# Patient Record
Sex: FEMALE | Race: WHITE | NOT HISPANIC OR LATINO | Employment: OTHER | URBAN - NONMETROPOLITAN AREA
[De-identification: names, ages, dates, MRNs, and addresses within clinical notes are randomized per-mention and may not be internally consistent; named-entity substitution may affect disease eponyms.]

---

## 2017-07-05 ENCOUNTER — TRANSCRIBE ORDERS (OUTPATIENT)
Dept: PHYSICAL THERAPY | Facility: HOSPITAL | Age: 57
End: 2017-07-05

## 2017-07-05 DIAGNOSIS — I89.0 LYMPHEDEMA: Primary | ICD-10-CM

## 2017-07-12 ENCOUNTER — APPOINTMENT (OUTPATIENT)
Dept: PHYSICAL THERAPY | Facility: HOSPITAL | Age: 57
End: 2017-07-12

## 2017-07-21 ENCOUNTER — APPOINTMENT (OUTPATIENT)
Dept: PHYSICAL THERAPY | Facility: HOSPITAL | Age: 57
End: 2017-07-21

## 2018-01-25 ENCOUNTER — OFFICE VISIT (OUTPATIENT)
Dept: ENDOCRINOLOGY | Facility: CLINIC | Age: 58
End: 2018-01-25

## 2018-01-25 VITALS
HEIGHT: 66 IN | BODY MASS INDEX: 33.38 KG/M2 | DIASTOLIC BLOOD PRESSURE: 70 MMHG | SYSTOLIC BLOOD PRESSURE: 128 MMHG | WEIGHT: 207.7 LBS | HEART RATE: 73 BPM

## 2018-01-25 DIAGNOSIS — E11.69 MIXED DIABETIC HYPERLIPIDEMIA ASSOCIATED WITH TYPE 2 DIABETES MELLITUS (HCC): ICD-10-CM

## 2018-01-25 DIAGNOSIS — E04.0 SIMPLE GOITER: ICD-10-CM

## 2018-01-25 DIAGNOSIS — E11.59 HYPERTENSION ASSOCIATED WITH DIABETES (HCC): ICD-10-CM

## 2018-01-25 DIAGNOSIS — Z79.4 TYPE 2 DIABETES MELLITUS WITH HYPERGLYCEMIA, WITH LONG-TERM CURRENT USE OF INSULIN (HCC): Primary | ICD-10-CM

## 2018-01-25 DIAGNOSIS — I15.2 HYPERTENSION ASSOCIATED WITH DIABETES (HCC): ICD-10-CM

## 2018-01-25 DIAGNOSIS — E78.2 MIXED DIABETIC HYPERLIPIDEMIA ASSOCIATED WITH TYPE 2 DIABETES MELLITUS (HCC): ICD-10-CM

## 2018-01-25 DIAGNOSIS — E11.65 TYPE 2 DIABETES MELLITUS WITH HYPERGLYCEMIA, WITH LONG-TERM CURRENT USE OF INSULIN (HCC): Primary | ICD-10-CM

## 2018-01-25 DIAGNOSIS — Z72.0 TOBACCO ABUSE DISORDER: ICD-10-CM

## 2018-01-25 PROBLEM — N18.2 CKD (CHRONIC KIDNEY DISEASE), STAGE II: Status: ACTIVE | Noted: 2018-01-25

## 2018-01-25 PROBLEM — C43.71 MELANOMA OF LOWER LEG, RIGHT: Status: ACTIVE | Noted: 2018-01-25

## 2018-01-25 PROBLEM — E11.42 DIABETIC POLYNEUROPATHY ASSOCIATED WITH TYPE 2 DIABETES MELLITUS: Status: ACTIVE | Noted: 2018-01-25

## 2018-01-25 PROBLEM — C43.71 MELANOMA OF LOWER LEG, RIGHT (HCC): Status: RESOLVED | Noted: 2018-01-25 | Resolved: 2018-01-25

## 2018-01-25 PROBLEM — E11.3593 PROLIFERATIVE DIABETIC RETINOPATHY OF BOTH EYES ASSOCIATED WITH TYPE 2 DIABETES MELLITUS (HCC): Status: ACTIVE | Noted: 2018-01-25

## 2018-01-25 LAB — GLUCOSE BLDC GLUCOMTR-MCNC: 199 MG/DL (ref 70–130)

## 2018-01-25 PROCEDURE — 82962 GLUCOSE BLOOD TEST: CPT | Performed by: INTERNAL MEDICINE

## 2018-01-25 PROCEDURE — 99205 OFFICE O/P NEW HI 60 MIN: CPT | Performed by: INTERNAL MEDICINE

## 2018-01-25 PROCEDURE — 99406 BEHAV CHNG SMOKING 3-10 MIN: CPT | Performed by: INTERNAL MEDICINE

## 2018-01-25 RX ORDER — PIOGLITAZONEHYDROCHLORIDE 15 MG/1
15 TABLET ORAL DAILY
Qty: 30 TABLET | Refills: 11 | Status: SHIPPED | OUTPATIENT
Start: 2018-01-25 | End: 2019-01-25

## 2018-01-25 NOTE — PATIENT INSTRUCTIONS
Basaglar 40 units - back off to 35   If your sugar drops more than 30 points when you go without eating, back off 5 units    ==========      On lasix 20 mg daily - hold and replace with jardiance but you may use lasix if you swell    ============    Start actos 15 mg daily     Prevents strokes but can cause swelling     ============    Start victoza 0.6 mg daily for 1 week then 1.2 mg daily   Control appetite   Releases your own insulin     -----------------      Humalog 12 units at 150  And 3 per 50 above 150     Start with 2 units per 15 grams    + sliding scale as you are doing 3 units per 50 above 150

## 2018-01-25 NOTE — PROGRESS NOTES
Milagros Parson is a 57 y.o. female who presents for  evaluation of   Chief Complaint   Patient presents with   • Diabetes       Referring provider    Abigail Nicholas, APRN  [address] [place], [place] [address]    Primary Care Provider    Krish Brown MD    Duration 15 years    Timing - Diabetes is Constant    Quality -  needs improvement    Severity -  severe    Complications -    CVA in 2016    + neuropathy  + retinopathy  + nephropathy       Current symptoms/problems  increase appetite, paresthesia of the feet, polydipsia and polyuria     Alleviating Factors: Compliance      Side Effects  none    Current diet  diabetic    Current exercise none    Current monitoring regimen: home blood tests - 4 times daily  Home blood sugar records:     Hypoglycemia nocturnal    Past Medical History:   Diagnosis Date   • Atrial septal defect, secundum    • Chest pain 11/17/2016   • CKD (chronic kidney disease), stage II 1/25/2018   • Diabetes mellitus     DIABETES , UNCOMPLICATED, TYPE II    • Diabetic polyneuropathy associated with type 2 diabetes mellitus 1/25/2018   • HLD (hyperlipidemia) 11/17/2016   • HTN (hypertension) 11/17/2016   • Hyperlipidemia, mixed    • Hypertension associated with diabetes 1/25/2018   • Hypertension, benign    • Melanoma of lower leg, right 1/25/2018   • Mixed diabetic hyperlipidemia associated with type 2 diabetes mellitus 1/25/2018   • Nonspecific chest pain    • Proliferative diabetic retinopathy of both eyes associated with type 2 diabetes mellitus 1/25/2018   • Type 2 diabetes mellitus with hyperglycemia, with long-term current use of insulin 1/25/2018     Family History   Problem Relation Age of Onset   • Heart disease Father      Social History   Substance Use Topics   • Smoking status: Current Every Day Smoker     Packs/day: 1.00   • Smokeless tobacco: Never Used   • Alcohol use No         Current Outpatient Prescriptions:   •  aspirin 81 MG EC tablet, Take 81 mg by mouth  Daily., Disp: , Rfl:   •  choline fenofibrate (TRILIPIX) 45 MG capsule, Take 1 capsule by mouth Daily., Disp: 30 capsule, Rfl: 11  •  dexlansoprazole (DEXILANT) 60 MG capsule, Take 60 mg by mouth Daily., Disp: , Rfl:   •  estrogens, conjugated,-methyltestosterone (ESTRATEST HS) 0.625-1.25 MG per tablet, Take 1 tablet by mouth Daily., Disp: , Rfl:   •  furosemide (LASIX) 20 MG tablet, Take 20 mg by mouth 2 (Two) Times a Day., Disp: , Rfl:   •  gabapentin (NEURONTIN) 300 MG capsule, Take 300 mg by mouth 3 (Three) Times a Day., Disp: , Rfl:   •  HYDROcodone-acetaminophen (VICODIN) 7.5-500 MG per tablet, Take 1 tablet by mouth Every 6 (Six) Hours As Needed for moderate pain (4-6)., Disp: , Rfl:   •  lisinopril (PRINIVIL,ZESTRIL) 10 MG tablet, Take 10 mg by mouth Daily., Disp: , Rfl:   •  ondansetron ODT (ZOFRAN-ODT) 4 MG disintegrating tablet, Take 1 tablet by mouth 4 (Four) Times a Day As Needed for nausea or vomiting., Disp: 20 tablet, Rfl: 0  •  promethazine (PHENERGAN) 25 MG tablet, Take 25 mg by mouth Every 6 (Six) Hours As Needed for nausea or vomiting., Disp: , Rfl:   •  azithromycin (ZITHROMAX) 250 MG tablet, Take 1 tablet by mouth Daily., Disp: 6 tablet, Rfl: 0  •  Empagliflozin (JARDIANCE) 10 MG tablet, Take 1 tablet by mouth Daily. Before bkfast, Disp: 30 tablet, Rfl: 11  •  insulin aspart (NOVOLOG FLEXPEN) 100 UNIT/ML solution pen-injector sc pen, Up to 20 units with meals, Disp: 6 pen, Rfl: 11  •  Insulin Glargine (BASAGLAR KWIKPEN) 100 UNIT/ML injection pen, 35 units qhs, Disp: 4 pen, Rfl: 11  •  Insulin Pen Needle (B-D UF III MINI PEN NEEDLES) 31G X 5 MM misc, Use 5 times daily, Disp: 150 each, Rfl: 11  •  Liraglutide (VICTOZA) 18 MG/3ML solution pen-injector injection, 0.6 mg daily for 1 week then 1.2 mg subcutaneously daily, Disp: 2 pen, Rfl: 11  •  pioglitazone (ACTOS) 15 MG tablet, Take 1 tablet by mouth Daily., Disp: 30 tablet, Rfl: 11    Review of Systems    Review of Systems   Constitutional:  "Positive for fatigue. Negative for activity change, appetite change, chills, diaphoresis, fever and unexpected weight change.   HENT: Negative for congestion, dental problem, drooling, ear discharge, ear pain, facial swelling, mouth sores, postnasal drip, rhinorrhea, sinus pressure, sore throat, tinnitus, trouble swallowing and voice change.    Eyes: Negative for photophobia, pain, discharge, redness, itching and visual disturbance.   Respiratory: Negative for apnea, cough, choking, chest tightness, shortness of breath, wheezing and stridor.    Cardiovascular: Negative for chest pain, palpitations and leg swelling.   Gastrointestinal: Negative for abdominal distention, abdominal pain, constipation, diarrhea, nausea and vomiting.   Endocrine: Positive for polydipsia, polyphagia and polyuria. Negative for cold intolerance and heat intolerance.   Genitourinary: Negative for decreased urine volume, difficulty urinating, dysuria, flank pain, frequency, hematuria and urgency.   Musculoskeletal: Negative for arthralgias, back pain, gait problem, joint swelling, myalgias, neck pain and neck stiffness.   Skin: Negative for color change, pallor, rash and wound.   Allergic/Immunologic: Negative for immunocompromised state.   Neurological: Positive for weakness. Negative for dizziness, tremors, seizures, syncope, facial asymmetry, speech difficulty, light-headedness, numbness and headaches.   Hematological: Negative for adenopathy.   Psychiatric/Behavioral: Negative for agitation, behavioral problems, confusion, decreased concentration, dysphoric mood, hallucinations, self-injury, sleep disturbance and suicidal ideas. The patient is not nervous/anxious and is not hyperactive.         Objective:   /70 (BP Location: Right arm, Patient Position: Sitting, Cuff Size: Large Adult)  Pulse 73  Ht 167.6 cm (65.98\")  Wt 94.2 kg (207 lb 11.2 oz)  BMI 33.54 kg/m2    Physical Exam   Constitutional: She is oriented to person, " place, and time. She appears well-developed.   HENT:   Head: Normocephalic.   Right Ear: External ear normal.   Left Ear: External ear normal.   Nose: Nose normal.   Eyes: Conjunctivae and EOM are normal. No scleral icterus.   Neck: Normal range of motion. Neck supple. No tracheal deviation present. Thyromegaly present.   Cardiovascular: Normal rate, regular rhythm, normal heart sounds and intact distal pulses.  Exam reveals no gallop and no friction rub.    No murmur heard.  Pulmonary/Chest: Effort normal and breath sounds normal. No stridor. No respiratory distress. She has no wheezes. She has no rales. She exhibits no tenderness.   Abdominal: Soft. Bowel sounds are normal. She exhibits no distension and no mass. There is no tenderness. There is no rebound and no guarding.   Musculoskeletal: Normal range of motion. She exhibits no tenderness or deformity.   Lymphadenopathy:     She has no cervical adenopathy.   Neurological: She is alert and oriented to person, place, and time. She displays normal reflexes. She exhibits normal muscle tone. Coordination normal.   Skin: No rash noted. No erythema. No pallor.   Psychiatric: She has a normal mood and affect. Her behavior is normal. Judgment and thought content normal.       Lab Review    Results for orders placed or performed in visit on 01/25/18   POC Glucose Fingerstick   Result Value Ref Range    Glucose 199 (A) 70 - 130 mg/dL           Assessment/Plan       ICD-10-CM ICD-9-CM   1. Type 2 diabetes mellitus with hyperglycemia, with long-term current use of insulin E11.65 250.00    Z79.4 790.29     V58.67   2. Hypertension associated with diabetes E11.59 250.80    I10 401.9   3. Mixed diabetic hyperlipidemia associated with type 2 diabetes mellitus E11.69 250.80    E78.2 272.2   4. Simple goiter E04.0 240.0   5. Tobacco abuse disorder Z72.0 305.1       Glycemic Management:   No results found for: HGBA1C  Lab Results   Component Value Date    GLUCOSE 253 (H)  11/29/2016    BUN 20 11/29/2016    CREATININE 1.42 (H) 11/29/2016    EGFRIFNONA 38 (L) 11/29/2016    BCR 14.1 11/29/2016    K 4.0 11/29/2016    CO2 31.0 11/29/2016    CALCIUM 9.5 11/29/2016    ALBUMIN 4.00 11/29/2016    LABIL2 1.1 11/29/2016    AST 31 11/29/2016    ALT 33 11/29/2016    ANIONGAP 12.0 11/29/2016     Lab Results   Component Value Date    WBC 9.28 11/29/2016    HGB 12.3 11/29/2016    HCT 35.9 (L) 11/29/2016    MCV 90.2 11/29/2016     11/29/2016       Basaglar 40 units - decrease to 35  If your sugar drops more than 30 points when you go without eating, back off 5 units    ==========      On lasix 20 mg daily - hold and replace with jardiance     ============    Start actos 15 mg daily     ============    Humalog 12 units at 150  And 3 per 50 above 150     Change to 2 per carb and 2 per 50    ============    Add victoza      Lipid Management  No results found for: CHOL  No results found for: TRIG  No results found for: HDL  No results found for: LDLCALC  No results found for: LDL  No results found for: LDLDIRECT    Statin?    Blood Pressure Management:    Vitals:    01/25/18 1050   BP: 128/70   Pulse: 73     Lab Results   Component Value Date    GLUCOSE 253 (H) 11/29/2016    CALCIUM 9.5 11/29/2016     11/29/2016    K 4.0 11/29/2016    CO2 31.0 11/29/2016    CL 94 (L) 11/29/2016    BUN 20 11/29/2016    CREATININE 1.42 (H) 11/29/2016    EGFRIFNONA 38 (L) 11/29/2016    BCR 14.1 11/29/2016    ANIONGAP 12.0 11/29/2016       Controlled on lisinopril mediated regimen         Microvascular Complication Monitoring:    -----------    Last Microalbumin-Proteinuria Assessment    No results found for: MALBCRERATIO    No results found for: UTPCR    -----------      Neuropathy, none     Tobacco Use     I advised the patient of the risks in continuing to use tobacco, and I provided this patient with smoking cessation educational materials.  I also discussed how to quit smoking and the patient has expressed  the willingness to quit.      During this visit, I spent 3-10 mintues counseling the patient regarding smoking cessation.           Weight Related:   Wt Readings from Last 3 Encounters:   01/25/18 94.2 kg (207 lb 11.2 oz)   11/29/16 97.5 kg (215 lb)     Body mass index is 33.54 kg/(m^2).        Diet interventions: moderate (500 kCal/d) deficit diet.      Bone Health    Lab Results   Component Value Date    CALCIUM 9.5 11/29/2016       Thyroid Health    No results found for: TSH      Perceived goiter, obtain thyroid US       Other Diabetes Related Aspects       No results found for: [id number]      I reviewed and summarized records from Krish Brown MD from 2017 and I reviewed / ordered labs.     Orders Placed This Encounter   Procedures   • US Thyroid     Scheduling Instructions:      Do at [facility] in [place], [place]     Order Specific Question:   Reason for Exam:     Answer:   goiter   • POC Glucose Fingerstick         A copy of my note was sent to Krish Brown MD    Please see my above opinion and suggestions.       MDM  Number of Diagnoses or Management Options  Hypertension associated with diabetes: new, needed workup  Mixed diabetic hyperlipidemia associated with type 2 diabetes mellitus: new, needed workup  Simple goiter: new, needed workup  Tobacco abuse disorder: new, needed workup  Type 2 diabetes mellitus with hyperglycemia, with long-term current use of insulin: new, needed workup     Amount and/or Complexity of Data Reviewed  Clinical lab tests: ordered and reviewed  Tests in the radiology section of CPT®: ordered  Review and summarize past medical records: yes    Risk of Complications, Morbidity, and/or Mortality  Presenting problems: moderate  Diagnostic procedures: minimal  Management options: moderate

## 2018-01-26 ENCOUNTER — TELEPHONE (OUTPATIENT)
Dept: ENDOCRINOLOGY | Facility: CLINIC | Age: 58
End: 2018-01-26

## 2018-01-26 PROBLEM — Z72.0 TOBACCO ABUSE DISORDER: Status: ACTIVE | Noted: 2018-01-26

## 2018-01-26 PROBLEM — E04.0 SIMPLE GOITER: Status: ACTIVE | Noted: 2018-01-26

## 2018-01-26 RX ORDER — INSULIN GLARGINE 100 [IU]/ML
INJECTION, SOLUTION SUBCUTANEOUS
Qty: 4 PEN | Refills: 11 | Status: SHIPPED | OUTPATIENT
Start: 2018-01-26 | End: 2020-01-28 | Stop reason: SDUPTHER

## 2018-01-29 ENCOUNTER — TELEPHONE (OUTPATIENT)
Dept: ENDOCRINOLOGY | Facility: CLINIC | Age: 58
End: 2018-01-29

## 2018-02-07 ENCOUNTER — TELEPHONE (OUTPATIENT)
Dept: ENDOCRINOLOGY | Facility: CLINIC | Age: 58
End: 2018-02-07

## 2018-02-08 ENCOUNTER — TELEPHONE (OUTPATIENT)
Dept: ENDOCRINOLOGY | Facility: CLINIC | Age: 58
End: 2018-02-08

## 2018-02-08 NOTE — TELEPHONE ENCOUNTER
jardiance is approved until further notice    actos requires step therapy of metformin which pt can not tolerate.

## 2018-02-15 ENCOUNTER — TELEPHONE (OUTPATIENT)
Dept: ENDOCRINOLOGY | Facility: CLINIC | Age: 58
End: 2018-02-15

## 2018-03-08 ENCOUNTER — TELEPHONE (OUTPATIENT)
Dept: ENDOCRINOLOGY | Facility: CLINIC | Age: 58
End: 2018-03-08

## 2018-04-13 ENCOUNTER — TELEPHONE (OUTPATIENT)
Dept: ENDOCRINOLOGY | Facility: CLINIC | Age: 58
End: 2018-04-13

## 2018-04-13 ENCOUNTER — TELEPHONE (OUTPATIENT)
Dept: FAMILY MEDICINE CLINIC | Facility: CLINIC | Age: 58
End: 2018-04-13

## 2018-04-13 ENCOUNTER — OFFICE VISIT (OUTPATIENT)
Dept: ENDOCRINOLOGY | Facility: CLINIC | Age: 58
End: 2018-04-13

## 2018-04-13 VITALS
HEART RATE: 66 BPM | BODY MASS INDEX: 33.91 KG/M2 | WEIGHT: 211 LBS | DIASTOLIC BLOOD PRESSURE: 70 MMHG | HEIGHT: 66 IN | SYSTOLIC BLOOD PRESSURE: 142 MMHG

## 2018-04-13 DIAGNOSIS — I15.2 HYPERTENSION ASSOCIATED WITH DIABETES (HCC): ICD-10-CM

## 2018-04-13 DIAGNOSIS — E11.69 MIXED DIABETIC HYPERLIPIDEMIA ASSOCIATED WITH TYPE 2 DIABETES MELLITUS (HCC): ICD-10-CM

## 2018-04-13 DIAGNOSIS — Z79.4 TYPE 2 DIABETES MELLITUS WITH HYPERGLYCEMIA, WITH LONG-TERM CURRENT USE OF INSULIN (HCC): Primary | ICD-10-CM

## 2018-04-13 DIAGNOSIS — E11.65 TYPE 2 DIABETES MELLITUS WITH HYPERGLYCEMIA, WITH LONG-TERM CURRENT USE OF INSULIN (HCC): Primary | ICD-10-CM

## 2018-04-13 DIAGNOSIS — I87.2 VENOUS INSUFFICIENCY OF BOTH LOWER EXTREMITIES: ICD-10-CM

## 2018-04-13 DIAGNOSIS — E78.2 MIXED DIABETIC HYPERLIPIDEMIA ASSOCIATED WITH TYPE 2 DIABETES MELLITUS (HCC): ICD-10-CM

## 2018-04-13 DIAGNOSIS — E11.59 HYPERTENSION ASSOCIATED WITH DIABETES (HCC): ICD-10-CM

## 2018-04-13 DIAGNOSIS — R09.89 WEAK PULSE: ICD-10-CM

## 2018-04-13 DIAGNOSIS — R60.9 SWELLING: Primary | ICD-10-CM

## 2018-04-13 PROCEDURE — 99214 OFFICE O/P EST MOD 30 MIN: CPT | Performed by: NURSE PRACTITIONER

## 2018-04-13 NOTE — TELEPHONE ENCOUNTER
She needs to get from primary doctor     Documentation       You   OWEN Humphrey 34 minutes ago (3:06 PM)      Please advise     Documentation       Deepthi Gunn routed conversation to You 37 minutes ago (3:02 PM)      Milagros Parson [phone]  Deepthi Gunn 39 minutes ago (3:01 PM)      Deepthi Gunn 39 minutes ago (3:01 PM)      MILAGROS PARSON WAS JUST SEEN TODAY ..SHE FORGOT TO ASK IF [facility] WILL GIVE HER A PRESP ANTIBIOTIC FOR HER INF.IN TOOTH..SHE HAS TO GET APPT WITH [facility]...PLEASE SEND PRESP TO [facility] IN [place]. HER# [phone]

## 2018-04-13 NOTE — PROGRESS NOTES
Subjective    Milagros Parson is a 58 y.o. female. she is here today for follow-up.    History of Present Illness       Primary Care Provider     Krish Brown MD     Duration 15 years     Timing - Diabetes is Constant     Quality -  needs improvement     Severity -  severe     Complications -     CVA in 2016     + neuropathy  + retinopathy  + nephropathy         Current symptoms/problems  increase appetite, paresthesia of the feet, polydipsia and polyuria      Alleviating Factors: Compliance       Side Effects  none     Current diet  diabetic     Current exercise none     Current monitoring regimen: home blood tests - 6 times daily  Home blood sugar records:       Am 140 up to 160       Under 200     Having some low sugars     Needs to test more frequently      Hypoglycemia nocturnal      The following portions of the patient's history were reviewed and updated as appropriate:   Past Medical History:   Diagnosis Date   • Atrial septal defect, secundum    • Chest pain 11/17/2016   • CKD (chronic kidney disease), stage II 1/25/2018   • Diabetes mellitus     DIABETES , UNCOMPLICATED, TYPE II    • Diabetic polyneuropathy associated with type 2 diabetes mellitus 1/25/2018   • HLD (hyperlipidemia) 11/17/2016   • HTN (hypertension) 11/17/2016   • Hyperlipidemia, mixed    • Hypertension associated with diabetes 1/25/2018   • Hypertension, benign    • Melanoma of lower leg, right 1/25/2018   • Mixed diabetic hyperlipidemia associated with type 2 diabetes mellitus 1/25/2018   • Nonspecific chest pain    • Proliferative diabetic retinopathy of both eyes associated with type 2 diabetes mellitus 1/25/2018   • Type 2 diabetes mellitus with hyperglycemia, with long-term current use of insulin 1/25/2018     No past surgical history on file.  Family History   Problem Relation Age of Onset   • Heart disease Father      OB History     No data available        Current Outpatient Prescriptions   Medication Sig Dispense Refill    • aspirin 81 MG EC tablet Take 81 mg by mouth Daily.     • choline fenofibrate (TRILIPIX) 45 MG capsule Take 1 capsule by mouth Daily. 30 capsule 11   • dexlansoprazole (DEXILANT) 60 MG capsule Take 60 mg by mouth Daily.     • Empagliflozin (JARDIANCE) 10 MG tablet Take 1 tablet by mouth Daily. Before bkfast 30 tablet 11   • estrogens, conjugated,-methyltestosterone (ESTRATEST HS) 0.625-1.25 MG per tablet Take 1 tablet by mouth Daily.     • furosemide (LASIX) 20 MG tablet Take 20 mg by mouth 2 (Two) Times a Day.     • gabapentin (NEURONTIN) 300 MG capsule Take 300 mg by mouth 3 (Three) Times a Day.     • HYDROcodone-acetaminophen (VICODIN) 7.5-500 MG per tablet Take 1 tablet by mouth Every 6 (Six) Hours As Needed for moderate pain (4-6).     • insulin aspart (NOVOLOG FLEXPEN) 100 UNIT/ML solution pen-injector sc pen Up to 20 units with meals 6 pen 11   • Insulin Glargine (BASAGLAR KWIKPEN) 100 UNIT/ML injection pen 35 units qhs 4 pen 11   • Insulin Pen Needle (B-D UF III MINI PEN NEEDLES) 31G X 5 MM misc Use 5 times daily 150 each 11   • Liraglutide (VICTOZA) 18 MG/3ML solution pen-injector injection 0.6 mg daily for 1 week then 1.2 mg subcutaneously daily 2 pen 11   • lisinopril (PRINIVIL,ZESTRIL) 10 MG tablet Take 10 mg by mouth Daily.     • ondansetron ODT (ZOFRAN-ODT) 4 MG disintegrating tablet Take 1 tablet by mouth 4 (Four) Times a Day As Needed for nausea or vomiting. 20 tablet 0   • pioglitazone (ACTOS) 15 MG tablet Take 1 tablet by mouth Daily. 30 tablet 11   • promethazine (PHENERGAN) 25 MG tablet Take 25 mg by mouth Every 6 (Six) Hours As Needed for nausea or vomiting.       No current facility-administered medications for this visit.      Allergies   Allergen Reactions   • Penicillins      Social History     Social History   • Marital status:      Social History Main Topics   • Smoking status: Current Every Day Smoker     Packs/day: 1.00   • Smokeless tobacco: Never Used   • Alcohol use No   •  "Drug use: No     Other Topics Concern   • Not on file       Review of Systems  Review of Systems   Constitutional: Negative for activity change, appetite change, diaphoresis and fatigue.   HENT: Negative for facial swelling, sneezing, sore throat, tinnitus, trouble swallowing and voice change.    Eyes: Negative for photophobia, pain, discharge, redness, itching and visual disturbance.   Respiratory: Negative for apnea, cough, choking, chest tightness and shortness of breath.    Cardiovascular: Negative for chest pain, palpitations and leg swelling.   Gastrointestinal: Negative for abdominal distention, abdominal pain, constipation, diarrhea, nausea and vomiting.   Endocrine: Negative for cold intolerance, heat intolerance, polydipsia, polyphagia and polyuria.   Genitourinary: Negative for difficulty urinating, dysuria, frequency, hematuria and urgency.   Musculoskeletal: Negative for arthralgias, back pain, gait problem, joint swelling, myalgias, neck pain and neck stiffness.   Skin: Negative for color change, pallor, rash and wound.   Neurological: Negative for dizziness, tremors, weakness, light-headedness, numbness and headaches.   Hematological: Negative for adenopathy. Does not bruise/bleed easily.   Psychiatric/Behavioral: Negative for behavioral problems, confusion and sleep disturbance.        Objective    /70 (BP Location: Left arm, Patient Position: Sitting, Cuff Size: Adult)   Pulse 66   Ht 167.6 cm (66\")   Wt 95.7 kg (211 lb)   BMI 34.06 kg/m²   Physical Exam   Constitutional: She is oriented to person, place, and time. She appears well-developed and well-nourished. No distress.   HENT:   Head: Normocephalic and atraumatic.   Right Ear: External ear normal.   Left Ear: External ear normal.   Nose: Nose normal.   Eyes: Conjunctivae and EOM are normal. Pupils are equal, round, and reactive to light.   Neck: Normal range of motion. Neck supple. No tracheal deviation present. No thyromegaly " present.   Cardiovascular: Normal rate, regular rhythm and normal heart sounds.    No murmur heard.  Pulses palpable but weak   Pulmonary/Chest: Effort normal and breath sounds normal. No respiratory distress. She has no wheezes.   Abdominal: Soft. Bowel sounds are normal. There is no tenderness. There is no rebound and no guarding.   Musculoskeletal: Normal range of motion. She exhibits edema. She exhibits no tenderness or deformity.   Trace edema bilateral   Neurological: She is alert and oriented to person, place, and time. No cranial nerve deficit.   Skin: Skin is warm and dry. No rash noted.   Psychiatric: She has a normal mood and affect. Her behavior is normal. Judgment and thought content normal.       Lab Review  Glucose (mg/dL)   Date Value   11/29/2016 253 (H)   08/21/2016 438 (H)     Sodium (mmol/L)   Date Value   11/29/2016 137   08/21/2016 135   08/21/2016 135     Potassium (mmol/L)   Date Value   11/29/2016 4.0   08/21/2016 4.3     Chloride (mmol/L)   Date Value   11/29/2016 94 (L)   08/21/2016 93 (L)     CO2 (mmol/L)   Date Value   11/29/2016 31.0   08/21/2016 28     BUN (mg/dL)   Date Value   11/29/2016 20   08/21/2016 28 (H)     Creatinine (mg/dL)   Date Value   11/29/2016 1.42 (H)   08/21/2016 1.62 (H)   08/20/2015 1.39       Assessment/Plan      1. Type 2 diabetes mellitus with hyperglycemia, with long-term current use of insulin    2. Hypertension associated with diabetes    3. Mixed diabetic hyperlipidemia associated with type 2 diabetes mellitus    4. Venous insufficiency of both lower extremities    .    Medications prescribed:  Outpatient Encounter Prescriptions as of 4/13/2018   Medication Sig Dispense Refill   • aspirin 81 MG EC tablet Take 81 mg by mouth Daily.     • choline fenofibrate (TRILIPIX) 45 MG capsule Take 1 capsule by mouth Daily. 30 capsule 11   • dexlansoprazole (DEXILANT) 60 MG capsule Take 60 mg by mouth Daily.     • Empagliflozin (JARDIANCE) 10 MG tablet Take 1 tablet by  mouth Daily. Before bkfast 30 tablet 11   • estrogens, conjugated,-methyltestosterone (ESTRATEST HS) 0.625-1.25 MG per tablet Take 1 tablet by mouth Daily.     • furosemide (LASIX) 20 MG tablet Take 20 mg by mouth 2 (Two) Times a Day.     • gabapentin (NEURONTIN) 300 MG capsule Take 300 mg by mouth 3 (Three) Times a Day.     • HYDROcodone-acetaminophen (VICODIN) 7.5-500 MG per tablet Take 1 tablet by mouth Every 6 (Six) Hours As Needed for moderate pain (4-6).     • insulin aspart (NOVOLOG FLEXPEN) 100 UNIT/ML solution pen-injector sc pen Up to 20 units with meals 6 pen 11   • Insulin Glargine (BASAGLAR KWIKPEN) 100 UNIT/ML injection pen 35 units qhs 4 pen 11   • Insulin Pen Needle (B-D UF III MINI PEN NEEDLES) 31G X 5 MM misc Use 5 times daily 150 each 11   • Liraglutide (VICTOZA) 18 MG/3ML solution pen-injector injection 0.6 mg daily for 1 week then 1.2 mg subcutaneously daily 2 pen 11   • lisinopril (PRINIVIL,ZESTRIL) 10 MG tablet Take 10 mg by mouth Daily.     • ondansetron ODT (ZOFRAN-ODT) 4 MG disintegrating tablet Take 1 tablet by mouth 4 (Four) Times a Day As Needed for nausea or vomiting. 20 tablet 0   • pioglitazone (ACTOS) 15 MG tablet Take 1 tablet by mouth Daily. 30 tablet 11   • promethazine (PHENERGAN) 25 MG tablet Take 25 mg by mouth Every 6 (Six) Hours As Needed for nausea or vomiting.     • [DISCONTINUED] azithromycin (ZITHROMAX) 250 MG tablet Take 1 tablet by mouth Daily. 6 tablet 0     No facility-administered encounter medications on file as of 4/13/2018.        Orders placed during this encounter include:  No orders of the defined types were placed in this encounter.    Glycemic Management:   No results found for: HGBA1C        Lab Results   Component Value Date     GLUCOSE 253 (H) 11/29/2016     BUN 20 11/29/2016     CREATININE 1.42 (H) 11/29/2016     EGFRIFNONA 38 (L) 11/29/2016     BCR 14.1 11/29/2016     K 4.0 11/29/2016     CO2 31.0 11/29/2016     CALCIUM 9.5 11/29/2016     ALBUMIN 4.00  11/29/2016     LABIL2 1.1 11/29/2016     AST 31 11/29/2016     ALT 33 11/29/2016     ANIONGAP 12.0 11/29/2016           Lab Results   Component Value Date     WBC 9.28 11/29/2016     HGB 12.3 11/29/2016     HCT 35.9 (L) 11/29/2016     MCV 90.2 11/29/2016      11/29/2016           Needs to check more frequently due to variable blood sugars from 30s up to 400        Basaglar taking 20 up to 25 at night   If your sugar drops more than 30 points when you go without eating, back off 5 units     ==========         jardiance      ============     Start actos 15 mg daily      ============     Novolog  12 units at 150  And 3 per 50 above 150      Change to 2 per carb and 2 per 50     ============      victoza taking         Lipid Management       Statin?     Blood Pressure Management:          Controlled on lisinopril mediated regimen     Lasix was stopped            Microvascular Complication Monitoring:    -----------     Last Microalbumin-Proteinuria Assessment     No results found for: MALBCRERATIO     No results found for: UTPCR     -----------        Neuropathy, none      Tobacco Use      I advised the patient of the risks in continuing to use tobacco, and I provided this patient with smoking cessation educational materials.  I also discussed how to quit smoking and the patient has expressed the willingness to quit.       During this visit, I spent 3-10 mintues counseling the patient regarding smoking cessation.               Weight Related:       Wt Readings from Last 3 Encounters:   01/25/18 94.2 kg (207 lb 11.2 oz)   11/29/16 97.5 kg (215 lb)      Body mass index is 33.54 kg/(m^2).           Diet interventions: moderate (500 kCal/d) deficit diet.        Bone Health           Lab Results   Component Value Date     CALCIUM 9.5 11/29/2016         Thyroid Health     No results found for: TSH        Perceived goiter, obtain thyroid US --- had done at [facility] -- will obtain report        Other Diabetes  Related Aspects         No results found for: [id number]    Refer to vascular at [facility]    Calf pain, swelling legs, weak pulses has been going on for months    Obtain vascular studies          4. Follow-up: Return in about 3 months (around 7/13/2018) for Recheck.

## 2018-04-19 ENCOUNTER — HOSPITAL ENCOUNTER (OUTPATIENT)
Dept: VASCULAR LAB | Age: 58
Discharge: HOME | End: 2018-04-19
Payer: MEDICAID

## 2018-04-19 DIAGNOSIS — M79.89 LEG SWELLING: Primary | ICD-10-CM

## 2018-04-19 PROCEDURE — 93970 EXTREMITY STUDY: CPT

## 2018-05-08 ENCOUNTER — TELEPHONE (OUTPATIENT)
Dept: ENDOCRINOLOGY | Facility: CLINIC | Age: 58
End: 2018-05-08

## 2018-05-08 NOTE — TELEPHONE ENCOUNTER
----- Message from OWEN Humphrey sent at 5/8/2018 10:54 AM CDT -----  She needs to discuss with primary I did not prescribe the neurotin; no evidence of blood clot in legs   ----- Message -----  From: Elina Whittaker MA  Sent: 5/8/2018   9:51 AM  To: OWEN Humphrey    Pt is asking that her neurontin be changed to something stronger or increased.  She takes 300 mg po tid and I have also called for labs from [facility] and vascular test from [facility].

## 2018-08-30 ENCOUNTER — TELEPHONE (OUTPATIENT)
Dept: ENDOCRINOLOGY | Facility: CLINIC | Age: 58
End: 2018-08-30

## 2018-09-20 ENCOUNTER — OFFICE VISIT (OUTPATIENT)
Dept: ENDOCRINOLOGY | Facility: CLINIC | Age: 58
End: 2018-09-20

## 2018-09-20 VITALS
HEIGHT: 66 IN | WEIGHT: 198 LBS | SYSTOLIC BLOOD PRESSURE: 124 MMHG | DIASTOLIC BLOOD PRESSURE: 80 MMHG | HEART RATE: 70 BPM | BODY MASS INDEX: 31.82 KG/M2

## 2018-09-20 DIAGNOSIS — E78.2 MIXED DIABETIC HYPERLIPIDEMIA ASSOCIATED WITH TYPE 2 DIABETES MELLITUS (HCC): ICD-10-CM

## 2018-09-20 DIAGNOSIS — E11.69 MIXED DIABETIC HYPERLIPIDEMIA ASSOCIATED WITH TYPE 2 DIABETES MELLITUS (HCC): ICD-10-CM

## 2018-09-20 DIAGNOSIS — E11.65 TYPE 2 DIABETES MELLITUS WITH HYPERGLYCEMIA, WITH LONG-TERM CURRENT USE OF INSULIN (HCC): Primary | ICD-10-CM

## 2018-09-20 DIAGNOSIS — Z79.4 TYPE 2 DIABETES MELLITUS WITH HYPERGLYCEMIA, WITH LONG-TERM CURRENT USE OF INSULIN (HCC): Primary | ICD-10-CM

## 2018-09-20 DIAGNOSIS — I15.2 HYPERTENSION ASSOCIATED WITH DIABETES (HCC): ICD-10-CM

## 2018-09-20 DIAGNOSIS — E11.59 HYPERTENSION ASSOCIATED WITH DIABETES (HCC): ICD-10-CM

## 2018-09-20 PROCEDURE — 99214 OFFICE O/P EST MOD 30 MIN: CPT | Performed by: NURSE PRACTITIONER

## 2018-09-20 NOTE — PROGRESS NOTES
Subjective    Milagros Parson is a 58 y.o. female. she is here today for follow-up.    History of Present Illness     Primary Care Provider     Krish Brown MD     Duration 15 years     Timing - Diabetes is Constant     Quality -  needs improvement     Severity -  severe     Complications -     CVA in 2016     + neuropathy  + retinopathy  + nephropathy         Current symptoms/problems  increase appetite, paresthesia of the feet, polydipsia and polyuria      Alleviating Factors: Compliance       Side Effects  none     Current diet  diabetic     Current exercise none     Current monitoring regimen: home blood tests - 6 times daily    Home blood sugar records:         Am fasting 130          Hypoglycemia nocturnal      The following portions of the patient's history were reviewed and updated as appropriate:   Past Medical History:   Diagnosis Date   • Atrial septal defect, secundum    • Chest pain 11/17/2016   • CKD (chronic kidney disease), stage II 1/25/2018   • Diabetes mellitus (CMS/[facility])     DIABETES , UNCOMPLICATED, TYPE II    • Diabetic polyneuropathy associated with type 2 diabetes mellitus (CMS/[facility]) 1/25/2018   • HLD (hyperlipidemia) 11/17/2016   • HTN (hypertension) 11/17/2016   • Hyperlipidemia, mixed    • Hypertension associated with diabetes (CMS/[facility]) 1/25/2018   • Hypertension, benign    • Melanoma of lower leg, right (CMS/[facility]) 1/25/2018   • Mixed diabetic hyperlipidemia associated with type 2 diabetes mellitus (CMS/[facility]) 1/25/2018   • Nonspecific chest pain    • Proliferative diabetic retinopathy of both eyes associated with type 2 diabetes mellitus (CMS/[facility]) 1/25/2018   • Type 2 diabetes mellitus with hyperglycemia, with long-term current use of insulin (CMS/[facility]) 1/25/2018     History reviewed. No pertinent surgical history.  Family History   Problem Relation Age of Onset   • Heart disease Father      OB History     No data available        Current Outpatient Prescriptions   Medication Sig  Dispense Refill   • aspirin 81 MG EC tablet Take 81 mg by mouth Daily.     • choline fenofibrate (TRILIPIX) 45 MG capsule Take 1 capsule by mouth Daily. 30 capsule 11   • dexlansoprazole (DEXILANT) 60 MG capsule Take 60 mg by mouth Daily.     • Empagliflozin (JARDIANCE) 10 MG tablet Take 1 tablet by mouth Daily. Before bkfast 30 tablet 11   • estrogens, conjugated,-methyltestosterone (ESTRATEST HS) 0.625-1.25 MG per tablet Take 1 tablet by mouth Daily.     • furosemide (LASIX) 20 MG tablet Take 20 mg by mouth 2 (Two) Times a Day.     • gabapentin (NEURONTIN) 300 MG capsule Take 300 mg by mouth 3 (Three) Times a Day.     • HYDROcodone-acetaminophen (VICODIN) 7.5-500 MG per tablet Take 1 tablet by mouth Every 6 (Six) Hours As Needed for moderate pain (4-6).     • insulin aspart (NOVOLOG FLEXPEN) 100 UNIT/ML solution pen-injector sc pen Up to 20 units with meals 6 pen 11   • Insulin Glargine (BASAGLAR KWIKPEN) 100 UNIT/ML injection pen 35 units qhs 4 pen 11   • Insulin Lispro (ADMELOG SOLOSTAR) 100 UNIT/ML solution pen-injector Inject up to 20 units with each meal 5 pen 5   • Insulin Pen Needle (B-D UF III MINI PEN NEEDLES) 31G X 5 MM misc Use 5 times daily 150 each 11   • Liraglutide (VICTOZA) 18 MG/3ML solution pen-injector injection 0.6 mg daily for 1 week then 1.2 mg subcutaneously daily 2 pen 11   • lisinopril (PRINIVIL,ZESTRIL) 10 MG tablet Take 10 mg by mouth Daily.     • ondansetron ODT (ZOFRAN-ODT) 4 MG disintegrating tablet Take 1 tablet by mouth 4 (Four) Times a Day As Needed for nausea or vomiting. 20 tablet 0   • pioglitazone (ACTOS) 15 MG tablet Take 1 tablet by mouth Daily. 30 tablet 11   • promethazine (PHENERGAN) 25 MG tablet Take 25 mg by mouth Every 6 (Six) Hours As Needed for nausea or vomiting.       No current facility-administered medications for this visit.      Allergies   Allergen Reactions   • Penicillins      Social History     Social History   • Marital status:      Social History  "Main Topics   • Smoking status: Current Every Day Smoker     Packs/day: 1.00   • Smokeless tobacco: Never Used   • Alcohol use No   • Drug use: No     Other Topics Concern   • Not on file       Review of Systems  Review of Systems   Constitutional: Negative for activity change, appetite change, diaphoresis and fatigue.   HENT: Negative for facial swelling, sneezing, sore throat, tinnitus, trouble swallowing and voice change.    Eyes: Negative for photophobia, pain, discharge, redness, itching and visual disturbance.   Respiratory: Negative for apnea, cough, choking, chest tightness and shortness of breath.    Cardiovascular: Negative for chest pain, palpitations and leg swelling.   Gastrointestinal: Negative for abdominal distention, abdominal pain, constipation, diarrhea, nausea and vomiting.   Endocrine: Negative for cold intolerance, heat intolerance, polydipsia, polyphagia and polyuria.   Genitourinary: Negative for difficulty urinating, dysuria, frequency, hematuria and urgency.   Musculoskeletal: Negative for arthralgias, back pain, gait problem, joint swelling, myalgias, neck pain and neck stiffness.   Skin: Negative for color change, pallor, rash and wound.   Neurological: Negative for dizziness, tremors, weakness, light-headedness, numbness and headaches.   Hematological: Negative for adenopathy. Does not bruise/bleed easily.   Psychiatric/Behavioral: Negative for behavioral problems, confusion and sleep disturbance.        Objective    /80 (BP Location: Left arm, Patient Position: Sitting, Cuff Size: Adult)   Pulse 70   Ht 167.6 cm (66\")   Wt 89.8 kg (198 lb)   BMI 31.96 kg/m²   Physical Exam   Constitutional: She is oriented to person, place, and time. She appears well-developed and well-nourished. No distress.   HENT:   Head: Normocephalic and atraumatic.   Right Ear: External ear normal.   Left Ear: External ear normal.   Nose: Nose normal.   Eyes: Pupils are equal, round, and reactive to " light. Conjunctivae and EOM are normal.   Neck: Normal range of motion. Neck supple. No tracheal deviation present. No thyromegaly present.   Cardiovascular: Normal rate, regular rhythm and normal heart sounds.    No murmur heard.  Pulmonary/Chest: Effort normal and breath sounds normal. No respiratory distress. She has no wheezes.   Abdominal: Soft. Bowel sounds are normal. There is no tenderness. There is no rebound and no guarding.   Musculoskeletal: Normal range of motion. She exhibits no edema, tenderness or deformity.    Milagros had a diabetic foot exam performed today.   During the foot exam she had a monofilament test performed.    Neurological Sensory Findings -  Right ankle/foot altered proprioception and left ankle/foot altered proprioception.  Vascular Status -  Her right foot exhibits no edema. Her left foot exhibits no edema.  Skin Integrity  -  She has right heel is dry and cracked.She has left heel dry and cracked..  Neurological: She is alert and oriented to person, place, and time. No cranial nerve deficit.   Skin: Skin is warm and dry. No rash noted.   Psychiatric: She has a normal mood and affect. Her behavior is normal. Judgment and thought content normal.       Lab Review  Glucose (mg/dL)   Date Value   11/29/2016 253 (H)   08/21/2016 438 (H)     Sodium (mmol/L)   Date Value   11/29/2016 137   08/21/2016 135   08/21/2016 135     Potassium (mmol/L)   Date Value   11/29/2016 4.0   08/21/2016 4.3     Chloride (mmol/L)   Date Value   11/29/2016 94 (L)   08/21/2016 93 (L)     CO2 (mmol/L)   Date Value   11/29/2016 31.0   08/21/2016 28     BUN (mg/dL)   Date Value   11/29/2016 20   08/21/2016 28 (H)     Creatinine (mg/dL)   Date Value   11/29/2016 1.42 (H)   08/21/2016 1.62 (H)   08/20/2015 1.39       Assessment/Plan      1. Type 2 diabetes mellitus with hyperglycemia, with long-term current use of insulin (CMS/[facility])    2. Hypertension associated with diabetes (CMS/[facility])    3. Mixed diabetic  hyperlipidemia associated with type 2 diabetes mellitus (CMS/[facility])    .    Medications prescribed:  Outpatient Encounter Prescriptions as of 9/20/2018   Medication Sig Dispense Refill   • aspirin 81 MG EC tablet Take 81 mg by mouth Daily.     • choline fenofibrate (TRILIPIX) 45 MG capsule Take 1 capsule by mouth Daily. 30 capsule 11   • dexlansoprazole (DEXILANT) 60 MG capsule Take 60 mg by mouth Daily.     • Empagliflozin (JARDIANCE) 10 MG tablet Take 1 tablet by mouth Daily. Before bkfast 30 tablet 11   • estrogens, conjugated,-methyltestosterone (ESTRATEST HS) 0.625-1.25 MG per tablet Take 1 tablet by mouth Daily.     • furosemide (LASIX) 20 MG tablet Take 20 mg by mouth 2 (Two) Times a Day.     • gabapentin (NEURONTIN) 300 MG capsule Take 300 mg by mouth 3 (Three) Times a Day.     • HYDROcodone-acetaminophen (VICODIN) 7.5-500 MG per tablet Take 1 tablet by mouth Every 6 (Six) Hours As Needed for moderate pain (4-6).     • insulin aspart (NOVOLOG FLEXPEN) 100 UNIT/ML solution pen-injector sc pen Up to 20 units with meals 6 pen 11   • Insulin Glargine (BASAGLAR KWIKPEN) 100 UNIT/ML injection pen 35 units qhs 4 pen 11   • Insulin Lispro (ADMELOG SOLOSTAR) 100 UNIT/ML solution pen-injector Inject up to 20 units with each meal 5 pen 5   • Insulin Pen Needle (B-D UF III MINI PEN NEEDLES) 31G X 5 MM misc Use 5 times daily 150 each 11   • Liraglutide (VICTOZA) 18 MG/3ML solution pen-injector injection 0.6 mg daily for 1 week then 1.2 mg subcutaneously daily 2 pen 11   • lisinopril (PRINIVIL,ZESTRIL) 10 MG tablet Take 10 mg by mouth Daily.     • ondansetron ODT (ZOFRAN-ODT) 4 MG disintegrating tablet Take 1 tablet by mouth 4 (Four) Times a Day As Needed for nausea or vomiting. 20 tablet 0   • pioglitazone (ACTOS) 15 MG tablet Take 1 tablet by mouth Daily. 30 tablet 11   • promethazine (PHENERGAN) 25 MG tablet Take 25 mg by mouth Every 6 (Six) Hours As Needed for nausea or vomiting.       No facility-administered encounter  medications on file as of 9/20/2018.        Orders placed during this encounter include:  No orders of the defined types were placed in this encounter.    Glycemic Management:              Needs to check more frequently due to variable blood sugars from 30s up to 400           Basaglar has not been taking      ==========         jardiance 10 mg daily -- taking      ============      actos 15 mg daily -- taking   ============     Admelog          12 units at 150  And 3 per 50 above 150       per carb and 2 per 50     ============     victoza 1.2 mg  taking         Lipid Management        Statin?     Blood Pressure Management:          Controlled on lisinopril mediated regimen      Lasix was stopped            Microvascular Complication Monitoring:    -----------     Last Microalbumin-Proteinuria Assessment     No results found for: MALBCRERATIO     No results found for: UTPCR     -----------        Neuropathy, none     RX for diabetic shoes     Follows with      Preventive Care:          Tobacco Use      I advised Milagros of the risks of continuing to use tobacco, and I provided her with tobacco cessation educational materials in the After Visit Summary.     During this visit, I spent less than 3  minutes counseling the patient regarding tobacco cessation.                  Weight Related:       Patient's Body mass index is 31.96 kg/m². BMI is above normal parameters. Recommendations include: educational material.             Diet interventions: moderate (500 kCal/d) deficit diet.        Bone Health               Lab Results   Component Value Date     CALCIUM 9.5 11/29/2016         Thyroid Health      No results found for: TSH          Perceived goiter, obtain thyroid US --- had done at [facility] -- will obtain report        Other Diabetes Related Aspects            4. Follow-up: Return in about 4 months (around 1/20/2019) for Recheck.

## 2018-11-24 ENCOUNTER — APPOINTMENT (OUTPATIENT)
Dept: GENERAL RADIOLOGY | Facility: HOSPITAL | Age: 58
End: 2018-11-24

## 2018-11-24 ENCOUNTER — HOSPITAL ENCOUNTER (EMERGENCY)
Facility: HOSPITAL | Age: 58
Discharge: HOME | End: 2018-11-24
Attending: EMERGENCY MEDICINE | Admitting: EMERGENCY MEDICINE

## 2018-11-24 ENCOUNTER — APPOINTMENT (OUTPATIENT)
Dept: CT IMAGING | Facility: HOSPITAL | Age: 58
End: 2018-11-24

## 2018-11-24 VITALS
WEIGHT: 203.2 LBS | SYSTOLIC BLOOD PRESSURE: 130 MMHG | OXYGEN SATURATION: 97 % | RESPIRATION RATE: 18 BRPM | HEIGHT: 66 IN | TEMPERATURE: 97.5 F | DIASTOLIC BLOOD PRESSURE: 58 MMHG | HEART RATE: 76 BPM | BODY MASS INDEX: 32.66 KG/M2

## 2018-11-24 DIAGNOSIS — R07.89 CHEST WALL PAIN: Primary | ICD-10-CM

## 2018-11-24 LAB
ALBUMIN SERPL-MCNC: 4 G/DL (ref 3.5–5)
ALBUMIN/GLOB SERPL: 1.3 G/DL (ref 1.1–2.5)
ALP SERPL-CCNC: 58 U/L (ref 24–120)
ALT SERPL W P-5'-P-CCNC: 16 U/L (ref 0–54)
ANION GAP SERPL CALCULATED.3IONS-SCNC: 9 MMOL/L (ref 4–13)
AST SERPL-CCNC: 24 U/L (ref 7–45)
BASOPHILS # BLD AUTO: 0.04 10*3/MM3 (ref 0–0.2)
BASOPHILS NFR BLD AUTO: 0.7 % (ref 0–2)
BILIRUB SERPL-MCNC: 0.4 MG/DL (ref 0.1–1)
BUN BLD-MCNC: 19 MG/DL (ref 5–21)
BUN/CREAT SERPL: 12.3 (ref 7–25)
CALCIUM SPEC-SCNC: 9.3 MG/DL (ref 8.4–10.4)
CHLORIDE SERPL-SCNC: 103 MMOL/L (ref 98–110)
CO2 SERPL-SCNC: 30 MMOL/L (ref 24–31)
CREAT BLD-MCNC: 1.54 MG/DL (ref 0.5–1.4)
DEPRECATED RDW RBC AUTO: 43.6 FL (ref 40–54)
EOSINOPHIL # BLD AUTO: 0.2 10*3/MM3 (ref 0–0.7)
EOSINOPHIL NFR BLD AUTO: 3.4 % (ref 0–4)
ERYTHROCYTE [DISTWIDTH] IN BLOOD BY AUTOMATED COUNT: 12.8 % (ref 12–15)
GFR SERPL CREATININE-BSD FRML MDRD: 34 ML/MIN/1.73
GLOBULIN UR ELPH-MCNC: 3.2 GM/DL
GLUCOSE BLD-MCNC: 145 MG/DL (ref 70–100)
HCT VFR BLD AUTO: 39.2 % (ref 37–47)
HGB BLD-MCNC: 12.8 G/DL (ref 12–16)
HOLD SPECIMEN: NORMAL
HOLD SPECIMEN: NORMAL
IMM GRANULOCYTES # BLD: 0.01 10*3/MM3 (ref 0–0.03)
IMM GRANULOCYTES NFR BLD: 0.2 % (ref 0–5)
LYMPHOCYTES # BLD AUTO: 2.25 10*3/MM3 (ref 0.72–4.86)
LYMPHOCYTES NFR BLD AUTO: 38.6 % (ref 15–45)
MCH RBC QN AUTO: 30.3 PG (ref 28–32)
MCHC RBC AUTO-ENTMCNC: 32.7 G/DL (ref 33–36)
MCV RBC AUTO: 92.9 FL (ref 82–98)
MONOCYTES # BLD AUTO: 0.52 10*3/MM3 (ref 0.19–1.3)
MONOCYTES NFR BLD AUTO: 8.9 % (ref 4–12)
NEUTROPHILS # BLD AUTO: 2.81 10*3/MM3 (ref 1.87–8.4)
NEUTROPHILS NFR BLD AUTO: 48.2 % (ref 39–78)
NRBC BLD MANUAL-RTO: 0 /100 WBC (ref 0–0)
PLATELET # BLD AUTO: 192 10*3/MM3 (ref 130–400)
PMV BLD AUTO: 11.5 FL (ref 6–12)
POTASSIUM BLD-SCNC: 4.6 MMOL/L (ref 3.5–5.3)
PROT SERPL-MCNC: 7.2 G/DL (ref 6.3–8.7)
RBC # BLD AUTO: 4.22 10*6/MM3 (ref 4.2–5.4)
SODIUM BLD-SCNC: 142 MMOL/L (ref 135–145)
TROPONIN I SERPL-MCNC: <0.012 NG/ML (ref 0–0.03)
WBC NRBC COR # BLD: 5.83 10*3/MM3 (ref 4.8–10.8)
WHOLE BLOOD HOLD SPECIMEN: NORMAL
WHOLE BLOOD HOLD SPECIMEN: NORMAL

## 2018-11-24 PROCEDURE — 93010 ELECTROCARDIOGRAM REPORT: CPT | Performed by: INTERNAL MEDICINE

## 2018-11-24 PROCEDURE — 71045 X-RAY EXAM CHEST 1 VIEW: CPT

## 2018-11-24 PROCEDURE — 93005 ELECTROCARDIOGRAM TRACING: CPT

## 2018-11-24 PROCEDURE — 71250 CT THORAX DX C-: CPT

## 2018-11-24 PROCEDURE — 99284 EMERGENCY DEPT VISIT MOD MDM: CPT

## 2018-11-24 PROCEDURE — 93005 ELECTROCARDIOGRAM TRACING: CPT | Performed by: EMERGENCY MEDICINE

## 2018-11-24 PROCEDURE — 80053 COMPREHEN METABOLIC PANEL: CPT | Performed by: EMERGENCY MEDICINE

## 2018-11-24 PROCEDURE — 84484 ASSAY OF TROPONIN QUANT: CPT | Performed by: EMERGENCY MEDICINE

## 2018-11-24 PROCEDURE — 85025 COMPLETE CBC W/AUTO DIFF WBC: CPT | Performed by: EMERGENCY MEDICINE

## 2018-11-24 RX ORDER — ASPIRIN 81 MG/1
324 TABLET, CHEWABLE ORAL ONCE
Status: COMPLETED | OUTPATIENT
Start: 2018-11-24 | End: 2018-11-24

## 2018-11-24 RX ORDER — SODIUM CHLORIDE 0.9 % (FLUSH) 0.9 %
10 SYRINGE (ML) INJECTION AS NEEDED
Status: DISCONTINUED | OUTPATIENT
Start: 2018-11-24 | End: 2018-11-24 | Stop reason: HOSPADM

## 2018-11-24 RX ADMIN — ASPIRIN 81 MG 324 MG: 81 TABLET ORAL at 14:44

## 2018-11-24 NOTE — ED NOTES
Pt EKG and Troponin repeats due at 1600. Dr Butterfield stated they did not need repeat these tests and that the patient was ready to be discharged.      Kitty Reynoso, RN  11/24/18 1284

## 2018-11-24 NOTE — ED PROVIDER NOTES
"Subjective   Patient c/o right chest pain for 2 months and getting worse.  Cannot think of anything that makes it worse or better.  Has seen PCP and had CXR that showed nodule and they are trying to get her into pulmonary.  Pain is usually in right upper chest just but also had pain right lower chest under breast this AM so came to ED.  Feels \"pop\" in chest at times and takes while to get things back into position and pain is worse then.        History provided by:  Patient   used: No    Chest Pain   Pain location:  R chest  Pain quality: aching    Pain radiates to:  Does not radiate  Pain severity:  Severe  Onset quality:  Gradual  Duration:  2 months  Timing:  Constant  Progression:  Worsening  Chronicity:  New  Context: not breathing, not drug use, not eating, not intercourse, not lifting, not movement, not raising an arm, not at rest, not stress and not trauma    Relieved by:  Nothing  Worsened by:  Nothing  Ineffective treatments:  None tried  Associated symptoms: no abdominal pain, no AICD problem, no altered mental status, no anorexia, no anxiety, no back pain, no claudication, no cough, no diaphoresis, no dizziness, no dysphagia, no fever, no headache, no heartburn, no lower extremity edema, no nausea, no orthopnea, no palpitations, no PND, no shortness of breath and no vomiting    Risk factors: smoking    Risk factors: no aortic disease, no birth control, no diabetes mellitus, no Eliza-Danlos syndrome, no high cholesterol, no hypertension, no immobilization, not male, not obese and not pregnant        Review of Systems   Constitutional: Negative.  Negative for diaphoresis and fever.   HENT: Negative.  Negative for trouble swallowing.    Respiratory: Negative.  Negative for cough and shortness of breath.    Cardiovascular: Positive for chest pain. Negative for palpitations, orthopnea, claudication and PND.   Gastrointestinal: Negative.  Negative for abdominal pain, anorexia, heartburn, " nausea and vomiting.   Genitourinary: Negative.    Musculoskeletal: Negative.  Negative for back pain.   Neurological: Negative.  Negative for dizziness and headaches.   Hematological: Negative.    Psychiatric/Behavioral: Negative.    All other systems reviewed and are negative.      Past Medical History:   Diagnosis Date   • Atrial septal defect, secundum    • Chest pain 11/17/2016   • CKD (chronic kidney disease), stage II 1/25/2018   • Diabetes mellitus (CMS/HCC)     DIABETES , UNCOMPLICATED, TYPE II    • Diabetic polyneuropathy associated with type 2 diabetes mellitus (CMS/HCC) 1/25/2018   • HLD (hyperlipidemia) 11/17/2016   • HTN (hypertension) 11/17/2016   • Hyperlipidemia, mixed    • Hypertension associated with diabetes (CMS/HCC) 1/25/2018   • Hypertension, benign    • Melanoma of lower leg, right (CMS/HCC) 1/25/2018   • Mixed diabetic hyperlipidemia associated with type 2 diabetes mellitus (CMS/HCC) 1/25/2018   • Nonspecific chest pain    • Proliferative diabetic retinopathy of both eyes associated with type 2 diabetes mellitus (CMS/HCC) 1/25/2018   • Type 2 diabetes mellitus with hyperglycemia, with long-term current use of insulin (CMS/HCC) 1/25/2018       Allergies   Allergen Reactions   • Penicillins Anaphylaxis       No past surgical history on file.    Family History   Problem Relation Age of Onset   • Heart disease Father        Social History     Socioeconomic History   • Marital status:      Spouse name: Not on file   • Number of children: Not on file   • Years of education: Not on file   • Highest education level: Not on file   Tobacco Use   • Smoking status: Current Every Day Smoker     Packs/day: 1.00   • Smokeless tobacco: Never Used   Substance and Sexual Activity   • Alcohol use: No   • Drug use: No       Prior to Admission medications    Medication Sig Start Date End Date Taking? Authorizing Provider   aspirin 81 MG EC tablet Take 81 mg by mouth Daily.    Provider, MD Grace    choline fenofibrate (TRILIPIX) 45 MG capsule Take 1 capsule by mouth Daily. 12/21/16   Israel Tobin MD   dexlansoprazole (DEXILANT) 60 MG capsule Take 60 mg by mouth Daily.    Grace Clancy MD   Empagliflozin (JARDIANCE) 10 MG tablet Take 1 tablet by mouth Daily. Before bkfast 1/25/18   Aleksander Fish MD   estrogens, conjugated,-methyltestosterone (ESTRATEST HS) 0.625-1.25 MG per tablet Take 1 tablet by mouth Daily.    Grace Clancy MD   furosemide (LASIX) 20 MG tablet Take 20 mg by mouth 2 (Two) Times a Day.    Grace Clancy MD   gabapentin (NEURONTIN) 300 MG capsule Take 300 mg by mouth 3 (Three) Times a Day.    Grace Clancy MD   HYDROcodone-acetaminophen (VICODIN) 7.5-500 MG per tablet Take 1 tablet by mouth Every 6 (Six) Hours As Needed for moderate pain (4-6).    Grace Clancy MD   insulin aspart (NOVOLOG FLEXPEN) 100 UNIT/ML solution pen-injector sc pen Up to 20 units with meals 1/26/18   Aleksander Fish MD   Insulin Glargine (BASAGLAR KWIKPEN) 100 UNIT/ML injection pen 35 units qhs 1/26/18   Aleksander Fish MD   Insulin Lispro (ADMELOG SOLOSTAR) 100 UNIT/ML solution pen-injector Inject up to 20 units with each meal 8/30/18   Smooth Plasencia APRN   Insulin Pen Needle (B-D UF III MINI PEN NEEDLES) 31G X 5 MM misc Use 5 times daily 1/25/18   Aleksander Fish MD   Liraglutide (VICTOZA) 18 MG/3ML solution pen-injector injection 0.6 mg daily for 1 week then 1.2 mg subcutaneously daily 1/25/18   Aleksander Fish MD   lisinopril (PRINIVIL,ZESTRIL) 10 MG tablet Take 10 mg by mouth Daily.    Grace Clancy MD   ondansetron ODT (ZOFRAN-ODT) 4 MG disintegrating tablet Take 1 tablet by mouth 4 (Four) Times a Day As Needed for nausea or vomiting. 11/30/16   Farhan Thompson MD   pioglitazone (ACTOS) 15 MG tablet Take 1 tablet by mouth Daily. 1/25/18 1/25/19  Aleksander Fish MD   promethazine (PHENERGAN)  25 MG tablet Take 25 mg by mouth Every 6 (Six) Hours As Needed for nausea or vomiting.    Provider, MD Grace       Medications   sodium chloride 0.9 % flush 10 mL (not administered)   aspirin chewable tablet 324 mg (324 mg Oral Given 11/24/18 1444)       Vitals:    11/24/18 1307   BP: 131/77   Pulse: 66   Resp: 17   Temp: 97.6 °F (36.4 °C)   SpO2: 99%         Objective   Physical Exam   Constitutional: She is oriented to person, place, and time. She appears well-developed and well-nourished.   HENT:   Head: Normocephalic and atraumatic.   Neck: Normal range of motion. Neck supple.   Cardiovascular: Normal rate and regular rhythm.   Pulmonary/Chest: Effort normal and breath sounds normal. She exhibits tenderness.   Tender to palpation in chest wall in midline just below clavicle.   Abdominal: Soft. Bowel sounds are normal.   Musculoskeletal: Normal range of motion.   Neurological: She is alert and oriented to person, place, and time.   Skin: Skin is warm and dry.   Psychiatric: She has a normal mood and affect. Her behavior is normal.   Nursing note and vitals reviewed.      Procedures         Lab Results (last 24 hours)     Procedure Component Value Units Date/Time    CBC & Differential [[id number]] Collected:  11/24/18 1332    Specimen:  Blood Updated:  11/24/18 1340    Narrative:       The following orders were created for panel order CBC & Differential.  Procedure                               Abnormality         Status                     ---------                               -----------         ------                     CBC Auto Differential[[id number]]        Abnormal            Final result                 Please view results for these tests on the individual orders.    Comprehensive Metabolic Panel [[id number]]  (Abnormal) Collected:  11/24/18 1332    Specimen:  Blood from Arm, Left Updated:  11/24/18 1351     Glucose 145 mg/dL      BUN 19 mg/dL      Creatinine 1.54 mg/dL      Sodium 142 mmol/L       Potassium 4.6 mmol/L      Chloride 103 mmol/L      CO2 30.0 mmol/L      Calcium 9.3 mg/dL      Total Protein 7.2 g/dL      Albumin 4.00 g/dL      ALT (SGPT) 16 U/L      AST (SGOT) 24 U/L      Alkaline Phosphatase 58 U/L      Total Bilirubin 0.4 mg/dL      eGFR Non African Amer 34 mL/min/1.73      Globulin 3.2 gm/dL      A/G Ratio 1.3 g/dL      BUN/Creatinine Ratio 12.3     Anion Gap 9.0 mmol/L     Troponin [[id number]]  (Normal) Collected:  11/24/18 1332    Specimen:  Blood from Arm, Left Updated:  11/24/18 1402     Troponin I <0.012 ng/mL     CBC Auto Differential [[id number]]  (Abnormal) Collected:  11/24/18 1332    Specimen:  Blood from Arm, Left Updated:  11/24/18 1340     WBC 5.83 10*3/mm3      RBC 4.22 10*6/mm3      Hemoglobin 12.8 g/dL      Hematocrit 39.2 %      MCV 92.9 fL      MCH 30.3 pg      MCHC 32.7 g/dL      RDW 12.8 %      RDW-SD 43.6 fl      MPV 11.5 fL      Platelets 192 10*3/mm3      Neutrophil % 48.2 %      Lymphocyte % 38.6 %      Monocyte % 8.9 %      Eosinophil % 3.4 %      Basophil % 0.7 %      Immature Grans % 0.2 %      Neutrophils, Absolute 2.81 10*3/mm3      Lymphocytes, Absolute 2.25 10*3/mm3      Monocytes, Absolute 0.52 10*3/mm3      Eosinophils, Absolute 0.20 10*3/mm3      Basophils, Absolute 0.04 10*3/mm3      Immature Grans, Absolute 0.01 10*3/mm3      nRBC 0.0 /100 WBC           CT Chest Without Contrast   Final Result   1. No acute cardiopulmonary finding on unenhanced CT.    2. ASD closure device present. Scattered coronary artery calcifications.   This report was finalized on 11/24/2018 14:45 by Dr Adina Ballesteros MD.      XR Chest 1 View   Final Result   1. No acute cardiopulmonary findings.   This report was finalized on 11/24/2018 13:44 by Dr Adina Ballesteros MD.          ED Course  ED Course as of Nov 24 1534   Sat Nov 24, 2018   1532 Told patient-that her testing was negative and this seemed to be chest wall pain but nothing life-threatening.  [TR]      ED Course User  Index  [TR] Guanako Butterfield Jr., MD          MDM  Number of Diagnoses or Management Options  Chest wall pain: new and requires workup     Amount and/or Complexity of Data Reviewed  Clinical lab tests: ordered and reviewed  Tests in the radiology section of CPT®: ordered and reviewed  Tests in the medicine section of CPT®: ordered and reviewed    Risk of Complications, Morbidity, and/or Mortality  Presenting problems: moderate  Diagnostic procedures: moderate  Management options: moderate    Patient Progress  Patient progress: stable      Final diagnoses:   Chest wall pain          Guanako Butterfield Jr., MD  11/24/18 1533

## 2019-02-06 ENCOUNTER — TELEPHONE (OUTPATIENT)
Dept: FAMILY MEDICINE CLINIC | Facility: CLINIC | Age: 59
End: 2019-02-06

## 2019-02-06 NOTE — TELEPHONE ENCOUNTER
She has an appt Feb 22 for 4  mo mary carmen-wants to know if she needs to get any lab work done before appt. She can be reached at [phone]- if get voice mail-said to leave message.

## 2019-02-07 DIAGNOSIS — E04.0 SIMPLE GOITER: ICD-10-CM

## 2019-02-07 DIAGNOSIS — E11.59 HYPERTENSION ASSOCIATED WITH DIABETES (HCC): Primary | ICD-10-CM

## 2019-02-07 DIAGNOSIS — E78.2 MIXED DIABETIC HYPERLIPIDEMIA ASSOCIATED WITH TYPE 2 DIABETES MELLITUS (HCC): ICD-10-CM

## 2019-02-07 DIAGNOSIS — I15.2 HYPERTENSION ASSOCIATED WITH DIABETES (HCC): Primary | ICD-10-CM

## 2019-02-07 DIAGNOSIS — Z79.4 TYPE 2 DIABETES MELLITUS WITH HYPERGLYCEMIA, WITH LONG-TERM CURRENT USE OF INSULIN (HCC): ICD-10-CM

## 2019-02-07 DIAGNOSIS — E11.69 MIXED DIABETIC HYPERLIPIDEMIA ASSOCIATED WITH TYPE 2 DIABETES MELLITUS (HCC): ICD-10-CM

## 2019-02-07 DIAGNOSIS — E55.9 VITAMIN D DEFICIENCY: ICD-10-CM

## 2019-02-07 DIAGNOSIS — E11.65 TYPE 2 DIABETES MELLITUS WITH HYPERGLYCEMIA, WITH LONG-TERM CURRENT USE OF INSULIN (HCC): ICD-10-CM

## 2019-02-18 ENCOUNTER — TELEPHONE (OUTPATIENT)
Dept: FAMILY MEDICINE CLINIC | Facility: CLINIC | Age: 59
End: 2019-02-18

## 2019-03-05 ENCOUNTER — TELEPHONE (OUTPATIENT)
Dept: NEUROSURGERY | Age: 59
End: 2019-03-05

## 2019-03-12 ENCOUNTER — TELEPHONE (OUTPATIENT)
Dept: ENDOCRINOLOGY | Facility: CLINIC | Age: 59
End: 2019-03-12

## 2019-03-12 RX ORDER — PIOGLITAZONEHYDROCHLORIDE 15 MG/1
15 TABLET ORAL DAILY
Qty: 30 TABLET | Refills: 5 | Status: SHIPPED | OUTPATIENT
Start: 2019-03-12 | End: 2019-11-26 | Stop reason: SDUPTHER

## 2019-03-12 NOTE — TELEPHONE ENCOUNTER
Pt needs a refill of Actose generic called in, says she has an appt in 2 weeks. She is completely out, she uses [facility] on [address] [phone] in [place]. Thanks!

## 2019-03-20 ENCOUNTER — TELEPHONE (OUTPATIENT)
Dept: NEUROSURGERY | Age: 59
End: 2019-03-20

## 2019-04-10 ENCOUNTER — TELEPHONE (OUTPATIENT)
Dept: NEUROSURGERY | Age: 59
End: 2019-04-10

## 2019-05-01 ENCOUNTER — OFFICE VISIT (OUTPATIENT)
Dept: NEUROSURGERY | Age: 59
End: 2019-05-01
Payer: MEDICAID

## 2019-05-01 ENCOUNTER — TELEPHONE (OUTPATIENT)
Dept: NEUROSURGERY | Age: 59
End: 2019-05-01

## 2019-05-01 VITALS
HEART RATE: 48 BPM | BODY MASS INDEX: 33.27 KG/M2 | HEIGHT: 66 IN | DIASTOLIC BLOOD PRESSURE: 77 MMHG | SYSTOLIC BLOOD PRESSURE: 139 MMHG | WEIGHT: 207 LBS

## 2019-05-01 DIAGNOSIS — M54.50 CHRONIC MIDLINE LOW BACK PAIN WITHOUT SCIATICA: Primary | ICD-10-CM

## 2019-05-01 DIAGNOSIS — M79.89 RIGHT LEG SWELLING: ICD-10-CM

## 2019-05-01 DIAGNOSIS — R26.89 ANTALGIC GAIT: ICD-10-CM

## 2019-05-01 DIAGNOSIS — Z85.820 H/O MALIGNANT MELANOMA: ICD-10-CM

## 2019-05-01 DIAGNOSIS — M25.551 RIGHT HIP PAIN: ICD-10-CM

## 2019-05-01 DIAGNOSIS — G89.29 CHRONIC MIDLINE LOW BACK PAIN WITHOUT SCIATICA: Primary | ICD-10-CM

## 2019-05-01 PROCEDURE — 99204 OFFICE O/P NEW MOD 45 MIN: CPT | Performed by: NURSE PRACTITIONER

## 2019-05-01 PROCEDURE — G8428 CUR MEDS NOT DOCUMENT: HCPCS | Performed by: NURSE PRACTITIONER

## 2019-05-01 PROCEDURE — G8417 CALC BMI ABV UP PARAM F/U: HCPCS | Performed by: NURSE PRACTITIONER

## 2019-05-01 PROCEDURE — 4004F PT TOBACCO SCREEN RCVD TLK: CPT | Performed by: NURSE PRACTITIONER

## 2019-05-01 PROCEDURE — 3017F COLORECTAL CA SCREEN DOC REV: CPT | Performed by: NURSE PRACTITIONER

## 2019-05-01 RX ORDER — ALPRAZOLAM 1 MG/1
1 TABLET ORAL 4 TIMES DAILY PRN
COMMUNITY
Start: 2015-09-10

## 2019-05-01 RX ORDER — PANTOPRAZOLE SODIUM 40 MG/1
40 TABLET, DELAYED RELEASE ORAL DAILY
COMMUNITY
Start: 2015-09-10

## 2019-05-01 RX ORDER — INSULIN LISPRO 100 U/ML
20 INJECTION, SOLUTION SUBCUTANEOUS
COMMUNITY
Start: 2019-02-07

## 2019-05-01 RX ORDER — POTASSIUM CHLORIDE 750 MG/1
10 TABLET, FILM COATED, EXTENDED RELEASE ORAL 2 TIMES DAILY
COMMUNITY
Start: 2019-04-01

## 2019-05-01 RX ORDER — FUROSEMIDE 40 MG/1
20 TABLET ORAL DAILY PRN
COMMUNITY
Start: 2015-09-11

## 2019-05-01 RX ORDER — GABAPENTIN 300 MG/1
300 CAPSULE ORAL 3 TIMES DAILY
COMMUNITY

## 2019-05-01 RX ORDER — LIRAGLUTIDE 6 MG/ML
1.2 INJECTION SUBCUTANEOUS DAILY
COMMUNITY
Start: 2019-01-24

## 2019-05-01 RX ORDER — LISINOPRIL 10 MG/1
10 TABLET ORAL DAILY
COMMUNITY
Start: 2019-04-08

## 2019-05-01 RX ORDER — CLOPIDOGREL BISULFATE 75 MG/1
75 TABLET ORAL DAILY
COMMUNITY
Start: 2019-04-01

## 2019-05-01 RX ORDER — HYDROCODONE BITARTRATE AND ACETAMINOPHEN 10; 325 MG/1; MG/1
1 TABLET ORAL 4 TIMES DAILY PRN
COMMUNITY
Start: 2019-04-25 | End: 2019-11-01

## 2019-05-01 RX ORDER — PIOGLITAZONEHYDROCHLORIDE 15 MG/1
15 TABLET ORAL DAILY
COMMUNITY
Start: 2019-04-05

## 2019-05-01 ASSESSMENT — ENCOUNTER SYMPTOMS
BACK PAIN: 1
PHOTOPHOBIA: 1
SHORTNESS OF BREATH: 1
NAUSEA: 1
SORE THROAT: 1
BLURRED VISION: 1

## 2019-05-01 NOTE — PROGRESS NOTES
[facility]  Office Visit      Chief Complaint   Patient presents with    New Patient    Back Pain     Rt sided LBP    Hip Pain     Rt hip       HISTORY OF PRESENT ILLNESS:    Kristi Gutierrez is a 62 y.o. female with a history of melanoma disabled who presents with low back pain and right hip pain that has been ongoing for about 5 weeks. History of chronic back pain prior to this injury. She reports lifting a filing cabinet the day before the pain started. The pain does not radiate. Her pain is mostly located in the right low back. The patient complains of mild tingling on right toes which could be associated with neuropathy. She states that her pain in the last few weeks has significantly improved. Her pain is not changed when going from a seated to standing position. Her pain is significantly worsened with walking. Her pain is not changed when lying flat. Overall, indicative that the patient does not have a mechanical nature to their pain. She also states that about 2 months ago she started developing right arm/shoulder pain. She reports that she does have some neck pain. She reports that it is difficult for her to use the right arm due to pain. She does have thumb pain in which she associates with trigger finger/previous surgery. The patient has underwent a non-operative treatment course that has included:  NSAIDs (cannot take due to kidneys)  Gabapentin  Opiates (Norco, Percocet)  Oral Steroids (diabetic)  IM steroid       Of note she does use tobacco and does take blood thinning medications (Plavix). She does have a history of a basal ganglia stroke.                 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 Endo/Heme/Allergies: Bruises/bleeds easily. Psychiatric/Behavioral: Positive for depression and memory loss. The patient is nervous/anxious. PHYSICAL EXAM:  Vitals:    05/01/19 1316   BP: 139/77   Pulse: (!) 48     Constitutional: appears well-developed and well-nourished. Eyes - conjunctiva normal.  Pupils react to light  Ear, nose, throat -hearing intact to finger rub, No scars, masses, or lesions over external nose or ears, no atrophy oftongue  Neck-symmetric, no masses noted, no jugular vein distension  Respiration- chest wall appears symmetric, good expansion, normal effort without use of accessory muscles  Musculoskeletal - no significantwasting of muscles noted, no bony deformities, gait no gross ataxia  Extremities- RLE edema that is chronic due to removal of lymph nodes   Skin - warm, dry, and intact. No rash, erythema, or pallor. Psychiatric - mood, affect, and behavior appear normal.     Neurologic Examination  Awake, Alert and oriented x 4  Normal speech pattern, following commands    Motor:  RIGHT:      iliopsoas 5/5    knee flexor 5/5    knee extension 5/5    EHL/dorsiflexion 5/5    plantar flexion 5/5    LEFT:      iliopsoas 5/5    knee flexor 5/5    knee extension 5/5    EHL/dorsiflexion 5/5    plantar flexion 5/5    Decrease to pinprick sensation patchy loss left hand and RLE  Reflexes are 3+ and symmetric  No clonus or Hoffmans sign  No myofacial tenderness to palpation  Antalgic Gait pattern        DATA and IMAGING:    Nursing/pcp notes, imaging, labs, and vitals reviewed. PT,OT and/or speech notes reviewed    No results found for: WBC, HGB, HCT, MCV, PLTNo results found for: NA, K, CL, CO2, BUN, CREATININE, GLUCOSE, CALCIUM, PROT, LABALBU, BILITOT, ALKPHOS, AST, ALT, LABGLOM, GFRAA, AGRATIO, GLOBNo results found for: INR, PROTIME    No imaging to view at today's appointment. ASSESSMENT:    Jorge Aquino is a 62 y.o. female with complaints of low back pain and right hip pain that has improved over the last few weeks. ICD-10-CM    1. Chronic midline low back pain without sciatica M54.5     G89.29    2. Right hip pain M25.551    3. Antalgic gait R26.89    4. Right leg swelling M79.89    5. H/O Malignant melanoma Z85.820        PLAN:  I discussed with Mrs. Doug Herman that given her pain has significantly improved over the last few weeks and that she is not profoundly weak, we can hold off on obtaining a MRI. She verbalizes understanding and agrees she would like to hold off on additional imaging. I recommend proper body mechanics, massage therapy, PT, etc.    -Follow up as needed           Note: A total of >50% (23 minutes) of 45 minutes was spent discussing the pathophysiology and treatment and/or coordination of care of the above diagnoses.       Rk Arechiga APRN

## 2019-05-01 NOTE — PROGRESS NOTES
Review of Systems   Constitutional: Positive for chills, diaphoresis and malaise/fatigue. HENT: Positive for sore throat. Eyes: Positive for blurred vision and photophobia. Respiratory: Positive for shortness of breath. Cardiovascular: Positive for claudication and leg swelling. Gastrointestinal: Positive for nausea. Genitourinary: Negative. Musculoskeletal: Positive for back pain, joint pain, myalgias and neck pain. Skin: Positive for itching. Neurological: Positive for dizziness, tingling, speech change, focal weakness and headaches. Endo/Heme/Allergies: Bruises/bleeds easily. Psychiatric/Behavioral: Positive for depression and memory loss. The patient is nervous/anxious.

## 2019-05-06 ENCOUNTER — APPOINTMENT (OUTPATIENT)
Dept: LAB | Facility: HOSPITAL | Age: 59
End: 2019-05-06

## 2019-05-06 ENCOUNTER — OFFICE VISIT (OUTPATIENT)
Dept: ENDOCRINOLOGY | Facility: CLINIC | Age: 59
End: 2019-05-06

## 2019-05-06 VITALS
DIASTOLIC BLOOD PRESSURE: 76 MMHG | HEART RATE: 63 BPM | BODY MASS INDEX: 32.14 KG/M2 | WEIGHT: 200 LBS | SYSTOLIC BLOOD PRESSURE: 148 MMHG | HEIGHT: 66 IN

## 2019-05-06 DIAGNOSIS — E04.0 SIMPLE GOITER: ICD-10-CM

## 2019-05-06 DIAGNOSIS — Z72.0 TOBACCO ABUSE DISORDER: ICD-10-CM

## 2019-05-06 DIAGNOSIS — E11.59 HYPERTENSION ASSOCIATED WITH DIABETES (HCC): ICD-10-CM

## 2019-05-06 DIAGNOSIS — Z79.4 TYPE 2 DIABETES MELLITUS WITH HYPERGLYCEMIA, WITH LONG-TERM CURRENT USE OF INSULIN (HCC): Primary | ICD-10-CM

## 2019-05-06 DIAGNOSIS — E78.2 MIXED DIABETIC HYPERLIPIDEMIA ASSOCIATED WITH TYPE 2 DIABETES MELLITUS (HCC): ICD-10-CM

## 2019-05-06 DIAGNOSIS — I15.2 HYPERTENSION ASSOCIATED WITH DIABETES (HCC): ICD-10-CM

## 2019-05-06 DIAGNOSIS — E11.69 MIXED DIABETIC HYPERLIPIDEMIA ASSOCIATED WITH TYPE 2 DIABETES MELLITUS (HCC): ICD-10-CM

## 2019-05-06 DIAGNOSIS — E11.65 TYPE 2 DIABETES MELLITUS WITH HYPERGLYCEMIA, WITH LONG-TERM CURRENT USE OF INSULIN (HCC): Primary | ICD-10-CM

## 2019-05-06 PROCEDURE — 36415 COLL VENOUS BLD VENIPUNCTURE: CPT | Performed by: NURSE PRACTITIONER

## 2019-05-06 PROCEDURE — 80050 GENERAL HEALTH PANEL: CPT | Performed by: NURSE PRACTITIONER

## 2019-05-06 PROCEDURE — 99214 OFFICE O/P EST MOD 30 MIN: CPT | Performed by: NURSE PRACTITIONER

## 2019-05-06 PROCEDURE — 83036 HEMOGLOBIN GLYCOSYLATED A1C: CPT | Performed by: NURSE PRACTITIONER

## 2019-05-06 NOTE — PROGRESS NOTES
Subjective    Milagros Parson is a 59 y.o. female. she is here today for follow-up.    History of Present Illness       Primary Care Provider     Krish Brown MD     Duration 15 years     Timing - Diabetes is Constant     Quality -  needs improvement     Severity -  severe     Complications -     CVA in 2016     + neuropathy  + retinopathy  + nephropathy         Current symptoms/problems  increase appetite, paresthesia of the feet, polydipsia and polyuria      Alleviating Factors: Compliance       Side Effects  none     Current diet  diabetic     Current exercise none     Current monitoring regimen: home blood tests - 6 times daily     Home blood sugar records:       states running good            Hypoglycemia nocturnal          The following portions of the patient's history were reviewed and updated as appropriate:   Past Medical History:   Diagnosis Date   • Atrial septal defect, secundum    • Chest pain 11/17/2016   • CKD (chronic kidney disease), stage II 1/25/2018   • Diabetes mellitus (CMS/[facility])     DIABETES , UNCOMPLICATED, TYPE II    • Diabetic polyneuropathy associated with type 2 diabetes mellitus (CMS/[facility]) 1/25/2018   • HLD (hyperlipidemia) 11/17/2016   • HTN (hypertension) 11/17/2016   • Hyperlipidemia, mixed    • Hypertension associated with diabetes (CMS/[facility]) 1/25/2018   • Hypertension, benign    • Melanoma of lower leg, right (CMS/[facility]) 1/25/2018   • Mixed diabetic hyperlipidemia associated with type 2 diabetes mellitus (CMS/[facility]) 1/25/2018   • Nonspecific chest pain    • Proliferative diabetic retinopathy of both eyes associated with type 2 diabetes mellitus (CMS/[facility]) 1/25/2018   • Type 2 diabetes mellitus with hyperglycemia, with long-term current use of insulin (CMS/[facility]) 1/25/2018     History reviewed. No pertinent surgical history.  Family History   Problem Relation Age of Onset   • Heart disease Father      OB History     No data available        Current Outpatient Medications    Medication Sig Dispense Refill   • aspirin 81 MG EC tablet Take 81 mg by mouth Daily.     • choline fenofibrate (TRILIPIX) 45 MG capsule Take 1 capsule by mouth Daily. 30 capsule 11   • dexlansoprazole (DEXILANT) 60 MG capsule Take 60 mg by mouth Daily.     • Empagliflozin (JARDIANCE) 10 MG tablet Take 1 tablet by mouth Daily. Before bkfast 30 tablet 11   • estrogens, conjugated,-methyltestosterone (ESTRATEST HS) 0.625-1.25 MG per tablet Take 1 tablet by mouth Daily.     • furosemide (LASIX) 20 MG tablet Take 20 mg by mouth 2 (Two) Times a Day.     • gabapentin (NEURONTIN) 300 MG capsule Take 300 mg by mouth 3 (Three) Times a Day.     • HYDROcodone-acetaminophen (VICODIN) 7.5-500 MG per tablet Take 1 tablet by mouth Every 6 (Six) Hours As Needed for moderate pain (4-6).     • insulin aspart (NOVOLOG FLEXPEN) 100 UNIT/ML solution pen-injector sc pen Up to 20 units with meals 6 pen 11   • Insulin Glargine (BASAGLAR KWIKPEN) 100 UNIT/ML injection pen 35 units qhs 4 pen 11   • Insulin Lispro (ADMELOG SOLOSTAR) 100 UNIT/ML solution pen-injector Inject up to 20 units with each meal 5 pen 5   • Insulin Pen Needle (B-D UF III MINI PEN NEEDLES) 31G X 5 MM misc Use 5 times daily 150 each 11   • Liraglutide (VICTOZA) 18 MG/3ML solution pen-injector injection Inject 1.2 mg under the skin into the appropriate area as directed Daily. 0.6 mg daily for 1 week then 1.2 mg subcutaneously daily 4 pen 11   • lisinopril (PRINIVIL,ZESTRIL) 10 MG tablet Take 10 mg by mouth Daily.     • ondansetron ODT (ZOFRAN-ODT) 4 MG disintegrating tablet Take 1 tablet by mouth 4 (Four) Times a Day As Needed for nausea or vomiting. 20 tablet 0   • pioglitazone (ACTOS) 15 MG tablet Take 1 tablet by mouth Daily. 30 tablet 5   • promethazine (PHENERGAN) 25 MG tablet Take 25 mg by mouth Every 6 (Six) Hours As Needed for nausea or vomiting.       No current facility-administered medications for this visit.      Allergies   Allergen Reactions   •  "Penicillins Anaphylaxis     Social History     Socioeconomic History   • Marital status:      Spouse name: Not on file   • Number of children: Not on file   • Years of education: Not on file   • Highest education level: Not on file   Tobacco Use   • Smoking status: Current Every Day Smoker     Packs/day: 1.00   • Smokeless tobacco: Never Used   Substance and Sexual Activity   • Alcohol use: No   • Drug use: No       Review of Systems  Review of Systems   Constitutional: Negative for activity change, appetite change, diaphoresis and fatigue.   HENT: Negative for facial swelling, sneezing, sore throat, tinnitus, trouble swallowing and voice change.    Eyes: Negative for photophobia, pain, discharge, redness, itching and visual disturbance.   Respiratory: Negative for apnea, cough, choking, chest tightness and shortness of breath.    Cardiovascular: Negative for chest pain, palpitations and leg swelling.   Gastrointestinal: Negative for abdominal distention, abdominal pain, constipation, diarrhea, nausea and vomiting.   Endocrine: Negative for cold intolerance, heat intolerance, polydipsia, polyphagia and polyuria.   Genitourinary: Negative for difficulty urinating, dysuria, frequency, hematuria and urgency.   Musculoskeletal: Negative for arthralgias, back pain, gait problem, joint swelling, myalgias, neck pain and neck stiffness.   Skin: Negative for color change, pallor, rash and wound.   Neurological: Negative for dizziness, tremors, weakness, light-headedness, numbness and headaches.   Hematological: Negative for adenopathy. Does not bruise/bleed easily.   Psychiatric/Behavioral: Negative for behavioral problems, confusion and sleep disturbance.        Objective    /76 (BP Location: Left arm, Patient Position: Sitting, Cuff Size: Adult)   Pulse 63   Ht 167.6 cm (66\")   Wt 90.7 kg (200 lb)   BMI 32.28 kg/m²   Physical Exam   Constitutional: She is oriented to person, place, and time. She appears " well-developed and well-nourished. No distress.   HENT:   Head: Normocephalic and atraumatic.   Right Ear: External ear normal.   Left Ear: External ear normal.   Nose: Nose normal.   Eyes: Conjunctivae and EOM are normal. Pupils are equal, round, and reactive to light.   Neck: Normal range of motion. Neck supple. No tracheal deviation present. No thyromegaly present.   Cardiovascular: Normal rate, regular rhythm and normal heart sounds.   No murmur heard.  Pulmonary/Chest: Effort normal and breath sounds normal. No respiratory distress. She has no wheezes.   Abdominal: Soft. Bowel sounds are normal. There is no tenderness. There is no rebound and no guarding.   Musculoskeletal: Normal range of motion. She exhibits no edema, tenderness or deformity.   Left foot bruised, swollen    Neurological: She is alert and oriented to person, place, and time. No cranial nerve deficit.   Skin: Skin is warm and dry. No rash noted.   Psychiatric: She has a normal mood and affect. Her behavior is normal. Judgment and thought content normal.       Lab Review  Glucose (mg/dL)   Date Value   11/24/2018 145 (H)   11/29/2016 253 (H)   08/21/2016 438 (H)     Sodium (mmol/L)   Date Value   11/24/2018 142   11/29/2016 137   08/21/2016 135   08/21/2016 135     Potassium (mmol/L)   Date Value   11/24/2018 4.6   11/29/2016 4.0   08/21/2016 4.3     Chloride (mmol/L)   Date Value   11/24/2018 103   11/29/2016 94 (L)   08/21/2016 93 (L)     CO2 (mmol/L)   Date Value   11/24/2018 30.0   11/29/2016 31.0   08/21/2016 28     BUN (mg/dL)   Date Value   11/24/2018 19   11/29/2016 20   08/21/2016 28 (H)     Creatinine (mg/dL)   Date Value   11/24/2018 1.54 (H)   11/29/2016 1.42 (H)   08/21/2016 1.62 (H)   08/20/2015 1.39       Assessment/Plan      1. Type 2 diabetes mellitus with hyperglycemia, with long-term current use of insulin (CMS/[facility])    2. Hypertension associated with diabetes (CMS/[facility])    3. Mixed diabetic hyperlipidemia associated with type 2  diabetes mellitus (CMS/[facility])    4. Tobacco abuse disorder    5. Simple goiter    .    Medications prescribed:  Outpatient Encounter Medications as of 5/6/2019   Medication Sig Dispense Refill   • aspirin 81 MG EC tablet Take 81 mg by mouth Daily.     • choline fenofibrate (TRILIPIX) 45 MG capsule Take 1 capsule by mouth Daily. 30 capsule 11   • dexlansoprazole (DEXILANT) 60 MG capsule Take 60 mg by mouth Daily.     • Empagliflozin (JARDIANCE) 10 MG tablet Take 1 tablet by mouth Daily. Before bkfast 30 tablet 11   • estrogens, conjugated,-methyltestosterone (ESTRATEST HS) 0.625-1.25 MG per tablet Take 1 tablet by mouth Daily.     • furosemide (LASIX) 20 MG tablet Take 20 mg by mouth 2 (Two) Times a Day.     • gabapentin (NEURONTIN) 300 MG capsule Take 300 mg by mouth 3 (Three) Times a Day.     • HYDROcodone-acetaminophen (VICODIN) 7.5-500 MG per tablet Take 1 tablet by mouth Every 6 (Six) Hours As Needed for moderate pain (4-6).     • insulin aspart (NOVOLOG FLEXPEN) 100 UNIT/ML solution pen-injector sc pen Up to 20 units with meals 6 pen 11   • Insulin Glargine (BASAGLAR KWIKPEN) 100 UNIT/ML injection pen 35 units qhs 4 pen 11   • Insulin Lispro (ADMELOG SOLOSTAR) 100 UNIT/ML solution pen-injector Inject up to 20 units with each meal 5 pen 5   • Insulin Pen Needle (B-D UF III MINI PEN NEEDLES) 31G X 5 MM misc Use 5 times daily 150 each 11   • Liraglutide (VICTOZA) 18 MG/3ML solution pen-injector injection Inject 1.2 mg under the skin into the appropriate area as directed Daily. 0.6 mg daily for 1 week then 1.2 mg subcutaneously daily 4 pen 11   • lisinopril (PRINIVIL,ZESTRIL) 10 MG tablet Take 10 mg by mouth Daily.     • ondansetron ODT (ZOFRAN-ODT) 4 MG disintegrating tablet Take 1 tablet by mouth 4 (Four) Times a Day As Needed for nausea or vomiting. 20 tablet 0   • pioglitazone (ACTOS) 15 MG tablet Take 1 tablet by mouth Daily. 30 tablet 5   • promethazine (PHENERGAN) 25 MG tablet Take 25 mg by mouth Every 6 (Six)  Hours As Needed for nausea or vomiting.     • [DISCONTINUED] Liraglutide (VICTOZA) 18 MG/3ML solution pen-injector injection 0.6 mg daily for 1 week then 1.2 mg subcutaneously daily 2 pen 11     No facility-administered encounter medications on file as of 5/6/2019.        Orders placed during this encounter include:  Orders Placed This Encounter   Procedures   • US Thyroid     Schedule at [facility]     Standing Status:   Future     Standing Expiration Date:   5/6/2020     Order Specific Question:   Reason for Exam:     Answer:   thyroid nodule   • Comprehensive Metabolic Panel   • Hemoglobin A1c   • TSH   • CBC & Differential     Order Specific Question:   Manual Differential     Answer:   No     Glycemic Management:            States at goal            Basaglar has not been taking      ==========         jardiance 10 mg daily -- taking --stopped due to fear of side effects      ============      actos 15 mg daily -- taking   ============     Admelog             12 units at 150  And 3 per 50 above 150       per carb and 2 per 50     ============     victoza 1.2 mg  taking         Lipid Management        Statin?     Blood Pressure Management:          Controlled on lisinopril mediated regimen      Lasix was stopped            Microvascular Complication Monitoring:    -----------     Last Microalbumin-Proteinuria Assessment     No results found for: MALBCRERACHRISTINEO     No results found for: UTPCR     -----------        Neuropathy, none      RX for diabetic shoes      Follows with       Preventive Care:            Tobacco Use      I advised Milagros of the risks of continuing to use tobacco, and I provided her with tobacco cessation educational materials in the After Visit Summary.     During this visit, I spent less than 3  minutes counseling the patient regarding tobacco cessation.                      Weight Related:         Patient's Body mass index is 32.28 kg/m². BMI is above normal parameters.  Recommendations include: educational material.                Diet interventions: moderate (500 kCal/d) deficit diet.        Bone Health               Lab Results   Component Value Date     CALCIUM 9.5 11/29/2016         Thyroid Health                   Perceived goiter, obtain thyroid US --- had done at [facility] -- will obtain report    Thyroid ultrasound ordered today         Other Diabetes Related Aspects             4. Follow-up: Return in about 4 months (around 9/6/2019) for Recheck.

## 2019-05-07 ENCOUNTER — TELEPHONE (OUTPATIENT)
Dept: ENDOCRINOLOGY | Facility: CLINIC | Age: 59
End: 2019-05-07

## 2019-05-07 DIAGNOSIS — R94.6 ABNORMAL THYROID FUNCTION TEST: Primary | ICD-10-CM

## 2019-05-07 LAB
ALBUMIN SERPL-MCNC: 4.4 G/DL (ref 3.5–5.2)
ALBUMIN/GLOB SERPL: 1.5 G/DL
ALP SERPL-CCNC: 70 U/L (ref 39–117)
ALT SERPL W P-5'-P-CCNC: 12 U/L (ref 1–33)
ANION GAP SERPL CALCULATED.3IONS-SCNC: 8.7 MMOL/L
AST SERPL-CCNC: 14 U/L (ref 1–32)
BASOPHILS # BLD AUTO: 0.08 10*3/MM3 (ref 0–0.2)
BASOPHILS NFR BLD AUTO: 1 % (ref 0–1.5)
BILIRUB SERPL-MCNC: 0.3 MG/DL (ref 0.2–1.2)
BUN BLD-MCNC: 22 MG/DL (ref 8–23)
BUN/CREAT SERPL: 17.1 (ref 7–25)
CALCIUM SPEC-SCNC: 9.8 MG/DL (ref 8.6–10.5)
CHLORIDE SERPL-SCNC: 102 MMOL/L (ref 98–107)
CO2 SERPL-SCNC: 28.3 MMOL/L (ref 22–29)
CREAT BLD-MCNC: 1.29 MG/DL (ref 0.57–1)
DEPRECATED RDW RBC AUTO: 43.3 FL (ref 37–54)
EOSINOPHIL # BLD AUTO: 0.17 10*3/MM3 (ref 0–0.4)
EOSINOPHIL NFR BLD AUTO: 2.2 % (ref 0.3–6.2)
ERYTHROCYTE [DISTWIDTH] IN BLOOD BY AUTOMATED COUNT: 12.7 % (ref 12.3–15.4)
GFR SERPL CREATININE-BSD FRML MDRD: 42 ML/MIN/1.73
GLOBULIN UR ELPH-MCNC: 2.9 GM/DL
GLUCOSE BLD-MCNC: 117 MG/DL (ref 65–99)
HBA1C MFR BLD: 6.4 % (ref 4.8–5.6)
HCT VFR BLD AUTO: 40.3 % (ref 34–46.6)
HGB BLD-MCNC: 13.3 G/DL (ref 12–15.9)
IMM GRANULOCYTES # BLD AUTO: 0.02 10*3/MM3 (ref 0–0.05)
IMM GRANULOCYTES NFR BLD AUTO: 0.3 % (ref 0–0.5)
LYMPHOCYTES # BLD AUTO: 2.7 10*3/MM3 (ref 0.7–3.1)
LYMPHOCYTES NFR BLD AUTO: 34.7 % (ref 19.6–45.3)
MCH RBC QN AUTO: 30.6 PG (ref 26.6–33)
MCHC RBC AUTO-ENTMCNC: 33 G/DL (ref 31.5–35.7)
MCV RBC AUTO: 92.6 FL (ref 79–97)
MONOCYTES # BLD AUTO: 0.61 10*3/MM3 (ref 0.1–0.9)
MONOCYTES NFR BLD AUTO: 7.9 % (ref 5–12)
NEUTROPHILS # BLD AUTO: 4.19 10*3/MM3 (ref 1.7–7)
NEUTROPHILS NFR BLD AUTO: 53.9 % (ref 42.7–76)
NRBC BLD AUTO-RTO: 0 /100 WBC (ref 0–0.2)
PLATELET # BLD AUTO: 194 10*3/MM3 (ref 140–450)
PMV BLD AUTO: 12.8 FL (ref 6–12)
POTASSIUM BLD-SCNC: 4.5 MMOL/L (ref 3.5–5.2)
PROT SERPL-MCNC: 7.3 G/DL (ref 6–8.5)
RBC # BLD AUTO: 4.35 10*6/MM3 (ref 3.77–5.28)
SODIUM BLD-SCNC: 139 MMOL/L (ref 136–145)
TSH SERPL DL<=0.05 MIU/L-ACNC: 4.92 MIU/ML (ref 0.27–4.2)
WBC NRBC COR # BLD: 7.77 10*3/MM3 (ref 3.4–10.8)

## 2019-05-07 NOTE — TELEPHONE ENCOUNTER
----- Message from OWEN Humphrey sent at 5/7/2019  7:58 AM CDT -----  thryoid mildly low at 4.9 we don't treat with one abnormal and until 7; she needs to repeat labs in one month we can fax an order to her lab ; also her kidney function is better but she still should not take the jardiance her GFR is 42

## 2019-06-12 ENCOUNTER — TELEPHONE (OUTPATIENT)
Dept: ENDOCRINOLOGY | Facility: CLINIC | Age: 59
End: 2019-06-12

## 2019-06-12 NOTE — TELEPHONE ENCOUNTER
Pt call for results on Thyroid test done by [facility] ordered by [facility]    Please call at [phone]

## 2019-06-13 NOTE — TELEPHONE ENCOUNTER
CLIF STELLA LEFT MESSAGE YESTERDAY FOR RESULTS ..SHE IS AT THE HOSPITAL NOW AND HER PHONE WILL BE ON SILENT..SO PLEASE LEAVE MESSAGE ON THAT NUMBER

## 2019-06-14 ENCOUNTER — TELEPHONE (OUTPATIENT)
Dept: ENDOCRINOLOGY | Facility: CLINIC | Age: 59
End: 2019-06-14

## 2019-06-27 ENCOUNTER — TELEPHONE (OUTPATIENT)
Dept: FAMILY MEDICINE CLINIC | Facility: CLINIC | Age: 59
End: 2019-06-27

## 2019-08-26 ENCOUNTER — APPOINTMENT (OUTPATIENT)
Dept: ULTRASOUND IMAGING | Facility: HOSPITAL | Age: 59
End: 2019-08-26

## 2019-08-26 ENCOUNTER — APPOINTMENT (OUTPATIENT)
Dept: GENERAL RADIOLOGY | Facility: HOSPITAL | Age: 59
End: 2019-08-26

## 2019-08-26 ENCOUNTER — HOSPITAL ENCOUNTER (EMERGENCY)
Facility: HOSPITAL | Age: 59
Discharge: HOME | End: 2019-08-26
Admitting: EMERGENCY MEDICINE

## 2019-08-26 VITALS
BODY MASS INDEX: 32.78 KG/M2 | TEMPERATURE: 97.7 F | HEIGHT: 66 IN | OXYGEN SATURATION: 97 % | SYSTOLIC BLOOD PRESSURE: 109 MMHG | WEIGHT: 204 LBS | HEART RATE: 58 BPM | RESPIRATION RATE: 16 BRPM | DIASTOLIC BLOOD PRESSURE: 61 MMHG

## 2019-08-26 DIAGNOSIS — G89.29 CHRONIC PAIN OF RIGHT UPPER EXTREMITY: Primary | ICD-10-CM

## 2019-08-26 DIAGNOSIS — M79.601 CHRONIC PAIN OF RIGHT UPPER EXTREMITY: Primary | ICD-10-CM

## 2019-08-26 DIAGNOSIS — G89.29 CHRONIC BILATERAL LOW BACK PAIN WITH LEFT-SIDED SCIATICA: ICD-10-CM

## 2019-08-26 DIAGNOSIS — M54.42 CHRONIC BILATERAL LOW BACK PAIN WITH LEFT-SIDED SCIATICA: ICD-10-CM

## 2019-08-26 PROCEDURE — 96372 THER/PROPH/DIAG INJ SC/IM: CPT

## 2019-08-26 PROCEDURE — 99283 EMERGENCY DEPT VISIT LOW MDM: CPT

## 2019-08-26 PROCEDURE — 25010000002 DEXAMETHASONE PER 1 MG: Performed by: EMERGENCY MEDICINE

## 2019-08-26 PROCEDURE — 72110 X-RAY EXAM L-2 SPINE 4/>VWS: CPT

## 2019-08-26 PROCEDURE — 93971 EXTREMITY STUDY: CPT

## 2019-08-26 PROCEDURE — 93970 EXTREMITY STUDY: CPT | Performed by: SURGERY

## 2019-08-26 RX ORDER — ONDANSETRON 4 MG/1
4 TABLET, ORALLY DISINTEGRATING ORAL ONCE
Status: COMPLETED | OUTPATIENT
Start: 2019-08-26 | End: 2019-08-26

## 2019-08-26 RX ORDER — PANTOPRAZOLE SODIUM 40 MG/1
40 TABLET, DELAYED RELEASE ORAL DAILY
COMMUNITY

## 2019-08-26 RX ORDER — HYDROCODONE BITARTRATE AND ACETAMINOPHEN 10; 325 MG/1; MG/1
1 TABLET ORAL EVERY 6 HOURS PRN
COMMUNITY
End: 2022-03-22 | Stop reason: ALTCHOICE

## 2019-08-26 RX ORDER — ALPRAZOLAM 1 MG/1
1 TABLET ORAL 4 TIMES DAILY PRN
COMMUNITY

## 2019-08-26 RX ORDER — METAXALONE 800 MG/1
800 TABLET ORAL 3 TIMES DAILY PRN
Qty: 21 TABLET | Refills: 0 | Status: SHIPPED | OUTPATIENT
Start: 2019-08-26 | End: 2022-06-08

## 2019-08-26 RX ORDER — DEXAMETHASONE SODIUM PHOSPHATE 10 MG/ML
10 INJECTION INTRAMUSCULAR; INTRAVENOUS ONCE
Status: COMPLETED | OUTPATIENT
Start: 2019-08-26 | End: 2019-08-26

## 2019-08-26 RX ORDER — METHYLPREDNISOLONE 4 MG/1
TABLET ORAL
Qty: 21 EACH | Refills: 0 | Status: SHIPPED | OUTPATIENT
Start: 2019-08-26 | End: 2022-06-08

## 2019-08-26 RX ORDER — CLOPIDOGREL BISULFATE 75 MG/1
75 TABLET ORAL DAILY
COMMUNITY

## 2019-08-26 RX ADMIN — ONDANSETRON 4 MG: 4 TABLET, ORALLY DISINTEGRATING ORAL at 14:33

## 2019-08-26 RX ADMIN — HYDROMORPHONE HYDROCHLORIDE 1 MG: 1 INJECTION, SOLUTION INTRAMUSCULAR; INTRAVENOUS; SUBCUTANEOUS at 14:32

## 2019-08-26 RX ADMIN — DEXAMETHASONE SODIUM PHOSPHATE 10 MG: 10 INJECTION INTRAMUSCULAR; INTRAVENOUS at 14:31

## 2019-08-29 ENCOUNTER — TELEPHONE (OUTPATIENT)
Dept: NEUROSURGERY | Age: 59
End: 2019-08-29

## 2019-08-29 NOTE — TELEPHONE ENCOUNTER
Patient left voicemail requesting an appointment after being seen by [facility]. Please call the patient to schedule an appointment with Vikas Perez on a Monday, Wednesday, or Friday. Please inform the patient that she must bring a disk with her XR images.

## 2019-10-03 ENCOUNTER — HOSPITAL ENCOUNTER (EMERGENCY)
Facility: HOSPITAL | Age: 59
Discharge: LEFT BEFORE DISCHARGE | End: 2019-10-03
Admitting: INTERNAL MEDICINE

## 2019-10-03 VITALS
WEIGHT: 204 LBS | OXYGEN SATURATION: 100 % | HEART RATE: 73 BPM | TEMPERATURE: 97.7 F | RESPIRATION RATE: 15 BRPM | SYSTOLIC BLOOD PRESSURE: 163 MMHG | BODY MASS INDEX: 32.78 KG/M2 | DIASTOLIC BLOOD PRESSURE: 77 MMHG | HEIGHT: 66 IN

## 2019-10-03 DIAGNOSIS — Z53.21 ELOPED FROM EMERGENCY DEPARTMENT: Primary | ICD-10-CM

## 2019-10-03 PROCEDURE — 99283 EMERGENCY DEPT VISIT LOW MDM: CPT

## 2019-10-03 PROCEDURE — 96372 THER/PROPH/DIAG INJ SC/IM: CPT

## 2019-10-03 RX ORDER — IBUPROFEN 800 MG/1
800 TABLET ORAL ONCE
Status: DISCONTINUED | OUTPATIENT
Start: 2019-10-03 | End: 2019-10-03

## 2019-10-03 RX ORDER — ONDANSETRON 4 MG/1
4 TABLET, ORALLY DISINTEGRATING ORAL ONCE
Status: COMPLETED | OUTPATIENT
Start: 2019-10-03 | End: 2019-10-03

## 2019-10-03 RX ORDER — KETOROLAC TROMETHAMINE 30 MG/ML
30 INJECTION, SOLUTION INTRAMUSCULAR; INTRAVENOUS ONCE
Status: DISCONTINUED | OUTPATIENT
Start: 2019-10-03 | End: 2019-10-03

## 2019-10-03 RX ORDER — ORPHENADRINE CITRATE 30 MG/ML
60 INJECTION INTRAMUSCULAR; INTRAVENOUS ONCE
Status: DISCONTINUED | OUTPATIENT
Start: 2019-10-03 | End: 2019-10-03

## 2019-10-03 RX ORDER — MORPHINE SULFATE 2 MG/ML
6 INJECTION, SOLUTION INTRAMUSCULAR; INTRAVENOUS ONCE
Status: DISCONTINUED | OUTPATIENT
Start: 2019-10-03 | End: 2019-10-03

## 2019-10-03 RX ADMIN — HYDROMORPHONE HYDROCHLORIDE 1 MG: 1 INJECTION, SOLUTION INTRAMUSCULAR; INTRAVENOUS; SUBCUTANEOUS at 17:53

## 2019-10-03 RX ADMIN — ONDANSETRON 4 MG: 4 TABLET, ORALLY DISINTEGRATING ORAL at 17:53

## 2019-10-04 NOTE — ED PROVIDER NOTES
Subjective   History of Present Illness  59-year-old female presents with a chief complaint of low back pain.  Patient has chronic low back pain and she follows up with neurosurgery pain is been progressively getting worse over the past 2 months.  Patient sees Dr. Petersen.  No fevers nausea vomiting diarrhea no bowel or bladder incontinence.  She has pain that shoots down her right leg.  Review of Systems   All other systems reviewed and are negative.      Past Medical History:   Diagnosis Date   • Atrial septal defect, secundum    • Chest pain 11/17/2016   • CKD (chronic kidney disease), stage II 1/25/2018   • Diabetes mellitus (CMS/[facility])     DIABETES , UNCOMPLICATED, TYPE II    • Diabetic polyneuropathy associated with type 2 diabetes mellitus (CMS/[facility]) 1/25/2018   • HLD (hyperlipidemia) 11/17/2016   • HTN (hypertension) 11/17/2016   • Hyperlipidemia, mixed    • Hypertension associated with diabetes (CMS/[facility]) 1/25/2018   • Hypertension, benign    • Melanoma of lower leg, right (CMS/[facility]) 1/25/2018   • Mixed diabetic hyperlipidemia associated with type 2 diabetes mellitus (CMS/[facility]) 1/25/2018   • Nonspecific chest pain    • Proliferative diabetic retinopathy of both eyes associated with type 2 diabetes mellitus (CMS/[facility]) 1/25/2018   • Stroke (CMS/[facility])    • Type 2 diabetes mellitus with hyperglycemia, with long-term current use of insulin (CMS/[facility]) 1/25/2018       Allergies   Allergen Reactions   • Penicillins Anaphylaxis       Past Surgical History:   Procedure Laterality Date   • ATRIAL SEPTAL DEFECT REPAIR     • CARPAL TUNNEL RELEASE     • FEMORAL LYMPH NODE BIOPSY/EXCISON     • FINGER SURGERY     • HYSTERECTOMY     • OTHER SURGICAL HISTORY      melanoma removed right buttock       Family History   Problem Relation Age of Onset   • Heart disease Father        Social History     Socioeconomic History   • Marital status:      Spouse name: Not on file   • Number of children: Not on file   • Years of education:  Not on file   • Highest education level: Not on file   Tobacco Use   • Smoking status: Current Every Day Smoker     Packs/day: 1.00   • Smokeless tobacco: Never Used   Substance and Sexual Activity   • Alcohol use: No   • Drug use: No           Objective   Physical Exam   Constitutional: She is oriented to person, place, and time. She appears well-developed and well-nourished.   HENT:   Head: Normocephalic and atraumatic.   Eyes: EOM are normal. Pupils are equal, round, and reactive to light.   Neck: Normal range of motion. Neck supple.   Cardiovascular: Normal rate and regular rhythm.   Pulmonary/Chest: Effort normal and breath sounds normal.   Abdominal: Soft. Bowel sounds are normal.   Musculoskeletal: Normal range of motion.   Right sided lumbar tenderness   Neurological: She is alert and oriented to person, place, and time. No cranial nerve deficit. Coordination normal.   Skin: Skin is warm and dry. Capillary refill takes less than 2 seconds.   Psychiatric: She has a normal mood and affect. Her behavior is normal.   Nursing note and vitals reviewed.      Procedures           ED Course                  MDM  Number of Diagnoses or Management Options  Diagnosis management comments: No loss of sensation in lower extremities, patient demonstrates full range of motion.  No loss of strength.  Positive straight leg raise right leg.  Patient had eloped.    Risk of Complications, Morbidity, and/or Mortality  Presenting problems: moderate  Diagnostic procedures: moderate  Management options: moderate    Patient Progress  Patient progress: stable      Final diagnoses:   Eloped from emergency department              Tee Juarez PA-C  10/03/19 1927

## 2019-10-07 ENCOUNTER — OFFICE VISIT (OUTPATIENT)
Dept: NEUROSURGERY | Age: 59
End: 2019-10-07
Payer: MEDICAID

## 2019-10-07 VITALS
HEART RATE: 66 BPM | BODY MASS INDEX: 33.41 KG/M2 | OXYGEN SATURATION: 98 % | SYSTOLIC BLOOD PRESSURE: 132 MMHG | HEIGHT: 66 IN | DIASTOLIC BLOOD PRESSURE: 89 MMHG

## 2019-10-07 DIAGNOSIS — M43.17 SPONDYLOLISTHESIS AT L5-S1 LEVEL: Primary | ICD-10-CM

## 2019-10-07 DIAGNOSIS — M51.36 DDD (DEGENERATIVE DISC DISEASE), LUMBAR: ICD-10-CM

## 2019-10-07 DIAGNOSIS — M43.06 PARS DEFECT OF LUMBAR SPINE: ICD-10-CM

## 2019-10-07 DIAGNOSIS — M51.37 DDD (DEGENERATIVE DISC DISEASE), LUMBOSACRAL: ICD-10-CM

## 2019-10-07 DIAGNOSIS — M79.604 RIGHT LEG PAIN: ICD-10-CM

## 2019-10-07 DIAGNOSIS — G89.29 CHRONIC MIDLINE LOW BACK PAIN WITH RIGHT-SIDED SCIATICA: ICD-10-CM

## 2019-10-07 DIAGNOSIS — M54.41 CHRONIC MIDLINE LOW BACK PAIN WITH RIGHT-SIDED SCIATICA: ICD-10-CM

## 2019-10-07 PROCEDURE — G8427 DOCREV CUR MEDS BY ELIG CLIN: HCPCS | Performed by: NURSE PRACTITIONER

## 2019-10-07 PROCEDURE — G8484 FLU IMMUNIZE NO ADMIN: HCPCS | Performed by: NURSE PRACTITIONER

## 2019-10-07 PROCEDURE — 3017F COLORECTAL CA SCREEN DOC REV: CPT | Performed by: NURSE PRACTITIONER

## 2019-10-07 PROCEDURE — 4004F PT TOBACCO SCREEN RCVD TLK: CPT | Performed by: NURSE PRACTITIONER

## 2019-10-07 PROCEDURE — 99214 OFFICE O/P EST MOD 30 MIN: CPT | Performed by: NURSE PRACTITIONER

## 2019-10-07 PROCEDURE — G8417 CALC BMI ABV UP PARAM F/U: HCPCS | Performed by: NURSE PRACTITIONER

## 2019-10-07 RX ORDER — IBUPROFEN 800 MG/1
TABLET ORAL
COMMUNITY
Start: 2015-09-10 | End: 2019-11-01

## 2019-10-07 RX ORDER — MELOXICAM 7.5 MG/1
TABLET ORAL
COMMUNITY
End: 2019-11-01

## 2019-10-07 RX ORDER — METHYLPREDNISOLONE 4 MG/1
TABLET ORAL
COMMUNITY
Start: 2019-08-26 | End: 2019-11-01

## 2019-10-07 RX ORDER — PROMETHAZINE HYDROCHLORIDE 25 MG/1
TABLET ORAL
COMMUNITY
Start: 2015-09-11 | End: 2019-11-01

## 2019-10-07 RX ORDER — ONDANSETRON 4 MG/1
TABLET, ORALLY DISINTEGRATING ORAL
COMMUNITY
End: 2019-11-01

## 2019-10-07 RX ORDER — SUCRALFATE 1 G/1
TABLET ORAL
COMMUNITY
End: 2019-11-01

## 2019-10-07 RX ORDER — DICLOFENAC SODIUM 75 MG/1
75 TABLET, DELAYED RELEASE ORAL 2 TIMES DAILY
Status: ON HOLD | COMMUNITY
Start: 2019-10-04 | End: 2019-11-13

## 2019-10-07 RX ORDER — BACLOFEN 10 MG/1
10 TABLET ORAL 3 TIMES DAILY
COMMUNITY
Start: 2019-10-04

## 2019-10-07 ASSESSMENT — ENCOUNTER SYMPTOMS
BACK PAIN: 1
GASTROINTESTINAL NEGATIVE: 1
EYES NEGATIVE: 1

## 2019-10-09 ENCOUNTER — HOSPITAL ENCOUNTER (EMERGENCY)
Facility: HOSPITAL | Age: 59
Discharge: HOME | End: 2019-10-09
Admitting: EMERGENCY MEDICINE

## 2019-10-09 VITALS
TEMPERATURE: 98 F | HEIGHT: 66 IN | SYSTOLIC BLOOD PRESSURE: 152 MMHG | OXYGEN SATURATION: 96 % | HEART RATE: 57 BPM | BODY MASS INDEX: 32.74 KG/M2 | WEIGHT: 203.7 LBS | RESPIRATION RATE: 15 BRPM | DIASTOLIC BLOOD PRESSURE: 86 MMHG

## 2019-10-09 DIAGNOSIS — M54.41 CHRONIC RIGHT-SIDED LOW BACK PAIN WITH RIGHT-SIDED SCIATICA: Primary | ICD-10-CM

## 2019-10-09 DIAGNOSIS — R20.2 PARESTHESIAS: ICD-10-CM

## 2019-10-09 DIAGNOSIS — G89.29 CHRONIC RIGHT-SIDED LOW BACK PAIN WITH RIGHT-SIDED SCIATICA: Primary | ICD-10-CM

## 2019-10-09 PROCEDURE — 99282 EMERGENCY DEPT VISIT SF MDM: CPT

## 2019-10-10 NOTE — ED PROVIDER NOTES
"Subjective   Patient is a 59-year-old female that presents to the ER today with complaint of \"I want to be checked to see if of had a stroke \".  The patient reports that she has numbness to the dorsal aspect of her right foot.  She states that today she felt that her toes \"felt weird\".  Patient states that several years ago she had a previous stroke in the past and that this concerned her.  Patient was initially seen at this ER on October 3, 2019.  At that time she was seen for low back pain.  She had reported that she was having pain and numbness to the right leg.  Patient was given pain medication and then eloped prior to work-up being completed.  The patient was then seen at [place] [facility] on October 5, 2019 for the same complaint.  At that time she had a CT scan performed and was advised to follow-up with her neurosurgeon.  The patient was seen by her neurosurgeon at [facility] on October 7, 2019.  I did review that note.  At that time the patient had reported that she was having numbness to the dorsal aspect of the right foot as well.  The patient was advised that they were going to set her up for an MRI and she has a follow-up appoint with him on November 5, 2019.  The patient currently denies any other symptoms at this time.  She states that she normally takes Norco for chronic pain however 3 days ago they changed her Norco to Percocet.  She states that after taking the Percocet she sometimes has problems with her memory.  She is awake alert and oriented at this time.  She is able to answer all my questions appropriately.  She has no weakness to her extremities.  She is neurologically intact and has no focal neurological deficits.  The patient states that tonight after taking a Percocet she got scared and decided to come to the ER for further evaluation.  The patient states that she does have low back pain that radiates down her right leg and into her foot.  She denies any loss of bowel or bladder control, " she denies any saddle anesthesias.        History provided by:  Patient   used: No    Other   Location:  Numbness to dorsal aspect rt foot  Severity:  Mild  Onset quality:  Sudden  Duration:  3 days  Timing:  Constant  Progression:  Unchanged  Chronicity:  New  Associated symptoms: no abdominal pain, no chest pain, no congestion, no cough, no diarrhea, no ear pain, no fatigue, no fever, no headaches, no loss of consciousness, no myalgias, no nausea, no rash, no rhinorrhea, no shortness of breath, no sore throat, no vomiting and no wheezing        Review of Systems   Constitutional: Negative for fatigue and fever.   HENT: Negative for congestion, ear pain, rhinorrhea and sore throat.    Respiratory: Negative for cough, shortness of breath and wheezing.    Cardiovascular: Negative for chest pain.   Gastrointestinal: Negative for abdominal pain, diarrhea, nausea and vomiting.   Musculoskeletal: Negative for myalgias.   Skin: Negative for rash.   Neurological: Negative for loss of consciousness and headaches.   All other systems reviewed and are negative.      Past Medical History:   Diagnosis Date   • Atrial septal defect, secundum    • Chest pain 11/17/2016   • CKD (chronic kidney disease), stage II 1/25/2018   • Diabetes mellitus (CMS/[facility])     DIABETES , UNCOMPLICATED, TYPE II    • Diabetic polyneuropathy associated with type 2 diabetes mellitus (CMS/[facility]) 1/25/2018   • HLD (hyperlipidemia) 11/17/2016   • HTN (hypertension) 11/17/2016   • Hyperlipidemia, mixed    • Hypertension associated with diabetes (CMS/[facility]) 1/25/2018   • Hypertension, benign    • Melanoma of lower leg, right (CMS/[facility]) 1/25/2018   • Mixed diabetic hyperlipidemia associated with type 2 diabetes mellitus (CMS/[facility]) 1/25/2018   • Nonspecific chest pain    • Proliferative diabetic retinopathy of both eyes associated with type 2 diabetes mellitus (CMS/HCC) 1/25/2018   • Stroke (CMS/[facility])    • Type 2 diabetes mellitus with  hyperglycemia, with long-term current use of insulin (CMS/[facility]) 1/25/2018       Allergies   Allergen Reactions   • Penicillins Anaphylaxis       Past Surgical History:   Procedure Laterality Date   • ATRIAL SEPTAL DEFECT REPAIR     • CARPAL TUNNEL RELEASE     • FEMORAL LYMPH NODE BIOPSY/EXCISON     • FINGER SURGERY     • HYSTERECTOMY     • OTHER SURGICAL HISTORY      melanoma removed right buttock       Family History   Problem Relation Age of Onset   • Heart disease Father        Social History     Socioeconomic History   • Marital status:      Spouse name: Not on file   • Number of children: Not on file   • Years of education: Not on file   • Highest education level: Not on file   Tobacco Use   • Smoking status: Current Every Day Smoker     Packs/day: 1.00   • Smokeless tobacco: Never Used   Substance and Sexual Activity   • Alcohol use: No   • Drug use: No           Objective   Physical Exam   Constitutional: She is oriented to person, place, and time. She appears well-developed and well-nourished.   HENT:   Head: Normocephalic and atraumatic.   Eyes: Conjunctivae and EOM are normal. Pupils are equal, round, and reactive to light.   No nystagmus noted   Neck: Normal range of motion.   Cardiovascular: Normal rate, regular rhythm and normal heart sounds.   Pulmonary/Chest: Effort normal and breath sounds normal.   Musculoskeletal:        Arms:  Neurological: She is alert and oriented to person, place, and time.   Pt A&O x 3, speech clear and appropriate  No facial drooping noted, PERRL, EOM intact, no nystagmus noted  Tongue midline  No upper ext drift noted,  strength 5/5 samina, +CMS, neurovascularly intact  Finger to nose test normal  No lower ext drift noted, strength 5/5 samina LE, +CMS, neurovascularly intact, particularly the pt is able to move all toes in flexion and extension without difficulty, proprioception intact, pt reports decreased sensation to dorsal aspect rt foot compared to dorsal aspect lt  foot, sensation normal to lateral and medial sides of rt foot and posterior rt foot   Skin: Skin is warm and dry. Capillary refill takes less than 2 seconds.   Psychiatric: She has a normal mood and affect.   Nursing note and vitals reviewed.      Procedures           ED Course  ED Course as of Oct 10 0058   Thu Oct 10, 2019   0039 I did have a lengthy discussion with the patient regarding her symptoms.  Upon further discussion with the patient she reports that she has had some difficulty with the numbness to the dorsal aspect of her foot for some time and states that she is also had some problems moving the dorsal aspect of her foot extending upwards towards her toes for some time. I advised the pt that this could likely be related to her back problems that she has been having. She also reported that since starting Percocet she has had some troubling remembering some things.   [LF]   0051 I advised her that the percocet could be causing this problem and suggested stopping the medication and going back to the norco that she had previously been using to see if this improved. She did not want to do this as she states that the norco was not controlling her pain well. I suggested that she take 1/2 tablet of percocet to see if this would help instead.   [LF]   0052 I advised the pt that I could not say for sure that she was not having a stroke and that the symptoms that she was having was caused by her back and the medication change. She has a hx of a CVA in the past and I suggested that we do lab work and a CT head to further evaluate her symptoms.   [LF]   0053 The pt tells me that she has had problems with her rt leg and foot for some time due to her back and does not believe that a stroke is causing her symptoms. She states that she thinks the percocet is making her forgetful since this started immediately after she started taking the Percocet and that she thinks this made her scared tonight and that is why she came  to the Er.   [LF]   0055 I offered on three separate occasions to orders labs and a CT scan head to evaluate for a stroke and told the pt that I cannot be sure that she has not had a stroke without doing this. The pt declines and prefers to be DC home with a follow up with her neurosurgeon. At this time she will be DC home with family in stable cond, advised to return to the ER as needed.   [LF]      ED Course User Index  [LF] Aimee Grant, APRN                  MDM  Number of Diagnoses or Management Options  Chronic right-sided low back pain with right-sided sciatica: established and worsening  Paresthesias: new and requires workup  Patient Progress  Patient progress: stable      Final diagnoses:   Chronic right-sided low back pain with right-sided sciatica   Paresthesias              Aimee Grant, APRN  10/10/19 0058

## 2019-10-16 ENCOUNTER — TELEPHONE (OUTPATIENT)
Dept: NEUROSURGERY | Age: 59
End: 2019-10-16

## 2019-10-16 DIAGNOSIS — M54.41 CHRONIC MIDLINE LOW BACK PAIN WITH RIGHT-SIDED SCIATICA: ICD-10-CM

## 2019-10-16 DIAGNOSIS — M51.37 DDD (DEGENERATIVE DISC DISEASE), LUMBOSACRAL: ICD-10-CM

## 2019-10-16 DIAGNOSIS — R26.89 ANTALGIC GAIT: ICD-10-CM

## 2019-10-16 DIAGNOSIS — M79.604 RIGHT LEG PAIN: ICD-10-CM

## 2019-10-16 DIAGNOSIS — M51.36 DDD (DEGENERATIVE DISC DISEASE), LUMBAR: ICD-10-CM

## 2019-10-16 DIAGNOSIS — M43.06 PARS DEFECT OF LUMBAR SPINE: ICD-10-CM

## 2019-10-16 DIAGNOSIS — M43.17 SPONDYLOLISTHESIS AT L5-S1 LEVEL: Primary | ICD-10-CM

## 2019-10-16 DIAGNOSIS — G89.29 CHRONIC MIDLINE LOW BACK PAIN WITH RIGHT-SIDED SCIATICA: ICD-10-CM

## 2019-10-25 ENCOUNTER — HOSPITAL ENCOUNTER (OUTPATIENT)
Dept: MRI IMAGING | Age: 59
Discharge: HOME | End: 2019-10-25
Payer: MEDICAID

## 2019-10-25 ENCOUNTER — HOSPITAL ENCOUNTER (EMERGENCY)
Age: 59
Discharge: HOME | End: 2019-10-25
Payer: MEDICAID

## 2019-10-25 VITALS
TEMPERATURE: 97.6 F | BODY MASS INDEX: 32.95 KG/M2 | SYSTOLIC BLOOD PRESSURE: 145 MMHG | HEIGHT: 66 IN | HEART RATE: 55 BPM | WEIGHT: 205 LBS | RESPIRATION RATE: 20 BRPM | OXYGEN SATURATION: 98 % | DIASTOLIC BLOOD PRESSURE: 75 MMHG

## 2019-10-25 DIAGNOSIS — G89.29 ACUTE EXACERBATION OF CHRONIC LOW BACK PAIN: ICD-10-CM

## 2019-10-25 DIAGNOSIS — M43.17 SPONDYLOLISTHESIS AT L5-S1 LEVEL: Primary | ICD-10-CM

## 2019-10-25 DIAGNOSIS — M43.06 PARS DEFECT OF LUMBAR SPINE: ICD-10-CM

## 2019-10-25 DIAGNOSIS — M51.37 DDD (DEGENERATIVE DISC DISEASE), LUMBOSACRAL: ICD-10-CM

## 2019-10-25 DIAGNOSIS — M79.604 RIGHT LEG PAIN: ICD-10-CM

## 2019-10-25 DIAGNOSIS — M51.36 DDD (DEGENERATIVE DISC DISEASE), LUMBAR: ICD-10-CM

## 2019-10-25 DIAGNOSIS — G89.29 CHRONIC MIDLINE LOW BACK PAIN WITH RIGHT-SIDED SCIATICA: ICD-10-CM

## 2019-10-25 DIAGNOSIS — M54.41 CHRONIC MIDLINE LOW BACK PAIN WITH RIGHT-SIDED SCIATICA: ICD-10-CM

## 2019-10-25 DIAGNOSIS — R26.89 ANTALGIC GAIT: ICD-10-CM

## 2019-10-25 DIAGNOSIS — M54.50 ACUTE EXACERBATION OF CHRONIC LOW BACK PAIN: ICD-10-CM

## 2019-10-25 DIAGNOSIS — M43.17 SPONDYLOLISTHESIS AT L5-S1 LEVEL: ICD-10-CM

## 2019-10-25 PROCEDURE — 96372 THER/PROPH/DIAG INJ SC/IM: CPT

## 2019-10-25 PROCEDURE — 72148 MRI LUMBAR SPINE W/O DYE: CPT

## 2019-10-25 PROCEDURE — 6360000002 HC RX W HCPCS: Performed by: PHYSICIAN ASSISTANT

## 2019-10-25 PROCEDURE — 99283 EMERGENCY DEPT VISIT LOW MDM: CPT

## 2019-10-25 RX ORDER — MEPERIDINE HYDROCHLORIDE 50 MG/ML
25 INJECTION INTRAMUSCULAR; INTRAVENOUS; SUBCUTANEOUS EVERY 4 HOURS PRN
Status: DISCONTINUED | OUTPATIENT
Start: 2019-10-25 | End: 2019-10-25 | Stop reason: HOSPADM

## 2019-10-25 RX ORDER — OXYCODONE HYDROCHLORIDE AND ACETAMINOPHEN 5; 325 MG/1; MG/1
1 TABLET ORAL EVERY 6 HOURS PRN
Qty: 12 TABLET | Refills: 0 | Status: SHIPPED | OUTPATIENT
Start: 2019-10-25 | End: 2019-10-28

## 2019-10-25 RX ADMIN — MEPERIDINE HYDROCHLORIDE 25 MG: 50 INJECTION INTRAMUSCULAR; INTRAVENOUS; SUBCUTANEOUS at 15:32

## 2019-10-25 SDOH — HEALTH STABILITY: MENTAL HEALTH: HOW OFTEN DO YOU HAVE A DRINK CONTAINING ALCOHOL?: NEVER

## 2019-10-25 ASSESSMENT — PAIN DESCRIPTION - LOCATION: LOCATION: BACK

## 2019-10-25 ASSESSMENT — PAIN SCALES - GENERAL
PAINLEVEL_OUTOF10: 9
PAINLEVEL_OUTOF10: 8

## 2019-10-26 ASSESSMENT — ENCOUNTER SYMPTOMS
SHORTNESS OF BREATH: 0
RHINORRHEA: 0
EYE DISCHARGE: 0
ABDOMINAL PAIN: 0
BACK PAIN: 1
COUGH: 0
EYE PAIN: 0
VOMITING: 0
PHOTOPHOBIA: 0
COLOR CHANGE: 0
APNEA: 0
SORE THROAT: 0
NAUSEA: 0
ABDOMINAL DISTENTION: 0

## 2019-10-28 ENCOUNTER — TELEPHONE (OUTPATIENT)
Dept: NEUROSURGERY | Age: 59
End: 2019-10-28

## 2019-10-29 ENCOUNTER — PREP FOR PROCEDURE (OUTPATIENT)
Dept: NEUROSURGERY | Age: 59
End: 2019-10-29

## 2019-10-29 ENCOUNTER — HOSPITAL ENCOUNTER (OUTPATIENT)
Dept: GENERAL RADIOLOGY | Age: 59
Discharge: HOME | End: 2019-10-29
Payer: MEDICAID

## 2019-10-29 ENCOUNTER — OFFICE VISIT (OUTPATIENT)
Dept: NEUROSURGERY | Age: 59
End: 2019-10-29
Payer: MEDICAID

## 2019-10-29 VITALS
HEART RATE: 66 BPM | SYSTOLIC BLOOD PRESSURE: 126 MMHG | WEIGHT: 205 LBS | BODY MASS INDEX: 32.95 KG/M2 | DIASTOLIC BLOOD PRESSURE: 71 MMHG | HEIGHT: 66 IN

## 2019-10-29 DIAGNOSIS — R26.89 ANTALGIC GAIT: ICD-10-CM

## 2019-10-29 DIAGNOSIS — M51.37 DDD (DEGENERATIVE DISC DISEASE), LUMBOSACRAL: ICD-10-CM

## 2019-10-29 DIAGNOSIS — M43.17 SPONDYLOLISTHESIS AT L5-S1 LEVEL: ICD-10-CM

## 2019-10-29 DIAGNOSIS — M79.604 RIGHT LEG PAIN: ICD-10-CM

## 2019-10-29 DIAGNOSIS — M48.061 LUMBAR FORAMINAL STENOSIS: ICD-10-CM

## 2019-10-29 DIAGNOSIS — M43.17 SPONDYLOLISTHESIS AT L5-S1 LEVEL: Primary | ICD-10-CM

## 2019-10-29 DIAGNOSIS — M43.06 PARS DEFECT OF LUMBAR SPINE: ICD-10-CM

## 2019-10-29 PROCEDURE — 72120 X-RAY BEND ONLY L-S SPINE: CPT

## 2019-10-29 PROCEDURE — 4004F PT TOBACCO SCREEN RCVD TLK: CPT | Performed by: NEUROLOGICAL SURGERY

## 2019-10-29 PROCEDURE — G8417 CALC BMI ABV UP PARAM F/U: HCPCS | Performed by: NEUROLOGICAL SURGERY

## 2019-10-29 PROCEDURE — 99215 OFFICE O/P EST HI 40 MIN: CPT | Performed by: NEUROLOGICAL SURGERY

## 2019-10-29 PROCEDURE — G8428 CUR MEDS NOT DOCUMENT: HCPCS | Performed by: NEUROLOGICAL SURGERY

## 2019-10-29 PROCEDURE — G8484 FLU IMMUNIZE NO ADMIN: HCPCS | Performed by: NEUROLOGICAL SURGERY

## 2019-10-29 PROCEDURE — 71046 X-RAY EXAM CHEST 2 VIEWS: CPT

## 2019-10-29 PROCEDURE — 3017F COLORECTAL CA SCREEN DOC REV: CPT | Performed by: NEUROLOGICAL SURGERY

## 2019-10-29 RX ORDER — OXYCODONE AND ACETAMINOPHEN 10; 325 MG/1; MG/1
1 TABLET ORAL EVERY 12 HOURS PRN
Qty: 60 TABLET | Refills: 0 | Status: SHIPPED | OUTPATIENT
Start: 2019-10-29 | End: 2019-11-07 | Stop reason: SDUPTHER

## 2019-10-29 RX ORDER — SODIUM CHLORIDE 0.9 % (FLUSH) 0.9 %
10 SYRINGE (ML) INJECTION PRN
Status: CANCELLED | OUTPATIENT
Start: 2019-10-29

## 2019-10-29 RX ORDER — SODIUM CHLORIDE 0.9 % (FLUSH) 0.9 %
10 SYRINGE (ML) INJECTION EVERY 12 HOURS SCHEDULED
Status: CANCELLED | OUTPATIENT
Start: 2019-10-29

## 2019-10-29 ASSESSMENT — ENCOUNTER SYMPTOMS
NAUSEA: 1
RESPIRATORY NEGATIVE: 1
BACK PAIN: 1
BLURRED VISION: 1

## 2019-11-01 ENCOUNTER — HOSPITAL ENCOUNTER (OUTPATIENT)
Dept: PREADMISSION TESTING | Age: 59
Discharge: HOME | End: 2019-11-05
Payer: MEDICAID

## 2019-11-01 VITALS — HEIGHT: 66 IN | WEIGHT: 205 LBS | BODY MASS INDEX: 32.95 KG/M2

## 2019-11-01 DIAGNOSIS — M43.17 SPONDYLOLISTHESIS AT L5-S1 LEVEL: ICD-10-CM

## 2019-11-01 DIAGNOSIS — M48.061 LUMBAR FORAMINAL STENOSIS: ICD-10-CM

## 2019-11-01 DIAGNOSIS — M43.06 PARS DEFECT OF LUMBAR SPINE: ICD-10-CM

## 2019-11-01 LAB
ALBUMIN SERPL-MCNC: 4.3 G/DL (ref 3.5–5.2)
ALP BLD-CCNC: 72 U/L (ref 35–104)
ALT SERPL-CCNC: 14 U/L (ref 5–33)
ANION GAP SERPL CALCULATED.3IONS-SCNC: 13 MMOL/L (ref 7–19)
APTT: 25.1 SEC (ref 26–36.2)
AST SERPL-CCNC: 13 U/L (ref 5–32)
BASOPHILS ABSOLUTE: 0 K/UL (ref 0–0.2)
BASOPHILS RELATIVE PERCENT: 0.5 % (ref 0–1)
BILIRUB SERPL-MCNC: <0.2 MG/DL (ref 0.2–1.2)
BILIRUBIN URINE: NEGATIVE
BLOOD, URINE: NEGATIVE
BUN BLDV-MCNC: 21 MG/DL (ref 8–23)
CALCIUM SERPL-MCNC: 9.9 MG/DL (ref 8.8–10.2)
CHLORIDE BLD-SCNC: 99 MMOL/L (ref 98–111)
CLARITY: CLEAR
CO2: 28 MMOL/L (ref 22–29)
COLOR: YELLOW
CREAT SERPL-MCNC: 1.3 MG/DL (ref 0.5–0.9)
EOSINOPHILS ABSOLUTE: 0.1 K/UL (ref 0–0.6)
EOSINOPHILS RELATIVE PERCENT: 0.7 % (ref 0–5)
GFR NON-AFRICAN AMERICAN: 42
GLUCOSE BLD-MCNC: 178 MG/DL (ref 74–109)
GLUCOSE URINE: NEGATIVE MG/DL
HCT VFR BLD CALC: 45.2 % (ref 37–47)
HEMOGLOBIN: 14.3 G/DL (ref 12–16)
IMMATURE GRANULOCYTES #: 0.1 K/UL
INR BLD: 0.97 (ref 0.88–1.18)
KETONES, URINE: NEGATIVE MG/DL
LEUKOCYTE ESTERASE, URINE: NEGATIVE
LYMPHOCYTES ABSOLUTE: 2.3 K/UL (ref 1.1–4.5)
LYMPHOCYTES RELATIVE PERCENT: 26.9 % (ref 20–40)
MCH RBC QN AUTO: 31 PG (ref 27–31)
MCHC RBC AUTO-ENTMCNC: 31.6 G/DL (ref 33–37)
MCV RBC AUTO: 97.8 FL (ref 81–99)
MONOCYTES ABSOLUTE: 0.7 K/UL (ref 0–0.9)
MONOCYTES RELATIVE PERCENT: 7.5 % (ref 0–10)
NEUTROPHILS ABSOLUTE: 5.5 K/UL (ref 1.5–7.5)
NEUTROPHILS RELATIVE PERCENT: 63.8 % (ref 50–65)
NITRITE, URINE: NEGATIVE
PDW BLD-RTO: 13.1 % (ref 11.5–14.5)
PH UA: 5.5 (ref 5–8)
PLATELET # BLD: 221 K/UL (ref 130–400)
PMV BLD AUTO: 12.1 FL (ref 9.4–12.3)
POTASSIUM SERPL-SCNC: 4.4 MMOL/L (ref 3.5–5)
PROTEIN UA: NEGATIVE MG/DL
PROTHROMBIN TIME: 12.3 SEC (ref 12–14.6)
RBC # BLD: 4.62 M/UL (ref 4.2–5.4)
SODIUM BLD-SCNC: 140 MMOL/L (ref 136–145)
SPECIFIC GRAVITY UA: 1.02 (ref 1–1.03)
TOTAL PROTEIN: 7.7 G/DL (ref 6.6–8.7)
URINE REFLEX TO CULTURE: NORMAL
UROBILINOGEN, URINE: 0.2 E.U./DL
WBC # BLD: 8.7 K/UL (ref 4.8–10.8)

## 2019-11-01 PROCEDURE — 80053 COMPREHEN METABOLIC PANEL: CPT

## 2019-11-01 PROCEDURE — 85025 COMPLETE CBC W/AUTO DIFF WBC: CPT

## 2019-11-01 PROCEDURE — 81003 URINALYSIS AUTO W/O SCOPE: CPT

## 2019-11-01 PROCEDURE — 85610 PROTHROMBIN TIME: CPT

## 2019-11-01 PROCEDURE — 93005 ELECTROCARDIOGRAM TRACING: CPT | Performed by: NEUROLOGICAL SURGERY

## 2019-11-01 PROCEDURE — 85730 THROMBOPLASTIN TIME PARTIAL: CPT

## 2019-11-01 RX ORDER — DEXAMETHASONE 1 MG
1.5 TABLET ORAL 2 TIMES DAILY WITH MEALS
Status: ON HOLD | COMMUNITY
End: 2019-11-13

## 2019-11-01 RX ORDER — ONDANSETRON 4 MG/1
4 TABLET, FILM COATED ORAL EVERY 8 HOURS PRN
COMMUNITY

## 2019-11-04 ENCOUNTER — TELEPHONE (OUTPATIENT)
Dept: NEUROSURGERY | Age: 59
End: 2019-11-04

## 2019-11-04 LAB
EKG P AXIS: 59 DEGREES
EKG P-R INTERVAL: 154 MS
EKG Q-T INTERVAL: 386 MS
EKG QRS DURATION: 108 MS
EKG QTC CALCULATION (BAZETT): 387 MS
EKG T AXIS: 39 DEGREES

## 2019-11-04 PROCEDURE — 93010 ELECTROCARDIOGRAM REPORT: CPT | Performed by: INTERNAL MEDICINE

## 2019-11-07 DIAGNOSIS — M43.17 SPONDYLOLISTHESIS AT L5-S1 LEVEL: ICD-10-CM

## 2019-11-07 DIAGNOSIS — M48.061 LUMBAR FORAMINAL STENOSIS: ICD-10-CM

## 2019-11-07 RX ORDER — OXYCODONE AND ACETAMINOPHEN 10; 325 MG/1; MG/1
1 TABLET ORAL EVERY 12 HOURS PRN
Qty: 60 TABLET | Refills: 0 | Status: ON HOLD | OUTPATIENT
Start: 2019-11-07 | End: 2019-11-13

## 2019-11-11 ENCOUNTER — TELEPHONE (OUTPATIENT)
Dept: NEUROSURGERY | Age: 59
End: 2019-11-11

## 2019-11-11 DIAGNOSIS — M79.604 RIGHT LEG PAIN: Primary | ICD-10-CM

## 2019-11-11 RX ORDER — OXYCODONE AND ACETAMINOPHEN 10; 325 MG/1; MG/1
1 TABLET ORAL EVERY 4 HOURS PRN
Qty: 120 TABLET | Refills: 0 | Status: SHIPPED | OUTPATIENT
Start: 2019-11-11 | End: 2019-12-11

## 2019-11-12 ENCOUNTER — TELEPHONE (OUTPATIENT)
Dept: NEUROSURGERY | Age: 59
End: 2019-11-12

## 2019-11-12 ENCOUNTER — ANESTHESIA EVENT (OUTPATIENT)
Dept: OPERATING ROOM | Age: 59
End: 2019-11-12
Payer: MEDICAID

## 2019-11-13 ENCOUNTER — HOSPITAL ENCOUNTER (OUTPATIENT)
Age: 59
Setting detail: SURGERY ADMIT
Discharge: HOME | End: 2019-11-13
Attending: NEUROLOGICAL SURGERY | Admitting: NEUROLOGICAL SURGERY
Payer: MEDICAID

## 2019-11-13 ENCOUNTER — ANESTHESIA (OUTPATIENT)
Dept: OPERATING ROOM | Age: 59
End: 2019-11-13
Payer: MEDICAID

## 2019-11-13 ENCOUNTER — APPOINTMENT (OUTPATIENT)
Dept: GENERAL RADIOLOGY | Age: 59
End: 2019-11-13
Attending: NEUROLOGICAL SURGERY
Payer: MEDICAID

## 2019-11-13 VITALS
TEMPERATURE: 98 F | SYSTOLIC BLOOD PRESSURE: 146 MMHG | WEIGHT: 205 LBS | HEART RATE: 78 BPM | OXYGEN SATURATION: 97 % | HEIGHT: 66 IN | RESPIRATION RATE: 18 BRPM | DIASTOLIC BLOOD PRESSURE: 72 MMHG | BODY MASS INDEX: 32.95 KG/M2

## 2019-11-13 LAB
ABO/RH: NORMAL
ANTIBODY SCREEN: NORMAL

## 2019-11-13 PROCEDURE — 86900 BLOOD TYPING SEROLOGIC ABO: CPT

## 2019-11-13 PROCEDURE — 86901 BLOOD TYPING SEROLOGIC RH(D): CPT

## 2019-11-13 PROCEDURE — 86850 RBC ANTIBODY SCREEN: CPT

## 2019-11-13 PROCEDURE — 36415 COLL VENOUS BLD VENIPUNCTURE: CPT

## 2019-11-13 RX ORDER — SCOLOPAMINE TRANSDERMAL SYSTEM 1 MG/1
1 PATCH, EXTENDED RELEASE TRANSDERMAL ONCE
Status: DISCONTINUED | OUTPATIENT
Start: 2019-11-13 | End: 2019-11-13 | Stop reason: HOSPADM

## 2019-11-13 RX ORDER — FENTANYL CITRATE 50 UG/ML
50 INJECTION, SOLUTION INTRAMUSCULAR; INTRAVENOUS
Status: DISCONTINUED | OUTPATIENT
Start: 2019-11-13 | End: 2019-11-13 | Stop reason: HOSPADM

## 2019-11-13 RX ORDER — SODIUM CHLORIDE 0.9 % (FLUSH) 0.9 %
10 SYRINGE (ML) INJECTION EVERY 12 HOURS SCHEDULED
Status: DISCONTINUED | OUTPATIENT
Start: 2019-11-13 | End: 2019-11-13 | Stop reason: HOSPADM

## 2019-11-13 RX ORDER — SODIUM CHLORIDE 0.9 % (FLUSH) 0.9 %
10 SYRINGE (ML) INJECTION PRN
Status: DISCONTINUED | OUTPATIENT
Start: 2019-11-13 | End: 2019-11-13 | Stop reason: HOSPADM

## 2019-11-13 RX ORDER — MIDAZOLAM HYDROCHLORIDE 1 MG/ML
2 INJECTION INTRAMUSCULAR; INTRAVENOUS
Status: DISCONTINUED | OUTPATIENT
Start: 2019-11-13 | End: 2019-11-13 | Stop reason: HOSPADM

## 2019-11-13 RX ORDER — LIDOCAINE HYDROCHLORIDE 10 MG/ML
1 INJECTION, SOLUTION EPIDURAL; INFILTRATION; INTRACAUDAL; PERINEURAL
Status: DISCONTINUED | OUTPATIENT
Start: 2019-11-13 | End: 2019-11-13 | Stop reason: HOSPADM

## 2019-11-13 RX ORDER — MORPHINE SULFATE 4 MG/ML
4 INJECTION, SOLUTION INTRAMUSCULAR; INTRAVENOUS
Status: DISCONTINUED | OUTPATIENT
Start: 2019-11-13 | End: 2019-11-13 | Stop reason: HOSPADM

## 2019-11-13 RX ORDER — SODIUM CHLORIDE, SODIUM LACTATE, POTASSIUM CHLORIDE, CALCIUM CHLORIDE 600; 310; 30; 20 MG/100ML; MG/100ML; MG/100ML; MG/100ML
INJECTION, SOLUTION INTRAVENOUS CONTINUOUS
Status: DISCONTINUED | OUTPATIENT
Start: 2019-11-13 | End: 2019-11-13 | Stop reason: HOSPADM

## 2019-11-13 ASSESSMENT — LIFESTYLE VARIABLES: SMOKING_STATUS: 1

## 2019-11-19 ENCOUNTER — TELEPHONE (OUTPATIENT)
Dept: FAMILY MEDICINE CLINIC | Facility: CLINIC | Age: 59
End: 2019-11-19

## 2019-11-22 ENCOUNTER — TELEPHONE (OUTPATIENT)
Dept: NEUROSURGERY | Age: 59
End: 2019-11-22

## 2019-11-25 ENCOUNTER — TELEPHONE (OUTPATIENT)
Dept: FAMILY MEDICINE CLINIC | Facility: CLINIC | Age: 59
End: 2019-11-25

## 2019-11-25 NOTE — TELEPHONE ENCOUNTER
PT STATES SHE WAS NEEDING JAVID TO CALL IN ONE MORE MONTH OF MEDS UNTIL SHE CAN GET HER BACK SURGERY AND COME IN AND SEE JAVID.    pioglitazone (ACTOS) 15 MG tablet  Insulin Pen Needle (B-D UF III MINI PEN NEEDLES) 31G X 5 MM misc  TEST STRIPS    [facility]

## 2019-11-25 NOTE — TELEPHONE ENCOUNTER
Wanted to check with you before I did anyting. This pt has cancelled 6 times.     (PT STATES SHE WAS NEEDING JAVID TO CALL IN ONE MORE MONTH OF MEDS UNTIL SHE CAN GET HER BACK SURGERY AND COME IN AND SEE JAVID.     pioglitazone (ACTOS) 15 MG tablet  Insulin Pen Needle (B-D UF III MINI PEN NEEDLES) 31G X 5 MM misc  TEST STRIPS     [facility])

## 2019-11-25 NOTE — TELEPHONE ENCOUNTER
This lady has no follow up appt. She must make appt but refills will be called in   Once she makes appt give her enough to make it in office

## 2019-11-26 RX ORDER — PIOGLITAZONEHYDROCHLORIDE 15 MG/1
15 TABLET ORAL DAILY
Qty: 30 TABLET | Refills: 1 | Status: SHIPPED | OUTPATIENT
Start: 2019-11-26 | End: 2020-09-25 | Stop reason: SDUPTHER

## 2019-12-02 ENCOUNTER — TELEPHONE (OUTPATIENT)
Dept: NEUROSURGERY | Age: 59
End: 2019-12-02

## 2019-12-17 ENCOUNTER — TELEPHONE (OUTPATIENT)
Dept: NEUROSURGERY | Age: 59
End: 2019-12-17

## 2020-01-03 ENCOUNTER — TELEPHONE (OUTPATIENT)
Dept: FAMILY MEDICINE CLINIC | Facility: CLINIC | Age: 60
End: 2020-01-03

## 2020-01-03 NOTE — TELEPHONE ENCOUNTER
Pt needs ACTOS called into [facility] on [address] in [place], [place] . Was supposed to have surgery but it was cancelled because of a rash goes back to see the surgeon on 7th and she says she really needs the medicine.Thank You

## 2020-01-07 ENCOUNTER — TELEPHONE (OUTPATIENT)
Dept: NEUROSURGERY | Age: 60
End: 2020-01-07

## 2020-01-08 ENCOUNTER — TELEPHONE (OUTPATIENT)
Dept: NEUROSURGERY | Age: 60
End: 2020-01-08

## 2020-01-08 NOTE — TELEPHONE ENCOUNTER
Patient did not show up for surgery this morning. I attempted to reach out to her to check in but she did not answer. Her wound check has been cancelled at this time.

## 2020-01-28 ENCOUNTER — NURSE TRIAGE (OUTPATIENT)
Dept: CALL CENTER | Facility: HOSPITAL | Age: 60
End: 2020-01-28

## 2020-01-28 ENCOUNTER — OFFICE VISIT (OUTPATIENT)
Dept: ENDOCRINOLOGY | Facility: CLINIC | Age: 60
End: 2020-01-28

## 2020-01-28 ENCOUNTER — LAB (OUTPATIENT)
Dept: LAB | Facility: HOSPITAL | Age: 60
End: 2020-01-28

## 2020-01-28 VITALS
BODY MASS INDEX: 34.42 KG/M2 | WEIGHT: 214.2 LBS | OXYGEN SATURATION: 100 % | HEIGHT: 66 IN | DIASTOLIC BLOOD PRESSURE: 74 MMHG | HEART RATE: 84 BPM | SYSTOLIC BLOOD PRESSURE: 132 MMHG

## 2020-01-28 DIAGNOSIS — R94.6 ABNORMAL THYROID FUNCTION TEST: ICD-10-CM

## 2020-01-28 DIAGNOSIS — I15.2 HYPERTENSION ASSOCIATED WITH DIABETES (HCC): Primary | ICD-10-CM

## 2020-01-28 DIAGNOSIS — E78.2 MIXED DIABETIC HYPERLIPIDEMIA ASSOCIATED WITH TYPE 2 DIABETES MELLITUS (HCC): ICD-10-CM

## 2020-01-28 DIAGNOSIS — Z79.4 TYPE 2 DIABETES MELLITUS WITH HYPERGLYCEMIA, WITH LONG-TERM CURRENT USE OF INSULIN (HCC): ICD-10-CM

## 2020-01-28 DIAGNOSIS — E11.59 HYPERTENSION ASSOCIATED WITH DIABETES (HCC): Primary | ICD-10-CM

## 2020-01-28 DIAGNOSIS — E11.69 MIXED DIABETIC HYPERLIPIDEMIA ASSOCIATED WITH TYPE 2 DIABETES MELLITUS (HCC): ICD-10-CM

## 2020-01-28 DIAGNOSIS — E11.65 TYPE 2 DIABETES MELLITUS WITH HYPERGLYCEMIA, WITH LONG-TERM CURRENT USE OF INSULIN (HCC): ICD-10-CM

## 2020-01-28 PROCEDURE — 86376 MICROSOMAL ANTIBODY EACH: CPT

## 2020-01-28 PROCEDURE — 82607 VITAMIN B-12: CPT | Performed by: NURSE PRACTITIONER

## 2020-01-28 PROCEDURE — 80061 LIPID PANEL: CPT | Performed by: NURSE PRACTITIONER

## 2020-01-28 PROCEDURE — 82306 VITAMIN D 25 HYDROXY: CPT | Performed by: NURSE PRACTITIONER

## 2020-01-28 PROCEDURE — 80050 GENERAL HEALTH PANEL: CPT | Performed by: NURSE PRACTITIONER

## 2020-01-28 PROCEDURE — 36415 COLL VENOUS BLD VENIPUNCTURE: CPT | Performed by: NURSE PRACTITIONER

## 2020-01-28 PROCEDURE — 83036 HEMOGLOBIN GLYCOSYLATED A1C: CPT | Performed by: NURSE PRACTITIONER

## 2020-01-28 PROCEDURE — 99214 OFFICE O/P EST MOD 30 MIN: CPT | Performed by: NURSE PRACTITIONER

## 2020-01-28 PROCEDURE — 84439 ASSAY OF FREE THYROXINE: CPT

## 2020-01-28 RX ORDER — INSULIN GLARGINE 100 [IU]/ML
INJECTION, SOLUTION SUBCUTANEOUS
Qty: 4 PEN | Refills: 11 | Status: SHIPPED | OUTPATIENT
Start: 2020-01-28 | End: 2022-01-19 | Stop reason: SDUPTHER

## 2020-01-28 NOTE — PROGRESS NOTES
Subjective    Milagros Parson is a 59 y.o. female. she is here today for follow-up.    History of Present Illness        Primary Care Provider     Krish Brown MD       Reason diabetes       Duration 15 years     Timing - Diabetes is Constant     Quality -  not controlled      Severity -  severe     Complications -     CVA in 2016     + neuropathy  + retinopathy  + nephropathy         Current symptoms/problems  increase appetite, paresthesia of the feet, polydipsia and polyuria      Alleviating Factors: Compliance       Side Effects  none     Current diet  diabetic     Current exercise none     Current monitoring regimen: home blood tests - 6 times daily    Lab Results   Component Value Date    HGBA1C 6.40 (H) 05/06/2019          Home blood sugar records:       states running 150 up to 250               Hypoglycemia none since last vist    She is in a lot of back pain she states and it is causing her BG to be high                  The following portions of the patient's history were reviewed and updated as appropriate:   Past Medical History:   Diagnosis Date   • Atrial septal defect, secundum    • Chest pain 11/17/2016   • CKD (chronic kidney disease), stage II 1/25/2018   • Diabetes mellitus (CMS/[facility])     DIABETES , UNCOMPLICATED, TYPE II    • Diabetic polyneuropathy associated with type 2 diabetes mellitus (CMS/[facility]) 1/25/2018   • HLD (hyperlipidemia) 11/17/2016   • HTN (hypertension) 11/17/2016   • Hyperlipidemia, mixed    • Hypertension associated with diabetes (CMS/[facility]) 1/25/2018   • Hypertension, benign    • Melanoma of lower leg, right (CMS/[facility]) 1/25/2018   • Mixed diabetic hyperlipidemia associated with type 2 diabetes mellitus (CMS/[facility]) 1/25/2018   • Nonspecific chest pain    • Proliferative diabetic retinopathy of both eyes associated with type 2 diabetes mellitus (CMS/[facility]) 1/25/2018   • Stroke (CMS/[facility])    • Type 2 diabetes mellitus with hyperglycemia, with long-term current use of insulin  (CMS/[facility]) 1/25/2018     Past Surgical History:   Procedure Laterality Date   • ATRIAL SEPTAL DEFECT REPAIR     • CARPAL TUNNEL RELEASE     • FEMORAL LYMPH NODE BIOPSY/EXCISON     • FINGER SURGERY     • HYSTERECTOMY     • OTHER SURGICAL HISTORY      melanoma removed right buttock     Family History   Problem Relation Age of Onset   • Heart disease Father      OB History    None       Current Outpatient Medications   Medication Sig Dispense Refill   • ALPRAZolam (XANAX) 1 MG tablet Take 1 mg by mouth 4 (Four) Times a Day As Needed for Anxiety.     • aspirin 81 MG EC tablet Take 81 mg by mouth Daily.     • choline fenofibrate (TRILIPIX) 45 MG capsule Take 1 capsule by mouth Daily. 30 capsule 11   • clopidogrel (PLAVIX) 75 MG tablet Take 75 mg by mouth Daily.     • dexlansoprazole (DEXILANT) 60 MG capsule Take 60 mg by mouth Daily.     • Empagliflozin (JARDIANCE) 10 MG tablet Take 1 tablet by mouth Daily. Before bkfast 30 tablet 11   • estrogens, conjugated,-methyltestosterone (ESTRATEST HS) 0.625-1.25 MG per tablet Take 1 tablet by mouth Daily.     • furosemide (LASIX) 20 MG tablet Take 60 mg by mouth Daily As Needed.     • gabapentin (NEURONTIN) 300 MG capsule Take 300 mg by mouth 3 (Three) Times a Day.     • glucose blood test strip ONE TOUCH ULTRA. TEST 5-6 TIMES A  each 0   • HYDROcodone-acetaminophen (NORCO)  MG per tablet Take 1 tablet by mouth Every 6 (Six) Hours As Needed for Moderate Pain .     • HYDROcodone-acetaminophen (VICODIN) 7.5-500 MG per tablet Take 1 tablet by mouth Every 6 (Six) Hours As Needed for moderate pain (4-6).     • insulin aspart (NOVOLOG FLEXPEN) 100 UNIT/ML solution pen-injector sc pen Up to 20 units with meals (Patient taking differently: Up to 20 units with meals; does sliding scale) 6 pen 11   • Insulin Glargine (BASAGLAR KWIKPEN) 100 UNIT/ML injection pen 35 units qhs (Patient taking differently: At Night As Needed. 35 units qhs) 4 pen 11   • Insulin Lispro (ADMELOG  SOLOSTAR) 100 UNIT/ML solution pen-injector Inject up to 20 units with each meal (Patient taking differently: Inject up to 20 units with each meal; has not started using yet because still on novolog) 5 pen 5   • Insulin Pen Needle (B-D UF III MINI PEN NEEDLES) 31G X 5 MM misc Use 5 times daily 150 each 1   • Liraglutide (VICTOZA) 18 MG/3ML solution pen-injector injection Inject 1.2 mg under the skin into the appropriate area as directed Daily. 0.6 mg daily for 1 week then 1.2 mg subcutaneously daily 4 pen 11   • lisinopril (PRINIVIL,ZESTRIL) 10 MG tablet Take 10 mg by mouth Daily.     • metaxalone (SKELAXIN) 800 MG tablet Take 1 tablet by mouth 3 (Three) Times a Day As Needed for Muscle Spasms. 21 tablet 0   • methylPREDNISolone (MEDROL, DESTINY,) 4 MG tablet Take as directed on package instructions. 21 each 0   • ondansetron ODT (ZOFRAN-ODT) 4 MG disintegrating tablet Take 1 tablet by mouth 4 (Four) Times a Day As Needed for nausea or vomiting. 20 tablet 0   • pantoprazole (PROTONIX) 40 MG EC tablet Take 40 mg by mouth Daily.     • pioglitazone (ACTOS) 15 MG tablet Take 1 tablet by mouth Daily. 30 tablet 1   • POTASSIUM CHLORIDE PO Take 10 mEq by mouth 2 (Two) Times a Day.     • promethazine (PHENERGAN) 25 MG tablet Take 25 mg by mouth Every 6 (Six) Hours As Needed for nausea or vomiting.       No current facility-administered medications for this visit.      Allergies   Allergen Reactions   • Penicillins Anaphylaxis   • Morphine Itching     Social History     Socioeconomic History   • Marital status:      Spouse name: Not on file   • Number of children: Not on file   • Years of education: Not on file   • Highest education level: Not on file   Tobacco Use   • Smoking status: Current Every Day Smoker     Packs/day: 1.00   • Smokeless tobacco: Never Used   Substance and Sexual Activity   • Alcohol use: No   • Drug use: No       Review of Systems  Review of Systems   Constitutional: Negative for activity change,  "appetite change, diaphoresis and fatigue.   HENT: Negative for facial swelling, sneezing, sore throat, tinnitus, trouble swallowing and voice change.    Eyes: Negative for photophobia, pain, discharge, redness, itching and visual disturbance.   Respiratory: Negative for apnea, cough, choking, chest tightness and shortness of breath.    Cardiovascular: Negative for chest pain, palpitations and leg swelling.   Gastrointestinal: Negative for abdominal distention, abdominal pain, constipation, diarrhea, nausea and vomiting.   Endocrine: Negative for cold intolerance, heat intolerance, polydipsia, polyphagia and polyuria.   Genitourinary: Negative for difficulty urinating, dysuria, frequency, hematuria and urgency.   Musculoskeletal: Positive for back pain. Negative for arthralgias, gait problem, joint swelling, myalgias, neck pain and neck stiffness.   Skin: Negative for color change, pallor, rash and wound.   Neurological: Negative for dizziness, tremors, weakness, light-headedness, numbness and headaches.   Hematological: Negative for adenopathy. Does not bruise/bleed easily.   Psychiatric/Behavioral: Negative for behavioral problems, confusion and sleep disturbance.        Objective    /74   Pulse 84   Ht 167.6 cm (66\")   Wt 97.2 kg (214 lb 3.2 oz)   SpO2 100%   BMI 34.57 kg/m²   Physical Exam   Constitutional: She is oriented to person, place, and time. She appears well-developed and well-nourished. No distress.   HENT:   Head: Normocephalic and atraumatic.   Right Ear: External ear normal.   Left Ear: External ear normal.   Nose: Nose normal.   Eyes: Pupils are equal, round, and reactive to light. Conjunctivae and EOM are normal.   Neck: Normal range of motion. Neck supple. No tracheal deviation present. No thyromegaly present.   Cardiovascular: Normal rate, regular rhythm and normal heart sounds.   No murmur heard.  Pulmonary/Chest: Effort normal and breath sounds normal. No respiratory distress. She " has no wheezes.   Abdominal: Soft. Bowel sounds are normal. There is no tenderness. There is no rebound and no guarding.   Musculoskeletal: Normal range of motion. She exhibits no edema, tenderness or deformity.   Neurological: She is alert and oriented to person, place, and time. No cranial nerve deficit.   Skin: Skin is warm and dry. No rash noted.   Psychiatric: She has a normal mood and affect. Her behavior is normal. Judgment and thought content normal.       Lab Review  Glucose (mg/dL)   Date Value   11/01/2019 178 (H)   05/06/2019 117 (H)   11/24/2018 145 (H)   11/29/2016 253 (H)     Sodium (mmol/L)   Date Value   11/01/2019 140   05/06/2019 139   11/24/2018 142   11/29/2016 137     Potassium (mmol/L)   Date Value   11/01/2019 4.4   05/06/2019 4.5   11/24/2018 4.6   11/29/2016 4.0     Chloride (mmol/L)   Date Value   11/01/2019 99   05/06/2019 102   11/24/2018 103   11/29/2016 94 (L)     CO2 (mmol/L)   Date Value   11/01/2019 28   05/06/2019 28.3   11/24/2018 30.0   11/29/2016 31.0     BUN (mg/dL)   Date Value   11/01/2019 21   05/06/2019 22   11/24/2018 19   11/29/2016 20     Creatinine (mg/dL)   Date Value   11/01/2019 1.3 (H)   05/06/2019 1.29 (H)   11/24/2018 1.54 (H)   11/29/2016 1.42 (H)     Hemoglobin A1C (%)   Date Value   05/06/2019 6.40 (H)       Assessment/Plan      1. Hypertension associated with diabetes (CMS/[facility])    2. Mixed diabetic hyperlipidemia associated with type 2 diabetes mellitus (CMS/[facility])    3. Type 2 diabetes mellitus with hyperglycemia, with long-term current use of insulin (CMS/[facility])    .    Medications prescribed:  Outpatient Encounter Medications as of 1/28/2020   Medication Sig Dispense Refill   • ALPRAZolam (XANAX) 1 MG tablet Take 1 mg by mouth 4 (Four) Times a Day As Needed for Anxiety.     • aspirin 81 MG EC tablet Take 81 mg by mouth Daily.     • choline fenofibrate (TRILIPIX) 45 MG capsule Take 1 capsule by mouth Daily. 30 capsule 11   • clopidogrel (PLAVIX) 75 MG tablet  Take 75 mg by mouth Daily.     • dexlansoprazole (DEXILANT) 60 MG capsule Take 60 mg by mouth Daily.     • Empagliflozin (JARDIANCE) 10 MG tablet Take 1 tablet by mouth Daily. Before bkfast 30 tablet 11   • estrogens, conjugated,-methyltestosterone (ESTRATEST HS) 0.625-1.25 MG per tablet Take 1 tablet by mouth Daily.     • furosemide (LASIX) 20 MG tablet Take 60 mg by mouth Daily As Needed.     • gabapentin (NEURONTIN) 300 MG capsule Take 300 mg by mouth 3 (Three) Times a Day.     • glucose blood test strip ONE TOUCH ULTRA. TEST 5-6 TIMES A  each 0   • HYDROcodone-acetaminophen (NORCO)  MG per tablet Take 1 tablet by mouth Every 6 (Six) Hours As Needed for Moderate Pain .     • HYDROcodone-acetaminophen (VICODIN) 7.5-500 MG per tablet Take 1 tablet by mouth Every 6 (Six) Hours As Needed for moderate pain (4-6).     • insulin aspart (NOVOLOG FLEXPEN) 100 UNIT/ML solution pen-injector sc pen Up to 20 units with meals (Patient taking differently: Up to 20 units with meals; does sliding scale) 6 pen 11   • Insulin Glargine (BASAGLAR KWIKPEN) 100 UNIT/ML injection pen 35 units qhs (Patient taking differently: At Night As Needed. 35 units qhs) 4 pen 11   • Insulin Lispro (ADMELOG SOLOSTAR) 100 UNIT/ML solution pen-injector Inject up to 20 units with each meal (Patient taking differently: Inject up to 20 units with each meal; has not started using yet because still on novolog) 5 pen 5   • Insulin Pen Needle (B-D UF III MINI PEN NEEDLES) 31G X 5 MM misc Use 5 times daily 150 each 1   • Liraglutide (VICTOZA) 18 MG/3ML solution pen-injector injection Inject 1.2 mg under the skin into the appropriate area as directed Daily. 0.6 mg daily for 1 week then 1.2 mg subcutaneously daily 4 pen 11   • lisinopril (PRINIVIL,ZESTRIL) 10 MG tablet Take 10 mg by mouth Daily.     • metaxalone (SKELAXIN) 800 MG tablet Take 1 tablet by mouth 3 (Three) Times a Day As Needed for Muscle Spasms. 21 tablet 0   • methylPREDNISolone  (MEDROL, DESTINY,) 4 MG tablet Take as directed on package instructions. 21 each 0   • ondansetron ODT (ZOFRAN-ODT) 4 MG disintegrating tablet Take 1 tablet by mouth 4 (Four) Times a Day As Needed for nausea or vomiting. 20 tablet 0   • pantoprazole (PROTONIX) 40 MG EC tablet Take 40 mg by mouth Daily.     • pioglitazone (ACTOS) 15 MG tablet Take 1 tablet by mouth Daily. 30 tablet 1   • POTASSIUM CHLORIDE PO Take 10 mEq by mouth 2 (Two) Times a Day.     • promethazine (PHENERGAN) 25 MG tablet Take 25 mg by mouth Every 6 (Six) Hours As Needed for nausea or vomiting.       No facility-administered encounter medications on file as of 1/28/2020.        Orders placed during this encounter include:  Orders Placed This Encounter   Procedures   • Comprehensive Metabolic Panel   • Hemoglobin A1c   • TSH   • Vitamin B12   • Vitamin D 25 Hydroxy   • CBC & Differential     Order Specific Question:   Manual Differential     Answer:   No     Glycemic Management:       Lab Results   Component Value Date    HGBA1C 6.40 (H) 05/06/2019             Running high            Basaglar taking 10 units  ---suggest 12   ==========         jardiance stopped due to fear of side effects      ============      actos 15 mg daily -- taking   ============     Novolog will be Admelog         Taking 3 up to 9 units --suggest increasing by 2   ============     victoza 1.2 mg  taking         Lipid Management        Statin?     Blood Pressure Management:          Controlled on lisinopril mediated regimen      Lasix was stopped            Microvascular Complication Monitoring:    -----------     Last Microalbumin-Proteinuria Assessment     No results found for: YESIKA [id number]    -----------        Neuropathy yes    RX for diabetic shoes      Follows with       Preventive Care:            Tobacco Use      Milagros Alicialivan  reports that she has been smoking. She has been smoking about 1.00 pack per day. She has never used smokeless tobacco.. I  have educated her on the risk of diseases from using tobacco products such as cancer, COPD and heart diease.     I advised her to quit and she is not willing to quit.    I spent 3  minutes counseling the patient.                                Weight Related:      Patient's Body mass index is 34.57 kg/m². BMI is above normal parameters. Recommendations include: educational material.    Decrease daily caloric intake by 500 calories per day     She cannot exercise due to back         Bone Health               Lab Results   Component Value Date     CALCIUM 9.5 11/29/2016         Thyroid Health                 Lab Results   Component Value Date    TSH 4.920 (H) 05/06/2019          Perceived goiter, obtain thyroid US --- had done at [facility] -- will obtain report     We never received report -- she is to obtain a copy for us       Other Diabetes Related Aspects             4. Follow-up: Return in about 4 months (around 5/28/2020) for Recheck.    P

## 2020-01-28 NOTE — TELEPHONE ENCOUNTER
"Plans to go to the [facility] at 730 AM today.    Reason for Disposition  • [1] SEVERE pain (e.g., excruciating, unable to do any normal activities) AND [2] not improved after 2 hours of pain medicine    Additional Information  • Negative: Followed a foot injury  • Negative: Diabetes  • Negative: Ankle pain is main symptom  • Negative: Thigh or calf pain is main symptom  • Negative: Entire foot is cool or blue in comparison to other foot  • Negative: Purple or black skin on foot or toe  • Negative: [1] Red area or streak AND [2] fever  • Negative: [1] Swollen foot AND [2] fever  • Negative: Patient sounds very sick or weak to the triager  • Negative: [1] Looks infected (spreading redness, pus) AND [2] large red area (> 2 in. or 5 cm)    Answer Assessment - Initial Assessment Questions  1. ONSET: \"When did the pain start?\"       Months ago; much worse tonight  2. LOCATION: \"Where is the pain located?\"       Top of feet  3. PAIN: \"How bad is the pain?\"    (Scale 1-10; or mild, moderate, severe)    -  MILD (1-3): doesn't interfere with normal activities     -  MODERATE (4-7): interferes with normal activities (e.g., work or school) or awakens from sleep, limping     -  SEVERE (8-10): excruciating pain, unable to do any normal activities, unable to walk      severe  4. WORK OR EXERCISE: \"Has there been any recent work or exercise that involved this part of the body?\"       denies  5. CAUSE: \"What do you think is causing the foot pain?\"      Nerve from back  6. OTHER SYMPTOMS: \"Do you have any other symptoms?\" (e.g., leg pain, rash, fever, numbness)      denies  7. PREGNANCY: \"Is there any chance you are pregnant?\" \"When was your last menstrual period?\"      na    Protocols used: FOOT PAIN-ADULT-AH      "

## 2020-01-29 LAB
25(OH)D3 SERPL-MCNC: 11 NG/ML (ref 30–100)
ALBUMIN SERPL-MCNC: 4.4 G/DL (ref 3.5–5.2)
ALBUMIN/GLOB SERPL: 1.3 G/DL
ALP SERPL-CCNC: 68 U/L (ref 39–117)
ALT SERPL W P-5'-P-CCNC: 10 U/L (ref 1–33)
ANION GAP SERPL CALCULATED.3IONS-SCNC: 12.6 MMOL/L (ref 5–15)
AST SERPL-CCNC: 10 U/L (ref 1–32)
BASOPHILS # BLD AUTO: 0.07 10*3/MM3 (ref 0–0.2)
BASOPHILS NFR BLD AUTO: 0.9 % (ref 0–1.5)
BILIRUB SERPL-MCNC: 0.2 MG/DL (ref 0.2–1.2)
BUN BLD-MCNC: 16 MG/DL (ref 8–23)
BUN/CREAT SERPL: 10.3 (ref 7–25)
CALCIUM SPEC-SCNC: 9.8 MG/DL (ref 8.6–10.5)
CHLORIDE SERPL-SCNC: 103 MMOL/L (ref 98–107)
CHOLEST SERPL-MCNC: 248 MG/DL (ref 0–200)
CO2 SERPL-SCNC: 26.4 MMOL/L (ref 22–29)
CREAT BLD-MCNC: 1.56 MG/DL (ref 0.57–1)
DEPRECATED RDW RBC AUTO: 40.7 FL (ref 37–54)
EOSINOPHIL # BLD AUTO: 0.11 10*3/MM3 (ref 0–0.4)
EOSINOPHIL NFR BLD AUTO: 1.4 % (ref 0.3–6.2)
ERYTHROCYTE [DISTWIDTH] IN BLOOD BY AUTOMATED COUNT: 12.1 % (ref 12.3–15.4)
GFR SERPL CREATININE-BSD FRML MDRD: 34 ML/MIN/1.73
GLOBULIN UR ELPH-MCNC: 3.3 GM/DL
GLUCOSE BLD-MCNC: 87 MG/DL (ref 65–99)
HBA1C MFR BLD: 6.5 % (ref 4.8–5.6)
HCT VFR BLD AUTO: 42.1 % (ref 34–46.6)
HDLC SERPL-MCNC: 36 MG/DL (ref 40–60)
HGB BLD-MCNC: 14.2 G/DL (ref 12–15.9)
IMM GRANULOCYTES # BLD AUTO: 0.02 10*3/MM3 (ref 0–0.05)
IMM GRANULOCYTES NFR BLD AUTO: 0.2 % (ref 0–0.5)
LDLC SERPL CALC-MCNC: 153 MG/DL (ref 0–100)
LDLC/HDLC SERPL: 4.26 {RATIO}
LYMPHOCYTES # BLD AUTO: 2.46 10*3/MM3 (ref 0.7–3.1)
LYMPHOCYTES NFR BLD AUTO: 30.5 % (ref 19.6–45.3)
MCH RBC QN AUTO: 31 PG (ref 26.6–33)
MCHC RBC AUTO-ENTMCNC: 33.7 G/DL (ref 31.5–35.7)
MCV RBC AUTO: 91.9 FL (ref 79–97)
MONOCYTES # BLD AUTO: 0.63 10*3/MM3 (ref 0.1–0.9)
MONOCYTES NFR BLD AUTO: 7.8 % (ref 5–12)
NEUTROPHILS # BLD AUTO: 4.78 10*3/MM3 (ref 1.7–7)
NEUTROPHILS NFR BLD AUTO: 59.2 % (ref 42.7–76)
NRBC BLD AUTO-RTO: 0 /100 WBC (ref 0–0.2)
PLATELET # BLD AUTO: 225 10*3/MM3 (ref 140–450)
PMV BLD AUTO: 11.9 FL (ref 6–12)
POTASSIUM BLD-SCNC: 5 MMOL/L (ref 3.5–5.2)
PROT SERPL-MCNC: 7.7 G/DL (ref 6–8.5)
RBC # BLD AUTO: 4.58 10*6/MM3 (ref 3.77–5.28)
SODIUM BLD-SCNC: 142 MMOL/L (ref 136–145)
T4 FREE SERPL-MCNC: 0.96 NG/DL (ref 0.93–1.7)
TRIGL SERPL-MCNC: 294 MG/DL (ref 0–150)
TSH SERPL DL<=0.05 MIU/L-ACNC: 6 UIU/ML (ref 0.27–4.2)
VIT B12 BLD-MCNC: 396 PG/ML (ref 211–946)
VLDLC SERPL-MCNC: 58.8 MG/DL (ref 5–40)
WBC NRBC COR # BLD: 8.07 10*3/MM3 (ref 3.4–10.8)

## 2020-01-30 LAB — THYROPEROXIDASE AB SERPL-ACNC: 329 IU/ML (ref 0–34)

## 2020-02-06 ENCOUNTER — TELEPHONE (OUTPATIENT)
Dept: ENDOCRINOLOGY | Facility: CLINIC | Age: 60
End: 2020-02-06

## 2020-02-12 ENCOUNTER — TELEPHONE (OUTPATIENT)
Dept: VASCULAR SURGERY | Facility: CLINIC | Age: 60
End: 2020-02-12

## 2020-02-12 ENCOUNTER — TELEPHONE (OUTPATIENT)
Dept: ENDOCRINOLOGY | Facility: CLINIC | Age: 60
End: 2020-02-12

## 2020-02-12 RX ORDER — ERGOCALCIFEROL 1.25 MG/1
50000 CAPSULE ORAL WEEKLY
Qty: 5 CAPSULE | Refills: 11 | Status: SHIPPED | OUTPATIENT
Start: 2020-02-12

## 2020-02-12 NOTE — TELEPHONE ENCOUNTER
Saranya with [employer]  Called faxed on 2/06 a medical [facility]. And 60 days of glucose logs for the script dexacom G6     Call [phone]   fax [phone]  Thank You

## 2020-02-12 NOTE — TELEPHONE ENCOUNTER
Left message advising patient of upcoming appointment with Dr. Flor.  Mailed all information to address on file.

## 2020-02-13 DIAGNOSIS — E06.3 HASHIMOTO'S THYROIDITIS: Primary | ICD-10-CM

## 2020-02-13 NOTE — TELEPHONE ENCOUNTER
Called Saranya at [facility]. Informed her we don't have cmn - need this faxed to us at [phone]. She stated will fax over.

## 2020-02-20 ENCOUNTER — TELEPHONE (OUTPATIENT)
Dept: ENDOCRINOLOGY | Facility: CLINIC | Age: 60
End: 2020-02-20

## 2020-02-20 NOTE — TELEPHONE ENCOUNTER
[facility] calling needs medical nessisity  and glucose logs     - [phone] Saranya    Thank You

## 2020-02-24 ENCOUNTER — TELEPHONE (OUTPATIENT)
Dept: ENDOCRINOLOGY | Facility: CLINIC | Age: 60
End: 2020-02-24

## 2020-02-26 ENCOUNTER — TELEPHONE (OUTPATIENT)
Dept: ENDOCRINOLOGY | Facility: CLINIC | Age: 60
End: 2020-02-26

## 2020-02-26 NOTE — TELEPHONE ENCOUNTER
olinda with [employer] called said they received medical nec. For the Dexacom but was missing the signature and date please add signiture and date and fax to     [phone]    Or questions call her direct line at [phone]    Thank You

## 2020-02-26 NOTE — TELEPHONE ENCOUNTER
Pt said there were supposed to have thyroid  labs faxed to [facility] and said they have not received them. She is due to get labs for another provider tomorrow and is wanting to do [facility] as well tomorrow. Says she is down in her back and is wanting to try and make one trip. Thanks!

## 2020-03-04 ENCOUNTER — TELEPHONE (OUTPATIENT)
Dept: ENDOCRINOLOGY | Facility: CLINIC | Age: 60
End: 2020-03-04

## 2020-03-05 RX ORDER — LEVOTHYROXINE SODIUM 0.03 MG/1
25 TABLET ORAL DAILY
Qty: 30 TABLET | Refills: 11 | Status: SHIPPED | OUTPATIENT
Start: 2020-03-05 | End: 2021-10-19

## 2020-03-05 NOTE — TELEPHONE ENCOUNTER
Did you end up starting her on thyroid meds? I don't see any record of it and you were waiting to retest the TSH.

## 2020-03-17 ENCOUNTER — APPOINTMENT (OUTPATIENT)
Dept: PREADMISSION TESTING | Facility: HOSPITAL | Age: 60
End: 2020-03-17

## 2020-08-05 ENCOUNTER — OFFICE VISIT (OUTPATIENT)
Dept: NEUROSURGERY | Age: 60
End: 2020-08-05
Payer: MEDICAID

## 2020-08-05 VITALS
DIASTOLIC BLOOD PRESSURE: 83 MMHG | SYSTOLIC BLOOD PRESSURE: 169 MMHG | HEART RATE: 74 BPM | HEIGHT: 66 IN | WEIGHT: 215 LBS | OXYGEN SATURATION: 98 % | BODY MASS INDEX: 34.55 KG/M2

## 2020-08-05 PROCEDURE — 3017F COLORECTAL CA SCREEN DOC REV: CPT | Performed by: NURSE PRACTITIONER

## 2020-08-05 PROCEDURE — G8427 DOCREV CUR MEDS BY ELIG CLIN: HCPCS | Performed by: NURSE PRACTITIONER

## 2020-08-05 PROCEDURE — 4004F PT TOBACCO SCREEN RCVD TLK: CPT | Performed by: NURSE PRACTITIONER

## 2020-08-05 PROCEDURE — 99213 OFFICE O/P EST LOW 20 MIN: CPT | Performed by: NURSE PRACTITIONER

## 2020-08-05 PROCEDURE — G8417 CALC BMI ABV UP PARAM F/U: HCPCS | Performed by: NURSE PRACTITIONER

## 2020-08-05 RX ORDER — INSULIN ASPART 100 [IU]/ML
INJECTION, SOLUTION INTRAVENOUS; SUBCUTANEOUS
COMMUNITY

## 2020-08-05 ASSESSMENT — ENCOUNTER SYMPTOMS
RESPIRATORY NEGATIVE: 1
EYES NEGATIVE: 1
GASTROINTESTINAL NEGATIVE: 1

## 2020-08-05 NOTE — PROGRESS NOTES
[facility]  Office Visit      Chief Complaint   Patient presents with    Follow-up    Swelling    Foot Pain     8/05/2020: Ms. Jyoti Reilly returns to clinic today to discuss right leg pain and swelling. She describes some low back pain and RLE pain that radiates into the right hip, posterolateral thigh and to the top of the foot. She does describe swelling of the RLE which is likely associated with her previous melanoma removal that included removal of the lymph nodes. She states that the radiating RLE pain is worsening and she has a hard time going up and down steps. She is wanting to discuss surgery with Dr. Yanelis Shirley. 10/29/2019:Ms. Jyoti Reilly returns to clinic today to review MRI and XR results. She states is experiencing severe right leg pain that is exacerbated with walking. She also reports low back pain. She reports 70% leg pain and 30% back pain. She reports weakness of her right lower extremity. She is utilizing a wheelchair to today's visit due to her severe pain. 10/07/2019: Ms. Jyoti Reilly returns to clinic today with worsening back and RLE pain. She has recently been to the ED on 10/03/2019 where she received ibuprofen, toradol, morphine, and norflex and decided to elope without being discharged. Today she states that she has severe low back and right leg pain that started about 2 months ago. She has had a few falls since our last visit. The pain radiates into the right posterolateral thigh, posterolateral shin and to the dorsum of the foot. She states that she has numbness and tingling on the dorsum of the right foot. She has continuous cramping in the right calf which is very painful. Walking and standing make the pain much worse. She can barely stand to fix her hair. She now has a commode seat riser. She is transported in a wheelchair today. Her pain is mostly in the RLE.         HISTORY OF PRESENT ILLNESS:    Palma Mederos is a 56 y.o. female with a history of melanoma disabled who presents with low back pain and right hip pain that has been ongoing for about 5 weeks. History of chronic back pain prior to this injury. She reports lifting a filing cabinet the day before the pain started. The pain does not radiate. Her pain is mostly located in the right low back. The patient complains of mild tingling on right toes which could be associated with neuropathy. She states that her pain in the last few weeks has significantly improved. Her pain is not changed when going from a seated to standing position. Her pain is significantly worsened with walking. Her pain is not changed when lying flat. Overall, indicative that the patient does not have a mechanical nature to their pain. She also states that about 2 months ago she started developing right arm/shoulder pain. She reports that she does have some neck pain. She reports that it is difficult for her to use the right arm due to pain. She does have thumb pain in which she associates with trigger finger/previous surgery. The patient has underwent a non-operative treatment course that has included:  NSAIDs (cannot take due to kidneys)  Gabapentin  Opiates for 20+ years (Norco, Percocet)  Oral Steroids (diabetic)  IM steroid       Of note she does use tobacco and does take blood thinning medications (Plavix). She does have a history of a basal ganglia stroke.                 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     Allergic rhinitis     Anxiety disorder     Anxiety state     Benign essential hypertension     Cancer (HCC)     MELANOMA ON RIGHT THIGH AND RIGHT BUTTOCK    Cellulitis     Cerebral artery occlusion with cerebral infarction ([facility] [address] 2014    OCCASIONAL FACE DRAWING ON THE LEFT SIDE WITH ANXIETY/BRAINSTEM INFARCT    Cerebrovascular disease     Chronic pain syndrome     Generalized anxiety disorder     Major depressive disorder     Reflux esophagitis     Type 2 diabetes mellitus (HCC)     Urgency of urination        Past Surgical History:   Procedure Laterality Date    CARPAL TUNNEL RELEASE Right     FINGER SURGERY      LEFT POINTER FINGER PARTIALLY CUTOFF    FINGER SURGERY Left     LEFT THUMB FRACTURE WITH LIGAMENT REPAIR    FINGER TRIGGER RELEASE Right        Current Outpatient Medications   Medication Sig Dispense Refill    insulin aspart (NOVOLOG FLEXPEN) 100 UNIT/ML injection pen Inject into the skin 3 times daily (before meals)      ondansetron (ZOFRAN) 4 MG tablet Take 4 mg by mouth every 8 hours as needed for Nausea or Vomiting      baclofen (LIORESAL) 10 MG tablet Take 10 mg by mouth 3 times daily       insulin glargine (BASAGLAR KWIKPEN) 100 UNIT/ML injection pen Inject 12 Units into the skin nightly       ALPRAZolam (XANAX) 1 MG tablet Take 1 mg by mouth 4 times daily as needed for Anxiety.  clopidogrel (PLAVIX) 75 MG tablet Take 75 mg by mouth daily       furosemide (LASIX) 40 MG tablet Take 20 mg by mouth daily as needed       gabapentin (NEURONTIN) 300 MG capsule Take 300 mg by mouth 3 times daily.  VICTOZA 18 MG/3ML SOPN SC injection Inject 1.2 mLs into the skin daily      lisinopril (PRINIVIL;ZESTRIL) 10 MG tablet Take 10 mg by mouth daily       pantoprazole (PROTONIX) 40 MG tablet Take 40 mg by mouth daily       pioglitazone (ACTOS) 15 MG tablet Take 15 mg by mouth daily       potassium chloride (KLOR-CON) 10 MEQ extended release tablet Take 10 mEq by mouth 2 times daily       ADMELOG SOLOSTAR 100 UNIT/ML pen Inject 20 Units into the skin 3 times daily (before meals)        No current facility-administered medications for this visit.         Allergies:  Penicillins and Sulfa antibiotics    Social History:   Social History     Tobacco Use   Smoking Status Current Every Day Smoker    Packs/day: 1.00    Years: 16.00    Pack years: 16.00    Types: Cigarettes   Smokeless Tobacco Never Used     Social History     Substance and Sexual Activity   Alcohol Use Yes    Frequency: Never    Comment: socially         Family History:   Family History   Problem Relation Age of Onset    Other Mother         AAA    Stroke Father     High Blood Pressure Father     Diabetes Father     Heart Disease Father         CABG    Heart Attack Father 58        MI       REVIEW OF SYSTEMS:  Review of Systems   Constitutional: Negative. HENT: Negative. Eyes: Positive for blurred vision. Respiratory: Negative. Cardiovascular: Negative. Gastrointestinal: Positive for nausea. Genitourinary: Negative. Musculoskeletal: Positive for back pain, joint pain and neck pain. Skin: Negative. Neurological: Positive for dizziness, weakness and headaches. Endo/Heme/Allergies: Negative. Psychiatric/Behavioral: Positive for depression. The patient is nervous/anxious. PHYSICAL EXAM:  Vitals:    08/05/20 1322   BP: (!) 169/83   Pulse: 74   SpO2: 98%     Constitutional: appears well-developed and well-nourished. Eyes - conjunctiva normal.  Pupils react to light  Ear, nose, throat -hearing intact to finger rub, No scars, masses, or lesions over external nose or ears, no atrophy oftongue  Neck-symmetric, no masses noted, no jugular vein distension  Respiration- chest wall appears symmetric, good expansion, normal effort without use of accessory muscles  Musculoskeletal - no significantwasting of muscles noted, no bony deformities, gait no gross ataxia  Extremities- RLE edema that is chronic due to removal of lymph nodes   Skin - warm, dry, and intact. No rash, erythema, or pallor.   Psychiatric - mood, affect, and behavior appear normal.     Neurologic Examination  Awake, Alert and oriented x 4  Normal speech pattern, following commands    Motor:  RIGHT:      iliopsoas 5/5    knee flexor 5/5    knee extension 5/5    EHL/dorsiflexion 5/5    plantar flexion 5/5    LEFT:      iliopsoas 5/5    knee flexor 5/5    knee extension 5/5    EHL/dorsiflexion 5/5    plantar flexion 5/5    Decrease to pinprick sensation right lateral shin  Reflexes are 2+ and symmetric  No clonus or Hoffmans sign  No myofacial tenderness to palpation  Antalgic Gait pattern          DATA and IMAGING:    Nursing/pcp notes, imaging, labs, and vitals reviewed. PT,OT and/or speech notes reviewed    Lab Results   Component Value Date    WBC 8.7 11/01/2019    HGB 14.3 11/01/2019    HCT 45.2 11/01/2019    MCV 97.8 11/01/2019     11/01/2019     Lab Results   Component Value Date     11/01/2019    K 4.4 11/01/2019    CL 99 11/01/2019    CO2 28 11/01/2019    BUN 21 11/01/2019    CREATININE 1.3 (H) 11/01/2019    GLUCOSE 178 (H) 11/01/2019    CALCIUM 9.9 11/01/2019    PROT 7.7 11/01/2019    LABALBU 4.3 11/01/2019    BILITOT <0.2 11/01/2019    ALKPHOS 72 11/01/2019    AST 13 11/01/2019    ALT 14 11/01/2019    LABGLOM 42 (A) 11/01/2019     Lab Results   Component Value Date    INR 0.97 11/01/2019    PROTIME 12.3 11/01/2019       CT Lumbar Spine (10/04/2019) Tam Ames  I have personally reviewed the images and my interpretation is:  L3 bilateral pars defects  L5-S1 there is a spondylolisthesis that measures 6-7mm with bilateral L5 pars defects     Narrative   History: L5 spondylolisthesis. Patient with severe low back pain and   right lower extremity pain. History of melanoma   MRI lumbar spine: Sagittal and axial images lumbar spine are obtained   without contrast.   COMPARISON: CT lumbar spine exam from [facility]. FINDINGS: There is stable grade 1/2 spondylolisthesis at L5-S1 with L5   pars defects. There is degenerative disc desiccation and narrowing of   the L5-S1 disc. A limited lumbar spine above the L5 level is normal.   There is minimal endplate spurring. Small Schmorl's node seen along   the superior plate of R91. There is no suspicious focal bony mass. The conus medullaris terminates at the L1 level.    At L5-S1, there is moderate facet osteoarthropathy in addition to the   pars defects resulting in grade 1/2 spondylolisthesis. There is right   foraminal disc bulging/protrusion with early posterior lateral   endplate spurring and mild left foraminal disc bulging. There is no   significant central canal stenosis, however there is severe right and   moderate left neural foramen narrowing at this L5-S1 level. The discs above L5-S1 are preserved with no additional prominent disc   bulging or focal protrusion. There is minimal right foraminal disc   bulging at the L3/4 level contributing to only mild right L3/4 neural   foramen narrowing. No additional neural foramen narrowing or central   lumbar canal stenosis visualized.       Impression   1. Grade 1/2 L5 spondylolisthesis with L5 pars defects. Moderate right   paracentral/foraminal L5-S1 disc protrusion. There is severe right and   moderate left L5-S1 neural foramen narrowing. Only mild right neural   foramen narrowing seen at L3/4. No significant central canal stenosis. Signed by Dr Leopoldo Hirschfeld on 10/25/2019 2:19 PM   I have reviewed these images and my interpretation is:  L5-S1 there is a spondylolisthesis that measures 8-9mm  There is a large disc extrusion that migrates superiorly and laterally to the foramen that results in severe compression of the right L5 nerve root. There is also moderate foraminal stenosis on the left side    XR lumbar spine flexion/extension (Tabitha) 10/29/19:  There is a relatively poor translation of the lumbar spine during flexion and extension. There appears to be no significant motion or evidence of instability. There is a spondylolisthesis at L5-S1 that measures 13mm. ASSESSMENT:    Palma Mederos is a 56 y.o. female with complaints of low back pain and right hip pain that has worsened over the last few weeks. ICD-10-CM    1. Spondylolisthesis at L5-S1 level  M43.17    2. Lumbar foraminal stenosis  M48.061    3. Pars defect of lumbar spine  M43.06    4. Right leg pain  M79.604    5. DDD (degenerative disc disease), lumbosacral  M51.37    6. DDD (degenerative disc disease), lumbar  M51.36    7. Antalgic gait  R26.89        PLAN:  -No new imaging to review at today's visit.   Given that her imaging is going on a year old, I will speak with Dr. Antonina Mitchell to see if he would like updated imaging prior to discussing surgery again.    -No repeat of imaging  -See back at next available on T or TH to discuss surgical options

## 2020-08-06 ENCOUNTER — TELEPHONE (OUTPATIENT)
Dept: NEUROSURGERY | Age: 60
End: 2020-08-06

## 2020-08-06 NOTE — TELEPHONE ENCOUNTER
Spoke with Dr. Alyssa Smith. He does not want to put her through repeat imaging.   Please schedule her on the next available T or Th to discuss surgical options

## 2020-09-11 ENCOUNTER — HOSPITAL ENCOUNTER (EMERGENCY)
Age: 60
Discharge: HOME | End: 2020-09-11
Payer: MEDICAID

## 2020-09-11 VITALS
DIASTOLIC BLOOD PRESSURE: 77 MMHG | HEART RATE: 69 BPM | RESPIRATION RATE: 14 BRPM | TEMPERATURE: 97.6 F | WEIGHT: 210 LBS | SYSTOLIC BLOOD PRESSURE: 165 MMHG | HEIGHT: 66 IN | OXYGEN SATURATION: 94 % | BODY MASS INDEX: 33.75 KG/M2

## 2020-09-11 LAB
ALBUMIN SERPL-MCNC: 4.2 G/DL (ref 3.5–5.2)
ALP BLD-CCNC: 57 U/L (ref 35–104)
ALT SERPL-CCNC: 8 U/L (ref 5–33)
ANION GAP SERPL CALCULATED.3IONS-SCNC: 9 MMOL/L (ref 7–19)
AST SERPL-CCNC: 9 U/L (ref 5–32)
BACTERIA: NEGATIVE /HPF
BASOPHILS ABSOLUTE: 0 K/UL (ref 0–0.2)
BASOPHILS RELATIVE PERCENT: 0.6 % (ref 0–1)
BILIRUB SERPL-MCNC: <0.2 MG/DL (ref 0.2–1.2)
BILIRUBIN URINE: NEGATIVE
BLOOD, URINE: NEGATIVE
BUN BLDV-MCNC: 12 MG/DL (ref 8–23)
CALCIUM SERPL-MCNC: 9.6 MG/DL (ref 8.8–10.2)
CHLORIDE BLD-SCNC: 105 MMOL/L (ref 98–111)
CLARITY: CLEAR
CO2: 28 MMOL/L (ref 22–29)
COLOR: YELLOW
CREAT SERPL-MCNC: 1.4 MG/DL (ref 0.5–0.9)
CRYSTALS, UA: ABNORMAL /HPF
EOSINOPHILS ABSOLUTE: 0.1 K/UL (ref 0–0.6)
EOSINOPHILS RELATIVE PERCENT: 1.2 % (ref 0–5)
EPITHELIAL CELLS, UA: 2 /HPF (ref 0–5)
GFR AFRICAN AMERICAN: 46
GFR NON-AFRICAN AMERICAN: 38
GLUCOSE BLD-MCNC: 135 MG/DL (ref 74–109)
GLUCOSE URINE: NEGATIVE MG/DL
HCT VFR BLD CALC: 40.4 % (ref 37–47)
HEMOGLOBIN: 13.2 G/DL (ref 12–16)
HYALINE CASTS: 2 /HPF (ref 0–8)
IMMATURE GRANULOCYTES #: 0 K/UL
KETONES, URINE: NEGATIVE MG/DL
LEUKOCYTE ESTERASE, URINE: ABNORMAL
LYMPHOCYTES ABSOLUTE: 2.2 K/UL (ref 1.1–4.5)
LYMPHOCYTES RELATIVE PERCENT: 34.6 % (ref 20–40)
MCH RBC QN AUTO: 30.1 PG (ref 27–31)
MCHC RBC AUTO-ENTMCNC: 32.7 G/DL (ref 33–37)
MCV RBC AUTO: 92.2 FL (ref 81–99)
MONOCYTES ABSOLUTE: 0.4 K/UL (ref 0–0.9)
MONOCYTES RELATIVE PERCENT: 6.7 % (ref 0–10)
NEUTROPHILS ABSOLUTE: 3.6 K/UL (ref 1.5–7.5)
NEUTROPHILS RELATIVE PERCENT: 56.6 % (ref 50–65)
NITRITE, URINE: NEGATIVE
PDW BLD-RTO: 12.5 % (ref 11.5–14.5)
PH UA: 5 (ref 5–8)
PLATELET # BLD: 189 K/UL (ref 130–400)
PMV BLD AUTO: 11.9 FL (ref 9.4–12.3)
POTASSIUM REFLEX MAGNESIUM: 4.3 MMOL/L (ref 3.5–5)
PROTEIN UA: NEGATIVE MG/DL
RBC # BLD: 4.38 M/UL (ref 4.2–5.4)
RBC UA: 1 /HPF (ref 0–4)
SODIUM BLD-SCNC: 142 MMOL/L (ref 136–145)
SPECIFIC GRAVITY UA: 1.02 (ref 1–1.03)
TOTAL PROTEIN: 7.1 G/DL (ref 6.6–8.7)
UROBILINOGEN, URINE: 0.2 E.U./DL
WBC # BLD: 6.4 K/UL (ref 4.8–10.8)
WBC UA: 3 /HPF (ref 0–5)

## 2020-09-11 PROCEDURE — 6360000002 HC RX W HCPCS: Performed by: NURSE PRACTITIONER

## 2020-09-11 PROCEDURE — 80053 COMPREHEN METABOLIC PANEL: CPT

## 2020-09-11 PROCEDURE — 81001 URINALYSIS AUTO W/SCOPE: CPT

## 2020-09-11 PROCEDURE — 85025 COMPLETE CBC W/AUTO DIFF WBC: CPT

## 2020-09-11 PROCEDURE — 96375 TX/PRO/DX INJ NEW DRUG ADDON: CPT

## 2020-09-11 PROCEDURE — 36415 COLL VENOUS BLD VENIPUNCTURE: CPT

## 2020-09-11 PROCEDURE — 96374 THER/PROPH/DIAG INJ IV PUSH: CPT

## 2020-09-11 PROCEDURE — 99999 PR OFFICE/OUTPT VISIT,PROCEDURE ONLY: CPT | Performed by: NURSE PRACTITIONER

## 2020-09-11 PROCEDURE — 99282 EMERGENCY DEPT VISIT SF MDM: CPT

## 2020-09-11 RX ORDER — HYDROCODONE BITARTRATE AND ACETAMINOPHEN 10; 325 MG/1; MG/1
TABLET ORAL
COMMUNITY
Start: 2020-08-14

## 2020-09-11 RX ORDER — ONDANSETRON 2 MG/ML
4 INJECTION INTRAMUSCULAR; INTRAVENOUS ONCE
Status: COMPLETED | OUTPATIENT
Start: 2020-09-11 | End: 2020-09-11

## 2020-09-11 RX ORDER — CIPROFLOXACIN 250 MG/1
TABLET, FILM COATED ORAL
COMMUNITY

## 2020-09-11 RX ORDER — HYDROMORPHONE HYDROCHLORIDE 1 MG/ML
1 INJECTION, SOLUTION INTRAMUSCULAR; INTRAVENOUS; SUBCUTANEOUS ONCE
Status: COMPLETED | OUTPATIENT
Start: 2020-09-11 | End: 2020-09-11

## 2020-09-11 RX ADMIN — HYDROMORPHONE HYDROCHLORIDE 1 MG: 1 INJECTION, SOLUTION INTRAMUSCULAR; INTRAVENOUS; SUBCUTANEOUS at 17:58

## 2020-09-11 RX ADMIN — ONDANSETRON 4 MG: 2 INJECTION INTRAMUSCULAR; INTRAVENOUS at 17:59

## 2020-09-11 ASSESSMENT — PAIN DESCRIPTION - DESCRIPTORS: DESCRIPTORS: STABBING

## 2020-09-11 ASSESSMENT — PAIN DESCRIPTION - LOCATION: LOCATION: BACK

## 2020-09-11 ASSESSMENT — PAIN DESCRIPTION - PAIN TYPE: TYPE: CHRONIC PAIN

## 2020-09-11 ASSESSMENT — PAIN SCALES - GENERAL
PAINLEVEL_OUTOF10: 8
PAINLEVEL_OUTOF10: 9

## 2020-09-11 ASSESSMENT — ENCOUNTER SYMPTOMS
NAUSEA: 1
BACK PAIN: 1

## 2020-09-11 NOTE — ED PROVIDER NOTES
[facility]  eMERGENCY dEPARTMENT eNCOUnter      Pt Name: Aditya Alan  MRN: [id number]  [place] 1960  Date of evaluation: 9/11/2020  Provider: Agatha Cunningham, [address]       Chief Complaint   Patient presents with    Dysuria    Back Pain     chronic         HISTORY OF PRESENT ILLNESS   (Location/Symptom, Timing/Onset,Context/Setting, Quality, Duration, Modifying Factors, Severity)  Note limiting factors. Aditya Alan is a 56 y.o. female who presents to the emergency department WITH LOW BACK PAIN which is chronic. She has had urinary frequency and has been taking Cipro that her physician called in for her. She states she still having back pain and is worried that the urinary tract infection is not getting any better. She has had some problems with her kidneys and is supposed to have lab work to evaluate that. She is going to have back surgery soon but the COVID pandemic has interfered with this    The history is provided by the patient. Dysuria    This is a new problem. The current episode started more than 2 days ago. The problem has not changed since onset. There has been no fever. Associated symptoms include nausea. Back Pain   Associated symptoms: dysuria    Associated symptoms: no fever        NursingNotes were reviewed. REVIEW OF SYSTEMS    (2-9 systems for level 4, 10 or more for level 5)     Review of Systems   Constitutional: Negative for fever. Gastrointestinal: Positive for nausea. Genitourinary: Positive for dysuria. Musculoskeletal: Positive for back pain. Except as noted above the remainder of the review of systems was reviewed and negative.        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     Allergic rhinitis     Anxiety disorder     Anxiety state     Benign essential hypertension     Cancer (HCC)     MELANOMA ON RIGHT THIGH AND RIGHT BUTTOCK  Cellulitis     Cerebral artery occlusion with cerebral infarction ([facility] [address] 2014    OCCASIONAL FACE DRAWING ON THE LEFT SIDE WITH ANXIETY/BRAINSTEM INFARCT    Cerebrovascular disease     Chronic pain syndrome     Generalized anxiety disorder     Major depressive disorder     Reflux esophagitis     Type 2 diabetes mellitus ([facility] [address]     Urgency of urination          SURGICALHISTORY       Past Surgical History:   Procedure Laterality Date    CARPAL TUNNEL RELEASE Right     FINGER SURGERY      LEFT POINTER FINGER PARTIALLY CUTOFF    FINGER SURGERY Left     LEFT THUMB FRACTURE WITH LIGAMENT REPAIR    FINGER TRIGGER RELEASE Right          CURRENT MEDICATIONS       Discharge Medication List as of 9/11/2020  6:22 PM      CONTINUE these medications which have NOT CHANGED    Details   HYDROcodone-acetaminophen (NORCO)  MG per tablet Historical Med      ciprofloxacin (CIPRO) 250 MG tablet Cipro 250 mg tablet   Take 1 tablet twice a day by oral route. Historical Med      insulin aspart (NOVOLOG FLEXPEN) 100 UNIT/ML injection pen Inject into the skin 3 times daily (before meals)Historical Med      baclofen (LIORESAL) 10 MG tablet Take 10 mg by mouth 3 times daily Historical Med      insulin glargine (BASAGLAR KWIKPEN) 100 UNIT/ML injection pen Inject 12 Units into the skin nightly Historical Med      ALPRAZolam (XANAX) 1 MG tablet Take 1 mg by mouth 4 times daily as needed for Anxiety. Historical Med      clopidogrel (PLAVIX) 75 MG tablet Take 75 mg by mouth daily Historical Med      furosemide (LASIX) 40 MG tablet Take 20 mg by mouth daily as needed Historical Med      gabapentin (NEURONTIN) 300 MG capsule Take 300 mg by mouth 3 times daily. Historical Med      VICTOZA 18 MG/3ML SOPN SC injection Inject 1.2 mLs into the skin daily, DAWHistorical Med      ADMELOG SOLOSTAR 100 UNIT/ML pen Inject 20 Units into the skin 3 times daily (before meals) , DAWHistorical Med      lisinopril (PRINIVIL;ZESTRIL) 10 MG tablet Take 10 mg by mouth daily Historical Med      pantoprazole (PROTONIX) 40 MG tablet Take 40 mg by mouth daily Historical Med      pioglitazone (ACTOS) 15 MG tablet Take 15 mg by mouth daily Historical Med      potassium chloride (KLOR-CON) 10 MEQ extended release tablet Take 10 mEq by mouth 2 times daily Historical Med      ondansetron (ZOFRAN) 4 MG tablet Take 4 mg by mouth every 8 hours as needed for Nausea or VomitingHistorical Med             ALLERGIES     Penicillins and Sulfa antibiotics    FAMILY HISTORY       Family History   Problem Relation Age of Onset    Other Mother         AAA    Stroke Father     High Blood Pressure Father     Diabetes Father     Heart Disease Father         CABG    Heart Attack Father 58        MI          SOCIAL HISTORY       Social History     Socioeconomic History    Marital status:      Spouse name: None    Number of children: None    Years of education: None    Highest education level: None   Occupational History    None   Social Needs    Financial resource strain: None    Food insecurity     Worry: None     Inability: None    Transportation needs     Medical: None     Non-medical: None   Tobacco Use    Smoking status: Current Every Day Smoker     Packs/day: 1.00     Years: 16.00     Pack years: 16.00     Types: Cigarettes    Smokeless tobacco: Never Used   Substance and Sexual Activity    Alcohol use: Yes     Frequency: Never     Comment: socially    Drug use: Never    Sexual activity: None   Lifestyle    Physical activity     Days per week: None     Minutes per session: None    Stress: None   Relationships    Social connections     Talks on phone: None     Gets together: None     Attends [employer]ism service: None     Active member of club or organization: None     Attends meetings of clubs or organizations: None     Relationship status: None    Intimate partner violence     Fear of current or ex partner: None     Emotionally abused: None Physically abused: None     Forced sexual activity: None   Other Topics Concern    None   Social History Narrative    None       SCREENINGS      @FLOW([id number])@      PHYSICAL EXAM    (up to 7 for level 4, 8 or more for level 5)     ED Triage Vitals [09/11/20 1602]   BP Temp Temp src Pulse Resp SpO2 Height Weight   (!) 165/77 97.6 °F (36.4 °C) -- 69 14 94 % 5' 6\" (1.676 m) 210 lb (95.3 kg)       Physical Exam  Vitals signs and nursing note reviewed. Constitutional:       Appearance: She is well-developed. HENT:      Head: Normocephalic and atraumatic. Eyes:      General: No scleral icterus. Right eye: No discharge. Left eye: No discharge. Neck:      Musculoskeletal: Normal range of motion and neck supple. Cardiovascular:      Rate and Rhythm: Normal rate. Pulmonary:      Effort: No respiratory distress. Abdominal:      General: Abdomen is protuberant. Bowel sounds are normal.      Palpations: Abdomen is soft. Tenderness: There is no abdominal tenderness. There is no guarding or rebound. Musculoskeletal:      Lumbar back: She exhibits decreased range of motion, tenderness and bony tenderness. Back:       Comments: Radiates down both legs   Neurological:      Mental Status: She is alert.    Psychiatric:         Behavior: Behavior normal.         DIAGNOSTIC RESULTS     EKG: All EKG's are interpreted by the Emergency Department Physician who either signs or Co-signsthis chart in the absence of a cardiologist.        RADIOLOGY:   Non-plain filmimages such as CT, Ultrasound and MRI are read by the radiologist. Plain radiographic images are visualized and preliminarily interpreted by the emergency physician with the below findings:      Interpretation per the Radiologist below, if available at the time of this note:    No orders to display         ED BEDSIDEULTRASOUND:   Performed by ED Physician -none    LABS:  Labs Reviewed   URINE RT REFLEX TO CULTURE - Abnormal; Notable for the following components:       Result Value    Leukocyte Esterase, Urine SMALL (*)     All other components within normal limits   CBC WITH AUTO DIFFERENTIAL - Abnormal; Notable for the following components:    MCHC 32.7 (*)     All other components within normal limits   COMPREHENSIVE METABOLIC PANEL W/ REFLEX TO MG FOR LOW K - Abnormal; Notable for the following components:    Glucose 135 (*)     CREATININE 1.4 (*)     GFR Non- 38 (*)     GFR  46 (*)     All other components within normal limits   MICROSCOPIC URINALYSIS - Abnormal; Notable for the following components:    Bacteria, UA NEGATIVE (*)     Crystals, UA NEG (*)     All other components within normal limits       All other labs were within normal range or not returned as of this dictation. EMERGENCY DEPARTMENT COURSE and DIFFERENTIALDIAGNOSIS/MDM:   Vitals:    Vitals:    09/11/20 1602   BP: (!) 165/77   Pulse: 69   Resp: 14   Temp: 97.6 °F (36.4 °C)   SpO2: 94%   Weight: 210 lb (95.3 kg)   Height: 5' 6\" (1.676 m)           MDM better after treatment with pain medication. Patient encouraged to complete her course of Cipro and follow-up with her back surgeon      CONSULTS:  None    PROCEDURES:  Unless otherwise noted below, none     Procedures    FINAL IMPRESSION      1. Acute exacerbation of chronic low back pain    2.  Dysuria        DISPOSITION/PLAN   DISPOSITION        PATIENT REFERRED TO:  FAROOQ Briggs - JUAN  [facility]  [phone]            DISCHARGE MEDICATIONS:  Discharge Medication List as of 9/11/2020  6:22 PM             (Please note that portions of this note were completed with a voice recognitionprogram.  Efforts were made to edit the dictations but occasionally words are mis-transcribed.)    FAROOQ Hadley (electronically signed)         FAROOQ Hadley  09/11/20 [id number]

## 2020-09-25 ENCOUNTER — TELEPHONE (OUTPATIENT)
Dept: ENDOCRINOLOGY | Facility: CLINIC | Age: 60
End: 2020-09-25

## 2020-09-25 DIAGNOSIS — E11.59 HYPERTENSION ASSOCIATED WITH DIABETES (HCC): ICD-10-CM

## 2020-09-25 DIAGNOSIS — I15.2 HYPERTENSION ASSOCIATED WITH DIABETES (HCC): ICD-10-CM

## 2020-09-25 DIAGNOSIS — E06.3 HASHIMOTO'S THYROIDITIS: Primary | ICD-10-CM

## 2020-09-25 DIAGNOSIS — E11.65 TYPE 2 DIABETES MELLITUS WITH HYPERGLYCEMIA, WITH LONG-TERM CURRENT USE OF INSULIN (HCC): ICD-10-CM

## 2020-09-25 DIAGNOSIS — E55.9 VITAMIN D DEFICIENCY: ICD-10-CM

## 2020-09-25 DIAGNOSIS — Z79.4 TYPE 2 DIABETES MELLITUS WITH HYPERGLYCEMIA, WITH LONG-TERM CURRENT USE OF INSULIN (HCC): ICD-10-CM

## 2020-09-25 RX ORDER — FLURBIPROFEN SODIUM 0.3 MG/ML
SOLUTION/ DROPS OPHTHALMIC
Qty: 150 EACH | Refills: 11 | Status: SHIPPED | OUTPATIENT
Start: 2020-09-25 | End: 2021-03-29 | Stop reason: SDUPTHER

## 2020-09-25 RX ORDER — PIOGLITAZONEHYDROCHLORIDE 15 MG/1
15 TABLET ORAL DAILY
Qty: 30 TABLET | Refills: 11 | Status: SHIPPED | OUTPATIENT
Start: 2020-09-25 | End: 2020-09-29 | Stop reason: SDUPTHER

## 2020-09-25 NOTE — TELEPHONE ENCOUNTER
Pt needs lab orders faxed to [facility] at [phone]  Will have done Today     Pt needs refills of   actos 15 mg   and wants the mini needles that come in 7  Count almost out of needles   Sent to [facility] on [address] in [place]     Thank You           Sent to pharmacy

## 2020-09-29 ENCOUNTER — TELEMEDICINE (OUTPATIENT)
Dept: ENDOCRINOLOGY | Facility: CLINIC | Age: 60
End: 2020-09-29

## 2020-09-29 DIAGNOSIS — I10 ESSENTIAL HYPERTENSION: ICD-10-CM

## 2020-09-29 DIAGNOSIS — E11.42 TYPE 2 DIABETES MELLITUS WITH DIABETIC POLYNEUROPATHY, WITH LONG-TERM CURRENT USE OF INSULIN (HCC): Primary | ICD-10-CM

## 2020-09-29 DIAGNOSIS — Z79.4 TYPE 2 DIABETES MELLITUS WITH DIABETIC POLYNEUROPATHY, WITH LONG-TERM CURRENT USE OF INSULIN (HCC): Primary | ICD-10-CM

## 2020-09-29 DIAGNOSIS — F17.200 SMOKER: ICD-10-CM

## 2020-09-29 PROBLEM — E11.9 TYPE 2 DIABETES MELLITUS WITHOUT COMPLICATION, WITH LONG-TERM CURRENT USE OF INSULIN (HCC): Status: ACTIVE | Noted: 2018-01-25

## 2020-09-29 PROCEDURE — 99214 OFFICE O/P EST MOD 30 MIN: CPT | Performed by: NURSE PRACTITIONER

## 2020-09-29 RX ORDER — PIOGLITAZONEHYDROCHLORIDE 15 MG/1
15 TABLET ORAL DAILY
Qty: 30 TABLET | Refills: 11 | Status: SHIPPED | OUTPATIENT
Start: 2020-09-29 | End: 2021-03-29 | Stop reason: SDUPTHER

## 2020-09-29 RX ORDER — PEN NEEDLE, DIABETIC 31 GX5/16"
NEEDLE, DISPOSABLE MISCELLANEOUS
Qty: 120 EACH | Refills: 11 | Status: ON HOLD | OUTPATIENT
Start: 2020-09-29 | End: 2022-09-25

## 2020-09-29 NOTE — PROGRESS NOTES
Subjective    Milagros Parson is a 60 y.o. female. she is here today for follow-up.    History of Present Illness     You have chosen to receive care through a telehealth visit.  Do you consent to use a video/audio connection for your medical care today? Yes        TELEHEALTH VIDEO VISIT     This a video visit due to [facility] current guidelines for social distancing due to the COVID 19 pandemic    Primary Care Provider     Krish Brown MD        Reason diabetes         Duration 15 years     Timing - Diabetes is Constant     Quality -  not controlled      Severity -  severe     Complications -     CVA in 2016     + neuropathy  + retinopathy  + nephropathy         Current symptoms/problems  increase appetite, paresthesia of the feet, polydipsia and polyuria      Alleviating Factors: Compliance       Side Effects  none     Current diet  diabetic     Current exercise none     Current monitoring regimen: home blood tests - 6 times daily      Lab Results   Component Value Date    HGBA1C 6.50 (H) 01/28/2020                Home blood sugar records:       states 100 up to 170                  Hypoglycemia none since last vist     She is in a lot of back pain she states and it is causing her BG to be high               The following portions of the patient's history were reviewed and updated as appropriate:   Past Medical History:   Diagnosis Date   • Atrial septal defect, secundum    • Chest pain 11/17/2016   • CKD (chronic kidney disease), stage II 1/25/2018   • Diabetes mellitus (CMS/[facility])     DIABETES , UNCOMPLICATED, TYPE II    • Diabetic polyneuropathy associated with type 2 diabetes mellitus (CMS/HCC) 1/25/2018   • HLD (hyperlipidemia) 11/17/2016   • HTN (hypertension) 11/17/2016   • Hyperlipidemia, mixed    • Hypertension associated with diabetes (CMS/HCC) 1/25/2018   • Hypertension, benign    • Melanoma of lower leg, right (CMS/[facility]) 1/25/2018   • Mixed diabetic hyperlipidemia associated with type 2 diabetes  mellitus (CMS/[facility]) 1/25/2018   • Nonspecific chest pain    • Proliferative diabetic retinopathy of both eyes associated with type 2 diabetes mellitus (CMS/[facility]) 1/25/2018   • Stroke (CMS/[facility])    • Type 2 diabetes mellitus with hyperglycemia, with long-term current use of insulin (CMS/[facility]) 1/25/2018     Past Surgical History:   Procedure Laterality Date   • ATRIAL SEPTAL DEFECT REPAIR     • CARPAL TUNNEL RELEASE     • FEMORAL LYMPH NODE BIOPSY/EXCISON     • FINGER SURGERY     • HYSTERECTOMY     • OTHER SURGICAL HISTORY      melanoma removed right buttock     Family History   Problem Relation Age of Onset   • Heart disease Father      OB History    No obstetric history on file.       Current Outpatient Medications   Medication Sig Dispense Refill   • ALPRAZolam (XANAX) 1 MG tablet Take 1 mg by mouth 4 (Four) Times a Day As Needed for Anxiety.     • aspirin 81 MG EC tablet Take 81 mg by mouth Daily.     • choline fenofibrate (TRILIPIX) 45 MG capsule Take 1 capsule by mouth Daily. 30 capsule 11   • clopidogrel (PLAVIX) 75 MG tablet Take 75 mg by mouth Daily.     • dexlansoprazole (DEXILANT) 60 MG capsule Take 60 mg by mouth Daily.     • Empagliflozin (JARDIANCE) 10 MG tablet Take 10 mg by mouth Daily. Before bkfast 30 tablet 11   • estrogens, conjugated,-methyltestosterone (ESTRATEST HS) 0.625-1.25 MG per tablet Take 1 tablet by mouth Daily.     • furosemide (LASIX) 20 MG tablet Take 60 mg by mouth Daily As Needed.     • gabapentin (NEURONTIN) 300 MG capsule Take 300 mg by mouth 3 (Three) Times a Day.     • glucose blood test strip E11.9, ONE TOUCH ULTRA. TEST 5-6 TIMES A  each 11   • HYDROcodone-acetaminophen (NORCO)  MG per tablet Take 1 tablet by mouth Every 6 (Six) Hours As Needed for Moderate Pain .     • HYDROcodone-acetaminophen (VICODIN) 7.5-500 MG per tablet Take 1 tablet by mouth Every 6 (Six) Hours As Needed for moderate pain (4-6).     • insulin aspart (NOVOLOG FLEXPEN) 100 UNIT/ML solution  pen-injector sc pen Up to 20 units with meals (Patient taking differently: Up to 20 units with meals; does sliding scale) 6 pen 11   • Insulin Glargine (BASAGLAR KWIKPEN) 100 UNIT/ML injection pen 35 units qhs 4 pen 11   • Insulin Lispro (ADMELOG SOLOSTAR) 100 UNIT/ML solution pen-injector Inject up to 20 units with each meal (Patient taking differently: Inject up to 20 units with each meal; has not started using yet because still on novolog) 5 pen 5   • Insulin Pen Needle (B-D UF III MINI PEN NEEDLES) 31G X 5 MM misc Use 5 times daily 150 each 11   • Insulin Pen Needle (Easy Touch Pen Needles) 31G X 8 MM misc Inject 4 times daily, E11.9 120 each 11   • levothyroxine (SYNTHROID) 25 MCG tablet Take 1 tablet by mouth Daily. 30 tablet 11   • Liraglutide (VICTOZA) 18 MG/3ML solution pen-injector injection 1.2 mg subcutaneously daily 4 pen 11   • lisinopril (PRINIVIL,ZESTRIL) 10 MG tablet Take 10 mg by mouth Daily.     • metaxalone (SKELAXIN) 800 MG tablet Take 1 tablet by mouth 3 (Three) Times a Day As Needed for Muscle Spasms. 21 tablet 0   • methylPREDNISolone (MEDROL, DESTINY,) 4 MG tablet Take as directed on package instructions. 21 each 0   • ondansetron ODT (ZOFRAN-ODT) 4 MG disintegrating tablet Take 1 tablet by mouth 4 (Four) Times a Day As Needed for nausea or vomiting. 20 tablet 0   • pantoprazole (PROTONIX) 40 MG EC tablet Take 40 mg by mouth Daily.     • pioglitazone (Actos) 15 MG tablet Take 1 tablet by mouth Daily. 30 tablet 11   • POTASSIUM CHLORIDE PO Take 10 mEq by mouth 2 (Two) Times a Day.     • promethazine (PHENERGAN) 25 MG tablet Take 25 mg by mouth Every 6 (Six) Hours As Needed for nausea or vomiting.     • vitamin D (ERGOCALCIFEROL) 1.25 MG (18360 UT) capsule capsule Take 1 capsule by mouth 1 (One) Time Per Week. 5 capsule 11     No current facility-administered medications for this visit.      Allergies   Allergen Reactions   • Penicillins Anaphylaxis   • Morphine Itching     Social History      Socioeconomic History   • Marital status:      Spouse name: Not on file   • Number of children: Not on file   • Years of education: Not on file   • Highest education level: Not on file   Tobacco Use   • Smoking status: Current Every Day Smoker     Packs/day: 1.00   • Smokeless tobacco: Never Used   Substance and Sexual Activity   • Alcohol use: No   • Drug use: No       Review of Systems  Review of Systems   Constitutional: Negative for activity change, appetite change, diaphoresis and fatigue.   HENT: Negative for facial swelling, sneezing, sore throat, tinnitus, trouble swallowing and voice change.    Eyes: Negative for photophobia, pain, discharge, redness, itching and visual disturbance.   Respiratory: Negative for apnea, cough, choking, chest tightness and shortness of breath.    Cardiovascular: Negative for chest pain, palpitations and leg swelling.   Gastrointestinal: Negative for abdominal distention, abdominal pain, constipation, diarrhea, nausea and vomiting.   Endocrine: Negative for cold intolerance, heat intolerance, polydipsia, polyphagia and polyuria.   Genitourinary: Negative for difficulty urinating, dysuria, frequency, hematuria and urgency.   Musculoskeletal: Negative for arthralgias, back pain, gait problem, joint swelling, myalgias, neck pain and neck stiffness.   Skin: Negative for color change, pallor, rash and wound.   Neurological: Negative for dizziness, tremors, weakness, light-headedness, numbness and headaches.   Hematological: Negative for adenopathy. Does not bruise/bleed easily.   Psychiatric/Behavioral: Negative for behavioral problems, confusion and sleep disturbance.        Objective    There were no vitals taken for this visit.  Physical Exam  Constitutional:       Appearance: Normal appearance.   HENT:      Head: Normocephalic and atraumatic.      Right Ear: External ear normal.      Left Ear: External ear normal.      Nose: Nose normal.   Eyes:      Extraocular  Movements: Extraocular movements intact.      Conjunctiva/sclera: Conjunctivae normal.      Pupils: Pupils are equal, round, and reactive to light.   Neck:      Musculoskeletal: Normal range of motion.   Pulmonary:      Effort: Pulmonary effort is normal. No respiratory distress.   Musculoskeletal: Normal range of motion.   Skin:     Coloration: Skin is not pale.   Neurological:      General: No focal deficit present.      Mental Status: She is alert and oriented to person, place, and time.   Psychiatric:         Mood and Affect: Mood normal.         Behavior: Behavior normal.         Thought Content: Thought content normal.         Judgment: Judgment normal.         Lab Review  Glucose (mg/dL)   Date Value   01/28/2020 87   11/01/2019 178 (H)   05/06/2019 117 (H)   11/24/2018 145 (H)     Sodium (mmol/L)   Date Value   01/28/2020 142   11/01/2019 140   05/06/2019 139   11/24/2018 142     Potassium (mmol/L)   Date Value   01/28/2020 5.0   11/01/2019 4.4   05/06/2019 4.5   11/24/2018 4.6     Chloride (mmol/L)   Date Value   01/28/2020 103   11/01/2019 99   05/06/2019 102   11/24/2018 103     CO2 (mmol/L)   Date Value   01/28/2020 26.4   11/01/2019 28   05/06/2019 28.3   11/24/2018 30.0     BUN (mg/dL)   Date Value   01/28/2020 16   11/01/2019 21   05/06/2019 22   11/24/2018 19     Creatinine (mg/dL)   Date Value   01/28/2020 1.56 (H)   11/01/2019 1.3 (H)   05/06/2019 1.29 (H)   11/24/2018 1.54 (H)     Hemoglobin A1C (%)   Date Value   01/28/2020 6.50 (H)   05/06/2019 6.40 (H)     Triglycerides (mg/dL)   Date Value   01/28/2020 294 (H)     LDL Cholesterol  (mg/dL)   Date Value   01/28/2020 153 (H)       Assessment/Plan      1. Type 2 diabetes mellitus with diabetic polyneuropathy, with long-term current use of insulin (CMS/[facility])    2. Smoker    3. Essential hypertension    .    Medications prescribed:  Outpatient Encounter Medications as of 9/29/2020   Medication Sig Dispense Refill   • ALPRAZolam (XANAX) 1 MG tablet  Take 1 mg by mouth 4 (Four) Times a Day As Needed for Anxiety.     • aspirin 81 MG EC tablet Take 81 mg by mouth Daily.     • choline fenofibrate (TRILIPIX) 45 MG capsule Take 1 capsule by mouth Daily. 30 capsule 11   • clopidogrel (PLAVIX) 75 MG tablet Take 75 mg by mouth Daily.     • dexlansoprazole (DEXILANT) 60 MG capsule Take 60 mg by mouth Daily.     • Empagliflozin (JARDIANCE) 10 MG tablet Take 10 mg by mouth Daily. Before bkfast 30 tablet 11   • estrogens, conjugated,-methyltestosterone (ESTRATEST HS) 0.625-1.25 MG per tablet Take 1 tablet by mouth Daily.     • furosemide (LASIX) 20 MG tablet Take 60 mg by mouth Daily As Needed.     • gabapentin (NEURONTIN) 300 MG capsule Take 300 mg by mouth 3 (Three) Times a Day.     • glucose blood test strip E11.9, ONE TOUCH ULTRA. TEST 5-6 TIMES A  each 11   • HYDROcodone-acetaminophen (NORCO)  MG per tablet Take 1 tablet by mouth Every 6 (Six) Hours As Needed for Moderate Pain .     • HYDROcodone-acetaminophen (VICODIN) 7.5-500 MG per tablet Take 1 tablet by mouth Every 6 (Six) Hours As Needed for moderate pain (4-6).     • insulin aspart (NOVOLOG FLEXPEN) 100 UNIT/ML solution pen-injector sc pen Up to 20 units with meals (Patient taking differently: Up to 20 units with meals; does sliding scale) 6 pen 11   • Insulin Glargine (BASAGLAR KWIKPEN) 100 UNIT/ML injection pen 35 units qhs 4 pen 11   • Insulin Lispro (ADMELOG SOLOSTAR) 100 UNIT/ML solution pen-injector Inject up to 20 units with each meal (Patient taking differently: Inject up to 20 units with each meal; has not started using yet because still on novolog) 5 pen 5   • Insulin Pen Needle (B-D UF III MINI PEN NEEDLES) 31G X 5 MM misc Use 5 times daily 150 each 11   • Insulin Pen Needle (Easy Touch Pen Needles) 31G X 8 MM misc Inject 4 times daily, E11.9 120 each 11   • levothyroxine (SYNTHROID) 25 MCG tablet Take 1 tablet by mouth Daily. 30 tablet 11   • Liraglutide (VICTOZA) 18 MG/3ML solution  pen-injector injection 1.2 mg subcutaneously daily 4 pen 11   • lisinopril (PRINIVIL,ZESTRIL) 10 MG tablet Take 10 mg by mouth Daily.     • metaxalone (SKELAXIN) 800 MG tablet Take 1 tablet by mouth 3 (Three) Times a Day As Needed for Muscle Spasms. 21 tablet 0   • methylPREDNISolone (MEDROL, DESTINY,) 4 MG tablet Take as directed on package instructions. 21 each 0   • ondansetron ODT (ZOFRAN-ODT) 4 MG disintegrating tablet Take 1 tablet by mouth 4 (Four) Times a Day As Needed for nausea or vomiting. 20 tablet 0   • pantoprazole (PROTONIX) 40 MG EC tablet Take 40 mg by mouth Daily.     • pioglitazone (Actos) 15 MG tablet Take 1 tablet by mouth Daily. 30 tablet 11   • POTASSIUM CHLORIDE PO Take 10 mEq by mouth 2 (Two) Times a Day.     • promethazine (PHENERGAN) 25 MG tablet Take 25 mg by mouth Every 6 (Six) Hours As Needed for nausea or vomiting.     • vitamin D (ERGOCALCIFEROL) 1.25 MG (56828 UT) capsule capsule Take 1 capsule by mouth 1 (One) Time Per Week. 5 capsule 11   • [DISCONTINUED] pioglitazone (Actos) 15 MG tablet Take 1 tablet by mouth Daily. 30 tablet 11     No facility-administered encounter medications on file as of 9/29/2020.        Orders placed during this encounter include:  No orders of the defined types were placed in this encounter.    Glycemic Management:      Diabetes mellitus type 2            Lab Results   Component Value Date    HGBA1C 6.50 (H) 01/28/2020               Basaglar taking 10 units   ==========         jardiance stopped due to fear of side effects      ============      actos 15 mg daily -- taking   ============     Novolog will be Admelog         Taking 3 up to 9 units --suggest increasing by 2   ============     victoza 1.2 mg  taking         Lipid Management        Statin?     Blood Pressure Management:          Controlled on lisinopril mediated regimen      Lasix was stopped            Microvascular Complication Monitoring:    -----------     Last Microalbumin-Proteinuria  Assessment     No results found for: YESIKA [id number]     -----------        Neuropathy yes    RX for diabetic shoes      Follows with       Preventive Care:            Tobacco Use        Milagros Parson  reports that she has been smoking. She has been smoking about 1.00 pack per day. She has never used smokeless tobacco.. I have educated her on the risk of diseases from using tobacco products such as cancer, COPD and heart diease.     I advised her to quit and she is not willing to quit.    I spent 3  minutes counseling the patient.                   Weight Related:      Patient's Body mass index is 34.57 kg/m². BMI is above normal parameters. Recommendations include: educational material.     Decrease daily caloric intake by 500 calories per day      She cannot exercise due to back         Bone Health               Lab Results   Component Value Date     CALCIUM 9.5 11/29/2016         Thyroid Health        Lab Results   Component Value Date    TSH 6.000 (H) 01/28/2020                Perceived goiter, obtain thyroid US --- had done at [facility] -- will obtain report     We never received report -- she is to obtain a copy for us       Other Diabetes Related Aspects             4. Follow-up: Return in about 6 months (around 3/29/2021) for Recheck.          We spent 25 minutes including reviewing the patients electronic chart, reviewing medications, reviewing labs, active problems    I provided advice regarding diabetes management, dietary changes, adjustments of medication, self titration of insulin, refilled medications, ordering labs, future appointments    Patient was advised to contact the office if there are unanswered questions, trouble with blood glucose management, or any concerns

## 2020-09-30 ENCOUNTER — TELEPHONE (OUTPATIENT)
Dept: NEUROSURGERY | Age: 60
End: 2020-09-30

## 2020-09-30 NOTE — TELEPHONE ENCOUNTER
Patient called and left a message asking for an appt. I called patient but no answer.  We need to make an appt for this patient on a Tuesday or Thursday to discuss surgery,

## 2020-10-05 ENCOUNTER — APPOINTMENT (OUTPATIENT)
Dept: GENERAL RADIOLOGY | Age: 60
End: 2020-10-05
Payer: MEDICAID

## 2020-10-05 ENCOUNTER — HOSPITAL ENCOUNTER (EMERGENCY)
Age: 60
Discharge: HOME | End: 2020-10-05
Payer: MEDICAID

## 2020-10-05 VITALS
TEMPERATURE: 98.7 F | BODY MASS INDEX: 32.28 KG/M2 | DIASTOLIC BLOOD PRESSURE: 85 MMHG | HEART RATE: 84 BPM | SYSTOLIC BLOOD PRESSURE: 134 MMHG | RESPIRATION RATE: 14 BRPM | OXYGEN SATURATION: 98 % | WEIGHT: 200 LBS

## 2020-10-05 PROCEDURE — 73560 X-RAY EXAM OF KNEE 1 OR 2: CPT

## 2020-10-05 PROCEDURE — 99281 EMR DPT VST MAYX REQ PHY/QHP: CPT

## 2020-10-05 PROCEDURE — 99999 PR OFFICE/OUTPT VISIT,PROCEDURE ONLY: CPT | Performed by: PHYSICIAN ASSISTANT

## 2020-10-05 PROCEDURE — 96372 THER/PROPH/DIAG INJ SC/IM: CPT

## 2020-10-05 PROCEDURE — 93971 EXTREMITY STUDY: CPT

## 2020-10-05 PROCEDURE — 6360000002 HC RX W HCPCS: Performed by: PHYSICIAN ASSISTANT

## 2020-10-05 PROCEDURE — 99282 EMERGENCY DEPT VISIT SF MDM: CPT

## 2020-10-05 RX ORDER — PREDNISONE 10 MG/1
10 TABLET ORAL DAILY
Qty: 5 TABLET | Refills: 0 | Status: SHIPPED | OUTPATIENT
Start: 2020-10-05 | End: 2020-10-10

## 2020-10-05 RX ORDER — HYDROMORPHONE HYDROCHLORIDE 1 MG/ML
1 INJECTION, SOLUTION INTRAMUSCULAR; INTRAVENOUS; SUBCUTANEOUS ONCE
Status: COMPLETED | OUTPATIENT
Start: 2020-10-05 | End: 2020-10-05

## 2020-10-05 RX ORDER — ORPHENADRINE CITRATE 30 MG/ML
60 INJECTION INTRAMUSCULAR; INTRAVENOUS ONCE
Status: COMPLETED | OUTPATIENT
Start: 2020-10-05 | End: 2020-10-05

## 2020-10-05 RX ORDER — MELOXICAM 15 MG/1
15 TABLET ORAL DAILY
Qty: 30 TABLET | Refills: 0 | Status: SHIPPED | OUTPATIENT
Start: 2020-10-05

## 2020-10-05 RX ADMIN — ORPHENADRINE CITRATE 60 MG: 30 INJECTION INTRAMUSCULAR; INTRAVENOUS at 18:53

## 2020-10-05 RX ADMIN — HYDROMORPHONE HYDROCHLORIDE 1 MG: 1 INJECTION, SOLUTION INTRAMUSCULAR; INTRAVENOUS; SUBCUTANEOUS at 18:52

## 2020-10-05 ASSESSMENT — PAIN SCALES - GENERAL
PAINLEVEL_OUTOF10: 8
PAINLEVEL_OUTOF10: 8

## 2020-10-05 NOTE — ED PROVIDER NOTES
[facility] EMERGENCY DEPT  eMERGENCYdEPARTMENT eNCOUnter      Pt Name: Cornelio Tipton  MRN: [id number]  [place] 1960  Date of evaluation: 10/5/2020  Flora Ahumada, [address]       Chief Complaint   Patient presents with    Back Pain     chronic    Leg Pain     R leg pain         HISTORY OF PRESENT ILLNESS  (Location/Symptom, Timing/Onset, Context/Setting, Quality, Duration, Modifying Factors, Severity.)   Cornelio Tipton is a 56 y.o. female who presents to the emergency department with complaints of back pain chronically lower back going into her right buttock down her leg her main complaint today is significant swelling and right knee pain that is new it is been worsening over the past 2 months. She is supposed to see Dr. Snow Martin with neurosurgery to discuss intervention of her known herniated disks L1-5. She denies any bowel bladder incontinence she is ambulatory. She denies any rectal numbness or pain. She denies any current rash or shingles outbreak which she is prone to. Takes Norco 10 baseline. Kayli Josias request #[id number]  Patient denies any prior DVT. HPI    Nursing Notes were reviewed and I agree. REVIEW OF SYSTEMS    (2-9 systems for level 4, 10 or more for level 5)     Review of Systems   Constitutional: Negative for activity change, appetite change, chills and fever. HENT: Negative for congestion, postnasal drip, rhinorrhea and sore throat. Eyes: Negative for photophobia, pain, discharge and visual disturbance. Respiratory: Negative for apnea, cough and shortness of breath. Cardiovascular: Negative for chest pain and leg swelling. Gastrointestinal: Negative for abdominal distention, abdominal pain and nausea. Genitourinary: Negative for vaginal bleeding. Musculoskeletal: Positive for arthralgias and back pain. Negative for joint swelling, neck pain and neck stiffness. Skin: Negative for color change and rash.    Neurological: Negative for dizziness, syncope, facial asymmetry and headaches. Hematological: Negative for adenopathy. Does not bruise/bleed easily. Psychiatric/Behavioral: Negative for agitation, behavioral problems and confusion. Except as noted above the remainder of the review of systems was reviewed and negative. 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     Allergic rhinitis     Anxiety disorder     Anxiety state     Benign essential hypertension     Cancer (HCC)     MELANOMA ON RIGHT THIGH AND RIGHT BUTTOCK    Cellulitis     Cerebral artery occlusion with cerebral infarction ([facility] [address] 2014    OCCASIONAL FACE DRAWING ON THE LEFT SIDE WITH ANXIETY/BRAINSTEM INFARCT    Cerebrovascular disease     Chronic pain syndrome     Generalized anxiety disorder     Major depressive disorder     Reflux esophagitis     Type 2 diabetes mellitus ([facility] [address]     Urgency of urination          SURGICAL HISTORY       Past Surgical History:   Procedure Laterality Date    CARPAL TUNNEL RELEASE Right     FINGER SURGERY      LEFT POINTER FINGER PARTIALLY CUTOFF    FINGER SURGERY Left     LEFT THUMB FRACTURE WITH LIGAMENT REPAIR    FINGER TRIGGER RELEASE Right          CURRENT MEDICATIONS       Discharge Medication List as of 10/5/2020  9:07 PM      CONTINUE these medications which have NOT CHANGED    Details   HYDROcodone-acetaminophen (NORCO)  MG per tablet Historical Med      ciprofloxacin (CIPRO) 250 MG tablet Cipro 250 mg tablet   Take 1 tablet twice a day by oral route. Historical Med      insulin aspart (NOVOLOG FLEXPEN) 100 UNIT/ML injection pen Inject into the skin 3 times daily (before meals)Historical Med      ondansetron (ZOFRAN) 4 MG tablet Take 4 mg by mouth every 8 hours as needed for Nausea or VomitingHistorical Med      baclofen (LIORESAL) 10 MG tablet Take 10 mg by mouth 3 times daily Historical Med      insulin glargine (BASAGLAR KWIKPEN) 100 UNIT/ML injection pen Inject 12 Units into the skin nightly Historical Med      ALPRAZolam (XANAX) 1 MG tablet Take 1 mg by mouth 4 times daily as needed for Anxiety. Historical Med      clopidogrel (PLAVIX) 75 MG tablet Take 75 mg by mouth daily Historical Med      furosemide (LASIX) 40 MG tablet Take 20 mg by mouth daily as needed Historical Med      gabapentin (NEURONTIN) 300 MG capsule Take 300 mg by mouth 3 times daily. Historical Med      VICTOZA 18 MG/3ML SOPN SC injection Inject 1.2 mLs into the skin daily, DAWHistorical Med      ADMELOG SOLOSTAR 100 UNIT/ML pen Inject 20 Units into the skin 3 times daily (before meals) , DAWHistorical Med      lisinopril (PRINIVIL;ZESTRIL) 10 MG tablet Take 10 mg by mouth daily Historical Med      pantoprazole (PROTONIX) 40 MG tablet Take 40 mg by mouth daily Historical Med      pioglitazone (ACTOS) 15 MG tablet Take 15 mg by mouth daily Historical Med      potassium chloride (KLOR-CON) 10 MEQ extended release tablet Take 10 mEq by mouth 2 times daily Historical Med             ALLERGIES     Penicillins and Sulfa antibiotics    FAMILY HISTORY       Family History   Problem Relation Age of Onset    Other Mother         AAA    Stroke Father     High Blood Pressure Father     Diabetes Father     Heart Disease Father         CABG    Heart Attack Father 58        MI          SOCIAL HISTORY       Social History     Socioeconomic History    Marital status:      Spouse name: Not on file    Number of children: Not on file    Years of education: Not on file    Highest education level: Not on file   Occupational History    Not on file   Social Needs    Financial resource strain: Not on file    Food insecurity     Worry: Not on file     Inability: Not on file    Transportation needs     Medical: Not on file     Non-medical: Not on file   Tobacco Use    Smoking status: Current Every Day Smoker     Packs/day: 1.00     Years: 16.00     Pack years: 16.00 Pulmonary:      Effort: Pulmonary effort is normal. No respiratory distress. Breath sounds: Normal breath sounds. No stridor. No wheezing. Abdominal:      General: Bowel sounds are normal. There is no distension. Palpations: Abdomen is soft. Tenderness: There is no abdominal tenderness. Musculoskeletal: Normal range of motion. General: Swelling and tenderness present. No signs of injury. Comments: Mild right knee effusion no erythema no significant calf pain or Homans sign   Skin:     General: Skin is warm and dry. Capillary Refill: Capillary refill takes less than 2 seconds. Findings: No rash. Neurological:      Mental Status: She is alert and oriented to person, place, and time. Cranial Nerves: No cranial nerve deficit. Sensory: No sensory deficit. Coordination: Coordination normal.   Psychiatric:         Behavior: Behavior normal.         Thought Content: Thought content normal.           DIAGNOSTIC RESULTS     RADIOLOGY:   Non-plain film images such as CT, Ultrasound and MRI are read by the radiologist. Plain radiographic images are visualized and preliminarilyinterpreted by No att. providers found with the below findings:      Interpretation per the Radiologist below, if available at the time of this note:    XR KNEE RIGHT (1-2 VIEWS)   Final Result   1. No acute osseous findings. 2.  Small knee joint effusion. 3.  Bilateral compartment osteoarthritic change. Signed by Dr Jodie Chandler on 10/5/2020 6:51 PM      VL Extremity Venous Right             LABS:  Labs Reviewed - No data to display    All other labs were within normal range or notreturned as of this dictation.     RE-ASSESSMENT        EMERGENCY DEPARTMENT COURSE and DIFFERENTIAL DIAGNOSIS/MDM:   Vitals:    Vitals:    10/05/20 1655 10/05/20 2122   BP: 135/76 134/85   Pulse: 97 84   Resp: 20 14   Temp: 98.7 °F (37.1 °C)    TempSrc: Temporal    SpO2: 98%    Weight: 200 lb (90.7 kg) MDM  Mild osteoarthritis. Patient has effusion appreciated. Blood clot ruled out she has a warm leg with good arterial pulses. I think a lot of this is unfortunately related to her chronic low back issues. She has appointment see Dr. Fernie Mitchell on the 27th which I encouraged her to keep. Plan for discharge his pain is controlled here and I advised for her to talk to PCP for pain management in outpatient setting while awaiting neurosurgery appointment    PROCEDURES:    Procedures      FINAL IMPRESSION      1.  Chronic right-sided low back pain with right-sided sciatica          DISPOSITION/PLAN   DISPOSITION Decision To Discharge 10/05/2020 09:02:20 PM      PATIENT REFERRED TO:  [facility] EMERGENCY DEPT  [address] [address] [phone]    If symptoms worsen    Stevo Hernandez, APRN - [facility]  [phone]      As needed      DISCHARGE MEDICATIONS:  Discharge Medication List as of 10/5/2020  9:07 PM          (Please note that portions of this note were completed with a voice recognition program.  Efforts were made to edit the dictations but occasionallywords are mis-transcribed.)    Candy Walden [address] [place] [address]  10/06/20 5992

## 2020-10-06 ASSESSMENT — ENCOUNTER SYMPTOMS
COUGH: 0
PHOTOPHOBIA: 0
NAUSEA: 0
RHINORRHEA: 0
SHORTNESS OF BREATH: 0
APNEA: 0
BACK PAIN: 1
EYE DISCHARGE: 0
EYE PAIN: 0
ABDOMINAL DISTENTION: 0
ABDOMINAL PAIN: 0
SORE THROAT: 0
COLOR CHANGE: 0

## 2020-10-06 NOTE — ED NOTES
To vascular lab in [address] [place] [address], [address] [place] [address]  10/05/20 2014

## 2020-10-06 NOTE — PROGRESS NOTES
Vascular lab preliminary. Right lower extremity venous duplex scan completed. No evidence for DVT, SVT, or reflux noted at this time. Final report pending.

## 2020-12-08 NOTE — TELEPHONE ENCOUNTER
I spoke to [facility] and she confirmed that patients has an upcoming appt on Friday dec 11 with Dr Dariusz Gastelum

## 2020-12-10 ENCOUNTER — TELEPHONE (OUTPATIENT)
Dept: NEUROSURGERY | Age: 60
End: 2020-12-10

## 2020-12-10 NOTE — TELEPHONE ENCOUNTER
Patient had called and left a vm asking about when is her next upcoming appt.    I called patient back and informed her that her next appt is scheduled for tomorrow @ 11 am.

## 2020-12-11 ENCOUNTER — PREP FOR PROCEDURE (OUTPATIENT)
Dept: NEUROSURGERY | Age: 60
End: 2020-12-11

## 2020-12-11 ENCOUNTER — TELEPHONE (OUTPATIENT)
Dept: NEUROSURGERY | Age: 60
End: 2020-12-11

## 2020-12-11 ENCOUNTER — OFFICE VISIT (OUTPATIENT)
Dept: NEUROSURGERY | Age: 60
End: 2020-12-11
Payer: MEDICAID

## 2020-12-11 VITALS
BODY MASS INDEX: 33.11 KG/M2 | SYSTOLIC BLOOD PRESSURE: 168 MMHG | WEIGHT: 206 LBS | HEART RATE: 59 BPM | OXYGEN SATURATION: 99 % | HEIGHT: 66 IN | DIASTOLIC BLOOD PRESSURE: 95 MMHG

## 2020-12-11 PROCEDURE — 99215 OFFICE O/P EST HI 40 MIN: CPT | Performed by: NEUROLOGICAL SURGERY

## 2020-12-11 PROCEDURE — G8427 DOCREV CUR MEDS BY ELIG CLIN: HCPCS | Performed by: NEUROLOGICAL SURGERY

## 2020-12-11 PROCEDURE — 3017F COLORECTAL CA SCREEN DOC REV: CPT | Performed by: NEUROLOGICAL SURGERY

## 2020-12-11 PROCEDURE — 4004F PT TOBACCO SCREEN RCVD TLK: CPT | Performed by: NEUROLOGICAL SURGERY

## 2020-12-11 PROCEDURE — G8484 FLU IMMUNIZE NO ADMIN: HCPCS | Performed by: NEUROLOGICAL SURGERY

## 2020-12-11 PROCEDURE — G8417 CALC BMI ABV UP PARAM F/U: HCPCS | Performed by: NEUROLOGICAL SURGERY

## 2020-12-11 RX ORDER — SODIUM CHLORIDE 0.9 % (FLUSH) 0.9 %
10 SYRINGE (ML) INJECTION PRN
Status: CANCELLED | OUTPATIENT
Start: 2020-12-11

## 2020-12-11 RX ORDER — SODIUM CHLORIDE 0.9 % (FLUSH) 0.9 %
10 SYRINGE (ML) INJECTION EVERY 12 HOURS SCHEDULED
Status: CANCELLED | OUTPATIENT
Start: 2020-12-11

## 2020-12-11 ASSESSMENT — ENCOUNTER SYMPTOMS
RESPIRATORY NEGATIVE: 1
GASTROINTESTINAL NEGATIVE: 1
EYES NEGATIVE: 1

## 2020-12-11 NOTE — PROGRESS NOTES
[facility]  Office Visit      Chief Complaint   Patient presents with    Follow-up     12/11/2020:  Ms. Kiera Rg returns to the clinic today to re-discuss surgery. Her surgery was previously postponed due to a shingle infection which she has recovered from. She states that on 11/03/2020 she stepped on a piece of glass and fell. Her pain has been exacerbated from this fall. She continues to have low back pain that radiates into the right hip, posterolateral thigh, lateral leg, and dorsum of foot. She also admits to some groin pain on the left. The leg pain is the worst.       She is currently taking Norco, gabapentin for pain. 8/05/2020: Ms. Kiera Rg returns to clinic today to discuss right leg pain and swelling. She describes some low back pain and RLE pain that radiates into the right hip, posterolateral thigh and to the top of the foot. She does describe swelling of the RLE which is likely associated with her previous melanoma removal that included removal of the lymph nodes. She states that the radiating RLE pain is worsening and she has a hard time going up and down steps. She is wanting to discuss surgery with Dr. Vesta Cruz. 10/29/2019:Ms. Kiera Rg returns to clinic today to review MRI and XR results. She states is experiencing severe right leg pain that is exacerbated with walking. She also reports low back pain. She reports 70% leg pain and 30% back pain. She reports weakness of her right lower extremity. She is utilizing a wheelchair to today's visit due to her severe pain. 10/07/2019: Ms. Kiera Rg returns to clinic today with worsening back and RLE pain. She has recently been to the ED on 10/03/2019 where she received ibuprofen, toradol, morphine, and norflex and decided to elope without being discharged. Today she states that she has severe low back and right leg pain that started about 2 months ago. She has had a few falls since our last visit.   The pain radiates into the right posterolateral thigh, posterolateral shin and to the dorsum of the foot. She states that she has numbness and tingling on the dorsum of the right foot. She has continuous cramping in the right calf which is very painful. Walking and standing make the pain much worse. She can barely stand to fix her hair. She now has a commode seat riser. She is transported in a wheelchair today. Her pain is mostly in the RLE. HISTORY OF PRESENT ILLNESS:    Rayray Black is a 56 y.o. female with a history of melanoma disabled who presents with low back pain and right hip pain that has been ongoing for about 5 weeks. History of chronic back pain prior to this injury. She reports lifting a filing cabinet the day before the pain started. The pain does not radiate. Her pain is mostly located in the right low back. The patient complains of mild tingling on right toes which could be associated with neuropathy. She states that her pain in the last few weeks has significantly improved. Her pain is not changed when going from a seated to standing position. Her pain is significantly worsened with walking. Her pain is not changed when lying flat. Overall, indicative that the patient does not have a mechanical nature to their pain. She also states that about 2 months ago she started developing right arm/shoulder pain. She reports that she does have some neck pain. She reports that it is difficult for her to use the right arm due to pain. She does have thumb pain in which she associates with trigger finger/previous surgery. The patient has underwent a non-operative treatment course that has included:  NSAIDs (cannot take due to kidneys)  Gabapentin  Opiates for 20+ years (Norco, Percocet)  Oral Steroids (diabetic)  IM steroid       Of note she does use tobacco and does take blood thinning medications (Plavix). She does have a history of a basal ganglia stroke.                 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     Allergic rhinitis     Anxiety disorder     Anxiety state     Benign essential hypertension     Cancer (HCC)     MELANOMA ON RIGHT THIGH AND RIGHT BUTTOCK    Cellulitis     Cerebral artery occlusion with cerebral infarction ([facility] [address] 2014    OCCASIONAL FACE DRAWING ON THE LEFT SIDE WITH ANXIETY/BRAINSTEM INFARCT    Cerebrovascular disease     Chronic pain syndrome     Generalized anxiety disorder     Major depressive disorder     Reflux esophagitis     Type 2 diabetes mellitus (HCC)     Urgency of urination        Past Surgical History:   Procedure Laterality Date    CARPAL TUNNEL RELEASE Right     FINGER SURGERY      LEFT POINTER FINGER PARTIALLY CUTOFF    FINGER SURGERY Left     LEFT THUMB FRACTURE WITH LIGAMENT REPAIR    FINGER TRIGGER RELEASE Right        Current Outpatient Medications   Medication Sig Dispense Refill    meloxicam (MOBIC) 15 MG tablet Take 1 tablet by mouth daily 30 tablet 0    HYDROcodone-acetaminophen (NORCO)  MG per tablet       insulin aspart (NOVOLOG FLEXPEN) 100 UNIT/ML injection pen Inject into the skin 3 times daily (before meals)      ondansetron (ZOFRAN) 4 MG tablet Take 4 mg by mouth every 8 hours as needed for Nausea or Vomiting      insulin glargine (BASAGLAR KWIKPEN) 100 UNIT/ML injection pen Inject 12 Units into the skin nightly       ALPRAZolam (XANAX) 1 MG tablet Take 1 mg by mouth 4 times daily as needed for Anxiety.  clopidogrel (PLAVIX) 75 MG tablet Take 75 mg by mouth daily       furosemide (LASIX) 40 MG tablet Take 20 mg by mouth daily as needed       gabapentin (NEURONTIN) 300 MG capsule Take 300 mg by mouth 3 times daily.       VICTOZA 18 MG/3ML SOPN SC injection Inject 1.2 mLs into the skin daily      ADMELOG SOLOSTAR 100 UNIT/ML pen Inject 20 Units into the skin 3 times daily (before meals)       lisinopril (PRINIVIL;ZESTRIL) 10 MG tablet Take 10 mg by mouth daily       pantoprazole (PROTONIX) 40 MG tablet Take 40 mg by mouth daily       pioglitazone (ACTOS) 15 MG tablet Take 15 mg by mouth daily       potassium chloride (KLOR-CON) 10 MEQ extended release tablet Take 10 mEq by mouth 2 times daily       ciprofloxacin (CIPRO) 250 MG tablet Cipro 250 mg tablet   Take 1 tablet twice a day by oral route.  baclofen (LIORESAL) 10 MG tablet Take 10 mg by mouth 3 times daily        No current facility-administered medications for this visit. Allergies:  Penicillins and Sulfa antibiotics    Social History:   Social History     Tobacco Use   Smoking Status Current Every Day Smoker    Packs/day: 1.00    Years: 16.00    Pack years: 16.00    Types: Cigarettes   Smokeless Tobacco Never Used     Social History     Substance and Sexual Activity   Alcohol Use Yes    Frequency: Never    Comment: socially         Family History:   Family History   Problem Relation Age of Onset    Other Mother         AAA    Stroke Father     High Blood Pressure Father     Diabetes Father     Heart Disease Father         CABG    Heart Attack Father 58        MI       REVIEW OF SYSTEMS:  Review of Systems   Constitutional: Negative. HENT: Negative. Eyes: Positive for blurred vision. Respiratory: Negative. Cardiovascular: Negative. Gastrointestinal: Positive for nausea. Genitourinary: Negative. Musculoskeletal: Positive for back pain, joint pain and neck pain. Skin: Negative. Neurological: Positive for dizziness, weakness and headaches. Endo/Heme/Allergies: Negative. Psychiatric/Behavioral: Positive for depression. The patient is nervous/anxious. PHYSICAL EXAM:  Vitals:    12/11/20 1111   BP: (!) 168/95   Pulse: 59   SpO2: 99%     Constitutional: appears well-developed and well-nourished.    Eyes - conjunctiva normal.  Pupils react to light  Ear, nose, throat -hearing intact to finger rub, No scars, masses, or lesions over external nose or ears, no atrophy oftongue  Neck-symmetric, no masses noted, no jugular vein distension  Respiration- chest wall appears symmetric, good expansion, normal effort without use of accessory muscles  Musculoskeletal - no significantwasting of muscles noted, no bony deformities, gait no gross ataxia  Extremities- RLE edema that is chronic due to removal of lymph nodes   Skin - warm, dry, and intact. No rash, erythema, or pallor. Psychiatric - mood, affect, and behavior appear normal.     Neurologic Examination  Awake, Alert and oriented x 4  Normal speech pattern, following commands    Motor:  RIGHT:      iliopsoas 5/5    knee flexor 5/5    knee extension 5/5    EHL/dorsiflexion 5/5    plantar flexion 5/5    LEFT:      iliopsoas 5/5    knee flexor 5/5    knee extension 5/5    EHL/dorsiflexion 5/5    plantar flexion 5/5    Decrease to pinprick sensation right lateral shin  Reflexes are 2+ and symmetric  No clonus or Hoffmans sign  No myofacial tenderness to palpation  Antalgic Gait pattern          DATA and IMAGING:    Nursing/pcp notes, imaging, labs, and vitals reviewed.      PT,OT and/or speech notes reviewed    Lab Results   Component Value Date    WBC 6.4 09/11/2020    HGB 13.2 09/11/2020    HCT 40.4 09/11/2020    MCV 92.2 09/11/2020     09/11/2020     Lab Results   Component Value Date     09/11/2020    K 4.3 09/11/2020     09/11/2020    CO2 28 09/11/2020    BUN 12 09/11/2020    CREATININE 1.4 (H) 09/11/2020    GLUCOSE 135 (H) 09/11/2020    CALCIUM 9.6 09/11/2020    PROT 7.1 09/11/2020    LABALBU 4.2 09/11/2020    BILITOT <0.2 09/11/2020    ALKPHOS 57 09/11/2020    AST 9 09/11/2020    ALT 8 09/11/2020    LABGLOM 38 (A) 09/11/2020    GFRAA 46 (L) 09/11/2020     Lab Results   Component Value Date    INR 0.97 11/01/2019    PROTIME 12.3 11/01/2019       CT Lumbar Spine (10/04/2019) [facility]  I have personally reviewed the images and my interpretation is:  L3 bilateral pars defects  L5-S1 there is a spondylolisthesis that measures 6-7mm with bilateral L5 pars defects     Narrative   History: L5 spondylolisthesis. Patient with severe low back pain and   right lower extremity pain. History of melanoma   MRI lumbar spine: Sagittal and axial images lumbar spine are obtained   without contrast.   COMPARISON: CT lumbar [facility] exam [facility]. FINDINGS: There is stable grade 1/2 spondylolisthesis at L5-S1 with L5   pars defects. There is degenerative disc desiccation and narrowing of   the L5-S1 disc. A limited lumbar spine above the L5 level is normal.   There is minimal endplate spurring. Small Schmorl's node seen along   the superior plate of L00. There is no suspicious focal bony mass. The conus medullaris terminates at the L1 level. At L5-S1, there is moderate facet osteoarthropathy in addition to the   pars defects resulting in grade 1/2 spondylolisthesis. There is right   foraminal disc bulging/protrusion with early posterior lateral   endplate spurring and mild left foraminal disc bulging. There is no   significant central canal stenosis, however there is severe right and   moderate left neural foramen narrowing at this L5-S1 level. The discs above L5-S1 are preserved with no additional prominent disc   bulging or focal protrusion. There is minimal right foraminal disc   bulging at the L3/4 level contributing to only mild right L3/4 neural   foramen narrowing. No additional neural foramen narrowing or central   lumbar canal stenosis visualized.       Impression   1. Grade 1/2 L5 spondylolisthesis with L5 pars defects. Moderate right   paracentral/foraminal L5-S1 disc protrusion. There is severe right and   moderate left L5-S1 neural foramen narrowing. Only mild right neural   foramen narrowing seen at L3/4. No significant central canal stenosis.    Signed by Dr Asher Campa on 10/25/2019 2:19 PM   I have reviewed these images and my interpretation is:  L5-S1 there is a spondylolisthesis that measures 8-9mm  There is a large disc extrusion that migrates superiorly and laterally to the foramen that results in severe compression of the right L5 nerve root. There is also moderate foraminal stenosis on the left side    XR lumbar spine flexion/extension (Tabitha) 10/29/19:  There is a relatively poor translation of the lumbar spine during flexion and extension. There appears to be no significant motion or evidence of instability. There is a spondylolisthesis at L5-S1 that measures 13mm. ASSESSMENT:    Anil Mcclellan is a 56 y.o. female with complaints of low back pain and right hip pain that has worsened over the last few weeks. ICD-10-CM    1. Spondylolisthesis at L5-S1 level  M43.17 APTT     CBC     Comprehensive Metabolic Panel     EKG 12 Lead     Protime-INR     Type and Screen     Urinalysis Reflex to Culture   2. Lumbar foraminal stenosis  M48.061 APTT     CBC     Comprehensive Metabolic Panel     EKG 12 Lead     Protime-INR     Type and Screen     Urinalysis Reflex to Culture   3. Pars defect of lumbar spine  M43.06 APTT     CBC     Comprehensive Metabolic Panel     EKG 12 Lead     Protime-INR     Type and Screen     Urinalysis Reflex to Culture   4. Right leg pain  M79.604    5. Pre-op exam  Z01.818 XR CHEST (2 VW)       PLAN:  -Mrs. Torrez's pain pattern perfectly correlates very well with her pathology. We are going to offer to reschedule her surgery. She would like to move forward with surgery. She will need a RIGHT TLIF of L5-S1 using minimally invasive technique     She will need to stop her Plavix at least 7 days prior to surgery and may resume following    We discussed risks, complications and expectations, including but not limited to infection, paralysis, bowel and bladder dysfunction, need for revision procedure, persistent pain, spinal fluid leak, stroke and death.   In addition, the benefits of the surgery were thoroughly discussed and the patient demonstrated a deep understanding. The patient wishes to proceed with surgical intervention. We will schedule for surgery in the near future. The patient was counseled at length about the risks of kate Covid-19 during their perioperative period and any recovery window from their procedure. The patient was made aware that kate Covid-19  may worsen their prognosis for recovering from their procedure  and lend to a higher morbidity and/or mortality risk. All material risks, benefits, and reasonable alternatives including postponing the procedure were discussed. The patient does wish to proceed with the procedure at this time.       Eulalia Moreira DO

## 2020-12-11 NOTE — H&P
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     Allergic rhinitis     Anxiety disorder     Anxiety state     Benign essential hypertension     Cancer (HCC)     MELANOMA ON RIGHT THIGH AND RIGHT BUTTOCK    Cellulitis     Cerebral artery occlusion with cerebral infarction ([facility] [address] 2014    OCCASIONAL FACE DRAWING ON THE LEFT SIDE WITH ANXIETY/BRAINSTEM INFARCT    Cerebrovascular disease     Chronic pain syndrome     Generalized anxiety disorder     Major depressive disorder     Reflux esophagitis     Type 2 diabetes mellitus (HCC)     Urgency of urination        Past Surgical History:   Procedure Laterality Date    CARPAL TUNNEL RELEASE Right     FINGER SURGERY      LEFT POINTER FINGER PARTIALLY CUTOFF    FINGER SURGERY Left     LEFT THUMB FRACTURE WITH LIGAMENT REPAIR    FINGER TRIGGER RELEASE Right        Current Outpatient Medications   Medication Sig Dispense Refill    meloxicam (MOBIC) 15 MG tablet Take 1 tablet by mouth daily 30 tablet 0    HYDROcodone-acetaminophen (NORCO)  MG per tablet       insulin aspart (NOVOLOG FLEXPEN) 100 UNIT/ML injection pen Inject into the skin 3 times daily (before meals)      ondansetron (ZOFRAN) 4 MG tablet Take 4 mg by mouth every 8 hours as needed for Nausea or Vomiting      insulin glargine (BASAGLAR KWIKPEN) 100 UNIT/ML injection pen Inject 12 Units into the skin nightly       ALPRAZolam (XANAX) 1 MG tablet Take 1 mg by mouth 4 times daily as needed for Anxiety.  clopidogrel (PLAVIX) 75 MG tablet Take 75 mg by mouth daily       furosemide (LASIX) 40 MG tablet Take 20 mg by mouth daily as needed       gabapentin (NEURONTIN) 300 MG capsule Take 300 mg by mouth 3 times daily.       VICTOZA 18 MG/3ML SOPN SC injection Inject 1.2 mLs into the skin daily      ADMELOG SOLOSTAR 100 UNIT/ML pen Inject 20 Units into the skin 3 times daily (before meals)       lisinopril (PRINIVIL;ZESTRIL) 10 MG tablet Take 10 mg by mouth daily       pantoprazole (PROTONIX) 40 MG tablet Take 40 mg by mouth daily       pioglitazone (ACTOS) 15 MG tablet Take 15 mg by mouth daily       potassium chloride (KLOR-CON) 10 MEQ extended release tablet Take 10 mEq by mouth 2 times daily       ciprofloxacin (CIPRO) 250 MG tablet Cipro 250 mg tablet   Take 1 tablet twice a day by oral route.  baclofen (LIORESAL) 10 MG tablet Take 10 mg by mouth 3 times daily        No current facility-administered medications for this visit. Allergies:  Penicillins and Sulfa antibiotics    Social History:   Social History     Tobacco Use   Smoking Status Current Every Day Smoker    Packs/day: 1.00    Years: 16.00    Pack years: 16.00    Types: Cigarettes   Smokeless Tobacco Never Used     Social History     Substance and Sexual Activity   Alcohol Use Yes    Frequency: Never    Comment: socially         Family History:   Family History   Problem Relation Age of Onset    Other Mother         AAA    Stroke Father     High Blood Pressure Father     Diabetes Father     Heart Disease Father         CABG    Heart Attack Father 58        MI       REVIEW OF SYSTEMS:  Review of Systems   Constitutional: Negative. HENT: Negative. Eyes: Positive for blurred vision. Respiratory: Negative. Cardiovascular: Negative. Gastrointestinal: Positive for nausea. Genitourinary: Negative. Musculoskeletal: Positive for back pain, joint pain and neck pain. Skin: Negative. Neurological: Positive for dizziness, weakness and headaches. Endo/Heme/Allergies: Negative. Psychiatric/Behavioral: Positive for depression. The patient is nervous/anxious. PHYSICAL EXAM:  Vitals:    12/11/20 1111   BP: (!) 168/95   Pulse: 59   SpO2: 99%     Constitutional: appears well-developed and well-nourished.    Eyes - conjunctiva normal.  Pupils react to light  Ear, nose, throat -hearing intact to finger rub, No scars, masses, or lesions over external nose or ears, no atrophy oftongue  Neck-symmetric, no masses noted, no jugular vein distension  Respiration- chest wall appears symmetric, good expansion, normal effort without use of accessory muscles  Musculoskeletal - no significantwasting of muscles noted, no bony deformities, gait no gross ataxia  Extremities- RLE edema that is chronic due to removal of lymph nodes   Skin - warm, dry, and intact. No rash, erythema, or pallor. Psychiatric - mood, affect, and behavior appear normal.     Neurologic Examination  Awake, Alert and oriented x 4  Normal speech pattern, following commands    Motor:  RIGHT:      iliopsoas 5/5    knee flexor 5/5    knee extension 5/5    EHL/dorsiflexion 5/5    plantar flexion 5/5    LEFT:      iliopsoas 5/5    knee flexor 5/5    knee extension 5/5    EHL/dorsiflexion 5/5    plantar flexion 5/5    Decrease to pinprick sensation right lateral shin  Reflexes are 2+ and symmetric  No clonus or Hoffmans sign  No myofacial tenderness to palpation  Antalgic Gait pattern          DATA and IMAGING:    Nursing/pcp notes, imaging, labs, and vitals reviewed.      PT,OT and/or speech notes reviewed    Lab Results   Component Value Date    WBC 6.4 09/11/2020    HGB 13.2 09/11/2020    HCT 40.4 09/11/2020    MCV 92.2 09/11/2020     09/11/2020     Lab Results   Component Value Date     09/11/2020    K 4.3 09/11/2020     09/11/2020    CO2 28 09/11/2020    BUN 12 09/11/2020    CREATININE 1.4 (H) 09/11/2020    GLUCOSE 135 (H) 09/11/2020    CALCIUM 9.6 09/11/2020    PROT 7.1 09/11/2020    LABALBU 4.2 09/11/2020    BILITOT <0.2 09/11/2020    ALKPHOS 57 09/11/2020    AST 9 09/11/2020    ALT 8 09/11/2020    LABGLOM 38 (A) 09/11/2020    GFRAA 46 (L) 09/11/2020     Lab Results   Component Value Date    INR 0.97 11/01/2019    PROTIME 12.3 11/01/2019       CT Lumbar Spine (10/04/2019) [facility]  I have personally reviewed the images and my interpretation is:  L3 bilateral pars defects  L5-S1 there is a spondylolisthesis that measures 6-7mm with bilateral L5 pars defects     Narrative   History: L5 spondylolisthesis. Patient with severe low back pain and   right lower extremity pain. History of melanoma   MRI lumbar spine: Sagittal and axial images lumbar spine are obtained   without contrast.   COMPARISON: CT lumbar spine exam from [facility]. FINDINGS: There is stable grade 1/2 spondylolisthesis at L5-S1 with L5   pars defects. There is degenerative disc desiccation and narrowing of   the L5-S1 disc. A limited lumbar spine above the L5 level is normal.   There is minimal endplate spurring. Small Schmorl's node seen along   the superior plate of P71. There is no suspicious focal bony mass. The conus medullaris terminates at the L1 level. At L5-S1, there is moderate facet osteoarthropathy in addition to the   pars defects resulting in grade 1/2 spondylolisthesis. There is right   foraminal disc bulging/protrusion with early posterior lateral   endplate spurring and mild left foraminal disc bulging. There is no   significant central canal stenosis, however there is severe right and   moderate left neural foramen narrowing at this L5-S1 level. The discs above L5-S1 are preserved with no additional prominent disc   bulging or focal protrusion. There is minimal right foraminal disc   bulging at the L3/4 level contributing to only mild right L3/4 neural   foramen narrowing. No additional neural foramen narrowing or central   lumbar canal stenosis visualized.       Impression   1. Grade 1/2 L5 spondylolisthesis with L5 pars defects. Moderate right   paracentral/foraminal L5-S1 disc protrusion. There is severe right and   moderate left L5-S1 neural foramen narrowing. Only mild right neural   foramen narrowing seen at L3/4. No significant central canal stenosis.    Signed by Dr Enriqueta Kong on 10/25/2019 2:19 PM   I have reviewed these images and my interpretation is:  L5-S1 there is a

## 2020-12-17 ENCOUNTER — HOSPITAL ENCOUNTER (EMERGENCY)
Age: 60
Discharge: HOME | End: 2020-12-17
Attending: EMERGENCY MEDICINE
Payer: MEDICAID

## 2020-12-17 VITALS
WEIGHT: 205 LBS | SYSTOLIC BLOOD PRESSURE: 124 MMHG | RESPIRATION RATE: 19 BRPM | BODY MASS INDEX: 32.95 KG/M2 | TEMPERATURE: 98.5 F | HEIGHT: 66 IN | DIASTOLIC BLOOD PRESSURE: 96 MMHG | HEART RATE: 80 BPM | OXYGEN SATURATION: 96 %

## 2020-12-17 PROCEDURE — 96372 THER/PROPH/DIAG INJ SC/IM: CPT

## 2020-12-17 PROCEDURE — 99283 EMERGENCY DEPT VISIT LOW MDM: CPT

## 2020-12-17 PROCEDURE — 6370000000 HC RX 637 (ALT 250 FOR IP)

## 2020-12-17 PROCEDURE — 6360000002 HC RX W HCPCS: Performed by: EMERGENCY MEDICINE

## 2020-12-17 PROCEDURE — 99999 PR OFFICE/OUTPT VISIT,PROCEDURE ONLY: CPT | Performed by: EMERGENCY MEDICINE

## 2020-12-17 RX ORDER — TIZANIDINE 4 MG/1
4 TABLET ORAL EVERY 8 HOURS PRN
Qty: 21 TABLET | Refills: 0 | Status: SHIPPED | OUTPATIENT
Start: 2020-12-17

## 2020-12-17 RX ORDER — HYDROMORPHONE HYDROCHLORIDE 1 MG/ML
1 INJECTION, SOLUTION INTRAMUSCULAR; INTRAVENOUS; SUBCUTANEOUS ONCE
Status: COMPLETED | OUTPATIENT
Start: 2020-12-17 | End: 2020-12-17

## 2020-12-17 RX ORDER — ONDANSETRON 4 MG/1
TABLET, ORALLY DISINTEGRATING ORAL
Status: COMPLETED
Start: 2020-12-17 | End: 2020-12-17

## 2020-12-17 RX ORDER — TIZANIDINE 4 MG/1
4 TABLET ORAL ONCE
Status: DISCONTINUED | OUTPATIENT
Start: 2020-12-17 | End: 2020-12-18 | Stop reason: HOSPADM

## 2020-12-17 RX ORDER — METHYLPREDNISOLONE 4 MG/1
TABLET ORAL
Qty: 1 KIT | Refills: 0 | Status: SHIPPED | OUTPATIENT
Start: 2020-12-17 | End: 2020-12-23

## 2020-12-17 RX ORDER — ONDANSETRON 4 MG/1
4 TABLET, ORALLY DISINTEGRATING ORAL ONCE
Status: DISCONTINUED | OUTPATIENT
Start: 2020-12-17 | End: 2020-12-18 | Stop reason: HOSPADM

## 2020-12-17 RX ORDER — DEXAMETHASONE SODIUM PHOSPHATE 10 MG/ML
6 INJECTION, SOLUTION INTRAMUSCULAR; INTRAVENOUS ONCE
Status: COMPLETED | OUTPATIENT
Start: 2020-12-17 | End: 2020-12-17

## 2020-12-17 RX ADMIN — DEXAMETHASONE SODIUM PHOSPHATE 6 MG: 10 INJECTION, SOLUTION INTRAMUSCULAR; INTRAVENOUS at 22:27

## 2020-12-17 RX ADMIN — ONDANSETRON 4 MG: 4 TABLET, ORALLY DISINTEGRATING ORAL at 22:32

## 2020-12-17 RX ADMIN — HYDROMORPHONE HYDROCHLORIDE 1 MG: 1 INJECTION, SOLUTION INTRAMUSCULAR; INTRAVENOUS; SUBCUTANEOUS at 22:27

## 2020-12-17 ASSESSMENT — ENCOUNTER SYMPTOMS
DIARRHEA: 0
SHORTNESS OF BREATH: 0
VOMITING: 0
RHINORRHEA: 0
ABDOMINAL PAIN: 0
BACK PAIN: 1
EYE PAIN: 0
COUGH: 0
VOICE CHANGE: 0
EYE REDNESS: 0

## 2020-12-17 ASSESSMENT — PAIN SCALES - GENERAL
PAINLEVEL_OUTOF10: 9
PAINLEVEL_OUTOF10: 9

## 2020-12-17 ASSESSMENT — PAIN DESCRIPTION - LOCATION: LOCATION: BACK

## 2020-12-17 ASSESSMENT — PAIN DESCRIPTION - PAIN TYPE: TYPE: ACUTE PAIN

## 2020-12-18 NOTE — ED PROVIDER NOTES
[facility] EMERGENCY DEPT  EMERGENCY DEPARTMENT ENCOUNTER      Pt Name: Alessio Abel  MRN: [id number]  [place] 1960  Date of evaluation: 12/17/2020  Provider: Demetrius Kaminski MD    [address] [place] [address]       Chief Complaint   Patient presents with    Back Pain         HISTORY OF PRESENT ILLNESS   (Location/Symptom, Timing/Onset,Context/Setting, Quality, Duration, Modifying Factors, Severity)  Note limiting factors. Alessio Abel is a 56 y.o. female who presents to the emergency department with complaint of worsening right lower back pain over the last several days with radicular pain down the right leg. Has had back pain for the last 14 months and is followed by Dr. Ministerio Sousa with neurosurgery with tentative plans for surgery in 1 month if pain management is Bode[place] Mattn is excellent at controlling pain. Denies any new numbness, weakness, tingling, incontinence, or other new symptoms. No recent injuries or other significant events    HPI    NursingNotes were reviewed. REVIEW OF SYSTEMS    (2-9 systems for level 4, 10 or more for level 5)     Review of Systems   Constitutional: Negative for fatigue and fever. HENT: Negative for congestion, rhinorrhea and voice change. Eyes: Negative for pain and redness. Respiratory: Negative for cough and shortness of breath. Cardiovascular: Negative for chest pain. Gastrointestinal: Negative for abdominal pain, diarrhea and vomiting. Endocrine: Negative. Genitourinary: Negative. Musculoskeletal: Positive for back pain. Negative for arthralgias and gait problem. Skin: Negative for rash and wound. Neurological: Negative for weakness and headaches. Hematological: Negative. Psychiatric/Behavioral: Negative. All other systems reviewed and are negative. A complete review of systems was performed and is negative except as noted above in the HPI.        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     Allergic rhinitis     Anxiety disorder     Anxiety state     Benign essential hypertension     Cancer (HCC)     MELANOMA ON RIGHT THIGH AND RIGHT BUTTOCK    Cellulitis     Cerebral artery occlusion with cerebral infarction ([facility] [address] 2014    OCCASIONAL FACE DRAWING ON THE LEFT SIDE WITH ANXIETY/BRAINSTEM INFARCT    Cerebrovascular disease     Chronic pain syndrome     Generalized anxiety disorder     Major depressive disorder     Reflux esophagitis     Type 2 diabetes mellitus (HCC)     Urgency of urination          SURGICAL HISTORY       Past Surgical History:   Procedure Laterality Date    CARPAL TUNNEL RELEASE Right     FINGER SURGERY      LEFT POINTER FINGER PARTIALLY CUTOFF    FINGER SURGERY Left     LEFT THUMB FRACTURE WITH LIGAMENT REPAIR    FINGER TRIGGER RELEASE Right          CURRENT MEDICATIONS       Discharge Medication List as of 12/17/2020 10:51 PM      CONTINUE these medications which have NOT CHANGED    Details   meloxicam (MOBIC) 15 MG tablet Take 1 tablet by mouth daily, Disp-30 tablet,R-0Print      HYDROcodone-acetaminophen (NORCO)  MG per tablet Historical Med      ciprofloxacin (CIPRO) 250 MG tablet Cipro 250 mg tablet   Take 1 tablet twice a day by oral route. Historical Med      insulin aspart (NOVOLOG FLEXPEN) 100 UNIT/ML injection pen Inject into the skin 3 times daily (before meals)Historical Med      ondansetron (ZOFRAN) 4 MG tablet Take 4 mg by mouth every 8 hours as needed for Nausea or VomitingHistorical Med      baclofen (LIORESAL) 10 MG tablet Take 10 mg by mouth 3 times daily Historical Med      insulin glargine (BASAGLAR KWIKPEN) 100 UNIT/ML injection pen Inject 12 Units into the skin nightly Historical Med      ALPRAZolam (XANAX) 1 MG tablet Take 1 mg by mouth 4 times daily as needed for Anxiety.  Historical Med      clopidogrel (PLAVIX) 75 MG tablet Take 75 mg by mouth daily Historical Med      furosemide (LASIX) 40 MG tablet Take 20 mg by mouth daily as needed Historical Med      gabapentin (NEURONTIN) 300 MG capsule Take 300 mg by mouth 3 times daily. Historical Med      VICTOZA 18 MG/3ML SOPN SC injection Inject 1.2 mLs into the skin daily, DAWHistorical Med      ADMELOG SOLOSTAR 100 UNIT/ML pen Inject 20 Units into the skin 3 times daily (before meals) , DAWHistorical Med      lisinopril (PRINIVIL;ZESTRIL) 10 MG tablet Take 10 mg by mouth daily Historical Med      pantoprazole (PROTONIX) 40 MG tablet Take 40 mg by mouth daily Historical Med      pioglitazone (ACTOS) 15 MG tablet Take 15 mg by mouth daily Historical Med      potassium chloride (KLOR-CON) 10 MEQ extended release tablet Take 10 mEq by mouth 2 times daily Historical Med             ALLERGIES     Penicillins and Sulfa antibiotics    FAMILY HISTORY       Family History   Problem Relation Age of Onset    Other Mother         AAA    Stroke Father     High Blood Pressure Father     Diabetes Father     Heart Disease Father         CABG    Heart Attack Father 58        MI          SOCIAL HISTORY       Social History     Socioeconomic History    Marital status:      Spouse name: None    Number of children: None    Years of education: None    Highest education level: None   Occupational History    None   Social Needs    Financial resource strain: None    Food insecurity     Worry: None     Inability: None    Transportation needs     Medical: None     Non-medical: None   Tobacco Use    Smoking status: Current Every Day Smoker     Packs/day: 1.00     Years: 16.00     Pack years: 16.00     Types: Cigarettes    Smokeless tobacco: Never Used   Substance and Sexual Activity    Alcohol use: Yes     Frequency: Never     Comment: socially    Drug use: Never    Sexual activity: None   Lifestyle    Physical activity     Days per week: None     Minutes per session: None    Stress: None   Relationships    Social connections     Talks on phone: None     Gets together: None     Attends Confucianist service: None     Active member of club or organization: None     Attends meetings of clubs or organizations: None     Relationship status: None    Intimate partner violence     Fear of current or ex partner: None     Emotionally abused: None     Physically abused: None     Forced sexual activity: None   Other Topics Concern    None   Social History Narrative    None       SCREENINGS             PHYSICAL EXAM    (up to 7 for level 4, 8 or more for level 5)     ED Triage Vitals   BP Temp Temp src Pulse Resp SpO2 Height Weight   12/17/20 1913 12/17/20 1911 -- 12/17/20 1911 12/17/20 1911 12/17/20 1911 12/17/20 2121 12/17/20 2121   (!) 124/96 98.5 °F (36.9 °C)  80 19 96 % 5' 6\" (1.676 m) 205 lb (93 kg)       Physical Exam  Vitals signs and nursing note reviewed. Constitutional:       General: She is not in acute distress. Appearance: She is well-developed. She is not toxic-appearing or diaphoretic. HENT:      Head: Normocephalic and atraumatic. Eyes:      General: No scleral icterus. Right eye: No discharge. Left eye: No discharge. Pupils: Pupils are equal, round, and reactive to light. Neck:      Musculoskeletal: Normal range of motion. Cardiovascular:      Rate and Rhythm: Normal rate and regular rhythm. Pulmonary:      Effort: Pulmonary effort is normal. No respiratory distress. Breath sounds: No stridor. Abdominal:      General: There is no distension. Musculoskeletal: Normal range of motion. General: No deformity. Skin:     General: Skin is warm and dry. Neurological:      General: No focal deficit present. Mental Status: She is alert and oriented to person, place, and time. GCS: GCS eye subscore is 4. GCS verbal subscore is 5. GCS motor subscore is 6. Cranial Nerves: No cranial nerve deficit. Sensory: No sensory deficit. Motor: No weakness or abnormal muscle tone.       Gait: Gait normal. Psychiatric:         Behavior: Behavior normal.         Thought Content: Thought content normal.         Judgment: Judgment normal.         DIAGNOSTIC RESULTS     EKG: All EKG's are interpreted by the Emergency Department Physician who either signs or Co-signs this chart in the absence of a cardiologist.    *    RADIOLOGY:   Non-plain film images such as CT, Ultrasound and MRI are read by the radiologist. Eliverto Ode images are visualized and preliminarily interpreted by the emergency physician with the below findings:      Interpretation per the Radiologist below, if available at the time of this note:    No orders to display         ED BEDSIDE ULTRASOUND:   Performed by ED Physician - none    LABS:  Labs Reviewed - No data to display    All other labs were within normal range or not returned as of this dictation. Medications   tiZANidine (ZANAFLEX) tablet 4 mg (4 mg Oral Not Given 12/17/20 2226)   ondansetron (ZOFRAN-ODT) disintegrating tablet 4 mg (has no administration in time range)   dexamethasone (PF) (DECADRON) injection 6 mg (6 mg Intramuscular Given 12/17/20 2227)   HYDROmorphone HCl PF (DILAUDID) injection 1 mg (1 mg Intramuscular Given 12/17/20 2227)   ondansetron (ZOFRAN-ODT) 4 MG disintegrating tablet (4 mg  Given 12/17/20 2232)       EMERGENCY DEPARTMENT COURSE and DIFFERENTIALDIAGNOSIS/MDM:   Vitals:    Vitals:    12/17/20 1911 12/17/20 1913 12/17/20 2121   BP:  (!) 124/96    Pulse: 80     Resp: 19     Temp: 98.5 °F (36.9 °C)     SpO2: 96%     Weight:   205 lb (93 kg)   Height:   5' 6\" (1.676 m)       MDM    Patient got IM shots for pain here in the emergency department then elected to leave prior to receiving discharge papers and prescriptions to go home with      Evaluation and work-up here revealed no signs of emergent or life-threatening pathology that would necessitate admission for further work-up or management at this time.   Patient is felt to be stable for discharge home with return precautions for worsening of the condition or development of new concerning symptoms. Patient was encouraged to follow-up with their primary care doctor in the appropriate timeframe. Necessary prescriptions and information have been provided for treatment at home. Patient voices understanding and agreement with the plan. CONSULTS:  None    PROCEDURES:  Unless otherwise notedbelow, none     Procedures      FINAL IMPRESSION     1. Acute exacerbation of chronic low back pain    2.  Back pain of lumbar region with sciatica          DISPOSITION/PLAN   DISPOSITION Decision To Discharge 12/17/2020 10:40:45 PM      PATIENT REFERRED TO:  [facility] EMERGENCY DEPT  [address] [address] [phone]    If symptoms worsen    Cherryjeevan Jd DO [facility] [address]  [phone]    Schedule an appointment as soon as possible for a visit         DISCHARGE MEDICATIONS:  Discharge Medication List as of 12/17/2020 10:51 PM      START taking these medications    Details   methylPREDNISolone (MEDROL, JUAN ANTONIO,) 4 MG tablet Take by mouth., Disp-1 kit, R-0Print      tiZANidine (ZANAFLEX) 4 MG tablet Take 1 tablet by mouth every 8 hours as needed (pain/spasm), Disp-21 tablet, R-0Print                (Please note that portions of this note were completed with a voice recognition program.  Efforts were made to edit the dictations butoccasionally words are mis-transcribed.)    Giovanni Martin MD (electronically signed)  AttendingEmerEureka Springs Hospital Physician    note      Giovanni Martin., MD  12/17/20 [id number]

## 2020-12-28 ENCOUNTER — TELEPHONE (OUTPATIENT)
Dept: NEUROSURGERY | Age: 60
End: 2020-12-28

## 2020-12-28 NOTE — TELEPHONE ENCOUNTER
Patient called and states she was supposed to get a referral to see Dr Edwina Llamas. Patient states that Dr Naomie Urban really does not want to do the upcoming surgery and would rather patient see Dr Edwina Llamas first.  Please advise/?

## 2021-03-02 ENCOUNTER — APPOINTMENT (OUTPATIENT)
Dept: GENERAL RADIOLOGY | Age: 61
End: 2021-03-02
Payer: MEDICAID

## 2021-03-02 ENCOUNTER — HOSPITAL ENCOUNTER (EMERGENCY)
Age: 61
Discharge: HOME | End: 2021-03-02
Payer: MEDICAID

## 2021-03-02 VITALS
HEIGHT: 66 IN | DIASTOLIC BLOOD PRESSURE: 92 MMHG | OXYGEN SATURATION: 93 % | HEART RATE: 86 BPM | WEIGHT: 205 LBS | RESPIRATION RATE: 16 BRPM | SYSTOLIC BLOOD PRESSURE: 148 MMHG | TEMPERATURE: 98.2 F | BODY MASS INDEX: 32.95 KG/M2
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
## 2021-03-02 DIAGNOSIS — G89.29 ACUTE EXACERBATION OF CHRONIC LOW BACK PAIN: Primary | ICD-10-CM

## 2021-03-02 DIAGNOSIS — M54.50 ACUTE EXACERBATION OF CHRONIC LOW BACK PAIN: Primary | ICD-10-CM

## 2021-03-02 PROCEDURE — 6360000002 HC RX W HCPCS: Performed by: NURSE PRACTITIONER

## 2021-03-02 PROCEDURE — 2500000003 HC RX 250 WO HCPCS: Performed by: NURSE PRACTITIONER

## 2021-03-02 PROCEDURE — 72100 X-RAY EXAM L-S SPINE 2/3 VWS: CPT

## 2021-03-02 PROCEDURE — 99283 EMERGENCY DEPT VISIT LOW MDM: CPT

## 2021-03-02 PROCEDURE — 73560 X-RAY EXAM OF KNEE 1 OR 2: CPT

## 2021-03-02 PROCEDURE — 96372 THER/PROPH/DIAG INJ SC/IM: CPT

## 2021-03-02 PROCEDURE — 73502 X-RAY EXAM HIP UNI 2-3 VIEWS: CPT

## 2021-03-02 RX ORDER — ORPHENADRINE CITRATE 30 MG/ML
60 INJECTION INTRAMUSCULAR; INTRAVENOUS ONCE
Status: COMPLETED | OUTPATIENT
Start: 2021-03-02 | End: 2021-03-02

## 2021-03-02 RX ORDER — HYDROMORPHONE HYDROCHLORIDE 1 MG/ML
1 INJECTION, SOLUTION INTRAMUSCULAR; INTRAVENOUS; SUBCUTANEOUS ONCE
Status: COMPLETED | OUTPATIENT
Start: 2021-03-02 | End: 2021-03-02

## 2021-03-02 RX ADMIN — HYDROMORPHONE HYDROCHLORIDE 1 MG: 1 INJECTION, SOLUTION INTRAMUSCULAR; INTRAVENOUS; SUBCUTANEOUS at 20:55

## 2021-03-02 RX ADMIN — ORPHENADRINE CITRATE 60 MG: 30 INJECTION INTRAMUSCULAR; INTRAVENOUS at 20:56

## 2021-03-02 ASSESSMENT — ENCOUNTER SYMPTOMS
ABDOMINAL PAIN: 0
BACK PAIN: 1

## 2021-03-03 NOTE — ED PROVIDER NOTES
[facility] EMERGENCY DEPT  eMERGENCY dEPARTMENT eNCOUnter      Pt Name: Kumar Mcguire  MRN: [id number]  [place] 1960  Date of evaluation: 3/2/2021  Provider: Evan Alvarez, [address]       Chief Complaint   Patient presents with    Back Pain     pt presents to ED with c/o lower back pain and right leg pain from falling on ice 2 weeks ago. HISTORY OF PRESENT ILLNESS   (Location/Symptom, Timing/Onset,Context/Setting, Quality, Duration, Modifying Factors, Severity)  Note limiting factors. Mary Churchill a 56 y.o. female who presents to the emergency department for evaluation of back pain. Pt tells me that she slipped and fell 2 weeks ago injuring her lower back. She gives history of chronic low back pain followed by pain management. She tells me that she took percocet today without improvement in lower back pain. She relates that she has had right hip and right knee pain. She denies abdominal pain, fever, changes in bowel/bladder as well as any saddle anesthesia. She does not endorse any lower extremity numbness or weakness to me. HPI    Nursing Notes were reviewed. REVIEW OF SYSTEMS    (2-9 systems for level 4, 10 or more for level 5)     Review of Systems   Constitutional: Negative for fever. Gastrointestinal: Negative for abdominal pain. Genitourinary: Negative for dysuria. Musculoskeletal: Positive for back pain. Neurological: Negative for weakness and numbness. A complete review of systems was performed and is negative except as noted above in the HPI.        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     Allergic rhinitis     Anxiety disorder     Anxiety state     Benign essential hypertension     Cancer (HCC)     MELANOMA ON RIGHT THIGH AND RIGHT BUTTOCK    Cellulitis     Cerebral artery occlusion with cerebral infarction ([facility] [place] [address] 2014    OCCASIONAL FACE DRAWING ON THE LEFT SIDE WITH ANXIETY/BRAINSTEM INFARCT    Cerebrovascular disease     Chronic pain syndrome     Generalized anxiety disorder     Major depressive disorder     Reflux esophagitis     Type 2 diabetes mellitus (HCC)     Urgency of urination          SURGICAL HISTORY       Past Surgical History:   Procedure Laterality Date    CARPAL TUNNEL RELEASE Right     FINGER SURGERY      LEFT POINTER FINGER PARTIALLY CUTOFF    FINGER SURGERY Left     LEFT THUMB FRACTURE WITH LIGAMENT REPAIR    FINGER TRIGGER RELEASE Right          CURRENT MEDICATIONS       Discharge Medication List as of 3/2/2021  9:04 PM      CONTINUE these medications which have NOT CHANGED    Details   tiZANidine (ZANAFLEX) 4 MG tablet Take 1 tablet by mouth every 8 hours as needed (pain/spasm), Disp-21 tablet, R-0Print      meloxicam (MOBIC) 15 MG tablet Take 1 tablet by mouth daily, Disp-30 tablet,R-0Print      HYDROcodone-acetaminophen (NORCO)  MG per tablet Historical Med      ciprofloxacin (CIPRO) 250 MG tablet Cipro 250 mg tablet   Take 1 tablet twice a day by oral route. Historical Med      insulin aspart (NOVOLOG FLEXPEN) 100 UNIT/ML injection pen Inject into the skin 3 times daily (before meals)Historical Med      ondansetron (ZOFRAN) 4 MG tablet Take 4 mg by mouth every 8 hours as needed for Nausea or VomitingHistorical Med      baclofen (LIORESAL) 10 MG tablet Take 10 mg by mouth 3 times daily Historical Med      insulin glargine (BASAGLAR KWIKPEN) 100 UNIT/ML injection pen Inject 12 Units into the skin nightly Historical Med      ALPRAZolam (XANAX) 1 MG tablet Take 1 mg by mouth 4 times daily as needed for Anxiety. Historical Med      clopidogrel (PLAVIX) 75 MG tablet Take 75 mg by mouth daily Historical Med      furosemide (LASIX) 40 MG tablet Take 20 mg by mouth daily as needed Historical Med      gabapentin (NEURONTIN) 300 MG capsule Take 300 mg by mouth 3 times daily. Historical Med      VICTOZA 18 MG/3ML SOPN SC injection Inject 1.2 mLs into the skin daily, DAWHistorical Med      ADMELOG SOLOSTAR 100 UNIT/ML pen Inject 20 Units into the skin 3 times daily (before meals) , DAWHistorical Med      lisinopril (PRINIVIL;ZESTRIL) 10 MG tablet Take 10 mg by mouth daily Historical Med      pantoprazole (PROTONIX) 40 MG tablet Take 40 mg by mouth daily Historical Med      pioglitazone (ACTOS) 15 MG tablet Take 15 mg by mouth daily Historical Med      potassium chloride (KLOR-CON) 10 MEQ extended release tablet Take 10 mEq by mouth 2 times daily Historical Med             ALLERGIES     Penicillins and Sulfa antibiotics    FAMILY HISTORY       Family History   Problem Relation Age of Onset    Other Mother         AAA    Stroke Father     High Blood Pressure Father     Diabetes Father     Heart Disease Father         CABG    Heart Attack Father 58        MI          SOCIAL HISTORY       Social History     Socioeconomic History    Marital status:      Spouse name: Not on file    Number of children: Not on file    Years of education: Not on file    Highest education level: Not on file   Occupational History    Not on file   Social Needs    Financial resource strain: Not on file    Food insecurity     Worry: Not on file     Inability: Not on file    Transportation needs     Medical: Not on file     Non-medical: Not on file   Tobacco Use    Smoking status: Current Every Day Smoker     Packs/day: 1.00     Years: 16.00     Pack years: 16.00     Types: Cigarettes    Smokeless tobacco: Never Used   Substance and Sexual Activity    Alcohol use: Yes     Frequency: Never     Comment: socially    Drug use: Never    Sexual activity: Not on file   Lifestyle    Physical activity     Days per week: Not on file     Minutes per session: Not on file    Stress: Not on file   Relationships    Social connections     Talks on phone: Not on file     Gets together: Not on file     Attends [employer] service: Not on file     Active member of club or organization: Not on file     Attends meetings of clubs or organizations: Not on file     Relationship status: Not on file    Intimate partner violence     Fear of current or ex partner: Not on file     Emotionally abused: Not on file     Physically abused: Not on file     Forced sexual activity: Not on file   Other Topics Concern    Not on file   Social History Narrative    Not on file       SCREENINGS    Daisy Coma Scale  Eye Opening: Spontaneous  Best Verbal Response: Oriented  Best Motor Response: Obeys commands  Constantine Coma Scale Score: 15        PHYSICAL EXAM    (up to 7 for level 4, 8 or more for level 5)     ED Triage Vitals [03/02/21 1849]   BP Temp Temp Source Pulse Resp SpO2 Height Weight   (!) 148/92 98.2 °F (36.8 °C) Oral 86 16 93 % 5' 6\" (1.676 m) 205 lb (93 kg)       Physical Exam  Vitals signs reviewed. HENT:      Head: Normocephalic. Right Ear: External ear normal.      Left Ear: External ear normal.   Eyes:      Conjunctiva/sclera: Conjunctivae normal.      Pupils: Pupils are equal, round, and reactive to light. Neck:      Musculoskeletal: Normal range of motion. Cardiovascular:      Rate and Rhythm: Normal rate and regular rhythm. Heart sounds: Normal heart sounds. Pulmonary:      Effort: Pulmonary effort is normal.      Breath sounds: Normal breath sounds. Abdominal:      General: Bowel sounds are normal.      Palpations: Abdomen is soft. Tenderness: There is no abdominal tenderness. Musculoskeletal: Normal range of motion. Comments: 5/5 MS equal bilateral lower extremities at hips/knees/ankles  CMS intact bilateral lower extremities  Mild ttp right hip and right knee     Skin:     General: Skin is warm and dry. Neurological:      Mental Status: She is alert and oriented to person, place, and time.          DIAGNOSTIC RESULTS     EKG: All EKG's are interpreted by the Emergency Department Physician who either signs or Co-signs this chart in the absence of acardiologist.        RADIOLOGY:   Non-plain film images such as CT, Ultrasound andMRI are read by the radiologist. Plain radiographic images are visualized and preliminarily interpreted by the emergency physician with the below findings:        Interpretation per the Radiologist below, if available at the time of this note:    XR LUMBAR SPINE (2-3 VIEWS)   Final Result   Impression:    1. Bilateral pars defects at L5 with 8mm anterior spondylolisthesis of   L5 on S1. Signed by Dr Harry Stauffer on 3/2/2021 8:08 PM      XR HIP 2-3 VW W PELVIS RIGHT   Final Result   Impression:    1. No acute abnormalities identified in the right hip. Signed by Dr Harry Stauffer on 3/2/2021 8:10 PM      XR KNEE RIGHT (1-2 VIEWS)   Final Result   Mild degenerative change medial joint compartment of the   right knee. No acute abnormalities identified. Signed by Dr Harry Stauffer on 3/2/2021 8:12 PM            ED BEDSIDE ULTRASOUND:   Performed by ED Physician - none    LABS:  Labs Reviewed - No data to display    All other labs were within normal range or not returned as of this dictation. RE-ASSESSMENT     Pt is ambulatory and in no distress at discharge. EMERGENCY DEPARTMENT COURSE and DIFFERENTIALDIAGNOSIS/MDM:   Vitals:    Vitals:    03/02/21 1849   BP: (!) 148/92   Pulse: 86   Resp: 16   Temp: 98.2 °F (36.8 °C)   TempSrc: Oral   SpO2: 93%   Weight: 205 lb (93 kg)   Height: 5' 6\" (1.676 m)       MDM      CONSULTS:  None    PROCEDURES:  Unless otherwise notedbelow, none     Procedures    FINAL IMPRESSION     1. Acute exacerbation of chronic low back pain    2.  Fall, initial encounter          DISPOSITION/PLAN   DISPOSITION Decision To Discharge 03/02/2021 08:59:37 PM      PATIENT REFERRED TO:  pain management      as scheduled      DISCHARGE MEDICATIONS:       Discharge Medication List as of 3/2/2021  9:04 PM          (Pleasenote that portions of this note were completed with a voice recognition program.

## 2021-03-29 ENCOUNTER — TELEMEDICINE (OUTPATIENT)
Dept: ENDOCRINOLOGY | Facility: CLINIC | Age: 61
End: 2021-03-29

## 2021-03-29 DIAGNOSIS — I10 ESSENTIAL HYPERTENSION: ICD-10-CM

## 2021-03-29 DIAGNOSIS — E55.9 VITAMIN D DEFICIENCY: ICD-10-CM

## 2021-03-29 DIAGNOSIS — E11.9 TYPE 2 DIABETES MELLITUS WITHOUT COMPLICATION, WITH LONG-TERM CURRENT USE OF INSULIN (HCC): ICD-10-CM

## 2021-03-29 DIAGNOSIS — Z79.4 TYPE 2 DIABETES MELLITUS WITHOUT COMPLICATION, WITH LONG-TERM CURRENT USE OF INSULIN (HCC): ICD-10-CM

## 2021-03-29 DIAGNOSIS — E11.69 MIXED DIABETIC HYPERLIPIDEMIA ASSOCIATED WITH TYPE 2 DIABETES MELLITUS (HCC): Primary | ICD-10-CM

## 2021-03-29 DIAGNOSIS — E04.0 SIMPLE GOITER: ICD-10-CM

## 2021-03-29 DIAGNOSIS — E78.2 MIXED DIABETIC HYPERLIPIDEMIA ASSOCIATED WITH TYPE 2 DIABETES MELLITUS (HCC): Primary | ICD-10-CM

## 2021-03-29 LAB — HBA1C MFR BLD: 6.5 %

## 2021-03-29 PROCEDURE — 99214 OFFICE O/P EST MOD 30 MIN: CPT | Performed by: NURSE PRACTITIONER

## 2021-03-29 RX ORDER — PIOGLITAZONEHYDROCHLORIDE 15 MG/1
15 TABLET ORAL DAILY
Qty: 30 TABLET | Refills: 11 | Status: SHIPPED | OUTPATIENT
Start: 2021-03-29 | End: 2022-08-04 | Stop reason: SDUPTHER

## 2021-03-29 RX ORDER — BLOOD-GLUCOSE METER
1 KIT MISCELLANEOUS AS NEEDED
Qty: 1 EACH | Refills: 0 | Status: SHIPPED | OUTPATIENT
Start: 2021-03-29 | End: 2022-04-21

## 2021-03-29 RX ORDER — PEN NEEDLE, DIABETIC 30 GX3/16"
1 NEEDLE, DISPOSABLE MISCELLANEOUS 4 TIMES DAILY
Qty: 120 EACH | Refills: 11 | Status: ON HOLD | OUTPATIENT
Start: 2021-03-29 | End: 2022-09-25

## 2021-03-29 RX ORDER — FLURBIPROFEN SODIUM 0.3 MG/ML
SOLUTION/ DROPS OPHTHALMIC
Qty: 150 EACH | Refills: 11 | Status: SHIPPED | OUTPATIENT
Start: 2021-03-29 | End: 2022-01-19 | Stop reason: SDUPTHER

## 2021-03-29 NOTE — PROGRESS NOTES
Chief Complaint  Diabetes and Thyroid Problem    Subjective          Milagros Parson presents to [facility] ENDOCRINOLOGY  History of Present Illness     You have chosen to receive care through a telehealth visit.  Do you consent to use a video/audio connection for your medical care today? Yes          TELEHEALTH VIDEO VISIT     This a video visit due to [facility] current guidelines for social distancing due to the COVID 19 pandemic    Primary Care Provider     Krish Brown MD    61-year-old female presents for follow-up    Reason diabetes mellitus type 2 and hypothyroidism    Duration diagnosed in 2005    Timing is constant    Quality near controlled    Severity is high         Complications -     CVA in 2016     + neuropathy  + retinopathy  + nephropathy         Current symptoms/problems back pain     Alleviating Factors: Compliance       Side Effects  none     Current diet  diabetic     Current exercise none     Current monitoring regimen: home blood tests - 6 times daily        Lab Results   Component Value Date    HGBA1C 6.5 03/12/2021             Home blood sugar records:       states 100 up to 150                 Hypoglycemia none since last vist     Review of Systems - Musculoskeletal ROS: positive for - joint pain, muscle pain and pain in back - both sides           Objective   Vital Signs:   There were no vitals taken for this visit.    Physical Exam  Constitutional:       Appearance: Normal appearance.   Skin:     Coloration: Skin is not pale.   Neurological:      General: No focal deficit present.      Mental Status: She is alert.   Psychiatric:         Mood and Affect: Mood normal.         Thought Content: Thought content normal.         Judgment: Judgment normal.        Result Review :   The following data was reviewed by: OWEN Humphrey on 03/29/2021:  Common labs    Common Labsle 9/11/20 3/12/21   WBC 6.4    Hemoglobin 13.2    Hematocrit 40.4    Platelets 189     Hemoglobin A1C  6.5                     Assessment and Plan    Diagnoses and all orders for this visit:    1. Mixed diabetic hyperlipidemia associated with type 2 diabetes mellitus (CMS/[facility]) (Primary)  -     CBC & Differential; Future  -     Comprehensive Metabolic Panel; Future  -     Hemoglobin A1c; Future  -     Lipid Panel; Future  -     Microalbumin / Creatinine Urine Ratio - Urine, Clean Catch; Future  -     Protein / Creatinine Ratio, Urine - Urine, Clean Catch; Future  -     TSH; Future  -     Vitamin B12; Future  -     Vitamin D 25 Hydroxy; Future    2. Type 2 diabetes mellitus without complication, with long-term current use of insulin (CMS/[facility])  -     CBC & Differential; Future  -     Comprehensive Metabolic Panel; Future  -     Hemoglobin A1c; Future  -     Lipid Panel; Future  -     Microalbumin / Creatinine Urine Ratio - Urine, Clean Catch; Future  -     Protein / Creatinine Ratio, Urine - Urine, Clean Catch; Future  -     TSH; Future  -     Vitamin B12; Future  -     Vitamin D 25 Hydroxy; Future    3. Essential hypertension  -     CBC & Differential; Future  -     Comprehensive Metabolic Panel; Future  -     Hemoglobin A1c; Future  -     Lipid Panel; Future  -     Microalbumin / Creatinine Urine Ratio - Urine, Clean Catch; Future  -     Protein / Creatinine Ratio, Urine - Urine, Clean Catch; Future  -     TSH; Future  -     Vitamin B12; Future  -     Vitamin D 25 Hydroxy; Future    4. Simple goiter  -     CBC & Differential; Future  -     Comprehensive Metabolic Panel; Future  -     Hemoglobin A1c; Future  -     Lipid Panel; Future  -     Microalbumin / Creatinine Urine Ratio - Urine, Clean Catch; Future  -     Protein / Creatinine Ratio, Urine - Urine, Clean Catch; Future  -     TSH; Future  -     Vitamin B12; Future  -     Vitamin D 25 Hydroxy; Future    5. Vitamin D deficiency  -     CBC & Differential; Future  -     Comprehensive Metabolic Panel; Future  -     Hemoglobin A1c; Future  -     Lipid  Panel; Future  -     Microalbumin / Creatinine Urine Ratio - Urine, Clean Catch; Future  -     Protein / Creatinine Ratio, Urine - Urine, Clean Catch; Future  -     TSH; Future  -     Vitamin B12; Future  -     Vitamin D 25 Hydroxy; Future    Other orders  -     Liraglutide (VICTOZA) 18 MG/3ML solution pen-injector injection; 1.2 mg subcutaneously daily  Dispense: 4 pen; Refill: 11  -     Insulin Pen Needle (Pen Needles) 32G X 4 MM misc; 1 each 4 (Four) Times a Day. Use 4 x daily, Dx code E11.65  Dispense: 120 each; Refill: 11  -     Insulin Pen Needle (B-D UF III MINI PEN NEEDLES) 31G X 5 MM misc; Use 5 times daily  Dispense: 150 each; Refill: 11  -     pioglitazone (Actos) 15 MG tablet; Take 1 tablet by mouth Daily.  Dispense: 30 tablet; Refill: 11  -     glucose monitor monitoring kit; 1 each As Needed (to check bg).  Dispense: 1 each; Refill: 0           Glycemic Management:      Diabetes mellitus type 2            Lab Results   Component Value Date    HGBA1C 6.5 03/12/2021                  Basaglar taking 10 units   ==========         jardiance stopped due to fear of side effects      ============      actos 15 mg daily -- taking   ============     Taking  Admelog         Taking 3 up to 9 units --   ============     victoza 1.2 mg  taking - refilled today         Lipid Management        Will not take a statin she states          Blood Pressure Management:          Taking lisinopril      Lasix was stopped            Microvascular Complication Monitoring:    -----------     Last Microalbumin-Proteinuria Assessment       -----------        Neuropathy yes        Taking pain medication      Follows with       Preventive Care:            Tobacco Use         Milagros Alicialivan  reports that she has been smoking. She has been smoking about 1.00 pack per day. She has never used smokeless tobacco.. I have educated her on the risk of diseases from using tobacco products such as cancer, COPD and heart disease.     I  advised her to quit and she is not willing to quit.    I spent 3  minutes counseling the patient.                            Weight Related:      Patient's Body mass index is 34.57 kg/m². BMI is above normal parameters. Recommendations include: educational material.     Decrease daily caloric intake by 500 calories per day      She cannot exercise due to back         Bone Health               Lab Results   Component Value Date     CALCIUM 9.5 11/29/2016         Thyroid Health        March 2021,    TSH - 11.5     Levothyroxine 50 mcg daily --repeat in one month            Perceived goiter, obtain thyroid US --- had done at [facility] -- will obtain report          Other Diabetes Related Aspects            Follow Up   Return in about 4 months (around 7/29/2021) for Recheck.  Patient was given instructions and counseling regarding her condition or for health maintenance advice. Please see specific information pulled into the AVS if appropriate.         I provided advice regarding diabetes management, dietary changes, adjustments of medication, self titration of insulin, refilled medications, ordering labs, future appointments    Patient was advised to contact the office if there are unanswered questions, trouble with blood glucose management, or any concerns

## 2021-03-30 RX ORDER — BLOOD-GLUCOSE METER
1 KIT MISCELLANEOUS AS NEEDED
Qty: 1 EACH | Refills: 0 | Status: SHIPPED | OUTPATIENT
Start: 2021-03-30 | End: 2022-04-21

## 2021-04-05 NOTE — PROGRESS NOTES
"[facility] ONC [facility] HEMATOLOGY AND ONCOLOGY  [address]  [place] [address] [phone]    Patient Name: Milagros Parson  Encounter Date: 04/12/2021  YOB: 1960  Patient Number: [id number]    Initial Note    REASON FOR CONSULTATION: Milagros Parson is a 61 y.o.  female referred by OWEN Roque for management recommendations for melanoma, right buttock, Stage III (pT2a, N1), resected 65.75 months ago.  She is seen alone.  She was lost to follow-up.  \"I had a stroke.\" She was last seen 12/27/2016.  History is obtained from the patient.  History is considered to be accurate.        The patient had seen Ms. Abigail Castro NP 03/12/2021 for follow-up.  She is followed at pain management.  Patient had not had her follow-up with oncology.  She reports a lump that has come up on the underside of her right arm.      With above background, she is seen.    DIAGNOSTIC ABNORMALITIES:  The patient had undergone 3 mm punch biopsy of the right buttock lesion by Dr. Cee 05/21/2015.  Pathology report 05/28/2015 from [facility] positive for invasive malignant melanoma with a maximum tumor depth of invasion being 2.01 mm, extending to the dermal and epidermal margins of resection.  CT abdomen and pelvis at [facility] 06/05/2015 was negative.  Pathology report sentinel node biopsy: Two (2) lymph nodes negative for melanoma. Malignant melanoma maximum depth of invasion is 1.75 mm extending into the reticularis dermis; margins of resection clear. No lymphovascular invasion identified. Fat necrosis deep to lesion consistent with previous biopsy site. In the current specimen, there is a maximum horizontal invasive melanoma approximately 0.9 cm with approximately 1 mm of melanoma in situ adjacent to the invasive tumor. The epidermal and dermal margins of resection are clear by several centimeters. There is " "no lymphovascular invasion.  Dr. Hussein called 08/05/2015. He was notified by pathology that 1 sentinel lymph node was positive.  CORRECTED REPORT 08/03/2015: Showed 1 of 2 lymph nodes positive for metastatic melanoma, sentinel node. AJCC T2a N1.  PET report 08/21/2015.  Low level increased tracer uptake in the soft tissues posteriorly in the right gluteal region and right groin likely inflammation related to postoperative change. There is no evidence of  metastatic disease.         PREVIOUS INTERVENTIONS:  Wide excision of right buttock melanoma with local advancement closure and sentinel node biopsy right inguinal  07/06/2015, by Dr. Cee and Dr. Hussein.  She had undergone right inguinal node dissection with sartorius flap (10/21/2015) at [place] by Dr. Yoana Alonzo.  She was discharged 10/24/2015.  \"No positive nodes.  I don't need any other treatment.\"      LABS    Lab Results - Last 18 Months   Lab Units 09/11/20  1711 01/28/20  1624 11/01/19  1320   HEMOGLOBIN g/dL 13.2 14.2 14.3   HEMATOCRIT % 40.4 42.1 45.2   MCV fL 92.2 91.9 97.8   WBC K/uL 6.4 8.07 8.7   RDW % 12.5 12.1* 13.1   MPV fL 11.9 11.9 12.1   PLATELETS K/uL 189 225 221   IMM GRAN % %  --  0.2  --    NEUTROS ABS K/uL 3.6 4.78 5.5   LYMPHS ABS K/uL 2.2 2.46 2.3   MONOS ABS K/uL 0.40 0.63 0.70   EOS ABS K/uL 0.10 0.11 0.10   BASOS ABS K/uL 0.00 0.07 0.00   IMMATURE GRANS (ABS) K/uL 0.0 0.02 0.1   NRBC /100 WBC  --  0.0  --        Lab Results - Last 18 Months   Lab Units 01/28/20  1624 11/01/19  1320   GLUCOSE mg/dL 87 178*   SODIUM mmol/L 142 140   POTASSIUM mmol/L 5.0 4.4   TOTAL CO2 mmol/L  --  28   CO2 mmol/L 26.4  --    CHLORIDE mmol/L 103 99   ANION GAP mmol/L 12.6 13   CREATININE mg/dL 1.56* 1.3*   BUN mg/dL 16 21   BUN / CREAT RATIO  10.3  --    CALCIUM mg/dL 9.8 9.9   EGFR IF NONAFRICN AM mL/min/1.73 34* 42*   ALK PHOS U/L 68 72   TOTAL PROTEIN g/dL 7.7 7.7   ALT (SGPT) U/L 10 14   AST (SGOT) U/L 10 13   BILIRUBIN mg/dL 0.2 " <0.2   ALBUMIN g/dL 4.40 4.3   GLOBULIN gm/dL 3.3  --        No results for input(s): MSPIKE, KAPPALAMB, IGLFLC, URICACID, FREEKAPPAL, CEA, LDH, REFLABREPO in the last 10354 hours.    Lab Results - Last 18 Months   Lab Units 01/28/20  1624   TSH uIU/mL 6.000*         PAST MEDICAL HISTORY:  ALLERGIES:  Allergies   Allergen Reactions   • Penicillins Anaphylaxis   • Morphine Itching     CURRENT MEDICATIONS:  Outpatient Encounter Medications as of 4/12/2021   Medication Sig Dispense Refill   • ALPRAZolam (XANAX) 1 MG tablet Take 1 mg by mouth 4 (Four) Times a Day As Needed for Anxiety.     • aspirin 81 MG EC tablet Take 81 mg by mouth Daily.     • choline fenofibrate (TRILIPIX) 45 MG capsule Take 1 capsule by mouth Daily. 30 capsule 11   • clopidogrel (PLAVIX) 75 MG tablet Take 75 mg by mouth Daily.     • dexlansoprazole (DEXILANT) 60 MG capsule Take 60 mg by mouth Daily.     • Empagliflozin (JARDIANCE) 10 MG tablet Take 10 mg by mouth Daily. Before bkfast 30 tablet 11   • estrogens, conjugated,-methyltestosterone (ESTRATEST HS) 0.625-1.25 MG per tablet Take 1 tablet by mouth Daily.     • furosemide (LASIX) 20 MG tablet Take 60 mg by mouth Daily As Needed.     • gabapentin (NEURONTIN) 300 MG capsule Take 300 mg by mouth 3 (Three) Times a Day.     • glucose blood test strip E11.9, ONE TOUCH ULTRA. TEST 5-6 TIMES A  each 11   • glucose monitor monitoring kit 1 each As Needed (to check bg). 1 each 0   • glucose monitor monitoring kit 1 each As Needed (to check bg). 1 each 0   • HYDROcodone-acetaminophen (NORCO)  MG per tablet Take 1 tablet by mouth Every 6 (Six) Hours As Needed for Moderate Pain .     • HYDROcodone-acetaminophen (VICODIN) 7.5-500 MG per tablet Take 1 tablet by mouth Every 6 (Six) Hours As Needed for moderate pain (4-6).     • insulin aspart (NOVOLOG FLEXPEN) 100 UNIT/ML solution pen-injector sc pen Up to 20 units with meals (Patient taking differently: Up to 20 units with meals; does sliding  scale) 6 pen 11   • Insulin Glargine (BASAGLAR KWIKPEN) 100 UNIT/ML injection pen 35 units qhs 4 pen 11   • Insulin Lispro (ADMELOG SOLOSTAR) 100 UNIT/ML solution pen-injector Inject up to 20 units with each meal (Patient taking differently: Inject up to 20 units with each meal; has not started using yet because still on novolog) 5 pen 5   • Insulin Pen Needle (B-D UF III MINI PEN NEEDLES) 31G X 5 MM misc Use 5 times daily 150 each 11   • Insulin Pen Needle (Easy Touch Pen Needles) 31G X 8 MM misc Inject 4 times daily, E11.9 120 each 11   • Insulin Pen Needle (Pen Needles) 32G X 4 MM misc 1 each 4 (Four) Times a Day. Use 4 x daily, Dx code E11.65 120 each 11   • levothyroxine (SYNTHROID) 25 MCG tablet Take 1 tablet by mouth Daily. 30 tablet 11   • Liraglutide (VICTOZA) 18 MG/3ML solution pen-injector injection 1.2 mg subcutaneously daily 4 pen 11   • lisinopril (PRINIVIL,ZESTRIL) 10 MG tablet Take 10 mg by mouth Daily.     • metaxalone (SKELAXIN) 800 MG tablet Take 1 tablet by mouth 3 (Three) Times a Day As Needed for Muscle Spasms. 21 tablet 0   • methylPREDNISolone (MEDROL, DESTINY,) 4 MG tablet Take as directed on package instructions. 21 each 0   • ondansetron ODT (ZOFRAN-ODT) 4 MG disintegrating tablet Take 1 tablet by mouth 4 (Four) Times a Day As Needed for nausea or vomiting. 20 tablet 0   • pantoprazole (PROTONIX) 40 MG EC tablet Take 40 mg by mouth Daily.     • pioglitazone (Actos) 15 MG tablet Take 1 tablet by mouth Daily. 30 tablet 11   • POTASSIUM CHLORIDE PO Take 10 mEq by mouth 2 (Two) Times a Day.     • promethazine (PHENERGAN) 25 MG tablet Take 25 mg by mouth Every 6 (Six) Hours As Needed for nausea or vomiting.     • vitamin D (ERGOCALCIFEROL) 1.25 MG (75413 UT) capsule capsule Take 1 capsule by mouth 1 (One) Time Per Week. 5 capsule 11     No facility-administered encounter medications on file as of 4/12/2021.     ADULT ILLNESSES:  Patient Active Problem List   Diagnosis Code   • Type 2 diabetes  mellitus without complication, with long-term current use of insulin (CMS/HCC) E11.9, Z79.4   • Essential hypertension I10   • Mixed diabetic hyperlipidemia associated with type 2 diabetes mellitus (CMS/[facility]) E11.69, E78.2   • Proliferative diabetic retinopathy of both eyes associated with type 2 diabetes mellitus (CMS/HCC) E11.3593   • Diabetic polyneuropathy associated with type 2 diabetes mellitus (CMS/[facility]) E11.42   • CKD (chronic kidney disease), stage II N18.2   • Simple goiter E04.0   • Smoker F17.200   • Venous insufficiency of both lower extremities I87.2     SURGERIES:  Past Surgical History:   Procedure Laterality Date   • ATRIAL SEPTAL DEFECT REPAIR     • CARPAL TUNNEL RELEASE     • FEMORAL LYMPH NODE BIOPSY/EXCISON     • FINGER SURGERY     • HYSTERECTOMY     • OTHER SURGICAL HISTORY      melanoma removed right buttock     HEALTH MAINTENANCE ITEMS:  Health Maintenance Due   Topic Date Due   • URINE MICROALBUMIN  Never done   • COLONOSCOPY  Never done   • ANNUAL PHYSICAL  Never done   • Pneumococcal Vaccine 0-64 (1 of 1 - PPSV23) Never done   • COVID-19 Vaccine (1) Never done   • TDAP/TD VACCINES (1 - Tdap) Never done   • ZOSTER VACCINE (1 of 2) Never done   • HEPATITIS C SCREENING  Never done   • MAMMOGRAM  Never done   • PAP SMEAR  Never done   • DIABETIC EYE EXAM  06/05/2018   • DIABETIC FOOT EXAM  09/20/2019   • LIPID PANEL  01/28/2021       <no information>  Last Completed Colonoscopy       Status Date      COLONOSCOPY No completions recorded          There is no immunization history on file for this patient.  Last Completed Mammogram       Status Date      MAMMOGRAM No completions recorded            FAMILY HISTORY:  Family History   Problem Relation Age of Onset   • Heart disease Father      SOCIAL HISTORY:  Social History     Socioeconomic History   • Marital status:      Spouse name: Not on file   • Number of children: Not on file   • Years of education: Not on file   • Highest education  "level: Not on file   Tobacco Use   • Smoking status: Current Every Day Smoker     Packs/day: 1.00   • Smokeless tobacco: Never Used   Substance and Sexual Activity   • Alcohol use: No   • Drug use: No       REVIEW OF SYSTEMS:  Review of Systems   Constitutional: Positive for fatigue. Negative for chills, fever and unexpected weight loss.        \"I feel good other than my back.\"   HENT: Negative for congestion, hearing loss and trouble swallowing.    Eyes: Negative for redness and visual disturbance.   Respiratory: Negative for cough, wheezing and stridor.    Cardiovascular: Negative for chest pain and leg swelling.   Gastrointestinal: Negative for abdominal pain, constipation, diarrhea, nausea and vomiting.   Endocrine: Negative for cold intolerance and heat intolerance.   Genitourinary: Negative for difficulty urinating, dysuria and flank pain.   Musculoskeletal: Positive for back pain.        \"I will have a procedure Friday.\"   Skin: Negative for pallor.   Allergic/Immunologic: Negative for food allergies.   Neurological: Negative for speech difficulty and confusion.        Right sided hemiparesis. \"I had a stroke.\"   Hematological: Negative for adenopathy. Does not bruise/bleed easily.   Psychiatric/Behavioral: Negative for agitation and hallucinations. The patient is not nervous/anxious.        /76   Pulse 65   Temp 96.7 °F (35.9 °C)   Resp 18   Ht 167.6 cm (66\")   Wt 90.8 kg (200 lb 1.6 oz)   SpO2 98%   Breastfeeding No   BMI 32.30 kg/m²  Body surface area is 2 meters squared.  Pain Score    04/12/21 1315   PainSc:   2       Physical Exam:  Physical Exam  Vitals reviewed.   Constitutional:       Appearance: She is obese.   HENT:      Head: Normocephalic and atraumatic.   Eyes:      General: No scleral icterus.  Cardiovascular:      Rate and Rhythm: Normal rate and regular rhythm.   Pulmonary:      Effort: No respiratory distress.      Breath sounds: No wheezing or rales.   Abdominal:      General: " Abdomen is flat.      Palpations: Abdomen is soft.      Tenderness: There is no abdominal tenderness.   Musculoskeletal:         General: No swelling.      Cervical back: Neck supple.      Comments: No mass palpable underneath her right upper arm.   Skin:     General: Skin is warm and dry.      Coloration: Skin is not pale.   Neurological:      Mental Status: She is alert and oriented to person, place, and time.   Psychiatric:         Mood and Affect: Mood normal.         Behavior: Behavior normal.         Thought Content: Thought content normal.         Judgment: Judgment normal.         Milagros Parson reports a pain score of 2.     Patient's Body mass index is 32.3 kg/m². BMI is above normal parameters. Recommendations include: referral to primary care.      ASSESSMENT:  1.   Melanoma, right buttock. 2.01 mm maximum depth and 0.9 cm maximum horizontal depth.  AJCC stage III  (T2a, N1, M0)  Charles status: 1/2 sentinel node positive.   Treatment status:  Post excision with a 3 cm margin and sentinel node biopsy.   Post right inguinal node dissection with sartorius flap (10/21/2015).  On observation.   2.   1 mm melanoma in situ   3.   Performance status of 1.   4.   Type II diabetes.  On insulin and Jardiance.  5.   Hypertension.  On lisinopril.  6.   Avoidance of routine care.   7.   Chronic kidney disease stage III GFR 54 ml/minute 08/13/2015 and followed by Dr. Mccrary,          PLAN:  1.     Re:  Noncompliance and risk management issues..     2.     Re:  Heme status.  WBC 5.9, hemoglobin 14, MCV 93 and platelet 191 on 03/12/2021.  3.     Re:  CMP.  GFR 36 mL/min.  4.     re:   The need to assess lesion, underside of the right arm.   5.    Schedule PET.   6.    Stable for observation. She will be seen every 6 months for 2 years then every 12 months for 3 years.  Routine imaging is category 2b per NCCN guidelines.   7.    Plan of care discussed with patient.  Understanding expressed.   Patient is agreeable to proceed.  8.    Continue ongoing management per primary care physician and other specialists.  9.    Refer to dermatology for surveillance.   10.  Refer to PCP for obesity.  11.  Return to office in 6 months with pre-office CBC with differential and CMP if PET scan is negative..      I have reviewed the assessment and plan and verified the accuracy of it. No changes to assessment and plan since the information was documented. Jaylon Gooden MD 04/12/21       I spent 47 total minutes, face-to-face, caring for Milagros today.  Greater than 50% of this time involved counseling and/or coordination of care as documented within this note regarding the patient's illness(es), pros and cons of various treatment options, instructions and/or risk reduction.      (Gallo Cee MD)  (Yoana Alonzo MD [place])  (Mejia Gonzalez MD)  MD PRISCILLA Borja MD  (OWEN Heredia, endocrinology)  OWEN Graham

## 2021-04-12 ENCOUNTER — CONSULT (OUTPATIENT)
Dept: ONCOLOGY | Facility: CLINIC | Age: 61
End: 2021-04-12

## 2021-04-12 ENCOUNTER — LAB (OUTPATIENT)
Dept: LAB | Facility: HOSPITAL | Age: 61
End: 2021-04-12

## 2021-04-12 VITALS
BODY MASS INDEX: 32.16 KG/M2 | WEIGHT: 200.1 LBS | RESPIRATION RATE: 18 BRPM | TEMPERATURE: 96.7 F | HEIGHT: 66 IN | HEART RATE: 65 BPM | OXYGEN SATURATION: 98 % | DIASTOLIC BLOOD PRESSURE: 76 MMHG | SYSTOLIC BLOOD PRESSURE: 144 MMHG

## 2021-04-12 DIAGNOSIS — C43.71 MELANOMA OF LOWER LEG, RIGHT (HCC): Primary | ICD-10-CM

## 2021-04-12 PROCEDURE — 99204 OFFICE O/P NEW MOD 45 MIN: CPT | Performed by: INTERNAL MEDICINE

## 2021-04-21 ENCOUNTER — APPOINTMENT (OUTPATIENT)
Dept: CT IMAGING | Facility: HOSPITAL | Age: 61
End: 2021-04-21

## 2021-04-21 ENCOUNTER — HOSPITAL ENCOUNTER (OUTPATIENT)
Dept: CT IMAGING | Facility: HOSPITAL | Age: 61
End: 2021-04-21

## 2021-04-28 ENCOUNTER — APPOINTMENT (OUTPATIENT)
Dept: CT IMAGING | Facility: HOSPITAL | Age: 61
End: 2021-04-28

## 2021-04-28 ENCOUNTER — HOSPITAL ENCOUNTER (OUTPATIENT)
Dept: CT IMAGING | Facility: HOSPITAL | Age: 61
End: 2021-04-28

## 2021-05-05 ENCOUNTER — APPOINTMENT (OUTPATIENT)
Dept: CT IMAGING | Facility: HOSPITAL | Age: 61
End: 2021-05-05

## 2021-05-05 ENCOUNTER — HOSPITAL ENCOUNTER (OUTPATIENT)
Dept: CT IMAGING | Facility: HOSPITAL | Age: 61
End: 2021-05-05

## 2021-05-14 ENCOUNTER — HOSPITAL ENCOUNTER (OUTPATIENT)
Dept: CT IMAGING | Facility: HOSPITAL | Age: 61
End: 2021-05-14

## 2021-05-14 ENCOUNTER — APPOINTMENT (OUTPATIENT)
Dept: CT IMAGING | Facility: HOSPITAL | Age: 61
End: 2021-05-14

## 2021-05-20 ENCOUNTER — HOSPITAL ENCOUNTER (OUTPATIENT)
Dept: CT IMAGING | Facility: HOSPITAL | Age: 61
Discharge: HOME | End: 2021-05-20

## 2021-05-20 DIAGNOSIS — C43.71 MELANOMA OF LOWER LEG, RIGHT (HCC): ICD-10-CM

## 2021-05-20 PROCEDURE — 78816 PET IMAGE W/CT FULL BODY: CPT

## 2021-05-20 PROCEDURE — A9552 F18 FDG: HCPCS | Performed by: INTERNAL MEDICINE

## 2021-05-20 PROCEDURE — 0 FLUDEOXYGLUCOSE F18 SOLUTION: Performed by: INTERNAL MEDICINE

## 2021-05-20 RX ADMIN — FLUDEOXYGLUCOSE F18 1 DOSE: 300 INJECTION INTRAVENOUS at 09:39

## 2021-05-21 ENCOUNTER — TELEPHONE (OUTPATIENT)
Dept: ONCOLOGY | Facility: CLINIC | Age: 61
End: 2021-05-21

## 2021-05-21 NOTE — TELEPHONE ENCOUNTER
----- Message from Jaylon Gooden MD sent at 5/20/2021  1:08 PM CDT -----  Notify patient. No scintigraphic evidence of metastatic disease.     Fax report to PCP.  Diffusely increased thyroid gland radiotracer uptake, which may  reflect underlying thyroiditis

## 2021-05-21 NOTE — TELEPHONE ENCOUNTER
Called patient and reviewed results of PET scan.  Instructed that it did not show any signs of metastatic disease but did show an enlarged thyroid gland.  Patient states that she is aware of this and is being followed by OWEN Heredia in [place].  Copy of report faxed to her office.  Patient v/u.

## 2021-08-12 ENCOUNTER — TRANSCRIBE ORDERS (OUTPATIENT)
Dept: PHYSICAL THERAPY | Facility: CLINIC | Age: 61
End: 2021-08-12

## 2021-08-12 ENCOUNTER — TELEPHONE (OUTPATIENT)
Dept: ENDOCRINOLOGY | Facility: CLINIC | Age: 61
End: 2021-08-12

## 2021-08-12 ENCOUNTER — TELEPHONE (OUTPATIENT)
Dept: NEUROSURGERY | Facility: CLINIC | Age: 61
End: 2021-08-12

## 2021-08-12 DIAGNOSIS — I89.0 LYMPHEDEMA, NOT ELSEWHERE CLASSIFIED: Primary | ICD-10-CM

## 2021-08-12 NOTE — TELEPHONE ENCOUNTER
Caller: Milagros Parson    Relationship: Self    CALLED AND LEFT A VM REGARDING ERIKA'S RESPONSE, PLEASE ADVISE PATIENT WHEN SHE CALLS BACK ABOUT THE MESSAGE

## 2021-08-12 NOTE — TELEPHONE ENCOUNTER
Caller: Milagros Parson    Relationship to patient: Self    Best call back number: [phone]  Chief complaint: PAIN     Type of visit: NEW PATIENT  Requested date: ASAP    If rescheduling, when is the original appointment: 11/16/21    Additional notes:PATIENT STATES THAT SHE WAS IN THE ER AT [facility] AND HAS A CT SCAN LUMBER, PATIENT ALSO STATES THAT SHE HAD AN MRI IS THE LAST 6 MONTHS FROM [facility], PATIENT IS ASKING IF SHE SHOULD DROP THEM OFF FOR REVIEW OR IF HER PCP SHOULD FAX THEM TO YOU.   PATIENT ALSO HAS XRAYLUMBAR IN ED NOTE 3/2/21 IN CHART.    PLEASE CALL THE PATIENT WITH QUESTIONS AND CONCERNS @[phone]  PATIENT WOULD LIKE TO SEE IF SHE CAN GET IN TO SEE THE MD EARLIER.     THANK YOU

## 2021-08-12 NOTE — TELEPHONE ENCOUNTER
Pt called stating that she is needing to discuss her thyroid and medication, she says the ER doctor is telling her to have her medication changed from 75, but she can't get her PCP to do this. I let the pt know that it has been almost a year since she has been seen so she would need an appt, she says she can not come in due to her back pain and scheduled a telephone visit for October. Please advise.

## 2021-08-12 NOTE — TELEPHONE ENCOUNTER
She can drop off the imaging CD and the reports if she would like but unless this is an emergency he usually doesn't look at them until the day of the appointment.    She is being seen for 2nd opinion.    kyler guadarrama CMA

## 2021-08-13 NOTE — TELEPHONE ENCOUNTER
PT CALLED AND STATES THAT SHE IS GOING TO GET HER MRI FROM [facility] TODAY-PT STATES THAT SHE IS IN SEVERE PAIN SHE IS HAVING TROUBLE WITH MOBILITY. PT STATES THAT SHE HAS TO GO TO VISIT THE ER EVERY COUPLE OF WEEKS BECAUSE SHE IS IN SO MUCH PAIN AND CAN NOT GET RELIEF. PT STATES THAT SHE WILL BE THERE TO DROP IT OFF EITHER TODAY OR Monday. THANK YOU

## 2021-08-30 NOTE — TELEPHONE ENCOUNTER
Caller: Milagros Parson    Relationship: Self    What was the call regarding: PT CALLED AND STATED THAT SHE WAS SEEN IN THE ED @ [facility] ON 08/13/21 AND SHE WOULD HAVE SOMEONE DROP THE IMAGING DISC OFF FOR REVIEW BECAUSE SHE IS IN SO MUCH PAIN THE PAIN MEDICATION IS NOT WORKING.

## 2021-10-19 ENCOUNTER — DOCUMENTATION (OUTPATIENT)
Dept: ENDOCRINOLOGY | Facility: CLINIC | Age: 61
End: 2021-10-19

## 2021-10-19 ENCOUNTER — TELEMEDICINE (OUTPATIENT)
Dept: ENDOCRINOLOGY | Facility: CLINIC | Age: 61
End: 2021-10-19

## 2021-10-19 DIAGNOSIS — E11.9 TYPE 2 DIABETES MELLITUS WITHOUT COMPLICATION, WITH LONG-TERM CURRENT USE OF INSULIN (HCC): Primary | ICD-10-CM

## 2021-10-19 DIAGNOSIS — Z79.4 TYPE 2 DIABETES MELLITUS WITHOUT COMPLICATION, WITH LONG-TERM CURRENT USE OF INSULIN (HCC): Primary | ICD-10-CM

## 2021-10-19 DIAGNOSIS — E11.69 MIXED DIABETIC HYPERLIPIDEMIA ASSOCIATED WITH TYPE 2 DIABETES MELLITUS (HCC): ICD-10-CM

## 2021-10-19 DIAGNOSIS — E78.2 MIXED DIABETIC HYPERLIPIDEMIA ASSOCIATED WITH TYPE 2 DIABETES MELLITUS (HCC): ICD-10-CM

## 2021-10-19 DIAGNOSIS — I10 ESSENTIAL HYPERTENSION: ICD-10-CM

## 2021-10-19 DIAGNOSIS — E03.9 ACQUIRED HYPOTHYROIDISM: ICD-10-CM

## 2021-10-19 PROCEDURE — 99214 OFFICE O/P EST MOD 30 MIN: CPT | Performed by: NURSE PRACTITIONER

## 2021-10-19 RX ORDER — LEVOTHYROXINE SODIUM 0.1 MG/1
100 TABLET ORAL DAILY
COMMUNITY
End: 2022-01-17

## 2021-10-19 NOTE — PROGRESS NOTES
Chief Complaint  Thyroid Problem    Subjective          Milagros Parson presents to [facility] ENDOCRINOLOGY  History of Present Illness     You have chosen to receive care through a telehealth visit.  Do you consent to use a video/audio connection for your medical care today? Yes              TELEHEALTH VIDEO VISIT     This a video visit due to [facility] current guidelines for social distancing due to the COVID 19 pandemic            61 year old female presents for follow up     Reason diabetes for follow up     Reason diabetes mellitus type 2       Diagnosed in 2005    Quality great control    Severity high     Timing constant       Macrovascular complications CVA in 2016    Microvascular complications +  Neuropathy, retinopathy, nephropathy            Current diabetes regimen     GLP-1, oral medication     Current monitoring regimen: home blood tests - 6 times daily        States at goal                  Hypoglycemia none since last vist           Review of Systems - General ROS: positive for  - back pain             Objective   Vital Signs:   There were no vitals taken for this visit.    Physical Exam  Neurological:      General: No focal deficit present.      Mental Status: She is alert.   Psychiatric:         Mood and Affect: Mood normal.         Thought Content: Thought content normal.         Judgment: Judgment normal.        Result Review :   The following data was reviewed by: OWEN Humphrey on 10/19/2021:  Common labs    Common Labsle 3/12/21   Hemoglobin A1C 6.5                     Assessment and Plan    Diagnoses and all orders for this visit:    1. Type 2 diabetes mellitus without complication, with long-term current use of insulin ([facility]) (Primary)  -     CBC & Differential; Future  -     Comprehensive Metabolic Panel; Future  -     Hemoglobin A1c; Future  -     Lipid Panel; Future  -     Microalbumin / Creatinine Urine Ratio - Urine, Clean Catch; Future  -      Protein / Creatinine Ratio, Urine - Urine, Clean Catch; Future  -     TSH; Future  -     Vitamin B12; Future  -     Vitamin D 25 Hydroxy; Future    2. Essential hypertension  -     CBC & Differential; Future  -     Comprehensive Metabolic Panel; Future  -     Hemoglobin A1c; Future  -     Lipid Panel; Future  -     Microalbumin / Creatinine Urine Ratio - Urine, Clean Catch; Future  -     Protein / Creatinine Ratio, Urine - Urine, Clean Catch; Future  -     TSH; Future  -     Vitamin B12; Future  -     Vitamin D 25 Hydroxy; Future    3. Mixed diabetic hyperlipidemia associated with type 2 diabetes mellitus (HCC)  -     CBC & Differential; Future  -     Comprehensive Metabolic Panel; Future  -     Hemoglobin A1c; Future  -     Lipid Panel; Future  -     Microalbumin / Creatinine Urine Ratio - Urine, Clean Catch; Future  -     Protein / Creatinine Ratio, Urine - Urine, Clean Catch; Future  -     TSH; Future  -     Vitamin B12; Future  -     Vitamin D 25 Hydroxy; Future    4. Acquired hypothyroidism  -     CBC & Differential; Future  -     Comprehensive Metabolic Panel; Future  -     Hemoglobin A1c; Future  -     Lipid Panel; Future  -     Microalbumin / Creatinine Urine Ratio - Urine, Clean Catch; Future  -     Protein / Creatinine Ratio, Urine - Urine, Clean Catch; Future  -     TSH; Future  -     Vitamin B12; Future  -     Vitamin D 25 Hydroxy; Future           Glycemic Management:      Diabetes mellitus type 2            Lab Results   Component Value Date    HGBA1C 6.5 03/12/2021                    Basaglar taking 10 units once daily   ==========         jardiance stopped due to fear of side effects      ============      actos 15 mg daily -- taking   ============     Taking  Admelog before each meal tid         Taking 3 up to 9 units --   ============     victoza 1.2 mg  taking - keep              Lipid Management        Will not take a statin she states            Blood Pressure Management:          Taking  lisinopril      Lasix was stopped            Microvascular Complication Monitoring:    -----------     Last Microalbumin-Proteinuria Assessment        -----------        Neuropathy yes          Taking pain medication      Follows with       Preventive Care:            Tobacco Use         Milagros Parson  reports that she has been smoking. She has been smoking about 1.00 pack per day. She has never used smokeless tobacco.. I have educated her on the risk of diseases from using tobacco products such as cancer, COPD and heart disease.     I advised her to quit and she is not willing to quit.    I spent 3  minutes counseling the patient.                                  Weight Related:             Bone Health               Lab Results   Component Value Date     CALCIUM 9.5 11/29/2016         Thyroid Health     States now on levothyroxine 100  Mcg daily from primary                Perceived goiter, obtain thyroid US --- had done at [place] [facility] -- will obtain report           Needs labs this week               Follow Up   No follow-ups on file.  Patient was given instructions and counseling regarding her condition or for health maintenance advice. Please see specific information pulled into the AVS if appropriate.         This document has been electronically signed by OWEN Humphrey on October 19, 2021 08:07 CDT.

## 2021-11-10 ENCOUNTER — TELEPHONE (OUTPATIENT)
Dept: NEUROSURGERY | Facility: CLINIC | Age: 61
End: 2021-11-10

## 2021-11-10 NOTE — TELEPHONE ENCOUNTER
Caller: Milagros Parson    Relationship to patient: Self    Best call back number: [phone]    Chief complaint: BACK PAIN    Type of visit: NEW PATIENT    Requested date:      If rescheduling, when is the original appointment: 11/16/2021     Additional notes:PT HAS BEEN EXPOSED TO COVID AND IS NEEDING TO RESCHEDULE APPT, BUT CANNOT WAIT TILL FIRST AVAILABLE (3/15/2021).    LEG GAVE OUT 10/7/2021 AND FELL AT HOME IN THE BATHROOM. (PT LIVES ALONE.    MRI AND CT SCAN COMPLETE.     PLEASE REVIEW AND ADVISE IS PT CAN BE RESCHEDULED TO A SOONER DATE.        THANK YOU

## 2021-11-10 NOTE — TELEPHONE ENCOUNTER
Dr Fischer's schedule is very busy and there is nothing open at this time until March 2021.  Please put her down for the next available in March and add her to the wait list.    kyler guadarrama CMA

## 2021-11-15 ENCOUNTER — TELEPHONE (OUTPATIENT)
Dept: NEUROSURGERY | Facility: CLINIC | Age: 61
End: 2021-11-15

## 2021-11-15 NOTE — TELEPHONE ENCOUNTER
PATIENT CALLED [facility]IA [facility] RETURNING YOUR CALL. SHE STILL NEEDED TO R/S HER APPOINTMENT. NO ONE FROM [facility] HAD CALLED HER FOLLOWING YOUR NOTE FROM LAST WEEK WITH A NEW APPT.     PT HAS ACCEPTED AN APPT IN MARCH BUT ASKS THAT WE CALL HER IF WE HAVE A CANCELLATION. STATES SHE DID BRING IN HER IMAGING CD.     ADVISED I WOULD RELAY THIS INFORMATION TO YOU.

## 2021-11-15 NOTE — TELEPHONE ENCOUNTER
Called to confirm patient's appt with Dr Fischer on 11/16/2021 - no answer so left a message to call me back      kyler guadarrama CMA

## 2021-12-04 LAB — ANTIBODY: NORMAL

## 2022-01-10 ENCOUNTER — TELEPHONE (OUTPATIENT)
Dept: ENDOCRINOLOGY | Facility: CLINIC | Age: 62
End: 2022-01-10

## 2022-01-10 NOTE — TELEPHONE ENCOUNTER
PT stated that she needs lab orders for her thyroid to be refaxed to [facility] in [place].    [phone]

## 2022-01-11 ENCOUNTER — TELEPHONE (OUTPATIENT)
Dept: NEUROSURGERY | Facility: CLINIC | Age: 62
End: 2022-01-11

## 2022-01-11 NOTE — TELEPHONE ENCOUNTER
PT CALLED AND WOULD LIKE TO PICK HER CD - SHE HAS AN APPMT WITH ALAN ON Thursday.   SHE HAS APPMT HERE ON 3-22-22 WITH DR. LYNCH.

## 2022-01-14 DIAGNOSIS — E78.2 MIXED DIABETIC HYPERLIPIDEMIA ASSOCIATED WITH TYPE 2 DIABETES MELLITUS: ICD-10-CM

## 2022-01-14 DIAGNOSIS — I10 ESSENTIAL HYPERTENSION: ICD-10-CM

## 2022-01-14 DIAGNOSIS — Z79.4 TYPE 2 DIABETES MELLITUS WITHOUT COMPLICATION, WITH LONG-TERM CURRENT USE OF INSULIN: ICD-10-CM

## 2022-01-14 DIAGNOSIS — E11.69 MIXED DIABETIC HYPERLIPIDEMIA ASSOCIATED WITH TYPE 2 DIABETES MELLITUS: ICD-10-CM

## 2022-01-14 DIAGNOSIS — E03.9 ACQUIRED HYPOTHYROIDISM: ICD-10-CM

## 2022-01-14 DIAGNOSIS — E11.9 TYPE 2 DIABETES MELLITUS WITHOUT COMPLICATION, WITH LONG-TERM CURRENT USE OF INSULIN: ICD-10-CM

## 2022-01-17 RX ORDER — LEVOTHYROXINE SODIUM 112 UG/1
112 TABLET ORAL DAILY
Qty: 30 TABLET | Refills: 11 | Status: SHIPPED | OUTPATIENT
Start: 2022-01-17 | End: 2022-01-19 | Stop reason: SDUPTHER

## 2022-01-19 ENCOUNTER — TELEMEDICINE (OUTPATIENT)
Dept: ENDOCRINOLOGY | Facility: CLINIC | Age: 62
End: 2022-01-19

## 2022-01-19 DIAGNOSIS — Z79.4 TYPE 2 DIABETES MELLITUS WITH HYPERGLYCEMIA, WITH LONG-TERM CURRENT USE OF INSULIN: Primary | ICD-10-CM

## 2022-01-19 DIAGNOSIS — E11.42 DIABETIC POLYNEUROPATHY ASSOCIATED WITH TYPE 2 DIABETES MELLITUS: ICD-10-CM

## 2022-01-19 DIAGNOSIS — E11.65 TYPE 2 DIABETES MELLITUS WITH HYPERGLYCEMIA, WITH LONG-TERM CURRENT USE OF INSULIN: Primary | ICD-10-CM

## 2022-01-19 DIAGNOSIS — F17.200 SMOKING: ICD-10-CM

## 2022-01-19 DIAGNOSIS — E03.9 ACQUIRED HYPOTHYROIDISM: ICD-10-CM

## 2022-01-19 PROBLEM — IMO0001 SMOKING: Status: ACTIVE | Noted: 2018-01-26

## 2022-01-19 PROCEDURE — 99214 OFFICE O/P EST MOD 30 MIN: CPT | Performed by: NURSE PRACTITIONER

## 2022-01-19 RX ORDER — LEVOTHYROXINE SODIUM 112 UG/1
112 TABLET ORAL DAILY
Qty: 30 TABLET | Refills: 11 | Status: SHIPPED | OUTPATIENT
Start: 2022-01-19 | End: 2023-01-16

## 2022-01-19 RX ORDER — INSULIN LISPRO 100 U/ML
INJECTION, SOLUTION SUBCUTANEOUS
Qty: 5 PEN | Refills: 11 | Status: SHIPPED | OUTPATIENT
Start: 2022-01-19 | End: 2022-09-21

## 2022-01-19 RX ORDER — INSULIN GLARGINE 100 [IU]/ML
INJECTION, SOLUTION SUBCUTANEOUS
Qty: 4 PEN | Refills: 11 | Status: ON HOLD | OUTPATIENT
Start: 2022-01-19 | End: 2022-09-25

## 2022-01-19 RX ORDER — FLURBIPROFEN SODIUM 0.3 MG/ML
SOLUTION/ DROPS OPHTHALMIC
Qty: 150 EACH | Refills: 11 | Status: ON HOLD | OUTPATIENT
Start: 2022-01-19 | End: 2022-09-25

## 2022-01-19 NOTE — PROGRESS NOTES
Chief Complaint  Diabetes and Thyroid Problem    Subjective          Milagros Parson presents to [facility] ENDOCRINOLOGY  History of Present Illness     You have chosen to receive care through a telehealth visit.  Do you consent to use a video/audio connection for your medical care today? Yes              TELEHEALTH VIDEO VISIT     This a video visit due to [facility] current guidelines for social distancing due to the COVID 19 pandemic      61 year old female presents for follow up     Reason diabetes mellitus type 2     Diagnosed in 2005     Timing constant    Quality controlled     Severity high     Last HgbA1c 6.2 % from Dec. 2021           Macrovascular complications CVA in 2016     Microvascular complications +  Neuropathy, retinopathy, nephropathy            Current diabetes regimen      GLP-1, oral medication     Current monitoring regimen: home blood tests - 6 times daily        States at goal                  Hypoglycemia none since last vist           Review of Systems - General ROS: negative      Objective   Vital Signs:   There were no vitals taken for this visit.    Physical Exam  Neurological:      General: No focal deficit present.      Mental Status: She is alert.   Psychiatric:         Mood and Affect: Mood normal.         Thought Content: Thought content normal.         Judgment: Judgment normal.        Result Review :   The following data was reviewed by: OWEN Humphrey on 01/19/2022:  Common labs    Common Labsle 3/12/21   Hemoglobin A1C 6.5                     Assessment and Plan    Diagnoses and all orders for this visit:    1. Type 2 diabetes mellitus with hyperglycemia, with long-term current use of insulin (HCC) (Primary)    2. Acquired hypothyroidism    3. Smoking    4. Diabetic polyneuropathy associated with type 2 diabetes mellitus (HCC)    Other orders  -     Liraglutide (VICTOZA) 18 MG/3ML solution pen-injector injection; 1.2 mg subcutaneously daily   Dispense: 4 pen; Refill: 11  -     levothyroxine (Synthroid) 112 MCG tablet; Take 1 tablet by mouth Daily.  Dispense: 30 tablet; Refill: 11  -     Insulin Glargine (BASAGLAR KWIKPEN) 100 UNIT/ML injection pen; 35 units qhs  Dispense: 4 pen; Refill: 11  -     Insulin Pen Needle (B-D UF III MINI PEN NEEDLES) 31G X 5 MM misc; Use 5 times daily  Dispense: 150 each; Refill: 11  -     Insulin Lispro (ADMELOG SOLOSTAR) 100 UNIT/ML injection pen; Up to 20 units with meals  Dispense: 5 pen; Refill: 11           Glycemic Management:      Diabetes mellitus type 2         Last HgbA1c 6.2% from Dec. 2021             Basaglar taking 10 units once daily   ==========         jardiance stopped due to fear of side effects      ============      actos 15 mg daily -- taking   ============     Taking  Admelog before each meal tid         Taking 3 up to 9 units --   ============     victoza 1.2 mg  taking - keep                  Lipid Management        Will not take a statin she states            Blood Pressure Management:          Taking lisinopril      Lasix was stopped            Microvascular Complication Monitoring:    -----------     Last Microalbumin-Proteinuria Assessment        -----------        Neuropathy yes          Taking pain medication      Follows with       Preventive Care:            Tobacco Use     Milagros Parson  reports that she has been smoking. She has been smoking about 1.00 pack per day. She has never used smokeless tobacco.. I have educated her on the risk of diseases from using tobacco products such as cancer and COPD.     I advised her to quit and she is not willing to quit.    I spent 3  minutes counseling the patient.                         Weight Related:               Bone Health               Lab Results   Component Value Date     CALCIUM 9.5 11/29/2016         Thyroid Health     States now on levothyroxine 100  Mcg daily from primary --increased to 112 mcg daily     TSH was 10 on Jan.  2022                 Perceived goiter, obtain thyroid US --- had done at [facility] -- will obtain report                              Follow Up   Return in about 3 months (around 4/19/2022) for Recheck.  Patient was given instructions and counseling regarding her condition or for health maintenance advice. Please see specific information pulled into the AVS if appropriate.         This document has been electronically signed by OWEN Humphrey on January 19, 2022 13:43 CST.

## 2022-01-27 ENCOUNTER — DOCUMENTATION (OUTPATIENT)
Dept: ENDOCRINOLOGY | Facility: CLINIC | Age: 62
End: 2022-01-27

## 2022-03-16 ENCOUNTER — TELEPHONE (OUTPATIENT)
Dept: NEUROSURGERY | Facility: CLINIC | Age: 62
End: 2022-03-16

## 2022-03-16 NOTE — TELEPHONE ENCOUNTER
I have tried every # in the patient's chart to try and confirm her appt with Dr Fischer on 3/22/22; however, every # either is disconnected, not a working #, or has calling restrictions.    I contacted her PCP and was given a [phone].  I was able to speak with the patient and she has confirmed her appt.    kyler guadarrama CMA

## 2022-03-22 ENCOUNTER — OFFICE VISIT (OUTPATIENT)
Dept: NEUROSURGERY | Facility: CLINIC | Age: 62
End: 2022-03-22

## 2022-03-22 VITALS — HEIGHT: 66 IN | BODY MASS INDEX: 30.82 KG/M2 | WEIGHT: 191.8 LBS

## 2022-03-22 DIAGNOSIS — M51.37 DEGENERATION OF LUMBAR OR LUMBOSACRAL INTERVERTEBRAL DISC: ICD-10-CM

## 2022-03-22 DIAGNOSIS — M54.16 LUMBAR RADICULOPATHY: ICD-10-CM

## 2022-03-22 DIAGNOSIS — E66.09 CLASS 1 OBESITY DUE TO EXCESS CALORIES WITH SERIOUS COMORBIDITY AND BODY MASS INDEX (BMI) OF 30.0 TO 30.9 IN ADULT: ICD-10-CM

## 2022-03-22 DIAGNOSIS — F17.200 SMOKING: ICD-10-CM

## 2022-03-22 DIAGNOSIS — M43.10 ACQUIRED SPONDYLOLISTHESIS: Primary | ICD-10-CM

## 2022-03-22 PROBLEM — E66.811 CLASS 1 OBESITY DUE TO EXCESS CALORIES WITH SERIOUS COMORBIDITY IN ADULT: Status: ACTIVE | Noted: 2022-03-22

## 2022-03-22 PROBLEM — E66.811 CLASS 1 OBESITY DUE TO EXCESS CALORIES WITH SERIOUS COMORBIDITY IN ADULT: Status: RESOLVED | Noted: 2022-03-22 | Resolved: 2022-03-22

## 2022-03-22 PROBLEM — M51.379 DEGENERATION OF LUMBAR OR LUMBOSACRAL INTERVERTEBRAL DISC: Status: ACTIVE | Noted: 2022-03-22

## 2022-03-22 PROBLEM — E66.811 CLASS 1 OBESITY DUE TO EXCESS CALORIES WITH SERIOUS COMORBIDITY AND BODY MASS INDEX (BMI) OF 30.0 TO 30.9 IN ADULT: Status: ACTIVE | Noted: 2022-03-22

## 2022-03-22 PROCEDURE — 99204 OFFICE O/P NEW MOD 45 MIN: CPT | Performed by: NEUROLOGICAL SURGERY

## 2022-03-22 RX ORDER — MELOXICAM 15 MG/1
15 TABLET ORAL DAILY
COMMUNITY
End: 2022-09-27 | Stop reason: HOSPADM

## 2022-03-22 RX ORDER — OXYCODONE AND ACETAMINOPHEN 10; 325 MG/1; MG/1
1 TABLET ORAL 4 TIMES DAILY
COMMUNITY
Start: 2022-02-28 | End: 2022-09-21

## 2022-03-22 RX ORDER — DICLOFENAC SODIUM 75 MG/1
TABLET, DELAYED RELEASE ORAL
COMMUNITY
Start: 2022-03-21 | End: 2022-06-08

## 2022-03-22 RX ORDER — NYSTATIN 100000 U/G
CREAM TOPICAL
Status: ON HOLD | COMMUNITY
End: 2022-09-25

## 2022-03-22 RX ORDER — ALBUTEROL SULFATE 90 UG/1
AEROSOL, METERED RESPIRATORY (INHALATION)
COMMUNITY
End: 2022-08-11

## 2022-03-22 RX ORDER — POLYETHYLENE GLYCOL 3350 17 G/17G
17 POWDER, FOR SOLUTION ORAL DAILY PRN
COMMUNITY

## 2022-03-22 RX ORDER — NEOMYCIN SULFATE, POLYMYXIN B SULFATE AND HYDROCORTISONE 10; 3.5; 1 MG/ML; MG/ML; [USP'U]/ML
SUSPENSION/ DROPS AURICULAR (OTIC)
COMMUNITY
End: 2022-06-08

## 2022-03-22 NOTE — PROGRESS NOTES
Patient: Milagros Parson  : 1960    Primary Care Provider: Austyn Marsh MD    Requesting Provider: Seth Newton APRN        History    Chief Complaint: Back pain and right leg pain  Chief Complaint   Patient presents with   • BACK & LEG PAIN     NEW PATIENT/SETH NEWTON APRN: patient is here for a 2nd opinion - patient's imaging from [facility] on PACS.  Patient states she stepped on glass 11/3/2020, fell, and has had increasing pain since that time.  She did see Dr Petersen and he offered her surgery but then referred her to Dr Reveles.  Patient has also seen Dr Devlin who also offered her surgery and told her to come back when she was ready to proceed.  She has had pain since 2019, just getting worse.  *Patient has had a stroke in the past.       History of Present Illness: 62-year-old female with a several year history of back pain.  With about a 2-year history of right lower extremity radicular pain.  She describes right paraspinal back pain which is about 10% of her problem and 90% right lower extremity radicular pain radiates down the right posterior aspect of her leg past her calf to the lateral aspect of her foot.  She denies any numbness or tingling.  She currently takes Percocet 10 mg 4 times a day from her primary care doctor.  She did physical therapy in the past without any improvement.  She is done chiropractic care a few years ago without any improvement.  She has had no injection treatments.  Her symptoms are worsened with standing and walking and any sort of activity.  She does get some relief if she gets off of her feet.  She has a history of melanoma involving the right buttock and hip which necessitated an extensive right groin lymph node dissection.  She does have some lymphedema in the right lower extremity.    Review of Systems   Musculoskeletal: Positive for back pain.   All other systems reviewed and are negative.      Past Medical History:   Past Medical History:   Diagnosis  Date   • Atrial septal defect, secundum    • Chest pain 11/17/2016   • CKD (chronic kidney disease), stage II 1/25/2018   • Diabetes mellitus (HCC)     DIABETES , UNCOMPLICATED, TYPE II    • Diabetic polyneuropathy associated with type 2 diabetes mellitus ([facility]) 1/25/2018   • HLD (hyperlipidemia) 11/17/2016   • HTN (hypertension) 11/17/2016   • Hyperlipidemia, mixed    • Hypertension associated with diabetes (HCC) 1/25/2018   • Hypertension, benign    • Melanoma of lower leg, right (HCC) 1/25/2018   • Mixed diabetic hyperlipidemia associated with type 2 diabetes mellitus (HCC) 1/25/2018   • Nonspecific chest pain    • Proliferative diabetic retinopathy of both eyes associated with type 2 diabetes mellitus ([facility]) 1/25/2018   • Stroke ([facility])    • Type 2 diabetes mellitus with hyperglycemia, with long-term current use of insulin ([facility]) 1/25/2018       Past Surgical History:   Past Surgical History:   Procedure Laterality Date   • ATRIAL SEPTAL DEFECT REPAIR     • CARPAL TUNNEL RELEASE     • FEMORAL LYMPH NODE BIOPSY/EXCISON     • FINGER SURGERY     • HYSTERECTOMY     • OTHER SURGICAL HISTORY      melanoma removed right buttock       Family History: family history includes Heart disease in her father.    Social History:  reports that she has been smoking. She has been smoking about 1.00 pack per day. She has never used smokeless tobacco. She reports that she does not drink alcohol and does not use drugs.    Medications:  (Not in a hospital admission)      Allergies:  Penicillins, Morphine, and Sulfa antibiotics    Physical Exam:     Physical Exam  Eyes:      Extraocular Movements: EOM normal.      Pupils: Pupils are equal, round, and reactive to light.   Cardiovascular:      Rate and Rhythm: Normal rate and regular rhythm.   Pulmonary:      Effort: No respiratory distress.      Breath sounds: Normal breath sounds.   Abdominal:      General: There is no distension.      Palpations: Abdomen is soft.   Neurological:       Mental Status: She is oriented to person, place, and time.      Coordination: Finger-Nose-Finger Test normal.      Gait: Gait is intact.      Deep Tendon Reflexes: Strength normal.   Psychiatric:         Speech: Speech normal.         Neurologic Exam     Mental Status   Oriented to person, place, and time.   Attention: normal.   Speech: speech is normal   Level of consciousness: alert  Knowledge: good.     Cranial Nerves     CN II   Visual fields full to confrontation.     CN III, IV, VI   Pupils are equal, round, and reactive to light.  Extraocular motions are normal.     CN V   Facial sensation intact.     CN VII   Facial expression full, symmetric.     CN VIII   CN VIII normal.     CN IX, X   CN IX normal.   CN X normal.     CN XI   CN XI normal.     CN XII   CN XII normal.     Motor Exam   Muscle bulk: normal  Overall muscle tone: normal  Right arm pronator drift: absent  Left arm pronator drift: absent    Strength   Strength 5/5 throughout.     Sensory Exam   Light touch normal.   Pinprick normal.     Gait, Coordination, and Reflexes     Gait  Gait: normal    Coordination   Finger to nose coordination: normal    Tremor   Resting tremor: absent  Intention tremor: absent  Action tremor: absent    Reflexes   Reflexes 2+ except as noted.         Independent Review of Radiographic Studies:       ASSESSMENT/PLAN: The patient has a L5-S1 spondylolisthesis with severe degenerative disc disease along with a rightward large disc herniation that responsible for her back pain and leg pain.  She is gone through an extensive course of conservative care involving physical therapy, chiropractic care and oral pain medication.  At this point I would recommend a L5-S1 anterior lumbar interbody fusion to treat her degenerative disc disease and spondylolisthesis followed by a right L5-S1 facetectomy, hemilaminectomy, foraminotomy and posterior pedicle screw instrumentation.  The risk and benefits were discussed at length which  include were not limited to infection, bleeding, paralysis, spinal fluid leak, major vascular injury, major bowel injury, stroke, coma, and death.  Patient knowledge understand this.  Her questions concerns were addressed.  No diagnosis found.      No follow-ups on file.      Guanako Fischer MD

## 2022-03-22 NOTE — H&P
Patient: Milagros Parson  : 1960    Primary Care Provider: Austyn Marsh MD    Requesting Provider: Seth Newton APRN        History    Chief Complaint: Back pain and right leg pain  Chief Complaint   Patient presents with   • BACK & LEG PAIN     NEW PATIENT/SETH NEWTON APRN: patient is here for a 2nd opinion - patient's imaging from [facility] [place] [place] on PACS.  Patient states she stepped on glass 11/3/2020, fell, and has had increasing pain since that time.  She did see Dr Petersen and he offered her surgery but then referred her to Dr Reveles.  Patient has also seen Dr Devlin who also offered her surgery and told her to come back when she was ready to proceed.  She has had pain since 2019, just getting worse.  *Patient has had a stroke in the past.       History of Present Illness: 62-year-old female with a several year history of back pain.  With about a 2-year history of right lower extremity radicular pain.  She describes right paraspinal back pain which is about 10% of her problem and 90% right lower extremity radicular pain radiates down the right posterior aspect of her leg past her calf to the lateral aspect of her foot.  She denies any numbness or tingling.  She currently takes Percocet 10 mg 4 times a day from her primary care doctor.  She did physical therapy in the past without any improvement.  She is done chiropractic care a few years ago without any improvement.  She has had no injection treatments.  Her symptoms are worsened with standing and walking and any sort of activity.  She does get some relief if she gets off of her feet.  She has a history of melanoma involving the right buttock and hip which necessitated an extensive right groin lymph node dissection.  She does have some lymphedema in the right lower extremity.    Review of Systems   Musculoskeletal: Positive for back pain.   All other systems reviewed and are negative.      Past Medical History:   Past Medical History:   Diagnosis  Date   • Atrial septal defect, secundum    • Chest pain 11/17/2016   • CKD (chronic kidney disease), stage II 1/25/2018   • Diabetes mellitus (HCC)     DIABETES , UNCOMPLICATED, TYPE II    • Diabetic polyneuropathy associated with type 2 diabetes mellitus ([facility]) 1/25/2018   • HLD (hyperlipidemia) 11/17/2016   • HTN (hypertension) 11/17/2016   • Hyperlipidemia, mixed    • Hypertension associated with diabetes (HCC) 1/25/2018   • Hypertension, benign    • Melanoma of lower leg, right (HCC) 1/25/2018   • Mixed diabetic hyperlipidemia associated with type 2 diabetes mellitus (HCC) 1/25/2018   • Nonspecific chest pain    • Proliferative diabetic retinopathy of both eyes associated with type 2 diabetes mellitus ([facility]) 1/25/2018   • Stroke ([facility])    • Type 2 diabetes mellitus with hyperglycemia, with long-term current use of insulin ([facility]) 1/25/2018       Past Surgical History:   Past Surgical History:   Procedure Laterality Date   • ATRIAL SEPTAL DEFECT REPAIR     • CARPAL TUNNEL RELEASE     • FEMORAL LYMPH NODE BIOPSY/EXCISON     • FINGER SURGERY     • HYSTERECTOMY     • OTHER SURGICAL HISTORY      melanoma removed right buttock       Family History: family history includes Heart disease in her father.    Social History:  reports that she has been smoking. She has been smoking about 1.00 pack per day. She has never used smokeless tobacco. She reports that she does not drink alcohol and does not use drugs.    Medications:  (Not in a hospital admission)      Allergies:  Penicillins, Morphine, and Sulfa antibiotics    Physical Exam:     Physical Exam  Eyes:      Extraocular Movements: EOM normal.      Pupils: Pupils are equal, round, and reactive to light.   Cardiovascular:      Rate and Rhythm: Normal rate and regular rhythm.   Pulmonary:      Effort: No respiratory distress.      Breath sounds: Normal breath sounds.   Abdominal:      General: There is no distension.      Palpations: Abdomen is soft.   Neurological:       Mental Status: She is oriented to person, place, and time.      Coordination: Finger-Nose-Finger Test normal.      Gait: Gait is intact.      Deep Tendon Reflexes: Strength normal.   Psychiatric:         Speech: Speech normal.         Neurologic Exam     Mental Status   Oriented to person, place, and time.   Attention: normal.   Speech: speech is normal   Level of consciousness: alert  Knowledge: good.     Cranial Nerves     CN II   Visual fields full to confrontation.     CN III, IV, VI   Pupils are equal, round, and reactive to light.  Extraocular motions are normal.     CN V   Facial sensation intact.     CN VII   Facial expression full, symmetric.     CN VIII   CN VIII normal.     CN IX, X   CN IX normal.   CN X normal.     CN XI   CN XI normal.     CN XII   CN XII normal.     Motor Exam   Muscle bulk: normal  Overall muscle tone: normal  Right arm pronator drift: absent  Left arm pronator drift: absent    Strength   Strength 5/5 throughout.     Sensory Exam   Light touch normal.   Pinprick normal.     Gait, Coordination, and Reflexes     Gait  Gait: normal    Coordination   Finger to nose coordination: normal    Tremor   Resting tremor: absent  Intention tremor: absent  Action tremor: absent    Reflexes   Reflexes 2+ except as noted.         Independent Review of Radiographic Studies:       ASSESSMENT/PLAN: The patient has a L5-S1 spondylolisthesis with severe degenerative disc disease along with a rightward large disc herniation that responsible for her back pain and leg pain.  She is gone through an extensive course of conservative care involving physical therapy, chiropractic care and oral pain medication.  At this point I would recommend a L5-S1 anterior lumbar interbody fusion to treat her degenerative disc disease and spondylolisthesis followed by a right L5-S1 facetectomy, hemilaminectomy, foraminotomy and posterior pedicle screw instrumentation.  The risk and benefits were discussed at length which  include were not limited to infection, bleeding, paralysis, spinal fluid leak, major vascular injury, major bowel injury, stroke, coma, and death.  Patient knowledge understand this.  Her questions concerns were addressed.  No diagnosis found.      No follow-ups on file.      Guanako Fischer MD

## 2022-03-22 NOTE — PATIENT INSTRUCTIONS
"PATIENT TO CONTINUE TO FOLLOW UP WITH HER PRIMARY CARE PROVIDER FOR YEARLY PHYSICAL EXAMS TO ENSURE COMPLETE HEALTH MAINTENANCE      BMI for Adults  What is BMI?  Body mass index (BMI) is a number that is calculated from a person's weight and height. BMI can help estimate how much of a person's weight is composed of fat. BMI does not measure body fat directly. Rather, it is an alternative to procedures that directly measure body fat, which can be difficult and expensive.  BMI can help identify people who may be at higher risk for certain medical problems.  What are BMI measurements used for?  BMI is used as a screening tool to identify possible weight problems. It helps determine whether a person is obese, overweight, a healthy weight, or underweight.  BMI is useful for:  Identifying a weight problem that may be related to a medical condition or may increase the risk for medical problems.  Promoting changes, such as changes in diet and exercise, to help reach a healthy weight. BMI screening can be repeated to see if these changes are working.  How is BMI calculated?  BMI involves measuring your weight in relation to your height. Both height and weight are measured, and the BMI is calculated from those numbers. This can be done either in English ([place] or metric measurements. Note that charts and online BMI calculators are available to help you find your BMI quickly and easily without having to do these calculations yourself.  To calculate your BMI in English ([place] measurements:    Measure your weight in pounds (lb).  Multiply the number of pounds by 703.  For example, for a person who weighs 180 lb, multiply that number by 703, which equals 126,540.  Measure your height in inches. Then multiply that number by itself to get a measurement called \"inches squared.\"  For example, for a person who is 70 inches tall, the \"inches squared\" measurement is 70 inches x 70 inches, which equals 4,900 inches squared.  Divide the " "total from step 2 (number of lb x 703) by the total from step 3 (inches squared): 126,540 ÷ 4,900 = 25.8. This is your BMI.    To calculate your BMI in metric measurements:  Measure your weight in kilograms (kg).  Measure your height in meters (m). Then multiply that number by itself to get a measurement called \"meters squared.\"  For example, for a person who is 1.75 m tall, the \"meters squared\" measurement is 1.75 m x 1.75 m, which is equal to 3.1 meters squared.  Divide the number of kilograms (your weight) by the meters squared number. In this example: 70 ÷ 3.1 = 22.6. This is your BMI.  What do the results mean?  BMI charts are used to identify whether you are underweight, normal weight, overweight, or obese. The following guidelines will be used:  Underweight: BMI less than 18.5.  Normal weight: BMI between 18.5 and 24.9.  Overweight: BMI between 25 and 29.9.  Obese: BMI of 30 or above.  Keep these notes in mind:  Weight includes both fat and muscle, so someone with a muscular build, such as an athlete, may have a BMI that is higher than 24.9. In cases like these, BMI is not an accurate measure of body fat.  To determine if excess body fat is the cause of a BMI of 25 or higher, further assessments may need to be done by a health care provider.  BMI is usually interpreted in the same way for men and women.  Where to find more information  For more information about BMI, including tools to quickly calculate your BMI, go to these websites:  Centers for Disease Control and Prevention: www.cdc.gov  American Heart Association: www.heart.org  National Heart, Lung, and Blood [place]: www.nhlbi.nih.gov  Summary  Body mass index (BMI) is a number that is calculated from a person's weight and height.  BMI may help estimate how much of a person's weight is composed of fat. BMI can help identify those who may be at higher risk for certain medical problems.  BMI can be measured using English measurements or metric " "measurements.  BMI charts are used to identify whether you are underweight, normal weight, overweight, or obese.  This information is not intended to replace advice given to you by your health care provider. Make sure you discuss any questions you have with your health care provider.  Document Revised: 09/09/2020 Document Reviewed: 07/17/2020  [employer] Patient Education © 2021 [employer]  https://www.nhlbi.nih.gov/files/docs/public/heart/dash_brief.pdf\">   DASH Eating Plan  DASH stands for Dietary Approaches to Stop Hypertension. The DASH eating plan is a healthy eating plan that has been shown to:  Reduce high blood pressure (hypertension).  Reduce your risk for type 2 diabetes, heart disease, and stroke.  Help with weight loss.  What are tips for following this plan?  Reading food labels  Check food labels for the amount of salt (sodium) per serving. Choose foods with less than 5 percent of the Daily Value of sodium. Generally, foods with less than 300 milligrams (mg) of sodium per serving fit into this eating plan.  To find whole grains, look for the word \"whole\" as the first word in the ingredient list.  Shopping  Buy products labeled as \"low-sodium\" or \"no salt added.\"  Buy fresh foods. Avoid canned foods and pre-made or frozen meals.  Cooking  Avoid adding salt when cooking. Use salt-free seasonings or herbs instead of table salt or sea salt. Check with your health care provider or pharmacist before using salt substitutes.  Do not hook foods. Cook foods using healthy methods such as baking, boiling, grilling, roasting, and broiling instead.  Cook with heart-healthy oils, such as olive, canola, avocado, soybean, or sunflower oil.  Meal planning    Eat a balanced diet that includes:  4 or more servings of fruits and 4 or more servings of vegetables each day. Try to fill one-half of your plate with fruits and vegetables.  6-8 servings of whole grains each day.  Less than 6 oz (170 g) of lean meat, poultry, or " fish each day. A 3-oz (85-g) serving of meat is about the same size as a deck of cards. One egg equals 1 oz (28 g).  2-3 servings of low-fat dairy each day. One serving is 1 cup (237 mL).  1 serving of nuts, seeds, or beans 5 times each week.  2-3 servings of heart-healthy fats. Healthy fats called omega-3 fatty acids are found in foods such as walnuts, flaxseeds, fortified milks, and eggs. These fats are also found in cold-water fish, such as sardines, salmon, and mackerel.  Limit how much you eat of:  Canned or prepackaged foods.  Food that is high in trans fat, such as some fried foods.  Food that is high in saturated fat, such as fatty meat.  Desserts and other sweets, sugary drinks, and other foods with added sugar.  Full-fat dairy products.  Do not salt foods before eating.  Do not eat more than 4 egg yolks a week.  Try to eat at least 2 vegetarian meals a week.  Eat more home-cooked food and less restaurant, buffet, and fast food.    Lifestyle  When eating at a restaurant, ask that your food be prepared with less salt or no salt, if possible.  If you drink alcohol:  Limit how much you use to:  0-1 drink a day for women who are not pregnant.  0-2 drinks a day for men.  Be aware of how much alcohol is in your drink. In the [place] one drink equals one 12 oz bottle of beer (355 mL), one 5 oz glass of wine (148 mL), or one 1½ oz glass of hard liquor (44 mL).  General information  Avoid eating more than 2,300 mg of salt a day. If you have hypertension, you may need to reduce your sodium intake to 1,500 mg a day.  Work with your health care provider to maintain a healthy body weight or to lose weight. Ask what an ideal weight is for you.  Get at least 30 minutes of exercise that causes your heart to beat faster (aerobic exercise) most days of the week. Activities may include walking, swimming, or biking.  Work with your health care provider or dietitian to adjust your eating plan to your individual calorie  needs.  What foods should I eat?  Fruits  All fresh, dried, or frozen fruit. Canned fruit in natural juice (without added sugar).  Vegetables  Fresh or frozen vegetables (raw, steamed, roasted, or grilled). Low-sodium or reduced-sodium tomato and vegetable juice. Low-sodium or reduced-sodium tomato sauce and tomato paste. Low-sodium or reduced-sodium canned vegetables.  Grains  Whole-grain or whole-wheat bread. Whole-grain or whole-wheat pasta. Brown rice. Oatmeal. Quinoa. Bulgur. Whole-grain and low-sodium cereals. Nereida bread. Low-fat, low-sodium crackers. Whole-wheat flour tortillas.  Meats and other proteins  Skinless chicken or turkey. Ground chicken or turkey. Pork with fat trimmed off. Fish and seafood. Egg whites. Dried beans, peas, or lentils. Unsalted nuts, nut butters, and seeds. Unsalted canned beans. Lean cuts of beef with fat trimmed off. Low-sodium, lean precooked or cured meat, such as sausages or meat loaves.  Dairy  Low-fat (1%) or fat-free (skim) milk. Reduced-fat, low-fat, or fat-free cheeses. Nonfat, low-sodium ricotta or cottage cheese. Low-fat or nonfat yogurt. Low-fat, low-sodium cheese.  Fats and oils  Soft margarine without trans fats. Vegetable oil. Reduced-fat, low-fat, or light mayonnaise and salad dressings (reduced-sodium). Canola, safflower, olive, avocado, soybean, and sunflower oils. Avocado.  Seasonings and condiments  Herbs. Spices. Seasoning mixes without salt.  Other foods  Unsalted popcorn and pretzels. Fat-free sweets.  The items listed above may not be a complete list of foods and beverages you can eat. Contact a dietitian for more information.  What foods should I avoid?  Fruits  Canned fruit in a light or heavy syrup. Fried fruit. Fruit in cream or butter sauce.  Vegetables  Creamed or fried vegetables. Vegetables in a cheese sauce. Regular canned vegetables (not low-sodium or reduced-sodium). Regular canned tomato sauce and paste (not low-sodium or reduced-sodium). Regular  tomato and vegetable juice (not low-sodium or reduced-sodium). Pickles. Olives.  Grains  Baked goods made with fat, such as croissants, muffins, or some breads. Dry pasta or rice meal packs.  Meats and other proteins  Fatty cuts of meat. Ribs. Fried meat. Perez. Bologna, salami, and other precooked or cured meats, such as sausages or meat loaves. Fat from the back of a pig (fatback). Bratwurst. Salted nuts and seeds. Canned beans with added salt. Canned or smoked fish. Whole eggs or egg yolks. Chicken or turkey with skin.  Dairy  Whole or 2% milk, cream, and half-and-half. Whole or full-fat cream cheese. Whole-fat or sweetened yogurt. Full-fat cheese. Nondairy creamers. Whipped toppings. Processed cheese and cheese spreads.  Fats and oils  Butter. Stick margarine. Lard. Shortening. Ghee. Perez fat. Tropical oils, such as coconut, palm kernel, or palm oil.  Seasonings and condiments  Onion salt, garlic salt, seasoned salt, table salt, and sea salt. Worcestershire sauce. Tartar sauce. Barbecue sauce. Teriyaki sauce. Soy sauce, including reduced-sodium. Steak sauce. Canned and packaged gravies. Fish sauce. Oyster sauce. Cocktail sauce. Store-bought horseradish. Ketchup. Mustard. Meat flavorings and tenderizers. Bouillon cubes. Hot sauces. Pre-made or packaged marinades. Pre-made or packaged taco seasonings. Relishes. Regular salad dressings.  Other foods  Salted popcorn and pretzels.  The items listed above may not be a complete list of foods and beverages you should avoid. Contact a dietitian for more information.  Where to find more information  National Heart, Lung, and Blood [place]: www.nhlbi.nih.gov  American Heart Association: www.heart.org  Academy of Nutrition and Dietetics: www.eatright.org  National Kidney Foundation: www.kidney.org  Summary  The DASH eating plan is a healthy eating plan that has been shown to reduce high blood pressure (hypertension). It may also reduce your risk for type 2 diabetes,  heart disease, and stroke.  When on the DASH eating plan, aim to eat more fresh fruits and vegetables, whole grains, lean proteins, low-fat dairy, and heart-healthy fats.  With the DASH eating plan, you should limit salt (sodium) intake to 2,300 mg a day. If you have hypertension, you may need to reduce your sodium intake to 1,500 mg a day.  Work with your health care provider or dietitian to adjust your eating plan to your individual calorie needs.  This information is not intended to replace advice given to you by your health care provider. Make sure you discuss any questions you have with your health care provider.  Document Revised: 11/20/2020 Document Reviewed: 11/20/2020  [employer] Patient Education © 2021 [employer]  Tobacco Use Disorder  Tobacco use disorder (TUD) occurs when a person craves, seeks, and uses tobacco, regardless of the consequences. This disorder can cause problems with mental and physical health. It can affect your ability to have healthy relationships, and it can keep you from meeting your responsibilities at work, home, or school.  Tobacco may be:  Smoked as a cigarette or cigar.  Inhaled using e-cigarettes.  Smoked in a pipe or hookah.  Chewed as smokeless tobacco.  Inhaled into the nostrils as snuff.  Tobacco products contain a dangerous chemical called nicotine, which is very addictive. Nicotine triggers hormones that make the body feel stimulated and works on areas of the brain that make you feel good. These effects can make it hard for people to quit nicotine.  Tobacco contains many other unsafe chemicals that can damage almost every organ in the body. Smoking tobacco also puts others in danger due to fire risk and possible health problems caused by breathing in secondhand smoke.  What are the signs or symptoms?  Symptoms of TUD may include:  Being unable to slow down or stop your tobacco use.  Spending an abnormal amount of time getting or using tobacco.  Craving tobacco. Cravings  may last for up to 6 months after quitting.  Tobacco use that:  Interferes with your work, school, or home life.  Interferes with your personal and social relationships.  Makes you give up activities that you once enjoyed or found important.  Using tobacco even though you know that it is:  Dangerous or bad for your health or someone else's health.  Causing problems in your life.  Needing more and more of the substance to get the same effect (developing tolerance).  Experiencing unpleasant symptoms if you do not use the substance (withdrawal). Withdrawal symptoms may include:  Depressed, anxious, or irritable mood.  Difficulty concentrating.  Increased appetite.  Restlessness or trouble sleeping.  Using the substance to avoid withdrawal.  How is this diagnosed?  This condition may be diagnosed based on:  Your current and past tobacco use. Your health care provider may ask questions about how your tobacco use affects your life.  A physical exam.  You may be diagnosed with TUD if you have at least two symptoms within a 12-month period.  How is this treated?  This condition is treated by stopping tobacco use. Many people are unable to quit on their own and need help. Treatment may include:  Nicotine replacement therapy (NRT). NRT provides nicotine without the other harmful chemicals in tobacco. NRT gradually lowers the dosage of nicotine in the body and reduces withdrawal symptoms. NRT is available as:  Over-the-counter gums, lozenges, and skin patches.  Prescription mouth inhalers and nasal sprays.  Medicine that acts on the brain to reduce cravings and withdrawal symptoms.  A type of talk therapy that examines your triggers for tobacco use, how to avoid them, and how to cope with cravings (behavioral therapy).  Hypnosis. This may help with withdrawal symptoms.  Joining a support group for others coping with TUD.  The best treatment for TUD is usually a combination of medicine, talk therapy, and support groups.  Recovery can be a long process. Many people start using tobacco again after stopping (relapse). If you relapse, it does not mean that treatment will not work.  Follow these instructions at home:    Lifestyle  Do not use any products that contain nicotine or tobacco, such as cigarettes and e-cigarettes.  Avoid things that trigger tobacco use as much as you can. Triggers include people and situations that usually cause you to use tobacco.  Avoid drinks that contain caffeine, including coffee. These may worsen some withdrawal symptoms.  Find ways to manage stress. Wanting to smoke may cause stress, and stress can make you want to smoke. Relaxation techniques such as deep breathing, meditation, and yoga may help.  Attend support groups as needed. These groups are an important part of long-term recovery for many people.  General instructions  Take over-the-counter and prescription medicines only as told by your health care provider.  Check with your health care provider before taking any new prescription or over-the-counter medicines.  Decide on a friend, family member, or smoking quit-line (such as 1-800-QUIT-NOW in the [place] that you can call or text when you feel the urge to smoke or when you need help coping with cravings.  Keep all follow-up visits as told by your health care provider and therapist. This is important.  Contact a health care provider if:  You are not able to take your medicines as prescribed.  Your symptoms get worse, even with treatment.  Summary  Tobacco use disorder (TUD) occurs when a person craves, seeks, and uses tobacco regardless of the consequences.  This condition may be diagnosed based on your current and past tobacco use and a physical exam.  Many people are unable to quit on their own and need help. Recovery can be a long process.  The most effective treatment for TUD is usually a combination of medicine, talk therapy, and support groups.  This information is not intended to replace  advice given to you by your health care provider. Make sure you discuss any questions you have with your health care provider.  Document Revised: 12/05/2018 Document Reviewed: 12/05/2018  [employer] Patient Education © 2021 [employer]  Steps to Quit Smoking  Smoking tobacco is the leading cause of preventable death. It can affect almost every organ in the body. Smoking puts you and those around you at risk for developing many serious chronic diseases. Quitting smoking can be difficult, but it is one of the best things that you can do for your health. It is never too late to quit.  How do I get ready to quit?  When you decide to quit smoking, create a plan to help you succeed. Before you quit:  Pick a date to quit. Set a date within the next 2 weeks to give you time to prepare.  Write down the reasons why you are quitting. Keep this list in places where you will see it often.  Tell your family, friends, and co-workers that you are quitting. Support from your loved ones can make quitting easier.  Talk with your health care provider about your options for quitting smoking.  Find out what treatment options are covered by your health insurance.  Identify people, places, things, and activities that make you want to smoke (triggers). Avoid them.  What first steps can I take to quit smoking?  Throw away all cigarettes at home, at work, and in your car.  Throw away smoking accessories, such as ashtrays and lighters.  Clean your car. Make sure to empty the ashtray.  Clean your home, including curtains and carpets.  What strategies can I use to quit smoking?  Talk with your health care provider about combining strategies, such as taking medicines while you are also receiving in-person counseling. Using these two strategies together makes you more likely to succeed in quitting than if you used either strategy on its own.  If you are pregnant or breastfeeding, talk with your health care provider about finding counseling or  other support strategies to quit smoking. Do not take medicine to help you quit smoking unless your health care provider tells you to do so.  To quit smoking:  Quit right away  Quit smoking completely, instead of gradually reducing how much you smoke over a period of time. Research shows that stopping smoking right away is more successful than gradually quitting.  Attend in-person counseling to help you build problem-solving skills. You are more likely to succeed in quitting if you attend counseling sessions regularly. Even short sessions of 10 minutes can be effective.  Take medicine  You may take medicines to help you quit smoking. Some medicines require a prescription and some you can purchase over-the-counter. Medicines may have nicotine in them to replace the nicotine in cigarettes. Medicines may:  Help to stop cravings.  Help to relieve withdrawal symptoms.  Your health care provider may recommend:  Nicotine patches, gum, or lozenges.  Nicotine inhalers or sprays.  Non-nicotine medicine that is taken by mouth.  Find resources  Find resources and support systems that can help you to quit smoking and remain smoke-free after you quit. These resources are most helpful when you use them often. They include:  Online chats with a counselor.  Telephone quitlines.  Printed self-help materials.  Support groups or group counseling.  Text messaging programs.  Mobile phone apps or applications. Use apps that can help you stick to your quit plan by providing reminders, tips, and encouragement. There are many free apps for mobile devices as well as websites. Examples include Quit Guide from the CDC and smokefree.gov  What things can I do to make it easier to quit?    Reach out to your family and friends for support and encouragement. Call telephone quitlines (9-800QUIT-NOW), reach out to support groups, or work with a counselor for support.  Ask people who smoke to avoid smoking around you.  Avoid places that trigger you to  smoke, such as bars, parties, or smoke-break areas at work.  Spend time with people who do not smoke.  Lessen the stress in your life. Stress can be a smoking trigger for some people. To lessen stress, try:  Exercising regularly.  Doing deep-breathing exercises.  Doing yoga.  Meditating.  Performing a body scan. This involves closing your eyes, scanning your body from head to toe, and noticing which parts of your body are particularly tense. Try to relax the muscles in those areas.  How will I feel when I quit smoking?  Day 1 to 3 weeks  Within the first 24 hours of quitting smoking, you may start to feel withdrawal symptoms. These symptoms are usually most noticeable 2-3 days after quitting, but they usually do not last for more than 2-3 weeks. You may experience these symptoms:  Mood swings.  Restlessness, anxiety, or irritability.  Trouble concentrating.  Dizziness.  Strong cravings for sugary foods and nicotine.  Mild weight gain.  Constipation.  Nausea.  Coughing or a sore throat.  Changes in how the medicines that you take for unrelated issues work in your body.  Depression.  Trouble sleeping (insomnia).  Week 3 and afterward  After the first 2-3 weeks of quitting, you may start to notice more positive results, such as:  Improved sense of smell and taste.  Decreased coughing and sore throat.  Slower heart rate.  Lower blood pressure.  Clearer skin.  The ability to breathe more easily.  Fewer sick days.  Quitting smoking can be very challenging. Do not get discouraged if you are not successful the first time. Some people need to make many attempts to quit before they achieve long-term success. Do your best to stick to your quit plan, and talk with your health care provider if you have any questions or concerns.  Summary  Smoking tobacco is the leading cause of preventable death. Quitting smoking is one of the best things that you can do for your health.  When you decide to quit smoking, create a plan to help  you succeed.  Quit smoking right away, not slowly over a period of time.  When you start quitting, seek help from your health care provider, family, or friends.  This information is not intended to replace advice given to you by your health care provider. Make sure you discuss any questions you have with your health care provider.  Document Revised: 09/11/2020 Document Reviewed: 03/07/2020  [employer] Patient Education © 2021 [employer]

## 2022-04-14 ENCOUNTER — TELEPHONE (OUTPATIENT)
Dept: ONCOLOGY | Facility: CLINIC | Age: 62
End: 2022-04-14

## 2022-04-14 NOTE — TELEPHONE ENCOUNTER
Caller: Milagros Parson    Relationship: Self      What was the call regarding:     NEEDING TO RESCHEDULE FOLLOW UP WITH DR HANSON TODAY FOR 1PM  , HAS A RUPTURED DISC AND IS IN A LOT OF PAIN WILL NOT BE ABLE TO COME IN TODAY       WARM TRANSFERRED TO YOLANDA AT THE  TO FURTHER ASSIST.

## 2022-04-21 ENCOUNTER — TELEMEDICINE (OUTPATIENT)
Dept: ENDOCRINOLOGY | Facility: CLINIC | Age: 62
End: 2022-04-21

## 2022-04-21 DIAGNOSIS — E11.65 TYPE 2 DIABETES MELLITUS WITH HYPERGLYCEMIA, WITH LONG-TERM CURRENT USE OF INSULIN: Primary | ICD-10-CM

## 2022-04-21 DIAGNOSIS — Z79.4 TYPE 2 DIABETES MELLITUS WITH HYPERGLYCEMIA, WITH LONG-TERM CURRENT USE OF INSULIN: Primary | ICD-10-CM

## 2022-04-21 DIAGNOSIS — F17.200 SMOKER: ICD-10-CM

## 2022-04-21 DIAGNOSIS — E11.42 DIABETIC POLYNEUROPATHY ASSOCIATED WITH TYPE 2 DIABETES MELLITUS: ICD-10-CM

## 2022-04-21 PROCEDURE — 99214 OFFICE O/P EST MOD 30 MIN: CPT | Performed by: NURSE PRACTITIONER

## 2022-04-21 RX ORDER — BLOOD-GLUCOSE METER
1 KIT MISCELLANEOUS AS NEEDED
Qty: 1 EACH | Refills: 0 | Status: ON HOLD | OUTPATIENT
Start: 2022-04-21 | End: 2022-09-25

## 2022-04-21 RX ORDER — LANCETS 30 GAUGE
EACH MISCELLANEOUS
Qty: 90 EACH | Refills: 11 | Status: ON HOLD | OUTPATIENT
Start: 2022-04-21 | End: 2022-09-25

## 2022-04-21 NOTE — PROGRESS NOTES
Chief Complaint  Diabetes and Thyroid Problem    Subjective          Milagros Parson presents to [facility] ENDOCRINOLOGY  History of Present Illness     You have chosen to receive care through a telehealth visit.  Do you consent to use a video/audio connection for your medical care today? Yes            TELEHEALTH VIDEO VISIT     This a video visit due to [facility] current guidelines for social distancing due to the COVID 19 pandemic      62 year follow up      Reason diabetes mellitus type 2      Diagnosed in 2005      Timing constant     Quality controlled      Severity high      Last HgbA1c 6.2 % from Dec. 2021            Macrovascular complications CVA in 2016     Microvascular complications +  Neuropathy, retinopathy, nephropathy            Current diabetes regimen      GLP-1, oral medication     Current monitoring regimen: home blood tests - 6 times daily        States at goal      Review of Systems - General ROS: negative            Objective   Vital Signs:   There were no vitals taken for this visit.    Physical Exam  Constitutional:       Appearance: Normal appearance.   Cardiovascular:      Rate and Rhythm: Regular rhythm.      Heart sounds: Normal heart sounds.   Pulmonary:      Breath sounds: Normal breath sounds.   Musculoskeletal:      Cervical back: Normal range of motion.   Feet:      Right foot:      Skin integrity: Callus and dry skin present.      Left foot:      Skin integrity: Callus and dry skin present.   Neurological:      Mental Status: She is alert.        Result Review :   The following data was reviewed by: OWEN Humphrey on 04/21/2022:                Assessment and Plan    Diagnoses and all orders for this visit:    1. Type 2 diabetes mellitus with hyperglycemia, with long-term current use of insulin (HCC) (Primary)    2. Diabetic polyneuropathy associated with type 2 diabetes mellitus (HCC)    3. Smoker    Other orders  -     glucose monitor  monitoring kit; 1 each As Needed (to check bg).  Dispense: 1 each; Refill: 0  -     glucose blood test strip; Testing 3 times daily  Dispense: 90 each; Refill: 5  -     Lancets misc; Test 3 times daily, E10.65  Dispense: 90 each; Refill: 11           Glycemic Management:      Diabetes mellitus type 2         Last HgbA1c 6.2% from Dec. 2021             Basaglar taking 10 units once daily --not having to take   ==========         jardiance stopped due to fear of side effects      ============      actos 15 mg daily -- taking   ============     Taking  Admelog before each meal tid --not having to take         Taking 3 up to 9 units --   ============     victoza 1.2 mg  taking - keep                  Lipid Management        Will not take a statin she states            Blood Pressure Management:          Taking lisinopril      Lasix was stopped            Microvascular Complication Monitoring:    -----------     Last Microalbumin-Proteinuria Assessment        -----------        Neuropathy yes      Diabetic shoes RX         Taking pain medication      Follows with       Preventive Care:            Tobacco Use     Milagros Parson  reports that she has been smoking. She has been smoking about 1.00 pack per day. She has never used smokeless tobacco.. I have educated her on the risk of diseases from using tobacco products such as cancer and COPD.     I advised her to quit and she is not willing to quit.    I spent 3  minutes counseling the patient.                                        Weight Related:               Bone Health               Lab Results   Component Value Date     CALCIUM 9.5 11/29/2016         Thyroid Health      levothyroxine  112 mcg daily      TSH was 10 on Jan. 2022    Repeat this month                  Perceived goiter, obtain thyroid US --- had done at [facility] -- will obtain report               Follow Up   No follow-ups on file.  Patient was given instructions and counseling regarding  her condition or for health maintenance advice. Please see specific information pulled into the AVS if appropriate.         This document has been electronically signed by OWEN Humphrey on April 21, 2022 16:56 CDT.

## 2022-04-22 ENCOUNTER — TELEPHONE (OUTPATIENT)
Dept: ONCOLOGY | Facility: CLINIC | Age: 62
End: 2022-04-22

## 2022-04-22 NOTE — TELEPHONE ENCOUNTER
Caller: Milagros Parson    Relationship to patient: Self    Best call back number: [phone]    Chief complaint: WANTED SEE IF CAN SCHEDULE FOR LATER TIME IN THE DAY     Type of visit: FOLLOW UP     Requested date: 05/13 LATER TIME IN DAY IF POSSIBLE     OR CAN DO LATER IN THE DAY ANY DAY THE WEEK OF 05/09    IF NOT ABLE TO SCHEDULE FOR LATER TIME IN THE DAY WILL KEEP APPOINTMENT FOR 11:30AM 05/13/22    If rescheduling, when is the original appointment: 05/13/22    Additional notes: PLEASE CALL PATIENT CONFIRM IF LATER TIME AVAILABLE AROUND 1PM OR AFTER DURING WEEK OF 5/9  IF NOTE PATIENT STATES DONT NEED TO CALL JUST KEEP APPT AS IS.

## 2022-05-02 ENCOUNTER — TELEPHONE (OUTPATIENT)
Dept: ENDOCRINOLOGY | Facility: CLINIC | Age: 62
End: 2022-05-02

## 2022-05-02 NOTE — TELEPHONE ENCOUNTER
Pt states she had a foot exam at her last appointment. She is needing her diabetic shoe prescription  Sent to [facility]. Fax number is [phone].Thank you

## 2022-05-23 ENCOUNTER — TELEPHONE (OUTPATIENT)
Dept: ONCOLOGY | Facility: CLINIC | Age: 62
End: 2022-05-23

## 2022-05-23 NOTE — TELEPHONE ENCOUNTER
Caller: Milagros Parson    Relationship: Self    Best call back number: [phone]    What is the best time to reach you: ANYTIME    Who are you requesting to speak with (clinical staff, provider,  specific staff member): SCHEDULING    What was the call regarding: PT MISSED HER APPT TODAY BECAUSE SHE DIDN'T HAVE A RIDE - WOULD LIKE TO R/S. SHE IS HAVING BACK SURGERY 6/27.    PLEASE CALL TO R/S. TRIED TO W/T TO OFFICE BUT WAS UNSUCCESSFUL.     Do you require a callback: YES

## 2022-06-06 ENCOUNTER — APPOINTMENT (OUTPATIENT)
Dept: PREADMISSION TESTING | Facility: HOSPITAL | Age: 62
End: 2022-06-06

## 2022-06-06 ENCOUNTER — APPOINTMENT (OUTPATIENT)
Dept: CT IMAGING | Facility: HOSPITAL | Age: 62
End: 2022-06-06

## 2022-06-08 ENCOUNTER — HOSPITAL ENCOUNTER (OUTPATIENT)
Dept: CT IMAGING | Facility: HOSPITAL | Age: 62
Discharge: HOME | End: 2022-06-08

## 2022-06-08 ENCOUNTER — PRE-ADMISSION TESTING (OUTPATIENT)
Dept: PREADMISSION TESTING | Facility: HOSPITAL | Age: 62
End: 2022-06-08

## 2022-06-08 VITALS
WEIGHT: 189.38 LBS | HEIGHT: 66 IN | RESPIRATION RATE: 20 BRPM | BODY MASS INDEX: 30.44 KG/M2 | HEART RATE: 61 BPM | DIASTOLIC BLOOD PRESSURE: 87 MMHG | OXYGEN SATURATION: 99 % | SYSTOLIC BLOOD PRESSURE: 141 MMHG

## 2022-06-08 DIAGNOSIS — M51.37 DEGENERATION OF LUMBAR OR LUMBOSACRAL INTERVERTEBRAL DISC: ICD-10-CM

## 2022-06-08 DIAGNOSIS — M54.16 LUMBAR RADICULOPATHY: ICD-10-CM

## 2022-06-08 DIAGNOSIS — E66.09 CLASS 1 OBESITY DUE TO EXCESS CALORIES WITH SERIOUS COMORBIDITY AND BODY MASS INDEX (BMI) OF 30.0 TO 30.9 IN ADULT: ICD-10-CM

## 2022-06-08 DIAGNOSIS — M43.10 ACQUIRED SPONDYLOLISTHESIS: ICD-10-CM

## 2022-06-08 DIAGNOSIS — F17.200 SMOKING: ICD-10-CM

## 2022-06-08 LAB
ALBUMIN SERPL-MCNC: 4.3 G/DL (ref 3.5–5.2)
ALBUMIN/GLOB SERPL: 1.7 G/DL
ALP SERPL-CCNC: 56 U/L (ref 39–117)
ALT SERPL W P-5'-P-CCNC: 11 U/L (ref 1–33)
ANION GAP SERPL CALCULATED.3IONS-SCNC: 7 MMOL/L (ref 5–15)
AST SERPL-CCNC: 13 U/L (ref 1–32)
BILIRUB SERPL-MCNC: 0.3 MG/DL (ref 0–1.2)
BILIRUB UR QL STRIP: NEGATIVE
BUN SERPL-MCNC: 13 MG/DL (ref 8–23)
BUN/CREAT SERPL: 9.2 (ref 7–25)
CALCIUM SPEC-SCNC: 9.4 MG/DL (ref 8.6–10.5)
CHLORIDE SERPL-SCNC: 107 MMOL/L (ref 98–107)
CLARITY UR: CLEAR
CO2 SERPL-SCNC: 29 MMOL/L (ref 22–29)
COLOR UR: YELLOW
CREAT SERPL-MCNC: 1.41 MG/DL (ref 0.57–1)
DEPRECATED RDW RBC AUTO: 46.6 FL (ref 37–54)
EGFRCR SERPLBLD CKD-EPI 2021: 41.7 ML/MIN/1.73
ERYTHROCYTE [DISTWIDTH] IN BLOOD BY AUTOMATED COUNT: 13 % (ref 12.3–15.4)
GLOBULIN UR ELPH-MCNC: 2.5 GM/DL
GLUCOSE SERPL-MCNC: 95 MG/DL (ref 65–99)
GLUCOSE UR STRIP-MCNC: NEGATIVE MG/DL
HCT VFR BLD AUTO: 39.1 % (ref 34–46.6)
HGB BLD-MCNC: 12.2 G/DL (ref 12–15.9)
HGB UR QL STRIP.AUTO: NEGATIVE
KETONES UR QL STRIP: NEGATIVE
LEUKOCYTE ESTERASE UR QL STRIP.AUTO: NEGATIVE
MCH RBC QN AUTO: 30.7 PG (ref 26.6–33)
MCHC RBC AUTO-ENTMCNC: 31.2 G/DL (ref 31.5–35.7)
MCV RBC AUTO: 98.2 FL (ref 79–97)
NITRITE UR QL STRIP: NEGATIVE
PH UR STRIP.AUTO: <=5 [PH] (ref 5–8)
PLATELET # BLD AUTO: 198 10*3/MM3 (ref 140–450)
PMV BLD AUTO: 11.5 FL (ref 6–12)
POTASSIUM SERPL-SCNC: 5 MMOL/L (ref 3.5–5.2)
PROT SERPL-MCNC: 6.8 G/DL (ref 6–8.5)
PROT UR QL STRIP: NEGATIVE
RBC # BLD AUTO: 3.98 10*6/MM3 (ref 3.77–5.28)
SODIUM SERPL-SCNC: 143 MMOL/L (ref 136–145)
SP GR UR STRIP: 1.02 (ref 1–1.03)
UROBILINOGEN UR QL STRIP: NORMAL
WBC NRBC COR # BLD: 5.53 10*3/MM3 (ref 3.4–10.8)

## 2022-06-08 PROCEDURE — 36415 COLL VENOUS BLD VENIPUNCTURE: CPT

## 2022-06-08 PROCEDURE — 93010 ELECTROCARDIOGRAM REPORT: CPT | Performed by: EMERGENCY MEDICINE

## 2022-06-08 PROCEDURE — 85027 COMPLETE CBC AUTOMATED: CPT

## 2022-06-08 PROCEDURE — 72131 CT LUMBAR SPINE W/O DYE: CPT

## 2022-06-08 PROCEDURE — 93005 ELECTROCARDIOGRAM TRACING: CPT

## 2022-06-08 PROCEDURE — 80053 COMPREHEN METABOLIC PANEL: CPT

## 2022-06-08 PROCEDURE — 81003 URINALYSIS AUTO W/O SCOPE: CPT

## 2022-06-08 NOTE — DISCHARGE INSTRUCTIONS
Before you come to the hospital        Arrival time: AS DIRECTED BY OFFICE     YOU MAY TAKE THE FOLLOWING MEDICATION(S) THE MORNING OF SURGERY WITH A SIP OF WATER: XANAX, GABAPENTIN, PERCOCET    DO NOT TAKE LISINOPRIL 24 HOURS PRIOR TO SURGERY           ALL OTHER HOME MEDICATION CHECK WITH YOUR PHYSICIAN (especially if you are taking diabetes medicines or blood thinners)      If you were given and instructed to use a germ- killing soap, use as directed the night before surgery and the morning of surgery before coming to the hospital.             Eating and drinking restrictions prior to scheduled arrival time    2 Hours before arrival time STOP   Drinking Clear liquids (water, apple juice-no pulp)     6 Hours before arrival time STOP   Milk or drinks that contain milk, full liquids    6 Hours before arrival time STOP   Light meals or foods, such as toast or cereal    8 Hours before arrival time STOP   Heavy foods, such as meat, fried foods, or fatty foods    (It is extremely important that you follow these guidelines to prevent delay or cancelation of your procedure)     Clear Liquids  Water and flavored water                                                                      Clear Fruit juices, such as cranberry juice and apple juice.  Black coffee (NO cream of any kind, including powdered).  Plain tea  Clear bouillon or broth.  Flavored gelatin.  Soda.  Gatorade or Powerade.  Full liquid examples  Juices that have pulp.  Frozen ice pops that contain fruit pieces.  Coffee with creamer  Milk.  Yogurt.              MANAGING PAIN AFTER SURGERY    We know you are probably wondering what your pain will be like after surgery.  Following surgery it is unrealistic to expect you will not have pain.   Pain is how our bodies let us know that something is wrong or cautions us to be careful.  That said, our goal is to make your pain tolerable.    Methods we may use to treat your pain include (oral or IV medications, PCAs,  epidurals, nerve blocks, etc.)   While some procedures require IV pain medications for a short time after surgery, transitioning to pain medications by mouth allows for better management of pain.   Your nurse will encourage you to take oral pain medications whenever possible.  IV medications work almost immediately, but only last a short while.  Taking medications by mouth allows for a more constant level of medication in your blood stream for a longer period of time.      Once your pain is out of control it is harder to get back under control.  It is important you are aware when your next dose of pain medication is due.  If you are admitted, your nurse may write the time of your next dose on the white board in your room to help you remember.      We are interested in your pain and encourage you to inform us about aggravating factors during your visit.   Many times a simple repositioning every few hours can make a big difference.    If your physician says it is okay, do not let your pain prevent you from getting out of bed. Be sure to call your nurse for assistance prior to getting up so you do not fall.      Before surgery, please decide your tolerable pain goal.  These faces help describe the pain ratings we use on a 0-10 scale.   Be prepared to tell us your goal and whether or not you take pain or anxiety medications at home.

## 2022-06-10 LAB
QT INTERVAL: 434 MS
QTC INTERVAL: 422 MS

## 2022-06-13 ENCOUNTER — TELEPHONE (OUTPATIENT)
Dept: VASCULAR SURGERY | Facility: CLINIC | Age: 62
End: 2022-06-13

## 2022-06-13 ENCOUNTER — TELEPHONE (OUTPATIENT)
Dept: NEUROSURGERY | Facility: CLINIC | Age: 62
End: 2022-06-13

## 2022-06-13 DIAGNOSIS — M54.16 LUMBAR RADICULOPATHY: ICD-10-CM

## 2022-06-13 DIAGNOSIS — F17.200 SMOKING: ICD-10-CM

## 2022-06-13 DIAGNOSIS — M43.10 ACQUIRED SPONDYLOLISTHESIS: ICD-10-CM

## 2022-06-13 DIAGNOSIS — M51.37 DEGENERATION OF LUMBAR OR LUMBOSACRAL INTERVERTEBRAL DISC: ICD-10-CM

## 2022-06-13 DIAGNOSIS — Z01.818 PREOP TESTING: Primary | ICD-10-CM

## 2022-06-13 NOTE — TELEPHONE ENCOUNTER
I called the patient to let her know the insurance has denied her surgery secondary to her smoking.  She states she quit for 5 days previously but then went to her mom's house and they were smoking, her nicotine patch had come off and she didn't know it, she was craving a cigarette so she started smoking again.  She states she quit again yesterday at 12 noon.  I have informed her that she will need to get a blood test on 7/25/22 to show she did not have nicotine in her blood.  She has agreed.  I am going to forward this to Luiza so she can get with the patient and go ahead and reschedule the surgery to August.      PANCHO EVANS [facility]  PHYSICIAN LEAD  DR KEISHA LYNCH  [facility]

## 2022-06-13 NOTE — TELEPHONE ENCOUNTER
Left message reminding Ms Pedrovan of her appointment for Tuesday, June 14th, 2022 at 1130 am with Dr Flor.

## 2022-06-16 ENCOUNTER — TELEPHONE (OUTPATIENT)
Dept: NEUROSURGERY | Facility: CLINIC | Age: 62
End: 2022-06-16

## 2022-06-16 NOTE — TELEPHONE ENCOUNTER
Left patient a voicemail requesting she call me back to discuss making her an appointment with Rhett to make documentation that she is stopping smoking.    kyler guadarrama CMA

## 2022-06-17 NOTE — TELEPHONE ENCOUNTER
Patient called me back and left a VM so I called her back and gave her an appointment with Rhett to document smoking cessation per Dr Lynch's request.  During the conversation the patient states she has a lot going on next week and cannot come in next week so appt given for 6/29/22.  She states her house was almost ready to explode and is going to have to do a lot to her home (new door, new windows, etc).  She expressed the amount of stress she is in right now.  She also stated she is occasionally taking more pain medication that prescribed and she wants us to be aware of that.  I told her that she must talk with Dr Marsh about that b/c he is the one giving the pain medication to her.  She agreed to talk to him.    PANCHO EVANS [facility]  PHYSICIAN LEAD  DR KEISHA LYNCH  [facility] [place] [facility]

## 2022-07-05 ENCOUNTER — OFFICE VISIT (OUTPATIENT)
Dept: NEUROSURGERY | Facility: CLINIC | Age: 62
End: 2022-07-05

## 2022-07-05 VITALS — WEIGHT: 188 LBS | BODY MASS INDEX: 30.22 KG/M2 | HEIGHT: 66 IN

## 2022-07-05 DIAGNOSIS — M51.37 DEGENERATION OF LUMBAR OR LUMBOSACRAL INTERVERTEBRAL DISC: ICD-10-CM

## 2022-07-05 DIAGNOSIS — M43.10 ACQUIRED SPONDYLOLISTHESIS: Primary | ICD-10-CM

## 2022-07-05 DIAGNOSIS — E66.09 CLASS 1 OBESITY DUE TO EXCESS CALORIES WITH SERIOUS COMORBIDITY AND BODY MASS INDEX (BMI) OF 30.0 TO 30.9 IN ADULT: ICD-10-CM

## 2022-07-05 DIAGNOSIS — F17.200 SMOKING: ICD-10-CM

## 2022-07-05 DIAGNOSIS — M54.16 LUMBAR RADICULOPATHY: ICD-10-CM

## 2022-07-05 PROCEDURE — 99213 OFFICE O/P EST LOW 20 MIN: CPT | Performed by: NURSE PRACTITIONER

## 2022-07-05 RX ORDER — POTASSIUM CHLORIDE 750 MG/1
TABLET, FILM COATED, EXTENDED RELEASE ORAL
COMMUNITY
Start: 2022-06-18 | End: 2022-08-11

## 2022-07-05 NOTE — PROGRESS NOTES
Chief complaint:   Chief Complaint   Patient presents with   • Back Pain     Pt states she's had back pain since last visit       Subjective     HPI: This is a 62-year-old female patient who been following for back pain and right lower extremity pain.  She did state that her back pain is 10% of her problem in her right leg is 90% of her problem.  The pain will go from her back down into the right posterior aspect of her leg to her foot.  The pain is constant.  Pain is worse with activity and better with rest.  The patient does receive Percocet from her primary care doctor.  She did go through physical therapy without any relief.  She is also attempted chiropractic care without improvement.  She is not had any injections in her back.  The patient's symptoms have been progressively getting worse.  She did see Dr. Amado onofre in March and surgery was scheduled for an L5-S1 ALIF.  The patient does have a known spondylolisthesis at L5-S1 with a right disc herniation.  The patient was set to have surgery however due to her smoking they did delay it until she could be 6 weeks without smoking cigarettes.  The patient states that she unfortunately smokes cigarettes 4 days ago.  I did explain to the patient that due to the lab testing that is required that she does have to remain off cigarettes for 6 weeks and that at this point it is likely that the surgery would have to be postponed again.    Review of Systems   Musculoskeletal: Positive for back pain.   All other systems reviewed and are negative.       Past Medical History:   Diagnosis Date   • Arthritis    • Atrial septal defect, secundum    • Chest pain 11/17/2016   • CKD (chronic kidney disease), stage II 01/25/2018   • Diabetes mellitus (HCC)     DIABETES , UNCOMPLICATED, TYPE II    • Diabetic polyneuropathy associated with type 2 diabetes mellitus (HCC) 01/25/2018   • Disease of thyroid gland    • GERD (gastroesophageal reflux disease)    • HLD (hyperlipidemia)  "11/17/2016   • HTN (hypertension) 11/17/2016   • Hyperlipidemia, mixed    • Hypertension associated with diabetes ([facility]) 01/25/2018   • Hypertension, benign    • Melanoma of lower leg, right ([facility]) 01/25/2018   • Mixed diabetic hyperlipidemia associated with type 2 diabetes mellitus ([facility]) 01/25/2018   • Nonspecific chest pain    • Proliferative diabetic retinopathy of both eyes associated with type 2 diabetes mellitus ([facility]) 01/25/2018   • Stroke ([facility])    • Type 2 diabetes mellitus with hyperglycemia, with long-term current use of insulin ([facility]) 01/25/2018     Past Surgical History:   Procedure Laterality Date   • ATRIAL SEPTAL DEFECT REPAIR     • CARPAL TUNNEL RELEASE     • FEMORAL LYMPH NODE BIOPSY/EXCISON     • FINGER SURGERY     • HYSTERECTOMY     • OTHER SURGICAL HISTORY      melanoma removed right buttock     Family History   Problem Relation Age of Onset   • Heart disease Father      Social History     Tobacco Use   • Smoking status: Former Smoker     Packs/day: 1.00     Years: 15.00     Pack years: 15.00   • Smokeless tobacco: Never Used   Vaping Use   • Vaping Use: Former   Substance Use Topics   • Alcohol use: No   • Drug use: No     (Not in a hospital admission)    Allergies:  Penicillins, Morphine, and Sulfa antibiotics    Objective      Vital Signs  Ht 167 cm (65.75\")   Wt 85.3 kg (188 lb)   BMI 30.58 kg/m²     Physical Exam  Constitutional:       Appearance: Normal appearance. She is well-developed.   HENT:      Head: Normocephalic.   Eyes:      General: Lids are normal.      Extraocular Movements: EOM normal.      Conjunctiva/sclera: Conjunctivae normal.      Pupils: Pupils are equal, round, and reactive to light.   Cardiovascular:      Rate and Rhythm: Normal rate and regular rhythm.   Pulmonary:      Effort: Pulmonary effort is normal.      Breath sounds: Normal breath sounds.   Musculoskeletal:         General: Normal range of motion.      Cervical back: Normal range of motion.      Comments: Back " and right leg pain   Skin:     General: Skin is warm.   Neurological:      Mental Status: She is alert and oriented to person, place, and time.      GCS: GCS eye subscore is 4. GCS verbal subscore is 5. GCS motor subscore is 6.      Cranial Nerves: No cranial nerve deficit.      Sensory: No sensory deficit.      Gait: Gait is intact.      Deep Tendon Reflexes: Strength normal and reflexes are normal and symmetric. Reflexes normal.   Psychiatric:         Speech: Speech normal.         Behavior: Behavior normal.         Thought Content: Thought content normal.         Neurologic Exam     Mental Status   Oriented to person, place, and time.   Attention: normal. Concentration: normal.   Speech: speech is normal   Level of consciousness: alert  Normal comprehension.     Cranial Nerves     CN II   Visual fields full to confrontation.     CN III, IV, VI   Pupils are equal, round, and reactive to light.  Extraocular motions are normal.     CN V   Facial sensation intact.     CN VII   Facial expression full, symmetric.     CN VIII   CN VIII normal.     CN IX, X   CN IX normal.   CN X normal.     CN XI   CN XI normal.     CN XII   CN XII normal.     Motor Exam   Muscle bulk: normal    Strength   Strength 5/5 throughout.     Sensory Exam   Light touch normal.     Gait, Coordination, and Reflexes     Gait  Gait: normal    Reflexes   Reflexes 2+ except as noted.       Imaging review: No new imaging        Assessment/Plan: The patient does admit to smoking cigarettes 4 days ago.  I will plan to see the patient back in the office in 6 weeks and at that time if she is remain off cigarettes we can look at ordering the lab test and resubmitting through the insurance company.  In addition she was told to work with her primary care doctor for an nicotine patches as she states that those did help her but she did take them off and the craving did become too much for her.  She acknowledged understanding.  Her questions and concerns were  addressed  Patient is a smoker. Smoking cessation classes given to the patient  The patient's Body mass index is 30.58 kg/m².. BMI is above normal parameters. Recommendations include: educational material and nutrition counseling    Diagnoses and all orders for this visit:    1. Acquired spondylolisthesis (Primary)    2. Degeneration of lumbar or lumbosacral intervertebral disc    3. Lumbar radiculopathy    4. Smoking    5. Class 1 obesity due to excess calories with serious comorbidity and body mass index (BMI) of 30.0 to 30.9 in adult          I discussed the patients findings and my recommendations with patient    Rhett Romano, APRN  07/05/22  14:19 CDT

## 2022-07-11 ENCOUNTER — OFFICE VISIT (OUTPATIENT)
Dept: ONCOLOGY | Facility: CLINIC | Age: 62
End: 2022-07-11

## 2022-07-11 VITALS
HEART RATE: 65 BPM | TEMPERATURE: 97.6 F | RESPIRATION RATE: 18 BRPM | DIASTOLIC BLOOD PRESSURE: 60 MMHG | HEIGHT: 66 IN | OXYGEN SATURATION: 97 % | SYSTOLIC BLOOD PRESSURE: 132 MMHG | BODY MASS INDEX: 30.17 KG/M2 | WEIGHT: 187.7 LBS

## 2022-07-11 DIAGNOSIS — C43.71 MALIGNANT MELANOMA OF RIGHT LOWER EXTREMITY INCLUDING HIP: Primary | ICD-10-CM

## 2022-07-11 PROCEDURE — 99214 OFFICE O/P EST MOD 30 MIN: CPT | Performed by: INTERNAL MEDICINE

## 2022-08-02 ENCOUNTER — TELEPHONE (OUTPATIENT)
Dept: NEUROSURGERY | Facility: CLINIC | Age: 62
End: 2022-08-02

## 2022-08-02 NOTE — TELEPHONE ENCOUNTER
LEFT MESSAGE FOR PT THAT HER SURGERY DATE HAS CHANGED TO 9/9/22 @ 5AM FOR 7AM     HER PREWORK HAS CHANGED TO 8/11/22 AFTER HER APPT WITH DR COOPER     AND WILL DO NICOTINE TESTING ON THIS DATE AS WELL.    STILL SEE DR MONTGOMERY ON 8/15    AND RICKY HA ON 8/16 FOR UPDATED H&P

## 2022-08-03 ENCOUNTER — TELEPHONE (OUTPATIENT)
Dept: NEUROSURGERY | Facility: CLINIC | Age: 62
End: 2022-08-03

## 2022-08-03 NOTE — TELEPHONE ENCOUNTER
Caller: Milagros Parson    Relationship to patient: Self    Best call back number: [phone]    Chief complaint: LUMBAR PAIN    Type of visit: FOLLOW UP    Requested date: SOONER APPT     If rescheduling, when is the original appointment: 8/16/2022     Additional notes: PT CALLED AND IS ASKING FOR A SOONER APPT WITH RICKY AND TO HAVE SURGERY SOONER THEN 9/9/2022.    PT STATES HAS NOT HAD ONE CIGARETTE SINCE SHE WAS TOLD TO STOP.  PT IS IN A LOT OF PAIN, PAIN STARTS IN HIPS AND IS GOING INTO BOTH LEGS NOW.      PT HAVING ISSUES WALKING, CAN SEE BUMP IN HIP AFTER DOING ANY KIND OF ACTIVITIES.    PT IS ASKING IF 8/29/22 DATE FOR SURGERY IS STILL OPEN  PT IS ASKING FOR [facility]

## 2022-08-03 NOTE — TELEPHONE ENCOUNTER
PATIENT WISHES TO SPEAK WITH PANCHO TO SEE IF THERE IS ANY OTHER OPTIONS TO HELP HER. SHE TAKES PERCOCET AND STATES IT DOES NOT HELP. SHE FREQUENTLY HAS TO GO TO [facility] FOR RELIEF.     ADVISED PT THAT 8/29 IS NOT AN OPTION FOR SURGERY AS DR COOPER IS NOT AVAILABLE AND THAT IS WHY WE HAD TO MOVE SURGERY TO 9/9. PT ASKED FOR AN EARLIER SURGERY DATE. ADVISED AGAIN THAT WE HAVE TO COORDINATE THIS WITH DR COOPER AND THERE IS NOT AN AVAILABLE OPTION TO DO THE SURGERY SOONER.     PT WOULD LIKE TO SPEAK TO RICKY DELEON OR DR LYNCH TO SEE WHAT KIND OF RELIEF SHE CAN BE GIVEN OR WHAT OTHER OPTIONS ARE AVAILBLE TO DO A SOONER SURGERY.     ADVISED I WOULD HAVE PANCHO CALL HER.

## 2022-08-03 NOTE — TELEPHONE ENCOUNTER
I called the patient back to let her know we do not give pre-op pain medications.  Per the history in her chart she was on Percocet when we saw her.     When I talked to her she stated she did not ask for anything for pain and did not say the Percocet wasn't helping.  She said the pain is really wearing on her nerves too.  I advised her to talk to provider who is giving her the Percocet to see if they can add something to it.  She expressed understanding why the procedure was moved and when we finished the conversation she was much better.    PANCHO EVANS [facility]  PHYSICIAN LEAD  DR KEISHA LYNCH  [facility]

## 2022-08-04 ENCOUNTER — TELEPHONE (OUTPATIENT)
Dept: ENDOCRINOLOGY | Facility: CLINIC | Age: 62
End: 2022-08-04

## 2022-08-04 ENCOUNTER — TELEMEDICINE (OUTPATIENT)
Dept: ENDOCRINOLOGY | Facility: CLINIC | Age: 62
End: 2022-08-04

## 2022-08-04 DIAGNOSIS — E03.9 ACQUIRED HYPOTHYROIDISM: ICD-10-CM

## 2022-08-04 DIAGNOSIS — I10 PRIMARY HYPERTENSION: ICD-10-CM

## 2022-08-04 DIAGNOSIS — Z79.4 TYPE 2 DIABETES MELLITUS WITH HYPERGLYCEMIA, WITH LONG-TERM CURRENT USE OF INSULIN: Primary | ICD-10-CM

## 2022-08-04 DIAGNOSIS — E11.65 TYPE 2 DIABETES MELLITUS WITH HYPERGLYCEMIA, WITH LONG-TERM CURRENT USE OF INSULIN: Primary | ICD-10-CM

## 2022-08-04 DIAGNOSIS — E11.42 DIABETIC POLYNEUROPATHY ASSOCIATED WITH TYPE 2 DIABETES MELLITUS: ICD-10-CM

## 2022-08-04 PROCEDURE — 99214 OFFICE O/P EST MOD 30 MIN: CPT | Performed by: NURSE PRACTITIONER

## 2022-08-04 RX ORDER — PIOGLITAZONEHYDROCHLORIDE 15 MG/1
15 TABLET ORAL DAILY
Qty: 30 TABLET | Refills: 11 | Status: SHIPPED | OUTPATIENT
Start: 2022-08-04 | End: 2023-02-20 | Stop reason: SDUPTHER

## 2022-08-04 NOTE — TELEPHONE ENCOUNTER
PT called and is needing refilling on the following Rx(s):  levothyroxine (Synthroid) 112 MCG tablet     pioglitazone (Actos) 15 MG tablet    PT is also requesting a new Rx for:  OneTouch Glucose Monitor      Pharmacy Info:  [place], [place] - [facility] - [phone]  - [phone] [address] [address]   Phone:  [phone]  Fax:  [phone]

## 2022-08-04 NOTE — PROGRESS NOTES
Chief Complaint  Diabetes and Thyroid Problem    Subjective          Milagros Parson presents to [facility] ENDOCRINOLOGY  History of Present Illness       You have chosen to receive care through a telehealth visit.  Do you consent to use a video/audio connection for your medical care today? Yes            TELEHEALTH VIDEO VISIT     This a video visit due to [facility] current guidelines for social distancing due to the COVID 19 pandemic      62 year follow up      Reason diabetes mellitus type 2      Diagnosed in 2005      Timing constant     Quality controlled      Severity high      Last HgbA1c 6.2 % from Dec. 2021         Macrovascular complications CVA in 2016     Microvascular complications +  Neuropathy, retinopathy, nephropathy            Current diabetes regimen      GLP-1, oral medication     Current monitoring regimen: home blood tests - 6 times daily        States at goal        Review of Systems - back pain              Objective   Vital Signs:   There were no vitals taken for this visit.    Physical Exam  Neurological:      General: No focal deficit present.      Mental Status: She is alert.   Psychiatric:         Mood and Affect: Mood normal.         Thought Content: Thought content normal.         Judgment: Judgment normal.        Result Review :   The following data was reviewed by: OWEN Humphrey on 04/21/2022:  Common labs    Common Labsle 6/8/22 6/8/22    1415 1415   Glucose  95   BUN  13   Creatinine  1.41 (A)   Sodium  143   Potassium  5.0   Chloride  107   Calcium  9.4   Albumin  4.30   Total Bilirubin  0.3   Alkaline Phosphatase  56   AST (SGOT)  13   ALT (SGPT)  11   WBC 5.53    Hemoglobin 12.2    Hematocrit 39.1    Platelets 198    (A) Abnormal value                        Assessment and Plan    Diagnoses and all orders for this visit:    1. Type 2 diabetes mellitus with hyperglycemia, with long-term current use of insulin (HCC) (Primary)  -     Hemoglobin  A1c; Future  -     TSH; Future  -     CBC & Differential; Future  -     Comprehensive Metabolic Panel; Future    2. Acquired hypothyroidism  -     Hemoglobin A1c; Future  -     TSH; Future  -     CBC & Differential; Future  -     Comprehensive Metabolic Panel; Future    3. Diabetic polyneuropathy associated with type 2 diabetes mellitus (HCC)    4. Primary hypertension    Other orders  -     pioglitazone (Actos) 15 MG tablet; Take 1 tablet by mouth Daily.  Dispense: 30 tablet; Refill: 11           Glycemic Management:      Diabetes mellitus type 2         Last HgbA1c 6.2% from Dec. 2021             Basaglar taking 10 units once daily --not having to take   ==========         jardiance stopped due to fear of side effects      ============      actos 15 mg daily -- taking   ============     Taking  Admelog before each meal tid --not having to take         Taking 3 up to 9 units --   ============     victoza 1.2 mg  taking - keep                  Lipid Management        Will not take a statin she states            Blood Pressure Management:          Taking lisinopril      Lasix was stopped            Microvascular Complication Monitoring:    -----------     Last Microalbumin-Proteinuria Assessment        -----------        Neuropathy yes      Diabetic shoes RX         Taking pain medication      Follows with       Preventive Care:        Stopped smoking              Weight Related:               Bone Health               Lab Results   Component Value Date     CALCIUM 9.5 11/29/2016         Thyroid Health      levothyroxine  112 mcg daily      TSH was 10 on Jan. 2022                       Perceived goiter, obtain thyroid US --- had done [facility] -- will obtain report               Follow Up   No follow-ups on file.  Patient was given instructions and counseling regarding her condition or for health maintenance advice. Please see specific information pulled into the AVS if appropriate.         This  document has been electronically signed by OWEN Humphrey on August 4, 2022 11:42 CDT.

## 2022-08-10 ENCOUNTER — TELEPHONE (OUTPATIENT)
Dept: VASCULAR SURGERY | Facility: CLINIC | Age: 62
End: 2022-08-10

## 2022-08-11 ENCOUNTER — OFFICE VISIT (OUTPATIENT)
Dept: VASCULAR SURGERY | Facility: CLINIC | Age: 62
End: 2022-08-11

## 2022-08-11 VITALS
RESPIRATION RATE: 18 BRPM | HEIGHT: 66 IN | SYSTOLIC BLOOD PRESSURE: 126 MMHG | HEART RATE: 51 BPM | WEIGHT: 184 LBS | DIASTOLIC BLOOD PRESSURE: 64 MMHG | BODY MASS INDEX: 29.57 KG/M2 | OXYGEN SATURATION: 98 %

## 2022-08-11 DIAGNOSIS — M51.37 DEGENERATION OF LUMBAR OR LUMBOSACRAL INTERVERTEBRAL DISC: Primary | ICD-10-CM

## 2022-08-11 PROBLEM — G89.29 CHRONIC PAIN: Status: ACTIVE | Noted: 2022-08-11

## 2022-08-11 PROBLEM — G62.9 NEUROPATHY: Status: ACTIVE | Noted: 2022-08-11

## 2022-08-11 PROBLEM — I63.9 CEREBROVASCULAR ACCIDENT (CVA): Status: ACTIVE | Noted: 2022-08-11

## 2022-08-11 PROBLEM — Z79.4 LONG TERM CURRENT USE OF INSULIN: Status: ACTIVE | Noted: 2022-08-11

## 2022-08-11 PROBLEM — F41.9 ANXIETY DISORDER: Status: ACTIVE | Noted: 2022-08-11

## 2022-08-11 PROBLEM — K21.9 GASTROESOPHAGEAL REFLUX DISEASE: Status: ACTIVE | Noted: 2022-08-11

## 2022-08-11 PROBLEM — E11.21 DIABETIC RENAL DISEASE (HCC): Status: ACTIVE | Noted: 2022-08-11

## 2022-08-11 PROBLEM — E55.9 VITAMIN D DEFICIENCY: Status: ACTIVE | Noted: 2022-08-11

## 2022-08-11 PROCEDURE — 99204 OFFICE O/P NEW MOD 45 MIN: CPT | Performed by: SURGERY

## 2022-08-11 RX ORDER — NICOTINE 14MG/24HR
1 PATCH, TRANSDERMAL 24 HOURS TRANSDERMAL TAKE AS DIRECTED
COMMUNITY
Start: 2022-06-26 | End: 2022-08-16

## 2022-08-11 NOTE — PROGRESS NOTES
08/11/2022      Gunaako Fischer MD  [address]  [address]  [place] [address]    Milagros Parson  1960    Chief Complaint   Patient presents with   • Pre-op Exam     Referral by Dr Guanako Fischer for ALIF 9/9/22.  Pt is a Former Smoker.  Pt denies any stroke like symptoms.       Dear Guanako Fischer MD:      HPI  I had the pleasure of seeing your patient Milagros Parson in the office today.  Thank you kindly for this consultation.  As you recall, Milagros Parson is a 62 y.o.  female who you are currently following for chronic back pain with right lower extremity radiculopathy.  Milagros Parsonis scheduled for anterior lumbar interbody fusion of L5-S1 with Dr. Devlin on 9/9/22.  The patient denies any history of DVT.    Past Medical History:   Diagnosis Date   • Arthritis    • Atrial septal defect, secundum    • Chest pain 11/17/2016   • CKD (chronic kidney disease), stage II 01/25/2018   • Diabetes mellitus (HCC)     DIABETES , UNCOMPLICATED, TYPE II    • Diabetic polyneuropathy associated with type 2 diabetes mellitus ([facility]) 01/25/2018   • Disease of thyroid gland    • GERD (gastroesophageal reflux disease)    • HLD (hyperlipidemia) 11/17/2016   • HTN (hypertension) 11/17/2016   • Hyperlipidemia, mixed    • Hypertension associated with diabetes ([facility]) 01/25/2018   • Hypertension, benign    • Melanoma of lower leg, right ([facility]) 01/25/2018   • Mixed diabetic hyperlipidemia associated with type 2 diabetes mellitus ([facility]) 01/25/2018   • Nonspecific chest pain    • Proliferative diabetic retinopathy of both eyes associated with type 2 diabetes mellitus ([facility]) 01/25/2018   • Stroke ([facility])    • Type 2 diabetes mellitus with hyperglycemia, with long-term current use of insulin ([facility]) 01/25/2018       Past Surgical History:   Procedure Laterality Date   • ATRIAL SEPTAL DEFECT REPAIR     • CARPAL TUNNEL RELEASE     • FEMORAL LYMPH NODE BIOPSY/EXCISON     • FINGER SURGERY     • HYSTERECTOMY     • OTHER SURGICAL  HISTORY      melanoma removed right buttock       Family History   Problem Relation Age of Onset   • Heart disease Father        Social History     Socioeconomic History   • Marital status:    Tobacco Use   • Smoking status: Former Smoker     Packs/day: 1.00     Years: 15.00     Pack years: 15.00   • Smokeless tobacco: Never Used   Vaping Use   • Vaping Use: Former   Substance and Sexual Activity   • Alcohol use: No   • Drug use: No   • Sexual activity: Defer       Allergies   Allergen Reactions   • Penicillins Anaphylaxis and Rash     Reaction: rash  Reaction: rash     • Morphine Itching   • Sulfa Antibiotics GI Intolerance     Reaction: GI upset  Reaction: GI upset       Current Outpatient Medications   Medication Instructions   • ALPRAZolam (XANAX) 1 mg, Oral, 4 Times Daily PRN   • clopidogrel (PLAVIX) 75 mg, Oral, Daily   • [employer] NICOTINE TRANSDERMAL SYS 14 MG/24HR patch 1 patch, Take As Directed   • furosemide (LASIX) 60 mg, Oral, Daily PRN   • gabapentin (NEURONTIN) 300 mg, Oral, 3 Times Daily   • glucose blood test strip E11.9, ONE TOUCH ULTRA. TEST 5-6 TIMES A DAY   • glucose blood test strip Testing 3 times daily   • glucose monitor monitoring kit 1 each, Does not apply, As Needed   • Insulin Glargine (BASAGLAR KWIKPEN) 100 UNIT/ML injection pen 35 units qhs   • Insulin Lispro (ADMELOG SOLOSTAR) 100 UNIT/ML injection pen Up to 20 units with meals   • Insulin Pen Needle (B-D UF III MINI PEN NEEDLES) 31G X 5 MM misc Use 5 times daily   • Insulin Pen Needle (Easy Touch Pen Needles) 31G X 8 MM misc Inject 4 times daily, E11.9   • Insulin Pen Needle (Pen Needles) 32G X 4 MM misc 1 each, Does not apply, 4 Times Daily, Use 4 x daily, Dx code E11.65   • Lancets misc Test 3 times daily, E10.65   • levothyroxine (SYNTHROID) 112 mcg, Oral, Daily   • Liraglutide (VICTOZA) 18 MG/3ML solution pen-injector injection 1.2 mg subcutaneously daily   • lisinopril (PRINIVIL,ZESTRIL) 10 mg, Oral, Daily   • meloxicam  "(MOBIC) 15 MG tablet meloxicam 15 mg tablet   • nystatin (MYCOSTATIN) 936864 UNIT/GM cream nystatin 100,000 unit/gram topical cream   APPLY TO THE AFFECTED AREA(S) BY TOPICAL ROUTE 2 TIMES PER DAY PRN   • ondansetron ODT (ZOFRAN-ODT) 4 mg, Oral, 4 Times Daily PRN   • oxyCODONE-acetaminophen (PERCOCET)  MG per tablet 1 tablet, Oral, 4 Times Daily   • pantoprazole (PROTONIX) 40 mg, Oral, Daily   • pioglitazone (ACTOS) 15 mg, Oral, Daily   • polyethylene glycol (MIRALAX) 17 GM/SCOOP powder polyethylene glycol 3350 17 gram/dose oral powder   • POTASSIUM CHLORIDE PO 10 mEq, Oral, 2 Times Daily   • promethazine (PHENERGAN) 25 mg, Oral, Every 6 Hours PRN   • vitamin D (ERGOCALCIFEROL) 50,000 Units, Oral, Weekly         Review of Systems   Constitutional: Negative.    HENT: Negative.    Eyes: Negative.    Respiratory: Negative.    Cardiovascular: Negative.    Gastrointestinal: Negative.    Endocrine: Negative.    Genitourinary: Negative.    Musculoskeletal: Positive for back pain (with right leg pain).   Skin: Negative.    Allergic/Immunologic: Negative.    Neurological: Negative.    Hematological: Negative.    Psychiatric/Behavioral: Negative.    All other systems reviewed and are negative.      /64 (BP Location: Left arm, Patient Position: Sitting, Cuff Size: Adult)   Pulse 51   Resp 18   Ht 167 cm (65.75\")   Wt 83.5 kg (184 lb)   SpO2 98%   BMI 29.92 kg/m²   Physical Exam  Vitals and nursing note reviewed.   Constitutional:       Appearance: Normal appearance. She is well-developed.   HENT:      Head: Normocephalic and atraumatic.   Eyes:      General: No scleral icterus.     Pupils: Pupils are equal, round, and reactive to light.   Neck:      Thyroid: No thyromegaly.      Vascular: No carotid bruit or JVD.   Cardiovascular:      Rate and Rhythm: Normal rate and regular rhythm.      Pulses:           Carotid pulses are 2+ on the right side and 2+ on the left side.       Femoral pulses are 2+ on the " right side and 2+ on the left side.       Popliteal pulses are 2+ on the right side and 2+ on the left side.        Dorsalis pedis pulses are 2+ on the right side and 2+ on the left side.        Posterior tibial pulses are 2+ on the right side and 2+ on the left side.      Heart sounds: Normal heart sounds.   Pulmonary:      Effort: Pulmonary effort is normal.      Breath sounds: Normal breath sounds.   Abdominal:      General: Bowel sounds are normal. There is no distension or abdominal bruit.      Palpations: Abdomen is soft. There is no mass.      Tenderness: There is no abdominal tenderness.   Musculoskeletal:         General: Normal range of motion.      Cervical back: Neck supple.      Comments: Lumbar pain with radiculopathy to right leg   Lymphadenopathy:      Cervical: No cervical adenopathy.   Skin:     General: Skin is warm and dry.   Neurological:      Mental Status: She is alert and oriented to person, place, and time.      Cranial Nerves: No cranial nerve deficit.      Sensory: No sensory deficit.         No results found.    Patient Active Problem List   Diagnosis   • Type 2 diabetes mellitus with hyperglycemia, with long-term current use of insulin (HCC)   • Primary hypertension   • Mixed diabetic hyperlipidemia associated with type 2 diabetes mellitus (HCC)   • Proliferative diabetic retinopathy of both eyes associated with type 2 diabetes mellitus (HCC)   • Diabetic polyneuropathy associated with type 2 diabetes mellitus (HCC)   • CKD (chronic kidney disease), stage II   • Simple goiter   • Smoking   • Venous insufficiency of both lower extremities   • Malignant melanoma of right lower extremity including hip (HCC)   • Acquired hypothyroidism   • Acquired spondylolisthesis   • Degeneration of lumbar or lumbosacral intervertebral disc   • Lumbar radiculopathy   • Class 1 obesity due to excess calories with serious comorbidity and body mass index (BMI) of 30.0 to 30.9 in adult   • Anxiety disorder    • Cerebrovascular accident (CVA) (HCC)   • Chronic pain   • Diabetic renal disease (HCC)   • Gastroesophageal reflux disease   • Long term current use of insulin (HCC)   • Neuropathy   • Vitamin D deficiency         ICD-10-CM ICD-9-CM   1. Degeneration of lumbar or lumbosacral intervertebral disc  M51.37 722.52         Plan: After thoroughly evaluating Milagros Parson, I believe the best course of action is to proceed with anterior lumbar interbody fusion of L5-S1.  Risks of ALIF were discussed and include, but are not limited to, bleeding, infection, nerve damage, vessel damage, retrograde ejaculation, bowel damage, ureteral damage, DVT, MI, stroke, and death.  The patient understands these risks and wishes to proceed with procedure.  The patient can continue taking their current medication regimen as previously planned.  This was all discussed in full with complete understanding.    Thank you for allowing me to participate in the care of your patient.  Please do not hesitate with any questions or concerns.  I will keep you aware of any further encounters with Milagros Parson.        Sincerely yours,         Aubrey Flor, DO

## 2022-08-12 NOTE — TELEPHONE ENCOUNTER
I WILL HAVE TO DISCUSS THIS WITH PANCHO AND RICKY. CURRENTLY I AM NOT SURE WHERE WE WOULD ADD HER SURGERY. PATIENT ALSO HAS TO CLEAR HER NICOTINE TESTING AND INSURANCE HAS TO APPROVE PROCEDURE.     WILL ROUTE TO PANCHO FOR DIRECTION.

## 2022-08-12 NOTE — TELEPHONE ENCOUNTER
SPOKE WITH PATIENT WHO VOICED UNDERSTANDING THAT SHE WILL DO HER PREWORK ON 8/16/22 WITH NICOTINE TESTING AFTER HER APPT WITH RICKY. ADVISED HER THAT WE MIGHT BE ABLE TO MOVE HER SURGERY UP BUT IT JUST DEPENDS ON THE PROVIDERS SCHEDULE AND HER TEST RESULTS.     VOICED UNDERSTANDING.

## 2022-08-12 NOTE — TELEPHONE ENCOUNTER
Caller: Milagros Parson    Relationship: Self    Best call back number: [phone]    What is the best time to reach you:ANYTIME    Who are you requesting to speak with (clinical staff, provider,  specific staff member):CLINICAL STAFF    Do you know the name of the person who called:NA    What was the call regarding:PT CALLED THIS MORNING AND STATES THAT SHE WENT TO SEE  YESTERDAY-PT STATES THAT  STATED HE WOULD BE WILLING TO SET UP AN EARLIER DATE BEFORE 08/29/22 -PT STATES THAT SHE IS IN A WHEELCHAIR DUE TO HER PAIN-I ADVISED I WOULD SEND ANOTHER MESSAGE TO OUR OFFICE-PT HAS UPCOMING APPT. ON 08/16/22 PT IS AWARE IT TAKES 24-48 HOURS FOR A CALL BACK AND STATES THAT IF SHE DID NOT HEAR ANYTHING SHE WOULD ASK AT HER APPT. ON 08/16/22-PT WOULD LIKE A CALL BACK TO DISCUSS THANK YOU    Do you require a callback:YES

## 2022-08-12 NOTE — TELEPHONE ENCOUNTER
RECEIVED CALL FROM YUE IN SURGERY SCHEDULING. PATIENT DID NOT SHOW FOR HER PREWORK YESTERDAY ON 8/11. THIS IS NOT A RESCHEDULED PREWORK AND HAS BEEN SCHEDULED FOR THIS DATE SINCE 6/14/22. PT WAS LEFT A VOICE MAIL AND REMINDED OF THIS ON 8/2.     PW HAS BEEN RESCHEDULED FOR 8/16/22 @ 3:30 PM FOLLOWING HER APPOINTMENT WITH [facility]

## 2022-08-16 ENCOUNTER — PRE-ADMISSION TESTING (OUTPATIENT)
Dept: PREADMISSION TESTING | Facility: HOSPITAL | Age: 62
End: 2022-08-16

## 2022-08-16 ENCOUNTER — APPOINTMENT (OUTPATIENT)
Dept: PREADMISSION TESTING | Facility: HOSPITAL | Age: 62
End: 2022-08-16

## 2022-08-16 ENCOUNTER — OFFICE VISIT (OUTPATIENT)
Dept: NEUROSURGERY | Facility: CLINIC | Age: 62
End: 2022-08-16

## 2022-08-16 VITALS — HEIGHT: 66 IN | BODY MASS INDEX: 29.57 KG/M2 | WEIGHT: 184 LBS

## 2022-08-16 VITALS
HEIGHT: 65 IN | BODY MASS INDEX: 31.55 KG/M2 | SYSTOLIC BLOOD PRESSURE: 111 MMHG | WEIGHT: 189.38 LBS | OXYGEN SATURATION: 99 % | RESPIRATION RATE: 16 BRPM | DIASTOLIC BLOOD PRESSURE: 82 MMHG | HEART RATE: 48 BPM

## 2022-08-16 DIAGNOSIS — Z01.818 PREOP TESTING: ICD-10-CM

## 2022-08-16 DIAGNOSIS — M43.10 ACQUIRED SPONDYLOLISTHESIS: ICD-10-CM

## 2022-08-16 DIAGNOSIS — F17.200 SMOKING: ICD-10-CM

## 2022-08-16 DIAGNOSIS — Z87.891 FORMER SMOKER: ICD-10-CM

## 2022-08-16 DIAGNOSIS — M51.37 DEGENERATION OF LUMBAR OR LUMBOSACRAL INTERVERTEBRAL DISC: Primary | ICD-10-CM

## 2022-08-16 DIAGNOSIS — M51.37 DEGENERATION OF LUMBAR OR LUMBOSACRAL INTERVERTEBRAL DISC: ICD-10-CM

## 2022-08-16 DIAGNOSIS — E66.3 OVERWEIGHT WITH BODY MASS INDEX (BMI) OF 29 TO 29.9 IN ADULT: ICD-10-CM

## 2022-08-16 DIAGNOSIS — M54.16 LUMBAR RADICULOPATHY: ICD-10-CM

## 2022-08-16 LAB
ANION GAP SERPL CALCULATED.3IONS-SCNC: 6 MMOL/L (ref 5–15)
BUN SERPL-MCNC: 12 MG/DL (ref 8–23)
BUN/CREAT SERPL: 8.9 (ref 7–25)
CALCIUM SPEC-SCNC: 9.5 MG/DL (ref 8.6–10.5)
CHLORIDE SERPL-SCNC: 106 MMOL/L (ref 98–107)
CO2 SERPL-SCNC: 31 MMOL/L (ref 22–29)
COTININE UR-MCNC: NEGATIVE NG/ML
CREAT SERPL-MCNC: 1.35 MG/DL (ref 0.57–1)
DEPRECATED RDW RBC AUTO: 42 FL (ref 37–54)
EGFRCR SERPLBLD CKD-EPI 2021: 44 ML/MIN/1.73
ERYTHROCYTE [DISTWIDTH] IN BLOOD BY AUTOMATED COUNT: 11.9 % (ref 12.3–15.4)
GLUCOSE SERPL-MCNC: 171 MG/DL (ref 65–99)
HCT VFR BLD AUTO: 39.1 % (ref 34–46.6)
HGB BLD-MCNC: 12.1 G/DL (ref 12–15.9)
MCH RBC QN AUTO: 29.8 PG (ref 26.6–33)
MCHC RBC AUTO-ENTMCNC: 30.9 G/DL (ref 31.5–35.7)
MCV RBC AUTO: 96.3 FL (ref 79–97)
PLATELET # BLD AUTO: 205 10*3/MM3 (ref 140–450)
PMV BLD AUTO: 11.8 FL (ref 6–12)
POTASSIUM SERPL-SCNC: 5.1 MMOL/L (ref 3.5–5.2)
RBC # BLD AUTO: 4.06 10*6/MM3 (ref 3.77–5.28)
SODIUM SERPL-SCNC: 143 MMOL/L (ref 136–145)
WBC NRBC COR # BLD: 7.34 10*3/MM3 (ref 3.4–10.8)

## 2022-08-16 PROCEDURE — 80048 BASIC METABOLIC PNL TOTAL CA: CPT

## 2022-08-16 PROCEDURE — 85027 COMPLETE CBC AUTOMATED: CPT

## 2022-08-16 PROCEDURE — 99214 OFFICE O/P EST MOD 30 MIN: CPT | Performed by: NURSE PRACTITIONER

## 2022-08-16 PROCEDURE — 36415 COLL VENOUS BLD VENIPUNCTURE: CPT

## 2022-08-16 PROCEDURE — G0480 DRUG TEST DEF 1-7 CLASSES: HCPCS

## 2022-08-16 NOTE — DISCHARGE INSTRUCTIONS
Before you come to the hospital        Arrival time: AS DIRECTED BY OFFICE     YOU MAY TAKE THE FOLLOWING MEDICATION(S) THE MORNING OF SURGERY WITH A SIP OF WATER: XANAX, GABAPENTIN, PERCOCET    DO NOT TAKE LISINOPRIL 24 HOURS PRIOR TO SURGERY           ALL OTHER HOME MEDICATION CHECK WITH YOUR PHYSICIAN (especially if you are taking diabetes medicines or blood thinners)      If you were given and instructed to use a germ- killing soap, use as directed the night before surgery and the morning of surgery or as directed by your surgeon before coming to the hospital.             Eating and drinking restrictions prior to scheduled arrival time    2 Hours before arrival time STOP   Drinking Clear liquids (water, apple juice-no pulp)     6 Hours before arrival time STOP   Milk or drinks that contain milk, full liquids    6 Hours before arrival time STOP   Light meals or foods, such as toast or cereal    8 Hours before arrival time STOP   Heavy foods, such as meat, fried foods, or fatty foods    (It is extremely important that you follow these guidelines to prevent delay or cancelation of your procedure)     Clear Liquids  Water and flavored water                                                                      Clear Fruit juices, such as cranberry juice and apple juice.  Black coffee (NO cream of any kind, including powdered).  Plain tea  Clear bouillon or broth.  Flavored gelatin.  Soda.  Gatorade or Powerade.  Full liquid examples  Juices that have pulp.  Frozen ice pops that contain fruit pieces.  Coffee with creamer  Milk.  Yogurt.                MANAGING PAIN AFTER SURGERY    We know you are probably wondering what your pain will be like after surgery.  Following surgery it is unrealistic to expect you will not have pain.   Pain is how our bodies let us know that something is wrong or cautions us to be careful.  That said, our goal is to make your pain tolerable.    Methods we may use to treat your pain include  (oral or IV medications, PCAs, epidurals, nerve blocks, etc.)   While some procedures require IV pain medications for a short time after surgery, transitioning to pain medications by mouth allows for better management of pain.   Your nurse will encourage you to take oral pain medications whenever possible.  IV medications work almost immediately, but only last a short while.  Taking medications by mouth allows for a more constant level of medication in your blood stream for a longer period of time.      Once your pain is out of control it is harder to get back under control.  It is important you are aware when your next dose of pain medication is due.  If you are admitted, your nurse may write the time of your next dose on the white board in your room to help you remember.      We are interested in your pain and encourage you to inform us about aggravating factors during your visit.   Many times a simple repositioning every few hours can make a big difference.    If your physician says it is okay, do not let your pain prevent you from getting out of bed. Be sure to call your nurse for assistance prior to getting up so you do not fall.      Before surgery, please decide your tolerable pain goal.  These faces help describe the pain ratings we use on a 0-10 scale.   Be prepared to tell us your goal and whether or not you take pain or anxiety medications at home.          Preparing for Surgery  Preparing for surgery is an important part of your care. It can make things go more smoothly and help you avoid complications. The steps leading up to surgery may vary among hospitals. Follow all instructions given to you by your health care providers. Ask questions if you do not understand something. Talk about any concerns that you have.  Here are some questions to consider asking before your surgery:  If my surgery is not an emergency (is elective), when would be the best time to have the surgery?  What arrangements do I need  to make for work, home, or school?  What will my recovery be like? How long will it be before I can return to normal activities?  Will I need to prepare my home? Will I need to arrange care for me or my children?  Should I expect to have pain after surgery? What are my pain management options? Are there nonmedical options that I can try for pain?  Tell a health care provider about:  Any allergies you have.  All medicines you are taking, including vitamins, herbs, eye drops, creams, and over-the-counter medicines.  Any problems you or family members have had with anesthetic medicines.  Any blood disorders you have.  Any surgeries you have had.  Any medical conditions you have.  Whether you are pregnant or may be pregnant.  What are the risks?  The risks and complications of surgery depend on the specific procedure that you have. Discuss all the risks with your health care providers before your surgery. Ask about common surgical complications, which may include:  Infection.  Bleeding or a need for blood replacement (transfusion).  Allergic reactions to medicines.  Damage to surrounding nerves, tissues, or structures.  A blood clot.  Scarring.  Failure of the surgery to correct the problem.  Follow these instructions before the procedure:  Several days or weeks before your procedure  You may have a physical exam by your primary health care provider to make sure it is safe for you to have surgery.  You may have testing. This may include a chest X-ray, blood and urine tests, electrocardiogram (ECG), or other testing.  Ask your health care provider about:  Changing or stopping your regular medicines. This is especially important if you are taking diabetes medicines or blood thinners.  Taking medicines such as aspirin and ibuprofen. These medicines can thin your blood. Do not take these medicines unless your health care provider tells you to take them.  Taking over-the-counter medicines, vitamins, herbs, and  supplements.  Do not use any products that contain nicotine or tobacco, such as cigarettes and e-cigarettes. If you need help quitting, ask your health care provider.  Avoid alcohol.  Ask your health care provider if there are exercises you can do to prepare for surgery.  Eat a healthy diet.   Plan to have someone take you home from the hospital or clinic.  Plan to have a responsible adult care for you for at least 24 hours after you leave the hospital or clinic. This is important.  The day before your procedure  You may be given antibiotic medicine to take by mouth to help prevent infection. Take it as told by your health care provider.  You may be asked to shower with a germ-killing soap.  Follow instructions from your health care provider about eating and drinking restrictions. This includes gum, mints and hard candy.  Pack comfortable clothes according to your procedure.   The day of your procedure  You may need to take another shower with a germ-killing soap before you leave home in the morning.  With a small sip of water, take only the medicines that you are told to take.  Remove all jewelry including rings.   Leave anything you consider valuable at home except hearing aids if needed.  Do not wear any makeup, nail polish, powder, deodorant, lotion, hair accessories, or anything on your skin or body except your clothes.  If you will be staying in the hospital, bring a case to hold your glasses, contacts, or dentures. You may also want to bring your robe and non-skid footwear.  If you wear oxygen at home, bring it with you the day of surgery.  If instructed by your health care provider, bring your sleep apnea device with you on the day of your surgery (if this applies to you).  You may want to leave your suitcase and sleep apnea device in the car until after surgery.   Arrive at the hospital as scheduled.  Bring a friend or family member with you who can help to answer questions and be present while you meet  with your health care provider.  At the hospital  When you arrive at the hospital:  Go to registration located at the main entrance of the hospital. You will be registered and given a beeper and a sheet of name stickers. Take the stickers to the Outpatient nurses desk and place in the black tray. This is to notify staff that you have arrived. Then return to the lobby to wait.   When your beeper lights up and vibrates proceed through the double doors, under the stairs, and a member of the Outpatient Surgery staff will escort you to your preoperative room.  You may have to wear compression sleeves. These help to prevent blood clots and reduce swelling in your legs.  An IV may be inserted into one of your veins.              In the operating room, you may be given one or more of the following:        A medicine to help you relax (sedative).        A medicine to numb the area (local anesthetic).        A medicine to make you fall asleep (general anesthetic).        A medicine that is injected into an area of your body to numb everything below the                      injection site (regional anesthetic).  You may be given an antibiotic through your IV to help prevent infection.  Your surgical site will be marked or identified.    Contact a health care provider if you:  Develop a fever of more than 100.4°F (38°C) or other feelings of illness during the 48 hours before your surgery.  Have symptoms that get worse.  Have questions or concerns about your surgery.  Summary  Preparing for surgery can make the procedure go more smoothly and lower your risk of complications.  Before surgery, make a list of questions and concerns to discuss with your surgeon. Ask about the risks and possible complications.  In the days or weeks before your surgery, follow all instructions from your health care provider. You may need to stop smoking, avoid alcohol, follow eating restrictions, and change or stop your regular medicines.  Contact  your surgeon if you develop a fever or other signs of illness during the few days before your surgery.  This information is not intended to replace advice given to you by your health care provider. Make sure you discuss any questions you have with your health care provider.  Document Revised: 12/21/2018 Document Reviewed: 10/23/2018  Elsevier Patient Education © 2021 [employer]

## 2022-08-18 ENCOUNTER — TELEPHONE (OUTPATIENT)
Dept: NEUROSURGERY | Facility: CLINIC | Age: 62
End: 2022-08-18

## 2022-08-18 NOTE — TELEPHONE ENCOUNTER
CALLED DR MONTGOMERY'S OFFICE AND LEFT A MESSAGE FOR HIS NURSE ASKING THEM TO CONTACT PATIENT TO DISCUSS HOLDING HER PLAVIX AND POTENTIALLY BRIDGING WITH LOVENOX PRIOR TO THE 9/23/22 SURGERY.     GAVE MY CALL BACK NUMBER [phone].

## 2022-09-21 ENCOUNTER — PRE-ADMISSION TESTING (OUTPATIENT)
Dept: PREADMISSION TESTING | Facility: HOSPITAL | Age: 62
End: 2022-09-21

## 2022-09-21 VITALS
RESPIRATION RATE: 18 BRPM | DIASTOLIC BLOOD PRESSURE: 79 MMHG | HEART RATE: 59 BPM | SYSTOLIC BLOOD PRESSURE: 151 MMHG | BODY MASS INDEX: 29.76 KG/M2 | HEIGHT: 66 IN | WEIGHT: 185.19 LBS | OXYGEN SATURATION: 99 %

## 2022-09-21 LAB
ANION GAP SERPL CALCULATED.3IONS-SCNC: 9 MMOL/L (ref 5–15)
BUN SERPL-MCNC: 13 MG/DL (ref 8–23)
BUN/CREAT SERPL: 9.8 (ref 7–25)
CALCIUM SPEC-SCNC: 9.8 MG/DL (ref 8.6–10.5)
CHLORIDE SERPL-SCNC: 102 MMOL/L (ref 98–107)
CO2 SERPL-SCNC: 28 MMOL/L (ref 22–29)
CREAT SERPL-MCNC: 1.33 MG/DL (ref 0.57–1)
DEPRECATED RDW RBC AUTO: 44 FL (ref 37–54)
EGFRCR SERPLBLD CKD-EPI 2021: 44.8 ML/MIN/1.73
ERYTHROCYTE [DISTWIDTH] IN BLOOD BY AUTOMATED COUNT: 12.5 % (ref 12.3–15.4)
GLUCOSE SERPL-MCNC: 101 MG/DL (ref 65–99)
HCT VFR BLD AUTO: 41.9 % (ref 34–46.6)
HGB BLD-MCNC: 13.3 G/DL (ref 12–15.9)
MCH RBC QN AUTO: 30.4 PG (ref 26.6–33)
MCHC RBC AUTO-ENTMCNC: 31.7 G/DL (ref 31.5–35.7)
MCV RBC AUTO: 95.9 FL (ref 79–97)
PLATELET # BLD AUTO: 192 10*3/MM3 (ref 140–450)
PMV BLD AUTO: 11.5 FL (ref 6–12)
POTASSIUM SERPL-SCNC: 4.7 MMOL/L (ref 3.5–5.2)
RBC # BLD AUTO: 4.37 10*6/MM3 (ref 3.77–5.28)
SODIUM SERPL-SCNC: 139 MMOL/L (ref 136–145)
WBC NRBC COR # BLD: 5.49 10*3/MM3 (ref 3.4–10.8)

## 2022-09-21 PROCEDURE — 85027 COMPLETE CBC AUTOMATED: CPT

## 2022-09-21 PROCEDURE — 36415 COLL VENOUS BLD VENIPUNCTURE: CPT

## 2022-09-21 PROCEDURE — 80048 BASIC METABOLIC PNL TOTAL CA: CPT

## 2022-09-21 RX ORDER — HYDROCODONE BITARTRATE AND ACETAMINOPHEN 10; 325 MG/1; MG/1
1 TABLET ORAL 4 TIMES DAILY PRN
COMMUNITY
End: 2022-09-27 | Stop reason: HOSPADM

## 2022-09-21 RX ORDER — INSULIN LISPRO 100 [IU]/ML
INJECTION, SOLUTION INTRAVENOUS; SUBCUTANEOUS AS NEEDED
Status: ON HOLD | COMMUNITY
End: 2022-09-25

## 2022-09-23 ENCOUNTER — APPOINTMENT (OUTPATIENT)
Dept: GENERAL RADIOLOGY | Facility: HOSPITAL | Age: 62
End: 2022-09-23

## 2022-09-23 ENCOUNTER — HOSPITAL ENCOUNTER (INPATIENT)
Facility: HOSPITAL | Age: 62
LOS: 4 days | Discharge: HOME HEALTH | End: 2022-09-27
Attending: NEUROLOGICAL SURGERY | Admitting: NEUROLOGICAL SURGERY

## 2022-09-23 ENCOUNTER — ANESTHESIA (OUTPATIENT)
Dept: PERIOP | Facility: HOSPITAL | Age: 62
End: 2022-09-23

## 2022-09-23 ENCOUNTER — ANESTHESIA EVENT (OUTPATIENT)
Dept: PERIOP | Facility: HOSPITAL | Age: 62
End: 2022-09-23

## 2022-09-23 DIAGNOSIS — E03.9 ACQUIRED HYPOTHYROIDISM: ICD-10-CM

## 2022-09-23 DIAGNOSIS — Z79.4 TYPE 2 DIABETES MELLITUS WITH HYPERGLYCEMIA, WITH LONG-TERM CURRENT USE OF INSULIN: ICD-10-CM

## 2022-09-23 DIAGNOSIS — Z74.09 IMPAIRED MOBILITY: ICD-10-CM

## 2022-09-23 DIAGNOSIS — M43.10 ACQUIRED SPONDYLOLISTHESIS: ICD-10-CM

## 2022-09-23 DIAGNOSIS — M51.37 DEGENERATION OF LUMBAR OR LUMBOSACRAL INTERVERTEBRAL DISC: ICD-10-CM

## 2022-09-23 DIAGNOSIS — M54.16 LUMBAR RADICULOPATHY: ICD-10-CM

## 2022-09-23 DIAGNOSIS — Z74.09 IMPAIRED MOBILITY AND ADLS: ICD-10-CM

## 2022-09-23 DIAGNOSIS — E11.65 TYPE 2 DIABETES MELLITUS WITH HYPERGLYCEMIA, WITH LONG-TERM CURRENT USE OF INSULIN: ICD-10-CM

## 2022-09-23 DIAGNOSIS — Z78.9 IMPAIRED MOBILITY AND ADLS: ICD-10-CM

## 2022-09-23 LAB
ABO GROUP BLD: NORMAL
BLD GP AB SCN SERPL QL: NEGATIVE
GLUCOSE BLDC GLUCOMTR-MCNC: 118 MG/DL (ref 70–130)
GLUCOSE BLDC GLUCOMTR-MCNC: 118 MG/DL (ref 70–130)
GLUCOSE BLDC GLUCOMTR-MCNC: 186 MG/DL (ref 70–130)
GLUCOSE BLDC GLUCOMTR-MCNC: 191 MG/DL (ref 70–130)
HBA1C MFR BLD: 6.1 % (ref 4.8–5.6)
RH BLD: NEGATIVE
T&S EXPIRATION DATE: NORMAL

## 2022-09-23 PROCEDURE — 86850 RBC ANTIBODY SCREEN: CPT | Performed by: NEUROLOGICAL SURGERY

## 2022-09-23 PROCEDURE — 25010000002 DROPERIDOL PER 5 MG: Performed by: ANESTHESIOLOGY

## 2022-09-23 PROCEDURE — 25010000002 HYDROMORPHONE PER 4 MG: Performed by: ANESTHESIOLOGY

## 2022-09-23 PROCEDURE — 88311 DECALCIFY TISSUE: CPT | Performed by: NEUROLOGICAL SURGERY

## 2022-09-23 PROCEDURE — 86902 BLOOD TYPE ANTIGEN DONOR EA: CPT

## 2022-09-23 PROCEDURE — 0 HYDROMORPHONE 1 MG/ML SOLUTION: Performed by: NURSE PRACTITIONER

## 2022-09-23 PROCEDURE — 76000 FLUOROSCOPY <1 HR PHYS/QHP: CPT

## 2022-09-23 PROCEDURE — C1713 ANCHOR/SCREW BN/BN,TIS/BN: HCPCS | Performed by: NEUROLOGICAL SURGERY

## 2022-09-23 PROCEDURE — 82962 GLUCOSE BLOOD TEST: CPT

## 2022-09-23 PROCEDURE — 25010000002 ONDANSETRON PER 1 MG: Performed by: NURSE ANESTHETIST, CERTIFIED REGISTERED

## 2022-09-23 PROCEDURE — 25010000002 VANCOMYCIN 1 G RECONSTITUTED SOLUTION 1 EACH VIAL: Performed by: NURSE PRACTITIONER

## 2022-09-23 PROCEDURE — 86922 COMPATIBILITY TEST ANTIGLOB: CPT

## 2022-09-23 PROCEDURE — 0ST40ZZ RESECTION OF LUMBOSACRAL DISC, OPEN APPROACH: ICD-10-PCS | Performed by: NEUROLOGICAL SURGERY

## 2022-09-23 PROCEDURE — 0SG30A0 FUSION OF LUMBOSACRAL JOINT WITH INTERBODY FUSION DEVICE, ANTERIOR APPROACH, ANTERIOR COLUMN, OPEN APPROACH: ICD-10-PCS | Performed by: NEUROLOGICAL SURGERY

## 2022-09-23 PROCEDURE — S0260 H&P FOR SURGERY: HCPCS | Performed by: NEUROLOGICAL SURGERY

## 2022-09-23 PROCEDURE — 86901 BLOOD TYPING SEROLOGIC RH(D): CPT | Performed by: NEUROLOGICAL SURGERY

## 2022-09-23 PROCEDURE — 25010000002 HYDROMORPHONE 1 MG/ML SOLUTION: Performed by: NURSE ANESTHETIST, CERTIFIED REGISTERED

## 2022-09-23 PROCEDURE — 8E0WXBF COMPUTER ASSISTED PROCEDURE OF TRUNK REGION, WITH FLUOROSCOPY: ICD-10-PCS | Performed by: SURGERY

## 2022-09-23 PROCEDURE — 22840 INSERT SPINE FIXATION DEVICE: CPT | Performed by: NEUROLOGICAL SURGERY

## 2022-09-23 PROCEDURE — 25010000002 MIDAZOLAM PER 1 MG: Performed by: ANESTHESIOLOGY

## 2022-09-23 PROCEDURE — 61783 SCAN PROC SPINAL: CPT | Performed by: NEUROLOGICAL SURGERY

## 2022-09-23 PROCEDURE — 25010000002 FENTANYL CITRATE (PF) 50 MCG/ML SOLUTION: Performed by: ANESTHESIOLOGY

## 2022-09-23 PROCEDURE — 22845 INSERT SPINE FIXATION DEVICE: CPT | Performed by: NEUROLOGICAL SURGERY

## 2022-09-23 PROCEDURE — 25010000002 PROPOFOL 10 MG/ML EMULSION: Performed by: NURSE ANESTHETIST, CERTIFIED REGISTERED

## 2022-09-23 PROCEDURE — 72100 X-RAY EXAM L-S SPINE 2/3 VWS: CPT

## 2022-09-23 PROCEDURE — 25010000002 VANCOMYCIN 1 G RECONSTITUTED SOLUTION 1 EACH VIAL: Performed by: NEUROLOGICAL SURGERY

## 2022-09-23 PROCEDURE — 74018 RADEX ABDOMEN 1 VIEW: CPT

## 2022-09-23 PROCEDURE — 22558 ARTHRD ANT NTRBD MIN DSC LUM: CPT | Performed by: SURGERY

## 2022-09-23 PROCEDURE — 86920 COMPATIBILITY TEST SPIN: CPT

## 2022-09-23 PROCEDURE — 63047 LAM FACETEC & FORAMOT LUMBAR: CPT | Performed by: NEUROLOGICAL SURGERY

## 2022-09-23 PROCEDURE — 88304 TISSUE EXAM BY PATHOLOGIST: CPT | Performed by: NEUROLOGICAL SURGERY

## 2022-09-23 PROCEDURE — 25010000002 VANCOMYCIN 10 G RECONSTITUTED SOLUTION: Performed by: NEUROLOGICAL SURGERY

## 2022-09-23 PROCEDURE — 25010000002 ONDANSETRON PER 1 MG: Performed by: NURSE PRACTITIONER

## 2022-09-23 PROCEDURE — 22558 ARTHRD ANT NTRBD MIN DSC LUM: CPT | Performed by: NEUROLOGICAL SURGERY

## 2022-09-23 PROCEDURE — 25010000002 FENTANYL CITRATE (PF) 250 MCG/5ML SOLUTION: Performed by: NURSE ANESTHETIST, CERTIFIED REGISTERED

## 2022-09-23 PROCEDURE — 86900 BLOOD TYPING SEROLOGIC ABO: CPT | Performed by: NEUROLOGICAL SURGERY

## 2022-09-23 PROCEDURE — 83036 HEMOGLOBIN GLYCOSYLATED A1C: CPT | Performed by: NURSE PRACTITIONER

## 2022-09-23 DEVICE — ROD 641000030 30MM PERC ROD 4.75MM CCM
Type: IMPLANTABLE DEVICE | Site: SPINE LUMBAR | Status: FUNCTIONAL
Brand: CD HORIZON® SOLERA® SPINAL SYSTEM

## 2022-09-23 DEVICE — KT HEMOST ABS SURGIFOAM PORCN 1GRAM: Type: IMPLANTABLE DEVICE | Site: SPINE LUMBAR | Status: FUNCTIONAL

## 2022-09-23 DEVICE — LIGACLIP MCA MULTIPLE CLIP APPLIERS, 20 SMALL CLIPS
Type: IMPLANTABLE DEVICE | Site: ABDOMEN | Status: FUNCTIONAL
Brand: LIGACLIP

## 2022-09-23 DEVICE — SCREW 7965530 5.5MM X 30MM SELF TAP
Type: IMPLANTABLE DEVICE | Site: SPINE LUMBAR | Status: FUNCTIONAL
Brand: SOVEREIGN™ SPINAL SYSTEM

## 2022-09-23 DEVICE — ASSEMBLY 7967812 S 32X23 12MM 8DEG CP
Type: IMPLANTABLE DEVICE | Site: SPINE LUMBAR | Status: FUNCTIONAL
Brand: SOVEREIGN™ SPINAL SYSTEM

## 2022-09-23 DEVICE — LIGACLIP MCA MULTIPLE CLIP APPLIERS, 30 MEDIUM CLIPS
Type: IMPLANTABLE DEVICE | Site: ABDOMEN | Status: FUNCTIONAL
Brand: LIGACLIP

## 2022-09-23 DEVICE — DBM T43103 2.5CC GRAFTON PUTTY
Type: IMPLANTABLE DEVICE | Site: SPINE LUMBAR | Status: FUNCTIONAL
Brand: GRAFTON®AND GRAFTON PLUS®DEMINERALIZED BONE MATRIX (DBM)

## 2022-09-23 DEVICE — SCRW ST SOLERA VOYAGER BRKOFF TI 4.75MM: Type: IMPLANTABLE DEVICE | Site: SPINE LUMBAR | Status: FUNCTIONAL

## 2022-09-23 RX ORDER — DROPERIDOL 2.5 MG/ML
0.62 INJECTION, SOLUTION INTRAMUSCULAR; INTRAVENOUS ONCE AS NEEDED
Status: COMPLETED | OUTPATIENT
Start: 2022-09-23 | End: 2022-09-23

## 2022-09-23 RX ORDER — OXYCODONE AND ACETAMINOPHEN 7.5; 325 MG/1; MG/1
1 TABLET ORAL EVERY 4 HOURS PRN
Status: DISCONTINUED | OUTPATIENT
Start: 2022-09-23 | End: 2022-09-27 | Stop reason: HOSPADM

## 2022-09-23 RX ORDER — FUROSEMIDE 20 MG/1
40 TABLET ORAL DAILY PRN
Status: DISCONTINUED | OUTPATIENT
Start: 2022-09-23 | End: 2022-09-27 | Stop reason: HOSPADM

## 2022-09-23 RX ORDER — SODIUM CHLORIDE 9 MG/ML
75 INJECTION, SOLUTION INTRAVENOUS CONTINUOUS
Status: DISCONTINUED | OUTPATIENT
Start: 2022-09-23 | End: 2022-09-27 | Stop reason: HOSPADM

## 2022-09-23 RX ORDER — MULTIVITAMIN WITH IRON
24000 TABLET ORAL DAILY
Status: DISCONTINUED | OUTPATIENT
Start: 2022-09-23 | End: 2022-09-27 | Stop reason: HOSPADM

## 2022-09-23 RX ORDER — PANTOPRAZOLE SODIUM 40 MG/1
40 TABLET, DELAYED RELEASE ORAL DAILY
Status: DISCONTINUED | OUTPATIENT
Start: 2022-09-23 | End: 2022-09-27 | Stop reason: HOSPADM

## 2022-09-23 RX ORDER — INSULIN LISPRO 100 [IU]/ML
0-14 INJECTION, SOLUTION INTRAVENOUS; SUBCUTANEOUS
Status: DISCONTINUED | OUTPATIENT
Start: 2022-09-23 | End: 2022-09-27 | Stop reason: HOSPADM

## 2022-09-23 RX ORDER — BUPIVACAINE HCL/0.9 % NACL/PF 0.125 %
PLASTIC BAG, INJECTION (ML) EPIDURAL AS NEEDED
Status: DISCONTINUED | OUTPATIENT
Start: 2022-09-23 | End: 2022-09-23 | Stop reason: SURG

## 2022-09-23 RX ORDER — LEVOTHYROXINE SODIUM 112 UG/1
112 TABLET ORAL
Status: DISCONTINUED | OUTPATIENT
Start: 2022-09-23 | End: 2022-09-27 | Stop reason: HOSPADM

## 2022-09-23 RX ORDER — ZINC SULFATE 50(220)MG
220 CAPSULE ORAL DAILY
Status: DISCONTINUED | OUTPATIENT
Start: 2022-09-23 | End: 2022-09-27 | Stop reason: HOSPADM

## 2022-09-23 RX ORDER — SODIUM CHLORIDE, SODIUM LACTATE, POTASSIUM CHLORIDE, CALCIUM CHLORIDE 600; 310; 30; 20 MG/100ML; MG/100ML; MG/100ML; MG/100ML
1000 INJECTION, SOLUTION INTRAVENOUS CONTINUOUS
Status: DISCONTINUED | OUTPATIENT
Start: 2022-09-23 | End: 2022-09-23

## 2022-09-23 RX ORDER — OXYCODONE AND ACETAMINOPHEN 10; 325 MG/1; MG/1
1 TABLET ORAL ONCE AS NEEDED
Status: COMPLETED | OUTPATIENT
Start: 2022-09-23 | End: 2022-09-23

## 2022-09-23 RX ORDER — SODIUM CHLORIDE 0.9 % (FLUSH) 0.9 %
3-10 SYRINGE (ML) INJECTION AS NEEDED
Status: DISCONTINUED | OUTPATIENT
Start: 2022-09-23 | End: 2022-09-23 | Stop reason: HOSPADM

## 2022-09-23 RX ORDER — LABETALOL HYDROCHLORIDE 5 MG/ML
5 INJECTION, SOLUTION INTRAVENOUS
Status: DISCONTINUED | OUTPATIENT
Start: 2022-09-23 | End: 2022-09-23 | Stop reason: HOSPADM

## 2022-09-23 RX ORDER — NALOXONE HCL 0.4 MG/ML
0.4 VIAL (ML) INJECTION
Status: DISCONTINUED | OUTPATIENT
Start: 2022-09-23 | End: 2022-09-27 | Stop reason: HOSPADM

## 2022-09-23 RX ORDER — LIDOCAINE HYDROCHLORIDE 10 MG/ML
0.5 INJECTION, SOLUTION EPIDURAL; INFILTRATION; INTRACAUDAL; PERINEURAL ONCE AS NEEDED
Status: DISCONTINUED | OUTPATIENT
Start: 2022-09-23 | End: 2022-09-23 | Stop reason: HOSPADM

## 2022-09-23 RX ORDER — SODIUM CHLORIDE 0.9 % (FLUSH) 0.9 %
3 SYRINGE (ML) INJECTION AS NEEDED
Status: DISCONTINUED | OUTPATIENT
Start: 2022-09-23 | End: 2022-09-23 | Stop reason: HOSPADM

## 2022-09-23 RX ORDER — ROCURONIUM BROMIDE 10 MG/ML
INJECTION, SOLUTION INTRAVENOUS AS NEEDED
Status: DISCONTINUED | OUTPATIENT
Start: 2022-09-23 | End: 2022-09-23 | Stop reason: SURG

## 2022-09-23 RX ORDER — ONDANSETRON 2 MG/ML
4 INJECTION INTRAMUSCULAR; INTRAVENOUS
Status: DISCONTINUED | OUTPATIENT
Start: 2022-09-23 | End: 2022-09-23 | Stop reason: HOSPADM

## 2022-09-23 RX ORDER — BISACODYL 10 MG
10 SUPPOSITORY, RECTAL RECTAL DAILY PRN
Status: DISCONTINUED | OUTPATIENT
Start: 2022-09-23 | End: 2022-09-27 | Stop reason: HOSPADM

## 2022-09-23 RX ORDER — SODIUM CHLORIDE, SODIUM LACTATE, POTASSIUM CHLORIDE, CALCIUM CHLORIDE 600; 310; 30; 20 MG/100ML; MG/100ML; MG/100ML; MG/100ML
100 INJECTION, SOLUTION INTRAVENOUS CONTINUOUS
Status: DISCONTINUED | OUTPATIENT
Start: 2022-09-23 | End: 2022-09-23

## 2022-09-23 RX ORDER — FENTANYL CITRATE 50 UG/ML
INJECTION, SOLUTION INTRAMUSCULAR; INTRAVENOUS AS NEEDED
Status: DISCONTINUED | OUTPATIENT
Start: 2022-09-23 | End: 2022-09-23 | Stop reason: SURG

## 2022-09-23 RX ORDER — CYCLOBENZAPRINE HCL 10 MG
10 TABLET ORAL 3 TIMES DAILY PRN
Status: DISCONTINUED | OUTPATIENT
Start: 2022-09-23 | End: 2022-09-27 | Stop reason: HOSPADM

## 2022-09-23 RX ORDER — GABAPENTIN 300 MG/1
300 CAPSULE ORAL 3 TIMES DAILY
Status: DISCONTINUED | OUTPATIENT
Start: 2022-09-23 | End: 2022-09-27 | Stop reason: HOSPADM

## 2022-09-23 RX ORDER — ASCORBIC ACID 500 MG
500 TABLET ORAL DAILY
Status: DISCONTINUED | OUTPATIENT
Start: 2022-09-23 | End: 2022-09-27 | Stop reason: HOSPADM

## 2022-09-23 RX ORDER — MIDAZOLAM HYDROCHLORIDE 1 MG/ML
1 INJECTION INTRAMUSCULAR; INTRAVENOUS
Status: DISCONTINUED | OUTPATIENT
Start: 2022-09-23 | End: 2022-09-23 | Stop reason: HOSPADM

## 2022-09-23 RX ORDER — PROPOFOL 10 MG/ML
VIAL (ML) INTRAVENOUS AS NEEDED
Status: DISCONTINUED | OUTPATIENT
Start: 2022-09-23 | End: 2022-09-23 | Stop reason: SURG

## 2022-09-23 RX ORDER — EPHEDRINE SULFATE 50 MG/ML
INJECTION, SOLUTION INTRAVENOUS AS NEEDED
Status: DISCONTINUED | OUTPATIENT
Start: 2022-09-23 | End: 2022-09-23 | Stop reason: SURG

## 2022-09-23 RX ORDER — PIOGLITAZONEHYDROCHLORIDE 15 MG/1
15 TABLET ORAL DAILY
Status: DISCONTINUED | OUTPATIENT
Start: 2022-09-23 | End: 2022-09-27 | Stop reason: HOSPADM

## 2022-09-23 RX ORDER — NICOTINE POLACRILEX 4 MG
15 LOZENGE BUCCAL
Status: DISCONTINUED | OUTPATIENT
Start: 2022-09-23 | End: 2022-09-27 | Stop reason: HOSPADM

## 2022-09-23 RX ORDER — MAGNESIUM HYDROXIDE 1200 MG/15ML
LIQUID ORAL AS NEEDED
Status: DISCONTINUED | OUTPATIENT
Start: 2022-09-23 | End: 2022-09-23 | Stop reason: HOSPADM

## 2022-09-23 RX ORDER — SODIUM CHLORIDE 0.9 % (FLUSH) 0.9 %
3 SYRINGE (ML) INJECTION EVERY 12 HOURS SCHEDULED
Status: DISCONTINUED | OUTPATIENT
Start: 2022-09-23 | End: 2022-09-27 | Stop reason: HOSPADM

## 2022-09-23 RX ORDER — SODIUM CHLORIDE 0.9 % (FLUSH) 0.9 %
10 SYRINGE (ML) INJECTION AS NEEDED
Status: DISCONTINUED | OUTPATIENT
Start: 2022-09-23 | End: 2022-09-27 | Stop reason: HOSPADM

## 2022-09-23 RX ORDER — ALPRAZOLAM 0.5 MG/1
1 TABLET ORAL 4 TIMES DAILY PRN
Status: DISCONTINUED | OUTPATIENT
Start: 2022-09-23 | End: 2022-09-27 | Stop reason: HOSPADM

## 2022-09-23 RX ORDER — ONDANSETRON 2 MG/ML
INJECTION INTRAMUSCULAR; INTRAVENOUS AS NEEDED
Status: DISCONTINUED | OUTPATIENT
Start: 2022-09-23 | End: 2022-09-23 | Stop reason: SURG

## 2022-09-23 RX ORDER — ACETAMINOPHEN 325 MG/1
650 TABLET ORAL EVERY 4 HOURS PRN
Status: DISCONTINUED | OUTPATIENT
Start: 2022-09-23 | End: 2022-09-27 | Stop reason: HOSPADM

## 2022-09-23 RX ORDER — NEOSTIGMINE METHYLSULFATE 5 MG/5 ML
SYRINGE (ML) INTRAVENOUS AS NEEDED
Status: DISCONTINUED | OUTPATIENT
Start: 2022-09-23 | End: 2022-09-23 | Stop reason: SURG

## 2022-09-23 RX ORDER — NALOXONE HCL 0.4 MG/ML
0.04 VIAL (ML) INJECTION AS NEEDED
Status: DISCONTINUED | OUTPATIENT
Start: 2022-09-23 | End: 2022-09-23 | Stop reason: HOSPADM

## 2022-09-23 RX ORDER — DEXTROSE MONOHYDRATE 25 G/50ML
25 INJECTION, SOLUTION INTRAVENOUS
Status: DISCONTINUED | OUTPATIENT
Start: 2022-09-23 | End: 2022-09-27 | Stop reason: HOSPADM

## 2022-09-23 RX ORDER — IBUPROFEN 600 MG/1
600 TABLET ORAL ONCE AS NEEDED
Status: DISCONTINUED | OUTPATIENT
Start: 2022-09-23 | End: 2022-09-23 | Stop reason: HOSPADM

## 2022-09-23 RX ORDER — SODIUM CHLORIDE 0.9 % (FLUSH) 0.9 %
10 SYRINGE (ML) INJECTION AS NEEDED
Status: DISCONTINUED | OUTPATIENT
Start: 2022-09-23 | End: 2022-09-23 | Stop reason: HOSPADM

## 2022-09-23 RX ORDER — POLYETHYLENE GLYCOL 3350 17 G/17G
17 POWDER, FOR SOLUTION ORAL DAILY PRN
Status: DISCONTINUED | OUTPATIENT
Start: 2022-09-23 | End: 2022-09-27 | Stop reason: HOSPADM

## 2022-09-23 RX ORDER — LIDOCAINE HYDROCHLORIDE 20 MG/ML
INJECTION, SOLUTION EPIDURAL; INFILTRATION; INTRACAUDAL; PERINEURAL AS NEEDED
Status: DISCONTINUED | OUTPATIENT
Start: 2022-09-23 | End: 2022-09-23 | Stop reason: SURG

## 2022-09-23 RX ORDER — FLUMAZENIL 0.1 MG/ML
0.2 INJECTION INTRAVENOUS AS NEEDED
Status: DISCONTINUED | OUTPATIENT
Start: 2022-09-23 | End: 2022-09-23 | Stop reason: HOSPADM

## 2022-09-23 RX ORDER — DOCUSATE SODIUM 100 MG/1
100 CAPSULE, LIQUID FILLED ORAL 2 TIMES DAILY
Status: DISCONTINUED | OUTPATIENT
Start: 2022-09-23 | End: 2022-09-27 | Stop reason: HOSPADM

## 2022-09-23 RX ORDER — MIDAZOLAM HYDROCHLORIDE 1 MG/ML
2 INJECTION INTRAMUSCULAR; INTRAVENOUS ONCE
Status: COMPLETED | OUTPATIENT
Start: 2022-09-23 | End: 2022-09-23

## 2022-09-23 RX ORDER — LISINOPRIL 10 MG/1
10 TABLET ORAL DAILY
Status: DISCONTINUED | OUTPATIENT
Start: 2022-09-23 | End: 2022-09-27 | Stop reason: HOSPADM

## 2022-09-23 RX ORDER — POTASSIUM CHLORIDE 1.5 G/1.77G
10 POWDER, FOR SOLUTION ORAL 2 TIMES DAILY
Status: DISCONTINUED | OUTPATIENT
Start: 2022-09-23 | End: 2022-09-27 | Stop reason: HOSPADM

## 2022-09-23 RX ORDER — ONDANSETRON 2 MG/ML
4 INJECTION INTRAMUSCULAR; INTRAVENOUS EVERY 6 HOURS PRN
Status: DISCONTINUED | OUTPATIENT
Start: 2022-09-23 | End: 2022-09-27 | Stop reason: HOSPADM

## 2022-09-23 RX ORDER — FENTANYL CITRATE 50 UG/ML
25 INJECTION, SOLUTION INTRAMUSCULAR; INTRAVENOUS
Status: COMPLETED | OUTPATIENT
Start: 2022-09-23 | End: 2022-09-23

## 2022-09-23 RX ORDER — SODIUM CHLORIDE 9 MG/ML
INJECTION, SOLUTION INTRAVENOUS AS NEEDED
Status: DISCONTINUED | OUTPATIENT
Start: 2022-09-23 | End: 2022-09-23 | Stop reason: HOSPADM

## 2022-09-23 RX ORDER — HYDROMORPHONE HYDROCHLORIDE 1 MG/ML
0.5 INJECTION, SOLUTION INTRAMUSCULAR; INTRAVENOUS; SUBCUTANEOUS
Status: COMPLETED | OUTPATIENT
Start: 2022-09-23 | End: 2022-09-23

## 2022-09-23 RX ORDER — SODIUM CHLORIDE 0.9 % (FLUSH) 0.9 %
3 SYRINGE (ML) INJECTION EVERY 12 HOURS SCHEDULED
Status: DISCONTINUED | OUTPATIENT
Start: 2022-09-23 | End: 2022-09-23 | Stop reason: HOSPADM

## 2022-09-23 RX ORDER — ACETAMINOPHEN 500 MG
1000 TABLET ORAL ONCE
Status: DISCONTINUED | OUTPATIENT
Start: 2022-09-23 | End: 2022-09-23 | Stop reason: HOSPADM

## 2022-09-23 RX ADMIN — PROPOFOL 150 MG: 10 INJECTION, EMULSION INTRAVENOUS at 07:17

## 2022-09-23 RX ADMIN — SODIUM CHLORIDE, POTASSIUM CHLORIDE, SODIUM LACTATE AND CALCIUM CHLORIDE 100 ML/HR: 600; 310; 30; 20 INJECTION, SOLUTION INTRAVENOUS at 11:38

## 2022-09-23 RX ADMIN — SODIUM CHLORIDE, POTASSIUM CHLORIDE, SODIUM LACTATE AND CALCIUM CHLORIDE 1000 ML: 600; 310; 30; 20 INJECTION, SOLUTION INTRAVENOUS at 06:08

## 2022-09-23 RX ADMIN — OXYCODONE HYDROCHLORIDE AND ACETAMINOPHEN 1 TABLET: 7.5; 325 TABLET ORAL at 16:59

## 2022-09-23 RX ADMIN — Medication 100 MG: at 07:17

## 2022-09-23 RX ADMIN — EPHEDRINE SULFATE 15 MG: 50 INJECTION INTRAVENOUS at 07:41

## 2022-09-23 RX ADMIN — HYDROMORPHONE HYDROCHLORIDE 0.5 MG: 1 INJECTION, SOLUTION INTRAMUSCULAR; INTRAVENOUS; SUBCUTANEOUS at 11:42

## 2022-09-23 RX ADMIN — GLYCOPYRROLATE 0.4 MG: 0.2 INJECTION INTRAMUSCULAR; INTRAVENOUS at 08:12

## 2022-09-23 RX ADMIN — HYDROMORPHONE HYDROCHLORIDE 1 MG: 1 INJECTION, SOLUTION INTRAMUSCULAR; INTRAVENOUS; SUBCUTANEOUS at 21:45

## 2022-09-23 RX ADMIN — FENTANYL CITRATE 25 MCG: 50 INJECTION, SOLUTION INTRAMUSCULAR; INTRAVENOUS at 11:20

## 2022-09-23 RX ADMIN — HYDROMORPHONE HYDROCHLORIDE 1 MG: 1 INJECTION, SOLUTION INTRAMUSCULAR; INTRAVENOUS; SUBCUTANEOUS at 15:07

## 2022-09-23 RX ADMIN — ALPRAZOLAM 1 MG: 0.5 TABLET ORAL at 19:28

## 2022-09-23 RX ADMIN — FENTANYL CITRATE 100 MCG: 0.05 INJECTION, SOLUTION INTRAMUSCULAR; INTRAVENOUS at 07:17

## 2022-09-23 RX ADMIN — ONDANSETRON 4 MG: 2 INJECTION INTRAMUSCULAR; INTRAVENOUS at 10:27

## 2022-09-23 RX ADMIN — GABAPENTIN 300 MG: 300 CAPSULE ORAL at 20:51

## 2022-09-23 RX ADMIN — HYDROMORPHONE HYDROCHLORIDE 0.5 MG: 1 INJECTION, SOLUTION INTRAMUSCULAR; INTRAVENOUS; SUBCUTANEOUS at 11:30

## 2022-09-23 RX ADMIN — EPHEDRINE SULFATE 10 MG: 50 INJECTION INTRAVENOUS at 09:34

## 2022-09-23 RX ADMIN — VANCOMYCIN HYDROCHLORIDE 1250 MG: 10 INJECTION, POWDER, LYOPHILIZED, FOR SOLUTION INTRAVENOUS at 06:42

## 2022-09-23 RX ADMIN — Medication 100 MCG: at 09:47

## 2022-09-23 RX ADMIN — SODIUM CHLORIDE, POTASSIUM CHLORIDE, SODIUM LACTATE AND CALCIUM CHLORIDE 1000 ML: 600; 310; 30; 20 INJECTION, SOLUTION INTRAVENOUS at 05:58

## 2022-09-23 RX ADMIN — ROCURONIUM BROMIDE 20 MG: 50 INJECTION INTRAVENOUS at 07:41

## 2022-09-23 RX ADMIN — HYDROMORPHONE HYDROCHLORIDE 0.5 MG: 1 INJECTION, SOLUTION INTRAMUSCULAR; INTRAVENOUS; SUBCUTANEOUS at 11:15

## 2022-09-23 RX ADMIN — HYDROMORPHONE HYDROCHLORIDE 1 MG: 1 INJECTION, SOLUTION INTRAMUSCULAR; INTRAVENOUS; SUBCUTANEOUS at 23:49

## 2022-09-23 RX ADMIN — VANCOMYCIN HYDROCHLORIDE 1000 MG: 1 INJECTION, POWDER, LYOPHILIZED, FOR SOLUTION INTRAVENOUS at 18:28

## 2022-09-23 RX ADMIN — FENTANYL CITRATE 50 MCG: 0.05 INJECTION, SOLUTION INTRAMUSCULAR; INTRAVENOUS at 09:22

## 2022-09-23 RX ADMIN — LISINOPRIL 10 MG: 10 TABLET ORAL at 14:56

## 2022-09-23 RX ADMIN — OXYCODONE AND ACETAMINOPHEN 1 TABLET: 325; 10 TABLET ORAL at 11:46

## 2022-09-23 RX ADMIN — OXYCODONE HYDROCHLORIDE AND ACETAMINOPHEN 1 TABLET: 7.5; 325 TABLET ORAL at 22:33

## 2022-09-23 RX ADMIN — DOCUSATE SODIUM 100 MG: 100 CAPSULE, LIQUID FILLED ORAL at 20:51

## 2022-09-23 RX ADMIN — FENTANYL CITRATE 25 MCG: 50 INJECTION, SOLUTION INTRAMUSCULAR; INTRAVENOUS at 11:28

## 2022-09-23 RX ADMIN — ONDANSETRON 4 MG: 2 INJECTION INTRAMUSCULAR; INTRAVENOUS at 15:12

## 2022-09-23 RX ADMIN — CYCLOBENZAPRINE 10 MG: 10 TABLET, FILM COATED ORAL at 20:51

## 2022-09-23 RX ADMIN — PIOGLITAZONE 15 MG: 15 TABLET ORAL at 14:56

## 2022-09-23 RX ADMIN — FENTANYL CITRATE 25 MCG: 50 INJECTION, SOLUTION INTRAMUSCULAR; INTRAVENOUS at 11:00

## 2022-09-23 RX ADMIN — HYDROMORPHONE HYDROCHLORIDE 0.5 MG: 1 INJECTION, SOLUTION INTRAMUSCULAR; INTRAVENOUS; SUBCUTANEOUS at 10:57

## 2022-09-23 RX ADMIN — Medication 3 MG: at 08:12

## 2022-09-23 RX ADMIN — Medication 10 ML: at 14:56

## 2022-09-23 RX ADMIN — POTASSIUM CHLORIDE 10 MEQ: 1.5 POWDER, FOR SOLUTION ORAL at 20:51

## 2022-09-23 RX ADMIN — FENTANYL CITRATE 25 MCG: 50 INJECTION, SOLUTION INTRAMUSCULAR; INTRAVENOUS at 11:10

## 2022-09-23 RX ADMIN — Medication 3 ML: at 20:51

## 2022-09-23 RX ADMIN — HYDROMORPHONE HYDROCHLORIDE 0.5 MG: 1 INJECTION, SOLUTION INTRAMUSCULAR; INTRAVENOUS; SUBCUTANEOUS at 09:53

## 2022-09-23 RX ADMIN — DROPERIDOL 0.62 MG: 2.5 INJECTION, SOLUTION INTRAMUSCULAR; INTRAVENOUS at 06:42

## 2022-09-23 RX ADMIN — DOCUSATE SODIUM 100 MG: 100 CAPSULE, LIQUID FILLED ORAL at 14:56

## 2022-09-23 RX ADMIN — GABAPENTIN 300 MG: 300 CAPSULE ORAL at 16:59

## 2022-09-23 RX ADMIN — DROPERIDOL 0.62 MG: 2.5 INJECTION, SOLUTION INTRAMUSCULAR; INTRAVENOUS at 10:58

## 2022-09-23 RX ADMIN — PANTOPRAZOLE SODIUM 40 MG: 40 TABLET, DELAYED RELEASE ORAL at 14:56

## 2022-09-23 RX ADMIN — EPHEDRINE SULFATE 15 MG: 50 INJECTION INTRAVENOUS at 09:31

## 2022-09-23 RX ADMIN — HYDROMORPHONE HYDROCHLORIDE 0.5 MG: 1 INJECTION, SOLUTION INTRAMUSCULAR; INTRAVENOUS; SUBCUTANEOUS at 10:40

## 2022-09-23 RX ADMIN — POTASSIUM CHLORIDE 10 MEQ: 1.5 POWDER, FOR SOLUTION ORAL at 14:56

## 2022-09-23 RX ADMIN — EPHEDRINE SULFATE 10 MG: 50 INJECTION INTRAVENOUS at 08:45

## 2022-09-23 RX ADMIN — MIDAZOLAM 2 MG: 1 INJECTION INTRAMUSCULAR; INTRAVENOUS at 07:03

## 2022-09-23 RX ADMIN — Medication 100 MCG: at 10:13

## 2022-09-23 RX ADMIN — Medication 100 MCG: at 10:01

## 2022-09-23 RX ADMIN — SODIUM CHLORIDE 75 ML/HR: 9 INJECTION, SOLUTION INTRAVENOUS at 14:57

## 2022-09-23 RX ADMIN — ROCURONIUM BROMIDE 30 MG: 50 INJECTION INTRAVENOUS at 07:17

## 2022-09-23 RX ADMIN — FENTANYL CITRATE 100 MCG: 0.05 INJECTION, SOLUTION INTRAMUSCULAR; INTRAVENOUS at 08:02

## 2022-09-23 RX ADMIN — HYDROMORPHONE HYDROCHLORIDE 1 MG: 1 INJECTION, SOLUTION INTRAMUSCULAR; INTRAVENOUS; SUBCUTANEOUS at 19:33

## 2022-09-23 NOTE — ANESTHESIA POSTPROCEDURE EVALUATION
"Patient: Milagros Parson    Procedure Summary     Date: 09/23/22 Room / Location:  [facility]  /  [facility]    Anesthesia Start: 0712 Anesthesia Stop: 1052    Procedures:       LUMBAR DISCECTOMY,ANTERIOR LUMBAR INTERBODY FUSION RIGHT LEISA- LAMINECTOMY, FACETECTOMY, FORAMENOTOMY, POSTEIOR PEDICLE SCREW INSTRUMENTATION L5-S1 RIGHT ROBOT. (Right Spine Cervical)      POSTEIOR PEDICLE SCREW INSTRUMENTATION L5-S1 (Right Spine Lumbar)      ANTERIOR LUMBAR EXPOSURE (N/A Abdomen) Diagnosis:       Acquired spondylolisthesis      Degeneration of lumbar or lumbosacral intervertebral disc      Lumbar radiculopathy      (Acquired spondylolisthesis [M43.10])      (Degeneration of lumbar or lumbosacral intervertebral disc [M51.37])      (Lumbar radiculopathy [M54.16])    Surgeons: Guanako Fischer MD; Aubrey Flor DO Provider: Shaniqua Pope CRNA    Anesthesia Type: general ASA Status: 3          Anesthesia Type: general    Vitals  Vitals Value Taken Time   BP 98/69 09/23/22 1212   Temp 97.5 °F (36.4 °C) 09/23/22 1200   Pulse 84 09/23/22 1217   Resp 14 09/23/22 1200   SpO2 98 % 09/23/22 1217   Vitals shown include unvalidated device data.        Post Anesthesia Care and Evaluation    Patient location during evaluation: PACU  Patient participation: complete - patient participated  Level of consciousness: awake and alert  Pain management: adequate    Airway patency: patent  Anesthetic complications: No anesthetic complications    Cardiovascular status: acceptable  Respiratory status: acceptable  Hydration status: acceptable    Comments: Blood pressure 114/48, pulse 76, temperature 97.5 °F (36.4 °C), temperature source Temporal, resp. rate 14, height 168 cm (66.14\"), weight 84.1 kg (185 lb 6.5 oz), SpO2 95 %, not currently breastfeeding.    Pt discharged from PACU based on chris score >8      "

## 2022-09-23 NOTE — ANESTHESIA PROCEDURE NOTES
Airway  Urgency: elective    Date/Time: 9/23/2022 7:18 AM  Airway not difficult    General Information and Staff    Patient location during procedure: OR  CRNA/CAA: Shaniqua Pope CRNA    Indications and Patient Condition  Indications for airway management: airway protection    Preoxygenated: yes  Mask difficulty assessment: 1 - vent by mask    Final Airway Details  Final airway type: endotracheal airway      Successful airway: ETT  Cuffed: yes   Successful intubation technique: direct laryngoscopy  Facilitating devices/methods: intubating stylet  Endotracheal tube insertion site: oral  Blade: Sary  Blade size: 3.5.  ETT size (mm): 7.0  Cormack-Lehane Classification: grade I - full view of glottis  Placement verified by: chest auscultation and capnometry   Cuff volume (mL): 6  Measured from: lips  ETT/EBT  to lips (cm): 22  Number of attempts at approach: 1  Assessment: lips, teeth, and gum same as pre-op and atraumatic intubation

## 2022-09-23 NOTE — ANESTHESIA PREPROCEDURE EVALUATION
Anesthesia Evaluation     Patient summary reviewed   no history of anesthetic complications:  NPO Solid Status: > 6 hours             Airway   Mallampati: II  Dental    (+) upper dentures    Pulmonary    (+) a smoker Former,   Cardiovascular   Exercise tolerance: poor (<4 METS)    (+) hypertension, dysrhythmias Bradycardia, hyperlipidemia,   (-) cardiac stents, CABG    ROS comment: pfo closure     Neuro/Psych  (+) CVA residual symptoms,    GI/Hepatic/Renal/Endo    (+) obesity,   renal disease CRI, diabetes mellitus, thyroid problem hypothyroidism    Musculoskeletal     Abdominal    Substance History      OB/GYN          Other   arthritis,    history of cancer                    Anesthesia Plan    ASA 3     general     intravenous induction     Anesthetic plan, risks, benefits, and alternatives have been provided, discussed and informed consent has been obtained with: patient.        CODE STATUS:

## 2022-09-24 LAB
ANION GAP SERPL CALCULATED.3IONS-SCNC: 9 MMOL/L (ref 5–15)
BASOPHILS # BLD AUTO: 0.04 10*3/MM3 (ref 0–0.2)
BASOPHILS NFR BLD AUTO: 0.4 % (ref 0–1.5)
BUN SERPL-MCNC: 11 MG/DL (ref 8–23)
BUN/CREAT SERPL: 10.3 (ref 7–25)
CALCIUM SPEC-SCNC: 8.9 MG/DL (ref 8.6–10.5)
CHLORIDE SERPL-SCNC: 105 MMOL/L (ref 98–107)
CO2 SERPL-SCNC: 25 MMOL/L (ref 22–29)
CREAT SERPL-MCNC: 1.07 MG/DL (ref 0.57–1)
DEPRECATED RDW RBC AUTO: 46.4 FL (ref 37–54)
EGFRCR SERPLBLD CKD-EPI 2021: 58.1 ML/MIN/1.73
EOSINOPHIL # BLD AUTO: 0.03 10*3/MM3 (ref 0–0.4)
EOSINOPHIL NFR BLD AUTO: 0.3 % (ref 0.3–6.2)
ERYTHROCYTE [DISTWIDTH] IN BLOOD BY AUTOMATED COUNT: 13 % (ref 12.3–15.4)
GLUCOSE BLDC GLUCOMTR-MCNC: 152 MG/DL (ref 70–130)
GLUCOSE BLDC GLUCOMTR-MCNC: 157 MG/DL (ref 70–130)
GLUCOSE BLDC GLUCOMTR-MCNC: 222 MG/DL (ref 70–130)
GLUCOSE SERPL-MCNC: 138 MG/DL (ref 65–99)
HCT VFR BLD AUTO: 38.4 % (ref 34–46.6)
HGB BLD-MCNC: 12.1 G/DL (ref 12–15.9)
IMM GRANULOCYTES # BLD AUTO: 0.05 10*3/MM3 (ref 0–0.05)
IMM GRANULOCYTES NFR BLD AUTO: 0.5 % (ref 0–0.5)
LYMPHOCYTES # BLD AUTO: 1.37 10*3/MM3 (ref 0.7–3.1)
LYMPHOCYTES NFR BLD AUTO: 14.5 % (ref 19.6–45.3)
MCH RBC QN AUTO: 30.6 PG (ref 26.6–33)
MCHC RBC AUTO-ENTMCNC: 31.5 G/DL (ref 31.5–35.7)
MCV RBC AUTO: 97 FL (ref 79–97)
MONOCYTES # BLD AUTO: 1.08 10*3/MM3 (ref 0.1–0.9)
MONOCYTES NFR BLD AUTO: 11.4 % (ref 5–12)
NEUTROPHILS NFR BLD AUTO: 6.91 10*3/MM3 (ref 1.7–7)
NEUTROPHILS NFR BLD AUTO: 72.9 % (ref 42.7–76)
NRBC BLD AUTO-RTO: 0 /100 WBC (ref 0–0.2)
PLATELET # BLD AUTO: 166 10*3/MM3 (ref 140–450)
PMV BLD AUTO: 12.2 FL (ref 6–12)
POTASSIUM SERPL-SCNC: 4.6 MMOL/L (ref 3.5–5.2)
RBC # BLD AUTO: 3.96 10*6/MM3 (ref 3.77–5.28)
SODIUM SERPL-SCNC: 139 MMOL/L (ref 136–145)
WBC NRBC COR # BLD: 9.48 10*3/MM3 (ref 3.4–10.8)

## 2022-09-24 PROCEDURE — 63710000001 INSULIN LISPRO (HUMAN) PER 5 UNITS: Performed by: NURSE PRACTITIONER

## 2022-09-24 PROCEDURE — 99024 POSTOP FOLLOW-UP VISIT: CPT | Performed by: NEUROLOGICAL SURGERY

## 2022-09-24 PROCEDURE — 85025 COMPLETE CBC W/AUTO DIFF WBC: CPT | Performed by: NURSE PRACTITIONER

## 2022-09-24 PROCEDURE — 97161 PT EVAL LOW COMPLEX 20 MIN: CPT | Performed by: PHYSICAL THERAPIST

## 2022-09-24 PROCEDURE — 25010000002 VANCOMYCIN 1 G RECONSTITUTED SOLUTION 1 EACH VIAL: Performed by: NURSE PRACTITIONER

## 2022-09-24 PROCEDURE — 25010000002 ONDANSETRON PER 1 MG: Performed by: NURSE PRACTITIONER

## 2022-09-24 PROCEDURE — 0 HYDROMORPHONE 1 MG/ML SOLUTION: Performed by: NURSE PRACTITIONER

## 2022-09-24 PROCEDURE — 97165 OT EVAL LOW COMPLEX 30 MIN: CPT | Performed by: OCCUPATIONAL THERAPIST

## 2022-09-24 PROCEDURE — 97116 GAIT TRAINING THERAPY: CPT

## 2022-09-24 PROCEDURE — 82962 GLUCOSE BLOOD TEST: CPT

## 2022-09-24 PROCEDURE — 80048 BASIC METABOLIC PNL TOTAL CA: CPT | Performed by: NURSE PRACTITIONER

## 2022-09-24 RX ADMIN — OXYCODONE HYDROCHLORIDE AND ACETAMINOPHEN 1 TABLET: 7.5; 325 TABLET ORAL at 20:36

## 2022-09-24 RX ADMIN — ZINC SULFATE 220 MG (50 MG) CAPSULE 220 MG: CAPSULE at 09:55

## 2022-09-24 RX ADMIN — POTASSIUM CHLORIDE 10 MEQ: 1.5 POWDER, FOR SOLUTION ORAL at 20:36

## 2022-09-24 RX ADMIN — CYCLOBENZAPRINE 10 MG: 10 TABLET, FILM COATED ORAL at 14:16

## 2022-09-24 RX ADMIN — ONDANSETRON 4 MG: 2 INJECTION INTRAMUSCULAR; INTRAVENOUS at 00:14

## 2022-09-24 RX ADMIN — DOCUSATE SODIUM 100 MG: 100 CAPSULE, LIQUID FILLED ORAL at 20:36

## 2022-09-24 RX ADMIN — ALPRAZOLAM 1 MG: 0.5 TABLET ORAL at 20:36

## 2022-09-24 RX ADMIN — DOCUSATE SODIUM 100 MG: 100 CAPSULE, LIQUID FILLED ORAL at 09:55

## 2022-09-24 RX ADMIN — VANCOMYCIN HYDROCHLORIDE 1000 MG: 1 INJECTION, POWDER, LYOPHILIZED, FOR SOLUTION INTRAVENOUS at 06:44

## 2022-09-24 RX ADMIN — GABAPENTIN 300 MG: 300 CAPSULE ORAL at 16:34

## 2022-09-24 RX ADMIN — GABAPENTIN 300 MG: 300 CAPSULE ORAL at 20:36

## 2022-09-24 RX ADMIN — HYDROMORPHONE HYDROCHLORIDE 1 MG: 1 INJECTION, SOLUTION INTRAMUSCULAR; INTRAVENOUS; SUBCUTANEOUS at 02:53

## 2022-09-24 RX ADMIN — ALPRAZOLAM 1 MG: 0.5 TABLET ORAL at 02:19

## 2022-09-24 RX ADMIN — GABAPENTIN 300 MG: 300 CAPSULE ORAL at 09:55

## 2022-09-24 RX ADMIN — OXYCODONE HYDROCHLORIDE AND ACETAMINOPHEN 1 TABLET: 7.5; 325 TABLET ORAL at 16:32

## 2022-09-24 RX ADMIN — POTASSIUM CHLORIDE 10 MEQ: 1.5 POWDER, FOR SOLUTION ORAL at 09:55

## 2022-09-24 RX ADMIN — LEVOTHYROXINE SODIUM 112 MCG: 112 TABLET ORAL at 06:49

## 2022-09-24 RX ADMIN — INSULIN LISPRO 5 UNITS: 100 INJECTION, SOLUTION INTRAVENOUS; SUBCUTANEOUS at 17:08

## 2022-09-24 RX ADMIN — OXYCODONE HYDROCHLORIDE AND ACETAMINOPHEN 1 TABLET: 7.5; 325 TABLET ORAL at 02:19

## 2022-09-24 RX ADMIN — Medication 24000 UNITS: at 09:55

## 2022-09-24 RX ADMIN — OXYCODONE HYDROCHLORIDE AND ACETAMINOPHEN 500 MG: 500 TABLET ORAL at 09:55

## 2022-09-24 RX ADMIN — PIOGLITAZONE 15 MG: 15 TABLET ORAL at 09:55

## 2022-09-24 RX ADMIN — PANTOPRAZOLE SODIUM 40 MG: 40 TABLET, DELAYED RELEASE ORAL at 09:55

## 2022-09-24 RX ADMIN — OXYCODONE HYDROCHLORIDE AND ACETAMINOPHEN 1 TABLET: 7.5; 325 TABLET ORAL at 06:48

## 2022-09-24 RX ADMIN — SODIUM CHLORIDE 1000 ML: 9 INJECTION, SOLUTION INTRAVENOUS at 11:18

## 2022-09-24 RX ADMIN — LISINOPRIL 10 MG: 10 TABLET ORAL at 09:54

## 2022-09-24 RX ADMIN — INSULIN LISPRO 3 UNITS: 100 INJECTION, SOLUTION INTRAVENOUS; SUBCUTANEOUS at 12:08

## 2022-09-24 RX ADMIN — ACETAMINOPHEN 650 MG: 325 TABLET, FILM COATED ORAL at 14:16

## 2022-09-25 LAB
GLUCOSE BLDC GLUCOMTR-MCNC: 142 MG/DL (ref 70–130)
GLUCOSE BLDC GLUCOMTR-MCNC: 157 MG/DL (ref 70–130)
GLUCOSE BLDC GLUCOMTR-MCNC: 180 MG/DL (ref 70–130)
GLUCOSE BLDC GLUCOMTR-MCNC: 247 MG/DL (ref 70–130)

## 2022-09-25 PROCEDURE — 82962 GLUCOSE BLOOD TEST: CPT

## 2022-09-25 PROCEDURE — 63710000001 INSULIN LISPRO (HUMAN) PER 5 UNITS: Performed by: NURSE PRACTITIONER

## 2022-09-25 PROCEDURE — 97116 GAIT TRAINING THERAPY: CPT

## 2022-09-25 PROCEDURE — 25010000002 ONDANSETRON PER 1 MG: Performed by: NURSE PRACTITIONER

## 2022-09-25 RX ORDER — INSULIN ASPART 100 [IU]/ML
1-10 INJECTION, SOLUTION INTRAVENOUS; SUBCUTANEOUS
COMMUNITY
End: 2023-02-20 | Stop reason: SDUPTHER

## 2022-09-25 RX ORDER — LIRAGLUTIDE 6 MG/ML
1.2 INJECTION SUBCUTANEOUS DAILY
COMMUNITY
End: 2023-02-20 | Stop reason: SDUPTHER

## 2022-09-25 RX ORDER — INSULIN GLARGINE 100 [IU]/ML
35 INJECTION, SOLUTION SUBCUTANEOUS NIGHTLY PRN
COMMUNITY
End: 2023-02-20 | Stop reason: SDUPTHER

## 2022-09-25 RX ADMIN — GABAPENTIN 300 MG: 300 CAPSULE ORAL at 08:06

## 2022-09-25 RX ADMIN — OXYCODONE HYDROCHLORIDE AND ACETAMINOPHEN 1 TABLET: 7.5; 325 TABLET ORAL at 20:28

## 2022-09-25 RX ADMIN — OXYCODONE HYDROCHLORIDE AND ACETAMINOPHEN 1 TABLET: 7.5; 325 TABLET ORAL at 12:58

## 2022-09-25 RX ADMIN — LISINOPRIL 10 MG: 10 TABLET ORAL at 09:27

## 2022-09-25 RX ADMIN — OXYCODONE HYDROCHLORIDE AND ACETAMINOPHEN 500 MG: 500 TABLET ORAL at 09:27

## 2022-09-25 RX ADMIN — INSULIN LISPRO 3 UNITS: 100 INJECTION, SOLUTION INTRAVENOUS; SUBCUTANEOUS at 12:58

## 2022-09-25 RX ADMIN — OXYCODONE HYDROCHLORIDE AND ACETAMINOPHEN 1 TABLET: 7.5; 325 TABLET ORAL at 08:06

## 2022-09-25 RX ADMIN — DOCUSATE SODIUM 100 MG: 100 CAPSULE, LIQUID FILLED ORAL at 09:27

## 2022-09-25 RX ADMIN — PIOGLITAZONE 15 MG: 15 TABLET ORAL at 09:27

## 2022-09-25 RX ADMIN — CYCLOBENZAPRINE 10 MG: 10 TABLET, FILM COATED ORAL at 08:06

## 2022-09-25 RX ADMIN — LEVOTHYROXINE SODIUM 112 MCG: 112 TABLET ORAL at 06:03

## 2022-09-25 RX ADMIN — POTASSIUM CHLORIDE 10 MEQ: 1.5 POWDER, FOR SOLUTION ORAL at 20:28

## 2022-09-25 RX ADMIN — ONDANSETRON 4 MG: 2 INJECTION INTRAMUSCULAR; INTRAVENOUS at 20:28

## 2022-09-25 RX ADMIN — GABAPENTIN 300 MG: 300 CAPSULE ORAL at 20:28

## 2022-09-25 RX ADMIN — OXYCODONE HYDROCHLORIDE AND ACETAMINOPHEN 1 TABLET: 7.5; 325 TABLET ORAL at 03:41

## 2022-09-25 RX ADMIN — POTASSIUM CHLORIDE 10 MEQ: 1.5 POWDER, FOR SOLUTION ORAL at 09:28

## 2022-09-25 RX ADMIN — INSULIN LISPRO 3 UNITS: 100 INJECTION, SOLUTION INTRAVENOUS; SUBCUTANEOUS at 09:27

## 2022-09-25 RX ADMIN — Medication 24000 UNITS: at 09:28

## 2022-09-25 RX ADMIN — ZINC SULFATE 220 MG (50 MG) CAPSULE 220 MG: CAPSULE at 09:28

## 2022-09-25 RX ADMIN — OXYCODONE HYDROCHLORIDE AND ACETAMINOPHEN 1 TABLET: 7.5; 325 TABLET ORAL at 16:31

## 2022-09-25 RX ADMIN — DOCUSATE SODIUM 100 MG: 100 CAPSULE, LIQUID FILLED ORAL at 20:28

## 2022-09-25 RX ADMIN — Medication 3 ML: at 09:29

## 2022-09-25 RX ADMIN — PANTOPRAZOLE SODIUM 40 MG: 40 TABLET, DELAYED RELEASE ORAL at 09:27

## 2022-09-25 RX ADMIN — ALPRAZOLAM 1 MG: 0.5 TABLET ORAL at 21:58

## 2022-09-25 RX ADMIN — GABAPENTIN 300 MG: 300 CAPSULE ORAL at 16:34

## 2022-09-25 RX ADMIN — INSULIN LISPRO 5 UNITS: 100 INJECTION, SOLUTION INTRAVENOUS; SUBCUTANEOUS at 17:38

## 2022-09-26 LAB
CYTO UR: NORMAL
GLUCOSE BLDC GLUCOMTR-MCNC: 172 MG/DL (ref 70–130)
GLUCOSE BLDC GLUCOMTR-MCNC: 173 MG/DL (ref 70–130)
GLUCOSE BLDC GLUCOMTR-MCNC: 207 MG/DL (ref 70–130)
LAB AP CASE REPORT: NORMAL
Lab: NORMAL
PATH REPORT.FINAL DX SPEC: NORMAL
PATH REPORT.GROSS SPEC: NORMAL

## 2022-09-26 PROCEDURE — 99024 POSTOP FOLLOW-UP VISIT: CPT | Performed by: NURSE PRACTITIONER

## 2022-09-26 PROCEDURE — 82962 GLUCOSE BLOOD TEST: CPT

## 2022-09-26 PROCEDURE — 97116 GAIT TRAINING THERAPY: CPT

## 2022-09-26 PROCEDURE — 63710000001 INSULIN LISPRO (HUMAN) PER 5 UNITS: Performed by: NURSE PRACTITIONER

## 2022-09-26 PROCEDURE — 97535 SELF CARE MNGMENT TRAINING: CPT

## 2022-09-26 RX ADMIN — Medication 3 ML: at 20:59

## 2022-09-26 RX ADMIN — INSULIN LISPRO 3 UNITS: 100 INJECTION, SOLUTION INTRAVENOUS; SUBCUTANEOUS at 08:55

## 2022-09-26 RX ADMIN — LISINOPRIL 10 MG: 10 TABLET ORAL at 08:55

## 2022-09-26 RX ADMIN — OXYCODONE HYDROCHLORIDE AND ACETAMINOPHEN 500 MG: 500 TABLET ORAL at 08:54

## 2022-09-26 RX ADMIN — INSULIN LISPRO 5 UNITS: 100 INJECTION, SOLUTION INTRAVENOUS; SUBCUTANEOUS at 10:56

## 2022-09-26 RX ADMIN — PANTOPRAZOLE SODIUM 40 MG: 40 TABLET, DELAYED RELEASE ORAL at 08:55

## 2022-09-26 RX ADMIN — OXYCODONE HYDROCHLORIDE AND ACETAMINOPHEN 1 TABLET: 7.5; 325 TABLET ORAL at 16:20

## 2022-09-26 RX ADMIN — GABAPENTIN 300 MG: 300 CAPSULE ORAL at 20:59

## 2022-09-26 RX ADMIN — ALPRAZOLAM 0.5 MG: 0.5 TABLET ORAL at 23:47

## 2022-09-26 RX ADMIN — LEVOTHYROXINE SODIUM 112 MCG: 112 TABLET ORAL at 06:15

## 2022-09-26 RX ADMIN — OXYCODONE HYDROCHLORIDE AND ACETAMINOPHEN 1 TABLET: 7.5; 325 TABLET ORAL at 06:57

## 2022-09-26 RX ADMIN — CYCLOBENZAPRINE 10 MG: 10 TABLET, FILM COATED ORAL at 23:47

## 2022-09-26 RX ADMIN — ZINC SULFATE 220 MG (50 MG) CAPSULE 220 MG: CAPSULE at 08:55

## 2022-09-26 RX ADMIN — POTASSIUM CHLORIDE 10 MEQ: 1.5 POWDER, FOR SOLUTION ORAL at 20:59

## 2022-09-26 RX ADMIN — PIOGLITAZONE 15 MG: 15 TABLET ORAL at 08:57

## 2022-09-26 RX ADMIN — DOCUSATE SODIUM 100 MG: 100 CAPSULE, LIQUID FILLED ORAL at 20:59

## 2022-09-26 RX ADMIN — GABAPENTIN 300 MG: 300 CAPSULE ORAL at 08:55

## 2022-09-26 RX ADMIN — Medication 3 ML: at 08:55

## 2022-09-26 RX ADMIN — INSULIN LISPRO 3 UNITS: 100 INJECTION, SOLUTION INTRAVENOUS; SUBCUTANEOUS at 16:34

## 2022-09-26 RX ADMIN — POTASSIUM CHLORIDE 10 MEQ: 1.5 POWDER, FOR SOLUTION ORAL at 08:58

## 2022-09-26 RX ADMIN — Medication 24000 UNITS: at 08:55

## 2022-09-26 RX ADMIN — OXYCODONE HYDROCHLORIDE AND ACETAMINOPHEN 1 TABLET: 7.5; 325 TABLET ORAL at 20:58

## 2022-09-26 RX ADMIN — GABAPENTIN 300 MG: 300 CAPSULE ORAL at 16:20

## 2022-09-26 RX ADMIN — DOCUSATE SODIUM 100 MG: 100 CAPSULE, LIQUID FILLED ORAL at 08:55

## 2022-09-26 RX ADMIN — OXYCODONE HYDROCHLORIDE AND ACETAMINOPHEN 1 TABLET: 7.5; 325 TABLET ORAL at 10:56

## 2022-09-27 ENCOUNTER — READMISSION MANAGEMENT (OUTPATIENT)
Dept: CALL CENTER | Facility: HOSPITAL | Age: 62
End: 2022-09-27

## 2022-09-27 ENCOUNTER — HOME HEALTH ADMISSION (OUTPATIENT)
Dept: HOME HEALTH SERVICES | Facility: HOME HEALTHCARE | Age: 62
End: 2022-09-27

## 2022-09-27 VITALS
HEART RATE: 58 BPM | SYSTOLIC BLOOD PRESSURE: 121 MMHG | BODY MASS INDEX: 29.8 KG/M2 | DIASTOLIC BLOOD PRESSURE: 74 MMHG | TEMPERATURE: 98 F | RESPIRATION RATE: 16 BRPM | OXYGEN SATURATION: 94 % | WEIGHT: 185.41 LBS | HEIGHT: 66 IN

## 2022-09-27 LAB
BH BB BLOOD EXPIRATION DATE: NORMAL
BH BB BLOOD EXPIRATION DATE: NORMAL
BH BB BLOOD TYPE BARCODE: 9500
BH BB BLOOD TYPE BARCODE: 9500
BH BB DISPENSE STATUS: NORMAL
BH BB DISPENSE STATUS: NORMAL
BH BB PRODUCT CODE: NORMAL
BH BB PRODUCT CODE: NORMAL
BH BB UNIT NUMBER: NORMAL
BH BB UNIT NUMBER: NORMAL
CROSSMATCH INTERPRETATION: NORMAL
CROSSMATCH INTERPRETATION: NORMAL
GLUCOSE BLDC GLUCOMTR-MCNC: 164 MG/DL (ref 70–130)
UNIT  ABO: NORMAL
UNIT  ABO: NORMAL
UNIT  RH: NORMAL
UNIT  RH: NORMAL

## 2022-09-27 PROCEDURE — 97530 THERAPEUTIC ACTIVITIES: CPT

## 2022-09-27 PROCEDURE — 63710000001 INSULIN LISPRO (HUMAN) PER 5 UNITS: Performed by: NURSE PRACTITIONER

## 2022-09-27 PROCEDURE — 99024 POSTOP FOLLOW-UP VISIT: CPT | Performed by: NURSE PRACTITIONER

## 2022-09-27 PROCEDURE — 82962 GLUCOSE BLOOD TEST: CPT

## 2022-09-27 RX ORDER — OXYCODONE AND ACETAMINOPHEN 7.5; 325 MG/1; MG/1
1 TABLET ORAL EVERY 6 HOURS PRN
Qty: 28 TABLET | Refills: 0 | Status: SHIPPED | OUTPATIENT
Start: 2022-09-27 | End: 2022-09-30

## 2022-09-27 RX ORDER — OXYCODONE AND ACETAMINOPHEN 7.5; 325 MG/1; MG/1
1 TABLET ORAL EVERY 6 HOURS PRN
Qty: 28 TABLET | Refills: 0
Start: 2022-09-27 | End: 2022-10-03 | Stop reason: SDUPTHER

## 2022-09-27 RX ORDER — CYCLOBENZAPRINE HCL 10 MG
10 TABLET ORAL 3 TIMES DAILY PRN
Qty: 90 TABLET | Refills: 0 | Status: SHIPPED | OUTPATIENT
Start: 2022-09-27

## 2022-09-27 RX ADMIN — Medication 24000 UNITS: at 08:47

## 2022-09-27 RX ADMIN — PIOGLITAZONE 15 MG: 15 TABLET ORAL at 08:47

## 2022-09-27 RX ADMIN — GABAPENTIN 300 MG: 300 CAPSULE ORAL at 08:01

## 2022-09-27 RX ADMIN — OXYCODONE HYDROCHLORIDE AND ACETAMINOPHEN 1 TABLET: 7.5; 325 TABLET ORAL at 08:01

## 2022-09-27 RX ADMIN — LEVOTHYROXINE SODIUM 112 MCG: 112 TABLET ORAL at 06:20

## 2022-09-27 RX ADMIN — DOCUSATE SODIUM 100 MG: 100 CAPSULE, LIQUID FILLED ORAL at 08:47

## 2022-09-27 RX ADMIN — LISINOPRIL 10 MG: 10 TABLET ORAL at 08:47

## 2022-09-27 RX ADMIN — ZINC SULFATE 220 MG (50 MG) CAPSULE 220 MG: CAPSULE at 08:47

## 2022-09-27 RX ADMIN — OXYCODONE HYDROCHLORIDE AND ACETAMINOPHEN 500 MG: 500 TABLET ORAL at 08:47

## 2022-09-27 RX ADMIN — INSULIN LISPRO 3 UNITS: 100 INJECTION, SOLUTION INTRAVENOUS; SUBCUTANEOUS at 08:48

## 2022-09-27 RX ADMIN — CYCLOBENZAPRINE 10 MG: 10 TABLET, FILM COATED ORAL at 10:36

## 2022-09-27 RX ADMIN — PANTOPRAZOLE SODIUM 40 MG: 40 TABLET, DELAYED RELEASE ORAL at 08:01

## 2022-09-28 NOTE — OUTREACH NOTE
Prep Survey    Flowsheet Row Responses   [facility] facility patient discharged from? [facility]   Is LACE score < 7 ? No   Emergency Room discharge w/ pulse ox? No   Eligibility Readm Mgmt   Discharge diagnosis s/p LUMBAR DISCECTOMY,ANTERIOR LUMBAR INTERBODY FUSION RIGHT LEISA- LAMINECTOMY, FACETECTOMY, FORAMENOTOMY, POSTEIOR PEDICLE SCREW INSTRUMENTATION L5-S1 RIGHT    Does the patient have one of the following disease processes/diagnoses(primary or secondary)? General Surgery   Does the patient have Home health ordered? Yes   What is the Home health agency?  [facility]   Is there a DME ordered? Yes   What DME was ordered? rolling walker, [facility]- [employer]   Prep survey completed? Yes          SERVANDO LOPEZ - Registered Nurse

## 2022-09-29 ENCOUNTER — HOME CARE VISIT (OUTPATIENT)
Dept: HOME HEALTH SERVICES | Facility: CLINIC | Age: 62
End: 2022-09-29

## 2022-09-29 ENCOUNTER — READMISSION MANAGEMENT (OUTPATIENT)
Dept: CALL CENTER | Facility: HOSPITAL | Age: 62
End: 2022-09-29

## 2022-09-29 VITALS
DIASTOLIC BLOOD PRESSURE: 78 MMHG | SYSTOLIC BLOOD PRESSURE: 138 MMHG | RESPIRATION RATE: 16 BRPM | WEIGHT: 185 LBS | OXYGEN SATURATION: 98 % | TEMPERATURE: 97.5 F | HEART RATE: 64 BPM | HEIGHT: 66 IN | BODY MASS INDEX: 29.73 KG/M2

## 2022-09-29 PROCEDURE — G0299 HHS/HOSPICE OF RN EA 15 MIN: HCPCS

## 2022-09-29 NOTE — OUTREACH NOTE
General Surgery Week 1 Survey    Flowsheet Row Responses   [facility] patient discharged from? [facility]   Does the patient have one of the following disease processes/diagnoses(primary or secondary)? General Surgery   Week 1 attempt successful? Yes   Call start time 1152   Call end time 1211   Discharge diagnosis s/p LUMBAR DISCECTOMY,ANTERIOR LUMBAR INTERBODY FUSION RIGHT LEISA- LAMINECTOMY, FACETECTOMY, FORAMENOTOMY, POSTEIOR PEDICLE SCREW INSTRUMENTATION L5-S1 RIGHT    Person spoke with today (if not patient) and relationship April-Dtr and patient   Meds reviewed with patient/caregiver? Yes   Is the patient having any side effects they believe may be caused by any medication additions or changes? No   Does the patient have all medications related to this admission filled (includes all antibiotics, pain medications, etc.) Yes   Is the patient taking all medications as directed (includes completed medication regime)? Yes   Does the patient have a follow up appointment scheduled with their surgeon? Yes  [10/19/22]   Has the patient kept scheduled appointments due by today? N/A   What is the Home health agency?  [facility]   Has home health visited the patient within 72 hours of discharge? Yes   What DME was ordered? rolling walker, [facility]- [employer]   Has all DME been delivered? Yes   Psychosocial issues? No   What is the patient's perception of their health status since discharge? Improving   Nursing interventions Nurse provided patient education   Is the patient /caregiver able to teach back basic post-op care? Take showers only when approved by MD-sponge bathe until then, No tub bath, swimming, or hot tub until instructed by MD, Keep incision areas clean,dry and protected, Lifting as instructed by MD in discharge instructions   Is the patient/caregiver able to teach back signs and symptoms of incisional infection? Increased redness, swelling or pain at the incisonal site, Increased drainage or bleeding   Is the  patient/caregiver able to teach back steps to recovery at home? Set small, achievable goals for return to baseline health, Rest and rebuild strength, gradually increase activity, Eat a well-balance diet   Is the patient/caregiver able to teach back the hierarchy of who to call/visit for symptoms/problems? PCP, Specialist, Home health nurse, Urgent Care, ED, 911 Yes   Week 1 call completed? Yes          CLIF DAVIS - Registered Nurse

## 2022-09-30 NOTE — HOME HEALTH
"SOC Note: Pt had discectomy 9/23, d/c from hospital 9/27. No strenous activity, wear brace when out of bed or sitting up. SN for wound care, cardiopulmonary assessment and pain management. PT for strengthening, home safety and exercises    Home Health ordered for: disciplines SN, PT    Reason for Hosp/Primary Dx/Co-morbidities:  Discectomy, spondylosis w/ radiculopathy lumbar region, DM, previous CVA    Focus of Care: Wound, pain, cardiopulmonary assessment, strengthening, safety    Current Functional status/mobility/DME: BSC, Front wheeled walker, cane    HB status/Living Arrangements: Lives alone, 2 daughters and mom support system, live closeby    Skin Integrity/wound status: 2 surgical incisions, 1 abdomen 1- right lower back    Code Status: CPR    Fall Risk: Yes    POC confirmed with Daniella \"Melany\" Eb 9/29/22"

## 2022-10-03 ENCOUNTER — HOME CARE VISIT (OUTPATIENT)
Dept: HOME HEALTH SERVICES | Facility: CLINIC | Age: 62
End: 2022-10-03

## 2022-10-03 VITALS
TEMPERATURE: 97.2 F | OXYGEN SATURATION: 100 % | HEART RATE: 59 BPM | SYSTOLIC BLOOD PRESSURE: 98 MMHG | RESPIRATION RATE: 18 BRPM | DIASTOLIC BLOOD PRESSURE: 58 MMHG

## 2022-10-03 VITALS
OXYGEN SATURATION: 99 % | SYSTOLIC BLOOD PRESSURE: 122 MMHG | HEART RATE: 66 BPM | DIASTOLIC BLOOD PRESSURE: 70 MMHG | RESPIRATION RATE: 16 BRPM

## 2022-10-03 DIAGNOSIS — M54.16 LUMBAR RADICULOPATHY: ICD-10-CM

## 2022-10-03 DIAGNOSIS — M51.37 DEGENERATION OF LUMBAR OR LUMBOSACRAL INTERVERTEBRAL DISC: ICD-10-CM

## 2022-10-03 PROCEDURE — G0151 HHCP-SERV OF PT,EA 15 MIN: HCPCS

## 2022-10-03 PROCEDURE — G0299 HHS/HOSPICE OF RN EA 15 MIN: HCPCS

## 2022-10-03 RX ORDER — OXYCODONE AND ACETAMINOPHEN 7.5; 325 MG/1; MG/1
1 TABLET ORAL EVERY 6 HOURS PRN
Qty: 56 TABLET | Refills: 0 | Status: SHIPPED | OUTPATIENT
Start: 2022-10-03 | End: 2022-10-19 | Stop reason: SDUPTHER

## 2022-10-03 NOTE — HOME HEALTH
COVID SCREENING: No S/S of infection or exposure    FOCUS OF CARE/SKILLED NEED: Wound care measurement, education on medication, diabetes management, pain assessment    TEACHING/INTERVENTIONS:  Wound care pic taken with measurements. Pt has not been checking blood sugar, educated on importance. Pt pain medications will run out if takes 2 more days if takes q 6 hours. Dr. Arevalo office called and message sent via in basket. Pt also urged to call pharmacy and followup.  Education on strenous activity and safety. Pt wearing back brace upon arrival.    PATIENT/CG RESPONSE: Tolerated well, pt anxiety is increasing due to pain medications possibly running out in 2 days.    PROGRESS TOWARD GOALS: Ongoing    PHYSICIAN CONTACT: Dr. Fischer office regarding pain medication    FALLS SINCE LAST VISIT? Pt denies    MEDICATION CHANGES SINCE LAST VISIT? No    PLAN FOR NEXT VISIT: Wound assessments, education regarding safety, diabetes and surgical aftercare.

## 2022-10-03 NOTE — HOME HEALTH
At evaluation patient was identified by name and . She denied signs and symptoms of COVID 19. Patient is a 62 y.o.F s/p L5-S1 ALIF, hemilaminectomy, facetectomy, foraminotomy, and posterio pedicle screw implementation via [place] on 22. Patient with the PMH of CKD, DM2, CVA,  melanoma with tissue removal at the RLE, INCLUDING R inguinal nodess removal (unknown if partial or complete). Patient was referred to skilled physical therapy due to a decline in functional mobility. PAtient lives alone in a two-story house with at least 3 steps for entry with no hand rails.  At evaluation patient was not able to verbalize the lumbar precautions. After a reminder of these she remembered what they were, however she has not been implementing them with transfers and bed mobility. Patient has been wearing her ASPEN lumbar brace, however reports strong pain and muscle spasms. She has taken her pain medicine this am, however no the muscle relaxant yet. Patient demonstrated decreased BLE MMT: 3+/5 in LLE, 3/5 in RLE. PAtient is at high risk for falls as evidenced by Tinetti . The patient uses a FWWx since the surgery, however uses it inconsistently. Patient was observed deviating from her path and leaning into a wall/doorframe with ambulation without the walker several times. Education was provided no benefits of using the walker at all times with transfers and ambulation at all times due to impaired balance and weakness. Education was provided on lumbar precautions: no bending , lifting , twisting; including using a log roll technique with bed mobiliity. Patient required CGA for bed mobility with 90% VC for the technique. Repetitive practice completed of the sit-stand with cues for hands placement to ensure the orthopedic precautions are followed. The toilet at home is too low and patient has been having difficulty with toilet transfers. PAtient had a Bed side commode at home, howevr has not been using it. The height of  the BSC was adjusted and it was placed over the toilet to assist with transfes safety. Discussed the benefits of installing a hand rail by the steps and in the bathroom to improved the transfer safety. Patient verbalized understanding. Patient stated she has been forgetful since her CVA and having difficulty with lengthy instructions. However declined the written instructions for the home safety. She agreed to have a written HEp: standing BLE exs with BIE supported. Education was provided to add pillows or cusions to the couch she uses daily to improve safety and independence with the transfers. Skilled physical therapy will be necessary in order to ensure consistent complince with orthopedic restrictions, transfers safety, gait safety, stair negotiation safety,  adevelopment and progression of HEP.

## 2022-10-05 ENCOUNTER — HOME CARE VISIT (OUTPATIENT)
Dept: HOME HEALTH SERVICES | Facility: CLINIC | Age: 62
End: 2022-10-05

## 2022-10-05 ENCOUNTER — TELEPHONE (OUTPATIENT)
Dept: NEUROSURGERY | Facility: CLINIC | Age: 62
End: 2022-10-05

## 2022-10-05 VITALS
DIASTOLIC BLOOD PRESSURE: 68 MMHG | RESPIRATION RATE: 18 BRPM | SYSTOLIC BLOOD PRESSURE: 110 MMHG | OXYGEN SATURATION: 97 % | TEMPERATURE: 97.6 F | HEART RATE: 81 BPM

## 2022-10-05 PROCEDURE — G0299 HHS/HOSPICE OF RN EA 15 MIN: HCPCS

## 2022-10-05 NOTE — TELEPHONE ENCOUNTER
Pt called and stated her legs are cramping so bad. They woke her up this morning. They have been hurting since surgery but nothing like today. She is taking gabapentin, flexeril and her pain meds and nothing is helping. Per Jonel make sure she's taking her meds correctly and it could take a little bit for the nerves to calm down.

## 2022-10-05 NOTE — HOME HEALTH
COVID SCREENING: No S/S of infection or exposure    FOCUS OF CARE/SKILLED NEED: Wound assessment, education, cardiopulmonary assessment    TEACHING/INTERVENTIONS: Education on wearing orthotic brace when up/sitting. Reinforced no lifting or twisting/ back precautions. Education on diabetes, checking sugars, etc. Pt pain meds were refilled, educated on nonpharmacological measures to reduce pain, wearing brace, sitting with feet flat on floor, etc. Wound assessments, blood pressure /vital signs monitoring. Pressure injury/skin breakdown prevention.    PATIENT/CG RESPONSE:Tolerated well    PROGRESS TOWARD GOALS: Ongoing    PHYSICIAN CONTACT: No    FALLS SINCE LAST VISIT? No    MEDICATION CHANGES SINCE LAST VISIT? No    PLAN FOR NEXT VISIT: Medication Education, cardiopulmonary assessment, wound assessment

## 2022-10-06 ENCOUNTER — HOME CARE VISIT (OUTPATIENT)
Dept: HOME HEALTH SERVICES | Facility: CLINIC | Age: 62
End: 2022-10-06

## 2022-10-06 VITALS
DIASTOLIC BLOOD PRESSURE: 62 MMHG | TEMPERATURE: 98.5 F | OXYGEN SATURATION: 98 % | HEART RATE: 70 BPM | SYSTOLIC BLOOD PRESSURE: 120 MMHG | RESPIRATION RATE: 18 BRPM

## 2022-10-06 PROCEDURE — G0157 HHC PT ASSISTANT EA 15: HCPCS

## 2022-10-06 NOTE — HOME HEALTH
Covid 19 pre-screen performed.Pt states no falls or changes to insurance or medicaiton since last visit.Pt states she is having very severe pain. Next visit to address gt mechanics, bed mobiltiy, and HEP.

## 2022-10-07 ENCOUNTER — READMISSION MANAGEMENT (OUTPATIENT)
Dept: CALL CENTER | Facility: HOSPITAL | Age: 62
End: 2022-10-07

## 2022-10-07 NOTE — OUTREACH NOTE
General Surgery Week 2 Survey    Flowsheet Row Responses   [facility] patient discharged from? [place]   Does the patient have one of the following disease processes/diagnoses(primary or secondary)? General Surgery   Week 2 attempt successful? Yes   Call start time 1034   Call end time 1040   Meds reviewed with patient/caregiver? Yes   Is the patient having any side effects they believe may be caused by any medication additions or changes? No   Does the patient have all medications related to this admission filled (includes all antibiotics, pain medications, etc.) Yes   Is the patient taking all medications as directed (includes completed medication regime)? Yes   Does the patient have a follow up appointment scheduled with their surgeon? Yes   Has the patient kept scheduled appointments due by today? N/A   Comments Surgeon appt 10/19/22.   Has home health visited the patient within 72 hours of discharge? Yes   Home health comments  nurse visited yesterday.   Has all DME been delivered? Yes   Psychosocial issues? No   Did the patient receive a copy of their discharge instructions? Yes   Nursing interventions Reviewed instructions with patient   What is the patient's perception of their health status since discharge? Improving   Nursing interventions Nurse provided patient education   Is the patient/caregiver able to teach back signs and symptoms of incisional infection? Increased redness, swelling or pain at the incisonal site, Incisional warmth, Fever, Increased drainage or bleeding, Pus or odor from incision   If the patient is a current smoker, are they able to teach back resources for cessation? Not a smoker   Is the patient/caregiver able to teach back the hierarchy of who to call/visit for symptoms/problems? PCP, Specialist, Home health nurse, Urgent Care, ED, 911 Yes   Week 2 call completed? Yes   Wrap up additional comments States is overall improving, but was awake last night with bilateral leg  cramping. States has had leg cramps for past 4 days, and HH nurse aware. States also needs Miralax for hard stools. Encouraged to call her HH nurse today to discuss leg cramps/need for Miralax. Voiced understanding.          ANAHY WELLINGTON - Registered Nurse

## 2022-10-11 ENCOUNTER — HOME CARE VISIT (OUTPATIENT)
Dept: HOME HEALTH SERVICES | Facility: CLINIC | Age: 62
End: 2022-10-11

## 2022-10-11 VITALS
SYSTOLIC BLOOD PRESSURE: 148 MMHG | RESPIRATION RATE: 18 BRPM | TEMPERATURE: 97.8 F | OXYGEN SATURATION: 98 % | DIASTOLIC BLOOD PRESSURE: 90 MMHG | HEART RATE: 73 BPM

## 2022-10-11 PROCEDURE — G0157 HHC PT ASSISTANT EA 15: HCPCS

## 2022-10-11 NOTE — HOME HEALTH
Covid 19 pre-screen performed.Pt states no falls or changes to insurance or medicaiton since last visit. Pt states she is in alot of pain. Pt states she has been having to sleep on her couch because her mechanical bed has been broken. Pt has gotten bed repaired today. Next visit to address knowledge and compliance of LE strengthening program and falls risk strategies.

## 2022-10-13 ENCOUNTER — HOME CARE VISIT (OUTPATIENT)
Dept: HOME HEALTH SERVICES | Facility: CLINIC | Age: 62
End: 2022-10-13

## 2022-10-13 VITALS
OXYGEN SATURATION: 97 % | HEART RATE: 78 BPM | TEMPERATURE: 98.1 F | SYSTOLIC BLOOD PRESSURE: 128 MMHG | RESPIRATION RATE: 18 BRPM | DIASTOLIC BLOOD PRESSURE: 82 MMHG

## 2022-10-13 PROCEDURE — G0157 HHC PT ASSISTANT EA 15: HCPCS

## 2022-10-13 NOTE — HOME HEALTH
Covid 19 pre-screen performed.Pt states no falls or changes to insurance or medicaiton since last visit. Pt states c/o 8/10 pain in BLEs. Discussed d/c plans.

## 2022-10-14 ENCOUNTER — HOME CARE VISIT (OUTPATIENT)
Dept: HOME HEALTH SERVICES | Facility: CLINIC | Age: 62
End: 2022-10-14

## 2022-10-14 VITALS
DIASTOLIC BLOOD PRESSURE: 72 MMHG | TEMPERATURE: 98.2 F | OXYGEN SATURATION: 98 % | RESPIRATION RATE: 20 BRPM | HEART RATE: 68 BPM | SYSTOLIC BLOOD PRESSURE: 130 MMHG

## 2022-10-14 PROCEDURE — G0299 HHS/HOSPICE OF RN EA 15 MIN: HCPCS

## 2022-10-14 NOTE — HOME HEALTH
COVID SCREENING: no S/S of infection or exposure    FOCUS OF CARE/SKILLED NEED: Pain management, med reconcilation, med education, fall prevention, pressure injury prevention,     TEACHING/INTERVENTIONS: Wounds healed, photo taken, discontinued LDA. Education of falls, meds, pt supposed to be wearing brace when up, pt not wearing brace. med eeucation, reconciliation, all questions answered    PATIENT/CG RESPONSE: Tolerated well, pt c/o 10/10 pain in legs and back, encouraged to wear brace    PROGRESS TOWARD GOALS: Ongoing    PHYSICIAN CONTACT: No    FALLS SINCE LAST VISIT? No    MEDICATION CHANGES SINCE LAST VISIT? No    PLAN FOR NEXT VISIT: Med education, pain relief, cardiopulmonary assessment pain management.

## 2022-10-19 ENCOUNTER — TELEPHONE (OUTPATIENT)
Dept: NEUROSURGERY | Facility: CLINIC | Age: 62
End: 2022-10-19

## 2022-10-19 DIAGNOSIS — M54.16 LUMBAR RADICULOPATHY: ICD-10-CM

## 2022-10-19 DIAGNOSIS — M51.37 DEGENERATION OF LUMBAR OR LUMBOSACRAL INTERVERTEBRAL DISC: ICD-10-CM

## 2022-10-19 RX ORDER — OXYCODONE AND ACETAMINOPHEN 7.5; 325 MG/1; MG/1
1 TABLET ORAL EVERY 6 HOURS PRN
Qty: 12 TABLET | Refills: 0 | Status: SHIPPED | OUTPATIENT
Start: 2022-10-19 | End: 2022-10-20 | Stop reason: SDUPTHER

## 2022-10-19 NOTE — TELEPHONE ENCOUNTER
Placed a call to pt to inform her meds have been sent to her pharmacy. Ended call with pt understanding.

## 2022-10-19 NOTE — TELEPHONE ENCOUNTER
----- Message from OWEN Hauser sent at 10/19/2022  3:06 PM CDT -----  Regarding: RE: meds  I think she talk to Flakita about this and I did send the medication to her pharmacy already but I think Flakita has notified her  ----- Message -----  From: Laith Magallanes MA  Sent: 10/19/2022   3:03 PM CDT  To: OWEN Hauser  Subject: meds                                             Pt called LVM stated she was unable to make it her appt today because she is in so much pain and wants to know if she can get some more pain meds filled.

## 2022-10-20 ENCOUNTER — OFFICE VISIT (OUTPATIENT)
Dept: NEUROSURGERY | Facility: CLINIC | Age: 62
End: 2022-10-20

## 2022-10-20 ENCOUNTER — HOSPITAL ENCOUNTER (OUTPATIENT)
Dept: GENERAL RADIOLOGY | Facility: HOSPITAL | Age: 62
Discharge: HOME | End: 2022-10-20
Admitting: NURSE PRACTITIONER

## 2022-10-20 VITALS — WEIGHT: 190 LBS | HEIGHT: 66 IN | BODY MASS INDEX: 30.53 KG/M2

## 2022-10-20 DIAGNOSIS — M54.16 LUMBAR RADICULOPATHY: ICD-10-CM

## 2022-10-20 DIAGNOSIS — E66.09 CLASS 1 OBESITY DUE TO EXCESS CALORIES WITH SERIOUS COMORBIDITY AND BODY MASS INDEX (BMI) OF 30.0 TO 30.9 IN ADULT: ICD-10-CM

## 2022-10-20 DIAGNOSIS — M51.37 DEGENERATION OF LUMBAR OR LUMBOSACRAL INTERVERTEBRAL DISC: Primary | ICD-10-CM

## 2022-10-20 DIAGNOSIS — M51.37 DEGENERATION OF LUMBAR OR LUMBOSACRAL INTERVERTEBRAL DISC: ICD-10-CM

## 2022-10-20 DIAGNOSIS — Z87.891 FORMER SMOKER: ICD-10-CM

## 2022-10-20 PROCEDURE — 99024 POSTOP FOLLOW-UP VISIT: CPT | Performed by: NURSE PRACTITIONER

## 2022-10-20 PROCEDURE — 72110 X-RAY EXAM L-2 SPINE 4/>VWS: CPT

## 2022-10-20 NOTE — PROGRESS NOTES
"  Chief complaint:   Chief Complaint   Patient presents with   • Post-op     Pt is here for a post op. She is having leg pain and some low back pain. The leg pain can get intense at times.         Subjective     HPI: This is a 62 y.o. female patient who went to the operating room on 9/23/2022 for a L5-S1 ALIF with him shannon-laminectomy on the right and posterior pedicle screw instrumentation from L5-S1 via Mazor.  Prior to the surgery the patient was complaining of 90% right leg pain and 10% back pain.  The patient is here in follow up today for postoperative visit.  The patient says that the pain that she was having in her back is gone.  She said the pain that she was having in her right leg prior to surgery has resolved as well but she is complaining of some soreness going down both of her legs at this time.  She says it is a completely different kind of pain which she had before surgery and does describe it more as a soreness like after she has worked the muscles.  She remains in the brace.  She is still taking pain medicine 4 times a day but does take it for for right hip pain issues.  Denies any bowel or bladder incontinence        Review of Systems   Musculoskeletal: Positive for arthralgias and myalgias. Negative for back pain.   Neurological: Negative.    Psychiatric/Behavioral: Negative.          Objective      Vital Signs  Ht 167.6 cm (66\")   Wt 86.2 kg (190 lb)   BMI 30.67 kg/m²     Physical Exam  Constitutional:       Appearance: She is well-developed.   HENT:      Head: Normocephalic.   Eyes:      Extraocular Movements: EOM normal.      Pupils: Pupils are equal, round, and reactive to light.   Pulmonary:      Effort: Pulmonary effort is normal.   Musculoskeletal:         General: Normal range of motion.      Cervical back: Normal range of motion.   Skin:     General: Skin is warm.   Neurological:      Mental Status: She is alert and oriented to person, place, and time.      GCS: GCS eye subscore is 4. " GCS verbal subscore is 5. GCS motor subscore is 6.      Cranial Nerves: No cranial nerve deficit.      Sensory: No sensory deficit.      Motor: Motor strength is normal.      Gait: Gait is intact. Gait normal.      Deep Tendon Reflexes: Reflexes are normal and symmetric.   Psychiatric:         Speech: Speech normal.         Behavior: Behavior normal.         Thought Content: Thought content normal.       Incisions clean dry and intact    Neurologic Exam     Mental Status   Oriented to person, place, and time.   Attention: normal. Concentration: normal.   Speech: speech is normal   Level of consciousness: alert  Normal comprehension.     Cranial Nerves     CN II   Visual fields full to confrontation.     CN III, IV, VI   Pupils are equal, round, and reactive to light.  Extraocular motions are normal.     CN V   Facial sensation intact.     CN VII   Facial expression full, symmetric.     CN VIII   CN VIII normal.     CN IX, X   CN IX normal.   CN X normal.     CN XI   CN XI normal.     CN XII   CN XII normal.     Motor Exam   Muscle bulk: normal    Strength   Strength 5/5 throughout.     Sensory Exam   Light touch normal.     Gait, Coordination, and Reflexes     Gait  Gait: normal    Reflexes   Reflexes 2+ except as noted.       Imaging review: No new imaging          Assessment/Plan: The patient is making progress with the pain that she is experiencing.  We will send her for x-rays of the lumbar spine to see when she can out of the brace.  Activity restrictions were gone over with the patient.  We will continue to give her pain medication until she is seen by her primary care doctor which time they can resume her pain medication due to it being for a variety of pain issues.  She was all to call us if any further problems or concerns.  We will have her follow-up Dr. Fischer next    Patient is a nonsmoker  The patient's Body mass index is 30.67 kg/m².. BMI is above normal parameters. Recommendations include: educational  material and nutrition counseling    Diagnoses and all orders for this visit:    1. Degeneration of lumbar or lumbosacral intervertebral disc (Primary)  -     XR Spine Lumbar Complete 4+VW    2. Lumbar radiculopathy  -     XR Spine Lumbar Complete 4+VW    3. Class 1 obesity due to excess calories with serious comorbidity and body mass index (BMI) of 30.0 to 30.9 in adult    4. Former smoker        I discussed the patients findings and my recommendations with patient  Rhett Romano, APRN  10/20/22  13:35 CDT

## 2022-10-21 ENCOUNTER — TELEPHONE (OUTPATIENT)
Dept: NEUROSURGERY | Facility: CLINIC | Age: 62
End: 2022-10-21

## 2022-10-21 ENCOUNTER — HOME CARE VISIT (OUTPATIENT)
Dept: HOME HEALTH SERVICES | Facility: CLINIC | Age: 62
End: 2022-10-21

## 2022-10-21 VITALS
OXYGEN SATURATION: 97 % | SYSTOLIC BLOOD PRESSURE: 110 MMHG | TEMPERATURE: 98.5 F | HEART RATE: 82 BPM | RESPIRATION RATE: 18 BRPM | DIASTOLIC BLOOD PRESSURE: 72 MMHG

## 2022-10-21 PROCEDURE — G0299 HHS/HOSPICE OF RN EA 15 MIN: HCPCS

## 2022-10-21 RX ORDER — OXYCODONE AND ACETAMINOPHEN 7.5; 325 MG/1; MG/1
1 TABLET ORAL EVERY 6 HOURS PRN
Qty: 12 TABLET | Refills: 0 | Status: SHIPPED | OUTPATIENT
Start: 2022-10-21 | End: 2022-10-24

## 2022-10-21 NOTE — TELEPHONE ENCOUNTER
----- Message from OWEN Hauser sent at 10/20/2022  4:55 PM CDT -----  Can you please call the patient let her know that her x-rays look good and she can come out of the brace on October 23  ----- Message -----  From: Sumit, Rad Results [place]  Sent: 10/20/2022   4:25 PM CDT  To: OWEN Hauser

## 2022-10-21 NOTE — HOME HEALTH
THE FOLLOWING INSTRUCTIONS WERE REVIEWED UPON DISCHARGE:    Keep your appointment with Dr. Fischer on December 15th @ 2:00 pm    Call your doctor if you develop a new or worsening symptom, especially if you develop chest pain, shortness of  breath, fever greater than 100.4, discharge from healed incisions, and all falls.    Continue to take the medications prescribed by your doctor. A medication list is attached. Be sure to keep them informed of all medications you take including over the counter herbal, vitamins/minerals and the ones you take only when needed.    Follow the regular diet as ordered by your doctor.     Continue with home program as instructed by therapist (handouts given).    Continue wound care as ordered.    Community resource referrals (list provided). In blue folder    Other notes/instructions:     Instructions given to Patient    Instructions provided per Visit

## 2022-10-24 ENCOUNTER — TELEPHONE (OUTPATIENT)
Dept: NEUROSURGERY | Facility: CLINIC | Age: 62
End: 2022-10-24

## 2022-10-24 NOTE — TELEPHONE ENCOUNTER
"Patient called stating she is in horrible pain and she had to reschedule her appt with her PCP today b/c her \"tailbone is too bad\".  She has rescheduled the appt for Friday 10/28/22.  She is requesting more pain medication but I explained to her that we instructed her last week we would not be giving her more pain medication and she must keep the appt with her PCP.  She is requesting that I send a note to her PCP letting them know we are not going to give her any more medication as of today.  She was given her last refill from our office on 10/21/22.  We are not dismissing her from our practice but we are not giving her any more pain medication.    PANCHO EVANS [facility]  PHYSICIAN LEAD  DR KEISHA LYNCH  [facility] NEUROSURGERY      Fax to Dr Marsh's office  [phone]      "

## 2022-11-10 ENCOUNTER — TELEMEDICINE (OUTPATIENT)
Dept: ENDOCRINOLOGY | Facility: CLINIC | Age: 62
End: 2022-11-10

## 2022-11-10 ENCOUNTER — TELEPHONE (OUTPATIENT)
Dept: NEUROSURGERY | Facility: CLINIC | Age: 62
End: 2022-11-10

## 2022-11-10 DIAGNOSIS — I10 PRIMARY HYPERTENSION: ICD-10-CM

## 2022-11-10 DIAGNOSIS — E11.65 TYPE 2 DIABETES MELLITUS WITH HYPERGLYCEMIA, WITH LONG-TERM CURRENT USE OF INSULIN: Primary | ICD-10-CM

## 2022-11-10 DIAGNOSIS — E03.9 ACQUIRED HYPOTHYROIDISM: ICD-10-CM

## 2022-11-10 DIAGNOSIS — Z79.4 TYPE 2 DIABETES MELLITUS WITH HYPERGLYCEMIA, WITH LONG-TERM CURRENT USE OF INSULIN: Primary | ICD-10-CM

## 2022-11-10 DIAGNOSIS — E11.69 MIXED DIABETIC HYPERLIPIDEMIA ASSOCIATED WITH TYPE 2 DIABETES MELLITUS: ICD-10-CM

## 2022-11-10 DIAGNOSIS — E78.2 MIXED DIABETIC HYPERLIPIDEMIA ASSOCIATED WITH TYPE 2 DIABETES MELLITUS: ICD-10-CM

## 2022-11-10 PROCEDURE — 99214 OFFICE O/P EST MOD 30 MIN: CPT | Performed by: NURSE PRACTITIONER

## 2022-11-10 NOTE — TELEPHONE ENCOUNTER
Patient called this monrning and the call was answered by Adilia Akua.  I received a message stating the patient was on the phone and in tears because she had fallen.  I told Adilia to transfer her to my phone and she could leave me a message & I would call her back (due to being in clinic).  I also stated that if she is in that much pain she may present to the ER for evaluation; otherwise, I would call her back and Dr Lynch or Rhett would more than likely order some xrays to be done today.  Adilia stated the patient told her she had a bone-like thing sticking out of her chest and that she was going to [facility] ER for evaluation.    I will follow up with [facility] later today to see what they found out.    PANCHO EVANS [facility]  PHYSICIAN LEAD  DR KEISHA LYNCH  [facility]

## 2022-11-10 NOTE — PROGRESS NOTES
Chief Complaint  No chief complaint on file.    Subjective          Milagros Parson presents to [facility] ENDOCRINOLOGY  History of Present Illness       You have chosen to receive care through a telehealth visit.  Do you consent to use a video/audio connection for your medical care today? Yes            TELEHEALTH VIDEO VISIT     This a video visit due to [facility] current guidelines for social distancing due to the COVID 19 pandemic        Mode of Visit: Video  Location of patient: home  You have chosen to receive care through a telehealth visit.  Does the patient consent to use a video/audio connection for your medical care today? Yes  The visit included audio and video interaction. No technical issues occurred during this visit.       62 year follow up      Reason diabetes mellitus type 2      Diagnosed in 2005      Timing constant     Quality controlled      Severity high            Macrovascular complications CVA in 2016     Microvascular complications +  Neuropathy, retinopathy, nephropathy            Current diabetes regimen      GLP-1, oral medication     Current monitoring regimen: home blood tests - 6 times daily        States at goal        Review of Systems - back pain              Objective   Vital Signs:   There were no vitals taken for this visit.    Physical Exam  Neurological:      General: No focal deficit present.      Mental Status: She is alert.   Psychiatric:         Mood and Affect: Mood normal.         Thought Content: Thought content normal.         Judgment: Judgment normal.        Result Review :   The following data was reviewed by: OWEN Humphrey on 04/21/2022:  Common labs    Common Labs 9/21/22 9/21/22 9/23/22 9/24/22 9/24/22    1253 1253  0556 0940   Glucose  101 (A)  138 (A)    BUN  13  11    Creatinine  1.33 (A)  1.07 (A)    Sodium  139  139    Potassium  4.7  4.6    Chloride  102  105    Calcium  9.8  8.9    WBC 5.49    9.48   Hemoglobin 13.3    Patient is scheduled on March 2nd at 9:30 am.  12.1   Hematocrit 41.9    38.4   Platelets 192    166   Hemoglobin A1C   6.10 (A)     (A) Abnormal value                        Assessment and Plan    Diagnoses and all orders for this visit:    1. Type 2 diabetes mellitus with hyperglycemia, with long-term current use of insulin (HCC) (Primary)  -     CBC & Differential; Future  -     Comprehensive Metabolic Panel; Future  -     Hemoglobin A1c; Future  -     TSH; Future  -     Vitamin D,25-Hydroxy; Future  -     Microalbumin / Creatinine Urine Ratio - Urine, Clean Catch; Future  -     Lipid Panel; Future    2. Primary hypertension  -     CBC & Differential; Future  -     Comprehensive Metabolic Panel; Future  -     Hemoglobin A1c; Future  -     TSH; Future  -     Vitamin D,25-Hydroxy; Future  -     Microalbumin / Creatinine Urine Ratio - Urine, Clean Catch; Future  -     Lipid Panel; Future    3. Mixed diabetic hyperlipidemia associated with type 2 diabetes mellitus (HCC)  -     CBC & Differential; Future  -     Comprehensive Metabolic Panel; Future  -     Hemoglobin A1c; Future  -     TSH; Future  -     Vitamin D,25-Hydroxy; Future  -     Microalbumin / Creatinine Urine Ratio - Urine, Clean Catch; Future  -     Lipid Panel; Future    4. Acquired hypothyroidism  -     CBC & Differential; Future  -     Comprehensive Metabolic Panel; Future  -     Hemoglobin A1c; Future  -     TSH; Future  -     Vitamin D,25-Hydroxy; Future  -     Microalbumin / Creatinine Urine Ratio - Urine, Clean Catch; Future  -     Lipid Panel; Future    Other orders  -     glucose blood test strip; Test 3 times daily, E11.65  Dispense: 100 each; Refill: 11           Glycemic Management:      Diabetes mellitus type 2         Lab Results   Component Value Date    HGBA1C 6.10 (H) 09/23/2022              Basaglar taking 10 units once daily --not having to take   ==========         jardiance stopped due to fear of side effects      ============      actos 15 mg daily --  taking   ============     Taking  Admelog before each meal tid --not having to take         Taking 3 up to 9 units --   ============     victoza 1.2 mg  taking - keep                  Lipid Management        Will not take a statin she states            Blood Pressure Management:          Taking lisinopril      Lasix was stopped            Microvascular Complication Monitoring:    -----------     Last Microalbumin-Proteinuria Assessment        -----------        Neuropathy yes                Taking pain medication      Follows with       Preventive Care:        Stopped smoking              Weight Related:               Bone Health               Lab Results   Component Value Date     CALCIUM 9.5 11/29/2016         Thyroid Health      levothyroxine  112 mcg daily      TSH was 10 on Jan. 2022                   Perceived goiter, obtain thyroid US --- had done at [facility] -- will obtain report               Follow Up   No follow-ups on file.  Patient was given instructions and counseling regarding her condition or for health maintenance advice. Please see specific information pulled into the AVS if appropriate.         This document has been electronically signed by OWEN Humphrey on November 10, 2022 16:35 CST.

## 2022-11-15 ENCOUNTER — TELEPHONE (OUTPATIENT)
Dept: ENDOCRINOLOGY | Facility: CLINIC | Age: 62
End: 2022-11-15

## 2022-11-15 NOTE — TELEPHONE ENCOUNTER
Pt just called to advise that she was unable to get her labs done this past Friday due to recovering from a fall she had. She just wanted to let Smooth know that she will get them done today or tomorrow at  in [place].    Thanks

## 2022-11-18 ENCOUNTER — TELEPHONE (OUTPATIENT)
Dept: ENDOCRINOLOGY | Facility: CLINIC | Age: 62
End: 2022-11-18

## 2022-11-18 NOTE — TELEPHONE ENCOUNTER
Pt called and stated her lab orders were never received at [facility]. She stated they told her they have a new system and provided a phone [phone] and a fax [phone].    Thanks

## 2022-12-07 ENCOUNTER — TELEPHONE (OUTPATIENT)
Dept: NEUROSURGERY | Facility: CLINIC | Age: 62
End: 2022-12-07

## 2022-12-07 NOTE — TELEPHONE ENCOUNTER
Called to confirm patient's appt with Dr Fischer on Thursday 12/15/22 @ 2 pm - no answer so left a message to call me back      kyler guadarrama CMA

## 2022-12-12 NOTE — TELEPHONE ENCOUNTER
Patient called stating she is sick with sore throat and just feeling real bad.  She requested the appt from 12/15/22 be rescheduled b/c she doesn't want to get out around people.  I have given her a new appt for 1/3/22 @ 3:15 pm.    PANCHO EVANS [facility]  PHYSICIAN LEAD  DR KEISHA LYNCH  [facility]

## 2023-01-13 ENCOUNTER — TELEPHONE (OUTPATIENT)
Dept: ENDOCRINOLOGY | Facility: CLINIC | Age: 63
End: 2023-01-13
Payer: COMMERCIAL

## 2023-01-13 DIAGNOSIS — E78.2 MIXED DIABETIC HYPERLIPIDEMIA ASSOCIATED WITH TYPE 2 DIABETES MELLITUS: ICD-10-CM

## 2023-01-13 DIAGNOSIS — E11.65 TYPE 2 DIABETES MELLITUS WITH HYPERGLYCEMIA, WITH LONG-TERM CURRENT USE OF INSULIN: ICD-10-CM

## 2023-01-13 DIAGNOSIS — I10 PRIMARY HYPERTENSION: ICD-10-CM

## 2023-01-13 DIAGNOSIS — E03.9 ACQUIRED HYPOTHYROIDISM: ICD-10-CM

## 2023-01-13 DIAGNOSIS — E11.69 MIXED DIABETIC HYPERLIPIDEMIA ASSOCIATED WITH TYPE 2 DIABETES MELLITUS: ICD-10-CM

## 2023-01-13 DIAGNOSIS — Z79.4 TYPE 2 DIABETES MELLITUS WITH HYPERGLYCEMIA, WITH LONG-TERM CURRENT USE OF INSULIN: ICD-10-CM

## 2023-01-13 NOTE — TELEPHONE ENCOUNTER
----- Message from OWEN Humphrey sent at 1/13/2023  4:19 PM CST -----  Thyroid level was underactive at 9.4, I have on file she takes 112 mcg daily if this is correct we need to increase to 125 mcg daily, A1c was 6.1%, bad cholesterol was 158 should be 70 or less at night now she takes cholesterol meds, vitamin D was normal her kidney function is low the GFR is 36 it should be above 60, creatinine one 1.5 should be 1.3, this is kidney disease stage III please see if she sees kidney doctors if not we will make referral

## 2023-01-16 ENCOUNTER — TELEPHONE (OUTPATIENT)
Dept: ENDOCRINOLOGY | Facility: CLINIC | Age: 63
End: 2023-01-16
Payer: COMMERCIAL

## 2023-01-16 DIAGNOSIS — E11.69 MIXED DIABETIC HYPERLIPIDEMIA ASSOCIATED WITH TYPE 2 DIABETES MELLITUS: ICD-10-CM

## 2023-01-16 DIAGNOSIS — E03.9 ACQUIRED HYPOTHYROIDISM: Primary | ICD-10-CM

## 2023-01-16 DIAGNOSIS — E03.9 ACQUIRED HYPOTHYROIDISM: ICD-10-CM

## 2023-01-16 DIAGNOSIS — E78.2 MIXED DIABETIC HYPERLIPIDEMIA ASSOCIATED WITH TYPE 2 DIABETES MELLITUS: ICD-10-CM

## 2023-01-16 DIAGNOSIS — Z79.4 TYPE 2 DIABETES MELLITUS WITH HYPERGLYCEMIA, WITH LONG-TERM CURRENT USE OF INSULIN: ICD-10-CM

## 2023-01-16 DIAGNOSIS — I10 PRIMARY HYPERTENSION: ICD-10-CM

## 2023-01-16 DIAGNOSIS — E11.65 TYPE 2 DIABETES MELLITUS WITH HYPERGLYCEMIA, WITH LONG-TERM CURRENT USE OF INSULIN: ICD-10-CM

## 2023-01-16 RX ORDER — LEVOTHYROXINE SODIUM 0.12 MG/1
125 TABLET ORAL DAILY
Qty: 30 TABLET | Refills: 11 | Status: SHIPPED | OUTPATIENT
Start: 2023-01-16 | End: 2024-01-16

## 2023-01-16 NOTE — TELEPHONE ENCOUNTER
Patient called and stated someone in the office was trying to reach her today. Was asking for call back.    Phone [phone]    Thank you

## 2023-01-17 ENCOUNTER — TELEPHONE (OUTPATIENT)
Dept: ENDOCRINOLOGY | Facility: CLINIC | Age: 63
End: 2023-01-17
Payer: COMMERCIAL

## 2023-01-17 NOTE — TELEPHONE ENCOUNTER
Patient called in to request her lab work be faxed to Dr. Jose Marsh in [place], [place].    Fax: [phone]    Thank you.

## 2023-02-20 ENCOUNTER — TELEPHONE (OUTPATIENT)
Dept: ENDOCRINOLOGY | Facility: CLINIC | Age: 63
End: 2023-02-20

## 2023-02-20 ENCOUNTER — TELEMEDICINE (OUTPATIENT)
Dept: ENDOCRINOLOGY | Facility: CLINIC | Age: 63
End: 2023-02-20
Payer: COMMERCIAL

## 2023-02-20 DIAGNOSIS — I10 PRIMARY HYPERTENSION: ICD-10-CM

## 2023-02-20 DIAGNOSIS — N18.30 STAGE 3 CHRONIC KIDNEY DISEASE, UNSPECIFIED WHETHER STAGE 3A OR 3B CKD: ICD-10-CM

## 2023-02-20 DIAGNOSIS — E03.9 ACQUIRED HYPOTHYROIDISM: ICD-10-CM

## 2023-02-20 DIAGNOSIS — E11.42 DIABETIC POLYNEUROPATHY ASSOCIATED WITH TYPE 2 DIABETES MELLITUS: ICD-10-CM

## 2023-02-20 DIAGNOSIS — Z79.4 TYPE 2 DIABETES MELLITUS WITHOUT COMPLICATION, WITH LONG-TERM CURRENT USE OF INSULIN: Primary | ICD-10-CM

## 2023-02-20 DIAGNOSIS — E11.9 TYPE 2 DIABETES MELLITUS WITHOUT COMPLICATION, WITH LONG-TERM CURRENT USE OF INSULIN: Primary | ICD-10-CM

## 2023-02-20 PROCEDURE — 99214 OFFICE O/P EST MOD 30 MIN: CPT | Performed by: NURSE PRACTITIONER

## 2023-02-20 RX ORDER — INSULIN GLARGINE 100 [IU]/ML
35 INJECTION, SOLUTION SUBCUTANEOUS NIGHTLY
Qty: 6 ML | Refills: 11 | Status: SHIPPED | OUTPATIENT
Start: 2023-02-20

## 2023-02-20 RX ORDER — PEN NEEDLE, DIABETIC 30 GX3/16"
1 NEEDLE, DISPOSABLE MISCELLANEOUS 4 TIMES DAILY
Qty: 120 EACH | Refills: 11 | Status: SHIPPED | OUTPATIENT
Start: 2023-02-20 | End: 2023-03-14 | Stop reason: SDUPTHER

## 2023-02-20 RX ORDER — PIOGLITAZONEHYDROCHLORIDE 15 MG/1
15 TABLET ORAL DAILY
Qty: 30 TABLET | Refills: 11 | Status: SHIPPED | OUTPATIENT
Start: 2023-02-20 | End: 2024-02-20

## 2023-02-20 RX ORDER — LIRAGLUTIDE 6 MG/ML
1.2 INJECTION SUBCUTANEOUS DAILY
Qty: 9 ML | Refills: 11 | Status: SHIPPED | OUTPATIENT
Start: 2023-02-20

## 2023-02-20 RX ORDER — INSULIN ASPART 100 [IU]/ML
1-10 INJECTION, SOLUTION INTRAVENOUS; SUBCUTANEOUS
Qty: 6 ML | Refills: 11 | Status: SHIPPED | OUTPATIENT
Start: 2023-02-20

## 2023-02-20 NOTE — PROGRESS NOTES
Chief Complaint  No chief complaint on file.    Subjective          Milagros Parson presents to [facility] ENDOCRINOLOGY  History of Present Illness       You have chosen to receive care through a telehealth visit.  Do you consent to use a video/audio connection for your medical care today? Yes            TELEHEALTH VIDEO VISIT     This a video visit due to [facility] current guidelines for social distancing due to the COVID 19 pandemic        Mode of Visit: Video  Location of patient: home  You have chosen to receive care through a telehealth visit.  Does the patient consent to use a video/audio connection for your medical care today? Yes  The visit included audio and video interaction. No technical issues occurred during this visit.       64 year follow up      Reason diabetes mellitus type 2       Diagnosed in 2005      Timing constant     Quality controlled      Severity high            Macrovascular complications CVA in 2016     Microvascular complications +  Neuropathy, retinopathy, nephropathy            Current diabetes regimen      GLP-1, oral medication     Current monitoring regimen: home blood tests - 6 times daily        States at goal       Now having knee problems       Review of Systems - General ROS: positive for  - back pain                  Objective   Vital Signs:   There were no vitals taken for this visit.    Physical Exam  Neurological:      General: No focal deficit present.      Mental Status: She is alert.   Psychiatric:         Mood and Affect: Mood normal.         Thought Content: Thought content normal.         Judgment: Judgment normal.        Result Review :   The following data was reviewed by: OWEN Humphrey on 04/21/2022:  Common labs    Common Labs 9/21/22 9/21/22 9/23/22 9/24/22 9/24/22    1253 1253  0556 0940   Glucose  101 (A)  138 (A)    BUN  13  11    Creatinine  1.33 (A)  1.07 (A)    Sodium  139  139    Potassium  4.7  4.6    Chloride  102   105    Calcium  9.8  8.9    WBC 5.49    9.48   Hemoglobin 13.3    12.1   Hematocrit 41.9    38.4   Platelets 192    166   Hemoglobin A1C   6.10 (A)     (A) Abnormal value                        Assessment and Plan    Diagnoses and all orders for this visit:    1. Type 2 diabetes mellitus without complication, with long-term current use of insulin (HCC) (Primary)  -     CBC & Differential; Future  -     Comprehensive Metabolic Panel; Future  -     Hemoglobin A1c; Future    2. Primary hypertension  -     CBC & Differential; Future  -     Comprehensive Metabolic Panel; Future    3. Acquired hypothyroidism  -     TSH; Future    4. Diabetic polyneuropathy associated with type 2 diabetes mellitus (HCC)    5. Stage 3 chronic kidney disease, unspecified whether stage 3a or 3b CKD ([facility])    Other orders  -     Liraglutide (Victoza) 18 MG/3ML solution pen-injector injection; Inject 1.2 mg under the skin into the appropriate area as directed Daily.  Dispense: 9 mL; Refill: 11  -     Insulin Glargine (BASAGLAR KWIKPEN) 100 UNIT/ML injection pen; Inject 35 Units under the skin into the appropriate area as directed Every Night.  Dispense: 6 mL; Refill: 11  -     Insulin Aspart (novoLOG) 100 UNIT/ML injection; Inject 1-10 Units under the skin into the appropriate area as directed 3 (Three) Times a Day Before Meals. Pt uses sliding scale  Dispense: 6 mL; Refill: 11  -     pioglitazone (Actos) 15 MG tablet; Take 1 tablet by mouth Daily.  Dispense: 30 tablet; Refill: 11  -     Insulin Pen Needle (Pen Needles) 32G X 4 MM misc; 1 each 4 (Four) Times a Day. Use 4 x daily, Dx code E11.65  Dispense: 120 each; Refill: 11           Glycemic Management:      Diabetes mellitus type 2           Lab Results   Component Value Date    HGBA1C 6.10 (H) 09/23/2022              Basaglar taking 10 units once daily --not having to take   ==========         jardiance stopped due to fear of side effects      ============      actos 15 mg daily --  taking   ============     Taking  Admelog before each meal tid --not having to take         Taking 3 up to 9 units --   ============     victoza 1.2 mg  taking - keep                  Lipid Management        Will not take a statin she states            Blood Pressure Management:          Taking lisinopril      Lasix was stopped            Microvascular Complication Monitoring:    -----------     Last Microalbumin-Proteinuria Assessment        -----------        Neuropathy yes        ckd stage 3    Following with nephrology         Taking pain medication      Follows with       Preventive Care:        Stopped smoking                        Bone Health               Lab Results   Component Value Date     CALCIUM 9.5 11/29/2016         Thyroid Health           Jan. 2023     TSH - 9.4    Increased levothyroxine to  125 mcg daily                          Follow Up   No follow-ups on file.  Patient was given instructions and counseling regarding her condition or for health maintenance advice. Please see specific information pulled into the AVS if appropriate.         This document has been electronically signed by OWEN Humphrey on February 20, 2023 16:16 CST.

## 2023-02-21 ENCOUNTER — DOCUMENTATION (OUTPATIENT)
Dept: ENDOCRINOLOGY | Facility: CLINIC | Age: 63
End: 2023-02-21
Payer: COMMERCIAL

## 2023-02-27 ENCOUNTER — DOCUMENTATION (OUTPATIENT)
Dept: ENDOCRINOLOGY | Facility: CLINIC | Age: 63
End: 2023-02-27
Payer: COMMERCIAL

## 2023-02-27 NOTE — PROGRESS NOTES
BASAGLAR [facility] UNIT KWIKPEN APPROVED FROM 2/25/23 TO 2/24/24    SENT TO MED REC

## 2023-03-14 ENCOUNTER — TELEPHONE (OUTPATIENT)
Dept: ENDOCRINOLOGY | Facility: CLINIC | Age: 63
End: 2023-03-14
Payer: COMMERCIAL

## 2023-03-14 DIAGNOSIS — E11.9 TYPE 2 DIABETES MELLITUS WITHOUT COMPLICATION, WITH LONG-TERM CURRENT USE OF INSULIN: Primary | ICD-10-CM

## 2023-03-14 DIAGNOSIS — Z79.4 TYPE 2 DIABETES MELLITUS WITHOUT COMPLICATION, WITH LONG-TERM CURRENT USE OF INSULIN: Primary | ICD-10-CM

## 2023-03-14 RX ORDER — NAPROXEN SODIUM 220 MG
1 TABLET ORAL 4 TIMES DAILY
Qty: 120 EACH | Refills: 3 | Status: SHIPPED | OUTPATIENT
Start: 2023-03-14

## 2023-03-14 RX ORDER — PEN NEEDLE, DIABETIC 30 GX3/16"
1 NEEDLE, DISPOSABLE MISCELLANEOUS 4 TIMES DAILY
Qty: 120 EACH | Refills: 11 | Status: SHIPPED | OUTPATIENT
Start: 2023-03-14

## 2023-03-14 NOTE — TELEPHONE ENCOUNTER
Patient called and is asking for a prescription for either the Novolog flexpen or for regular insulin needles. States she received vial of Novolog and has no way to draw it up.     Phone [phone]    Thank you

## 2023-05-24 LAB
ALBUMIN SERPL-MCNC: 4.2 G/DL
ALP BLD-CCNC: 61 U/L
ALT SERPL-CCNC: 15 U/L
ANION GAP SERPL CALCULATED.3IONS-SCNC: 15.2 MMOL/L
AST SERPL-CCNC: 26 U/L
BILIRUB SERPL-MCNC: 0.5 MG/DL (ref 0.1–1.4)
BUN BLDV-MCNC: 13 MG/DL
CALCIUM SERPL-MCNC: 9.2 MG/DL
CHLORIDE BLD-SCNC: 100 MMOL/L
CHOLESTEROL, TOTAL: 250 MG/DL
CHOLESTEROL/HDL RATIO: NORMAL
CO2: 29 MMOL/L
CREAT SERPL-MCNC: 1.1 MG/DL
CREATININE, URINE: 160
EGFR: 56
GLUCOSE BLD-MCNC: 135 MG/DL
HDLC SERPL-MCNC: 49 MG/DL (ref 35–70)
LDL CHOLESTEROL CALCULATED: 156 MG/DL (ref 0–160)
MICROALBUMIN/CREAT 24H UR: 5.1 MG/G{CREAT}
MICROALBUMIN/CREAT UR-RTO: 3
NONHDLC SERPL-MCNC: NORMAL MG/DL
POTASSIUM SERPL-SCNC: 4.3 MMOL/L
SODIUM BLD-SCNC: 140 MMOL/L
TOTAL PROTEIN: 7
TRIGL SERPL-MCNC: 228 MG/DL
VLDLC SERPL CALC-MCNC: 59 MG/DL

## 2023-08-24 ENCOUNTER — TELEPHONE (OUTPATIENT)
Dept: ENDOCRINOLOGY | Facility: CLINIC | Age: 63
End: 2023-08-24
Payer: COMMERCIAL

## 2023-09-14 ENCOUNTER — OFFICE VISIT (OUTPATIENT)
Dept: NEUROSURGERY | Facility: CLINIC | Age: 63
End: 2023-09-14
Payer: MEDICARE

## 2023-09-14 ENCOUNTER — HOSPITAL ENCOUNTER (OUTPATIENT)
Dept: GENERAL RADIOLOGY | Facility: HOSPITAL | Age: 63
Discharge: HOME | End: 2023-09-14
Admitting: NEUROLOGICAL SURGERY
Payer: MEDICARE

## 2023-09-14 VITALS — WEIGHT: 181 LBS | HEIGHT: 66 IN | BODY MASS INDEX: 29.09 KG/M2

## 2023-09-14 DIAGNOSIS — G89.29 CHRONIC RIGHT-SIDED LOW BACK PAIN WITH RIGHT-SIDED SCIATICA: ICD-10-CM

## 2023-09-14 DIAGNOSIS — E66.3 OVERWEIGHT WITH BODY MASS INDEX (BMI) OF 29 TO 29.9 IN ADULT: ICD-10-CM

## 2023-09-14 DIAGNOSIS — M54.41 CHRONIC RIGHT-SIDED LOW BACK PAIN WITH RIGHT-SIDED SCIATICA: Primary | ICD-10-CM

## 2023-09-14 DIAGNOSIS — F17.200 SMOKING: ICD-10-CM

## 2023-09-14 DIAGNOSIS — G89.29 CHRONIC RIGHT-SIDED LOW BACK PAIN WITH RIGHT-SIDED SCIATICA: Primary | ICD-10-CM

## 2023-09-14 DIAGNOSIS — M54.41 CHRONIC RIGHT-SIDED LOW BACK PAIN WITH RIGHT-SIDED SCIATICA: ICD-10-CM

## 2023-09-14 PROCEDURE — 72114 X-RAY EXAM L-S SPINE BENDING: CPT

## 2023-09-14 PROCEDURE — 99213 OFFICE O/P EST LOW 20 MIN: CPT | Performed by: NEUROLOGICAL SURGERY

## 2023-09-14 RX ORDER — NALOXONE HYDROCHLORIDE 4 MG/.1ML
SPRAY NASAL
COMMUNITY
Start: 2023-06-29

## 2023-09-14 RX ORDER — CYCLOBENZAPRINE HCL 5 MG
TABLET ORAL
COMMUNITY
Start: 2023-06-29

## 2023-09-14 RX ORDER — PRAVASTATIN SODIUM 20 MG
TABLET ORAL
COMMUNITY
Start: 2023-08-16

## 2023-09-14 RX ORDER — HYDROCODONE BITARTRATE AND ACETAMINOPHEN 10; 325 MG/1; MG/1
TABLET ORAL
COMMUNITY
Start: 2023-08-29

## 2023-09-14 NOTE — PROGRESS NOTES
SUBJECTIVE:  Patient ID: Milagros Parson is a 63 y.o. female is here today for follow-up.    Chief Complaint: Back pain  No chief complaint on file.      HPI  63-year-old female that went to the operating room for an anterior lumbar fusion and a right hemilaminectomy facetectomy on September 23, 2022.  This is the first time I have seen her in follow-up.  When she saw 3 weeks postop she said that she had no back pain and no right lower extremity radicular pain.  She said she did was doing well until she fell backwards 3 months ago.  Since then she has had a recurrence of her right paraspinal back pain and right lower extremity radicular pain.  Takes Norco 10 mg 2-4 times per day for a variety of chronic pain issues.  She has never had any injection treatments.    The following portions of the patient's history were reviewed and updated as appropriate: allergies, current medications, past family history, past medical history, past social history, past surgical history and problem list.    OBJECTIVE:    Review of Systems       Physical Exam  Eyes:      Extraocular Movements: EOM normal.      Pupils: Pupils are equal, round, and reactive to light.   Neurological:      Mental Status: She is oriented to person, place, and time.      Motor: Motor strength is normal.      Coordination: Finger-Nose-Finger Test normal.      Gait: Gait is intact.   Psychiatric:         Speech: Speech normal.       Neurologic Exam     Mental Status   Oriented to person, place, and time.   Attention: normal.   Speech: speech is normal   Level of consciousness: alert  Knowledge: good.     Cranial Nerves     CN II   Visual fields full to confrontation.     CN III, IV, VI   Pupils are equal, round, and reactive to light.  Extraocular motions are normal.     CN V   Facial sensation intact.     CN VII   Facial expression full, symmetric.     CN VIII   CN VIII normal.     CN IX, X   CN IX normal.   CN X normal.     CN XI   CN XI normal.     CN XII    CN XII normal.     Motor Exam   Muscle bulk: normal  Overall muscle tone: normal  Right arm pronator drift: absent  Left arm pronator drift: absent    Strength   Strength 5/5 throughout.     Sensory Exam   Light touch normal.   Pinprick normal.     Gait, Coordination, and Reflexes     Gait  Gait: normal    Coordination   Finger to nose coordination: normal    Tremor   Resting tremor: absent  Intention tremor: absent  Action tremor: absent    Reflexes   Reflexes 2+ except as noted.     Independent Review of Radiographic Studies:   No new imaging    ASSESSMENT/PLAN:  The patient relates 3 months of recurrent right paraspinal back pain and right lower extremity radicular pain after a fall.  We will repeat her images with a plain x-rays and then an MRI of the lumbar spine.  We will see her in follow-up after these images are done      No diagnosis found.    The patient's Body mass index is 30.67 kg/m².. BMI is above normal parameters. Recommendations include: educational material    No follow-ups on file.      Guanako Fischer MD

## 2023-09-14 NOTE — PATIENT INSTRUCTIONS
"PATIENT TO CONTINUE TO FOLLOW UP WITH HER PRIMARY CARE PROVIDER FOR YEARLY PHYSICAL EXAMS TO ENSURE COMPLETE HEALTH MAINTENANCE    BMI for Adults  What is BMI?  Body mass index (BMI) is a number that is calculated from a person's weight and height. BMI can help estimate how much of a person's weight is composed of fat. BMI does not measure body fat directly. Rather, it is an alternative to procedures that directly measure body fat, which can be difficult and expensive.  BMI can help identify people who may be at higher risk for certain medical problems.  What are BMI measurements used for?  BMI is used as a screening tool to identify possible weight problems. It helps determine whether a person is obese, overweight, a healthy weight, or underweight.  BMI is useful for:  Identifying a weight problem that may be related to a medical condition or may increase the risk for medical problems.  Promoting changes, such as changes in diet and exercise, to help reach a healthy weight. BMI screening can be repeated to see if these changes are working.  How is BMI calculated?  BMI involves measuring your weight in relation to your height. Both height and weight are measured, and the BMI is calculated from those numbers. This can be done either in English ([place] or metric measurements. Note that charts and online BMI calculators are available to help you find your BMI quickly and easily without having to do these calculations yourself.  To calculate your BMI in English ([place] measurements:    Measure your weight in pounds (lb).  Multiply the number of pounds by 703.  For example, for a person who weighs 180 lb, multiply that number by 703, which equals 126,540.  Measure your height in inches. Then multiply that number by itself to get a measurement called \"inches squared.\"  For example, for a person who is 70 inches tall, the \"inches squared\" measurement is 70 inches x 70 inches, which equals 4,900 inches squared.  Divide the " "total from step 2 (number of lb x 703) by the total from step 3 (inches squared): 126,540 ÷ 4,900 = 25.8. This is your BMI.  To calculate your BMI in metric measurements:  Measure your weight in kilograms (kg).  Measure your height in meters (m). Then multiply that number by itself to get a measurement called \"meters squared.\"  For example, for a person who is 1.75 m tall, the \"meters squared\" measurement is 1.75 m x 1.75 m, which is equal to 3.1 meters squared.  Divide the number of kilograms (your weight) by the meters squared number. In this example: 70 ÷ 3.1 = 22.6. This is your BMI.  What do the results mean?  BMI charts are used to identify whether you are underweight, normal weight, overweight, or obese. The following guidelines will be used:  Underweight: BMI less than 18.5.  Normal weight: BMI between 18.5 and 24.9.  Overweight: BMI between 25 and 29.9.  Obese: BMI of 30 or above.  Keep these notes in mind:  Weight includes both fat and muscle, so someone with a muscular build, such as an athlete, may have a BMI that is higher than 24.9. In cases like these, BMI is not an accurate measure of body fat.  To determine if excess body fat is the cause of a BMI of 25 or higher, further assessments may need to be done by a health care provider.  BMI is usually interpreted in the same way for men and women.  Where to find more information  For more information about BMI, including tools to quickly calculate your BMI, go to these websites:  Centers for Disease Control and Prevention: www.cdc.gov  American Heart Association: www.heart.org  National Heart, Lung, and Blood [place]: www.nhlbi.nih.gov  Summary  Body mass index (BMI) is a number that is calculated from a person's weight and height.  BMI may help estimate how much of a person's weight is composed of fat. BMI can help identify those who may be at higher risk for certain medical problems.  BMI can be measured using English measurements or metric " "measurements.  BMI charts are used to identify whether you are underweight, normal weight, overweight, or obese.  This information is not intended to replace advice given to you by your health care provider. Make sure you discuss any questions you have with your health care provider.  Document Revised: 09/09/2020 Document Reviewed: 07/17/2020  [employer] Patient Education © 2023 [employer]  DASH Eating Plan  DASH stands for Dietary Approaches to Stop Hypertension. The DASH eating plan is a healthy eating plan that has been shown to:  Reduce high blood pressure (hypertension).  Reduce your risk for type 2 diabetes, heart disease, and stroke.  Help with weight loss.  What are tips for following this plan?  Reading food labels  Check food labels for the amount of salt (sodium) per serving. Choose foods with less than 5 percent of the Daily Value of sodium. Generally, foods with less than 300 milligrams (mg) of sodium per serving fit into this eating plan.  To find whole grains, look for the word \"whole\" as the first word in the ingredient list.  Shopping  Buy products labeled as \"low-sodium\" or \"no salt added.\"  Buy fresh foods. Avoid canned foods and pre-made or frozen meals.  Cooking  Avoid adding salt when cooking. Use salt-free seasonings or herbs instead of table salt or sea salt. Check with your health care provider or pharmacist before using salt substitutes.  Do not hook foods. Cook foods using healthy methods such as baking, boiling, grilling, roasting, and broiling instead.  Cook with heart-healthy oils, such as olive, canola, avocado, soybean, or sunflower oil.  Meal planning    Eat a balanced diet that includes:  4 or more servings of fruits and 4 or more servings of vegetables each day. Try to fill one-half of your plate with fruits and vegetables.  6-8 servings of whole grains each day.  Less than 6 oz (170 g) of lean meat, poultry, or fish each day. A 3-oz (85-g) serving of meat is about the same size as " a deck of cards. One egg equals 1 oz (28 g).  2-3 servings of low-fat dairy each day. One serving is 1 cup (237 mL).  1 serving of nuts, seeds, or beans 5 times each week.  2-3 servings of heart-healthy fats. Healthy fats called omega-3 fatty acids are found in foods such as walnuts, flaxseeds, fortified milks, and eggs. These fats are also found in cold-water fish, such as sardines, salmon, and mackerel.  Limit how much you eat of:  Canned or prepackaged foods.  Food that is high in trans fat, such as some fried foods.  Food that is high in saturated fat, such as fatty meat.  Desserts and other sweets, sugary drinks, and other foods with added sugar.  Full-fat dairy products.  Do not salt foods before eating.  Do not eat more than 4 egg yolks a week.  Try to eat at least 2 vegetarian meals a week.  Eat more home-cooked food and less restaurant, buffet, and fast food.  Lifestyle  When eating at a restaurant, ask that your food be prepared with less salt or no salt, if possible.  If you drink alcohol:  Limit how much you use to:  0-1 drink a day for women who are not pregnant.  0-2 drinks a day for men.  Be aware of how much alcohol is in your drink. In the [place] one drink equals one 12 oz bottle of beer (355 mL), one 5 oz glass of wine (148 mL), or one 1½ oz glass of hard liquor (44 mL).  General information  Avoid eating more than 2,300 mg of salt a day. If you have hypertension, you may need to reduce your sodium intake to 1,500 mg a day.  Work with your health care provider to maintain a healthy body weight or to lose weight. Ask what an ideal weight is for you.  Get at least 30 minutes of exercise that causes your heart to beat faster (aerobic exercise) most days of the week. Activities may include walking, swimming, or biking.  Work with your health care provider or dietitian to adjust your eating plan to your individual calorie needs.  What foods should I eat?  Fruits  All fresh, dried, or frozen fruit. Canned  fruit in natural juice (without added sugar).  Vegetables  Fresh or frozen vegetables (raw, steamed, roasted, or grilled). Low-sodium or reduced-sodium tomato and vegetable juice. Low-sodium or reduced-sodium tomato sauce and tomato paste. Low-sodium or reduced-sodium canned vegetables.  Grains  Whole-grain or whole-wheat bread. Whole-grain or whole-wheat pasta. Brown rice. Oatmeal. Quinoa. Bulgur. Whole-grain and low-sodium cereals. Nereida bread. Low-fat, low-sodium crackers. Whole-wheat flour tortillas.  Meats and other proteins  Skinless chicken or turkey. Ground chicken or turkey. Pork with fat trimmed off. Fish and seafood. Egg whites. Dried beans, peas, or lentils. Unsalted nuts, nut butters, and seeds. Unsalted canned beans. Lean cuts of beef with fat trimmed off. Low-sodium, lean precooked or cured meat, such as sausages or meat loaves.  Dairy  Low-fat (1%) or fat-free (skim) milk. Reduced-fat, low-fat, or fat-free cheeses. Nonfat, low-sodium ricotta or cottage cheese. Low-fat or nonfat yogurt. Low-fat, low-sodium cheese.  Fats and oils  Soft margarine without trans fats. Vegetable oil. Reduced-fat, low-fat, or light mayonnaise and salad dressings (reduced-sodium). Canola, safflower, olive, avocado, soybean, and sunflower oils. Avocado.  Seasonings and condiments  Herbs. Spices. Seasoning mixes without salt.  Other foods  Unsalted popcorn and pretzels. Fat-free sweets.  The items listed above may not be a complete list of foods and beverages you can eat. Contact a dietitian for more information.  What foods should I avoid?  Fruits  Canned fruit in a light or heavy syrup. Fried fruit. Fruit in cream or butter sauce.  Vegetables  Creamed or fried vegetables. Vegetables in a cheese sauce. Regular canned vegetables (not low-sodium or reduced-sodium). Regular canned tomato sauce and paste (not low-sodium or reduced-sodium). Regular tomato and vegetable juice (not low-sodium or reduced-sodium). Pickles.  Olives.  Grains  Baked goods made with fat, such as croissants, muffins, or some breads. Dry pasta or rice meal packs.  Meats and other proteins  Fatty cuts of meat. Ribs. Fried meat. Perez. Bologna, salami, and other precooked or cured meats, such as sausages or meat loaves. Fat from the back of a pig (fatback). Bratwurst. Salted nuts and seeds. Canned beans with added salt. Canned or smoked fish. Whole eggs or egg yolks. Chicken or turkey with skin.  Dairy  Whole or 2% milk, cream, and half-and-half. Whole or full-fat cream cheese. Whole-fat or sweetened yogurt. Full-fat cheese. Nondairy creamers. Whipped toppings. Processed cheese and cheese spreads.  Fats and oils  Butter. Stick margarine. Lard. Shortening. Ghee. Perez fat. Tropical oils, such as coconut, palm kernel, or palm oil.  Seasonings and condiments  Onion salt, garlic salt, seasoned salt, table salt, and sea salt. [facility] sauce. Tartar sauce. Barbecue sauce. Teriyaki sauce. Soy sauce, including reduced-sodium. Steak sauce. Canned and packaged gravies. Fish sauce. Oyster sauce. Cocktail sauce. Store-bought horseradish. Ketchup. Mustard. Meat flavorings and tenderizers. Bouillon cubes. Hot sauces. Pre-made or packaged marinades. Pre-made or packaged taco seasonings. Relishes. Regular salad dressings.  Other foods  Salted popcorn and pretzels.  The items listed above may not be a complete list of foods and beverages you should avoid. Contact a dietitian for more information.  Where to find more information  National Heart, Lung, and Blood [place]: www.nhlbi.nih.gov  American Heart Association: www.heart.org  Academy of Nutrition and Dietetics: www.eatright.org  National Kidney Foundation: www.kidney.org  Summary  The DASH eating plan is a healthy eating plan that has been shown to reduce high blood pressure (hypertension). It may also reduce your risk for type 2 diabetes, heart disease, and stroke.  When on the DASH eating plan, aim to eat more  fresh fruits and vegetables, whole grains, lean proteins, low-fat dairy, and heart-healthy fats.  With the DASH eating plan, you should limit salt (sodium) intake to 2,300 mg a day. If you have hypertension, you may need to reduce your sodium intake to 1,500 mg a day.  Work with your health care provider or dietitian to adjust your eating plan to your individual calorie needs.  This information is not intended to replace advice given to you by your health care provider. Make sure you discuss any questions you have with your health care provider.  Document Revised: 11/20/2020 Document Reviewed: 11/20/2020  [employer] Patient Education © 2023 [employer]

## 2023-09-20 ENCOUNTER — TELEMEDICINE (OUTPATIENT)
Dept: ENDOCRINOLOGY | Facility: CLINIC | Age: 63
End: 2023-09-20
Payer: MEDICARE

## 2023-09-20 DIAGNOSIS — E03.9 ACQUIRED HYPOTHYROIDISM: Primary | ICD-10-CM

## 2023-09-20 DIAGNOSIS — E11.65 TYPE 2 DIABETES MELLITUS WITH HYPERGLYCEMIA, WITH LONG-TERM CURRENT USE OF INSULIN: ICD-10-CM

## 2023-09-20 DIAGNOSIS — N18.32 STAGE 3B CHRONIC KIDNEY DISEASE: ICD-10-CM

## 2023-09-20 DIAGNOSIS — E11.43 DIABETIC AUTONOMIC NEUROPATHY ASSOCIATED WITH TYPE 2 DIABETES MELLITUS: ICD-10-CM

## 2023-09-20 DIAGNOSIS — Z79.4 TYPE 2 DIABETES MELLITUS WITH HYPERGLYCEMIA, WITH LONG-TERM CURRENT USE OF INSULIN: ICD-10-CM

## 2023-09-20 PROCEDURE — 99214 OFFICE O/P EST MOD 30 MIN: CPT | Performed by: NURSE PRACTITIONER

## 2023-09-20 NOTE — PROGRESS NOTES
Chief Complaint  Diabetes and Thyroid Problem    Subjective          Milagros Parson presents to [facility] ENDOCRINOLOGY  History of Present Illness     You have chosen to receive care through a telehealth visit.  Do you consent to use a video/audio connection for your medical care today? Yes            TELEHEALTH VIDEO VISIT     This a video visit due to [facility] current guidelines for social distancing due to the COVID 19 pandemic        Mode of Visit: Video  Location of patient: home  You have chosen to receive care through a telehealth visit.  Does the patient consent to use a video/audio connection for your medical care today? Yes  The visit included audio and video interaction. No technical issues occurred during this visit.       63 year old female presents for follow up     Diabetes mellitus type 2    Diagnosed in 2005     Timing constant    Quality controlled     Severity moderate      Complications -- CVA in 2016, neuropathy             Current diabetes regimen      GLP-1, oral medication     Current monitoring regimen: home blood tests -         6 times daily        States at goal           Review of Systems - General ROS: positive for  - joint pain              Objective   Vital Signs:   There were no vitals taken for this visit.    Physical Exam  Neurological:      General: No focal deficit present.      Mental Status: She is alert.   Psychiatric:         Mood and Affect: Mood normal.         Thought Content: Thought content normal.         Judgment: Judgment normal.      Result Review :   The following data was reviewed by: OWEN Humphrey on 04/21/2022:                Assessment and Plan    Diagnoses and all orders for this visit:    1. Acquired hypothyroidism (Primary)  -     Hemoglobin A1c; Future  -     TSH; Future    2. Type 2 diabetes mellitus with hyperglycemia, with long-term current use of insulin  -     Hemoglobin A1c; Future  -     TSH; Future    3.  Diabetic autonomic neuropathy associated with type 2 diabetes mellitus    4. Stage 3b chronic kidney disease             Glycemic Management:      Diabetes mellitus type 2           Lab Results   Component Value Date    HGBA1C 6.10 (H) 09/23/2022              Basaglar taking 10 units once daily --not having to take   ==========         jardiance stopped due to fear of side effects      ============      actos 15 mg daily -- taking   ============     Taking  Admelog before each meal tid --not having to take         Taking 3 up to 9 units --   ============     victoza 1.2 mg  taking - keep                  Lipid Management        Will not take a statin she states            Blood Pressure Management:          Taking lisinopril      Lasix was stopped            Microvascular Complication Monitoring:    -----------       -----------        Neuropathy yes        ckd stage 3    Following with nephrology         Taking pain medication      Follows with       Preventive Care:        Stopped smoking                        Bone Health               Lab Results   Component Value Date     CALCIUM 9.5 11/29/2016         Thyroid Health           Jan. 2023     TSH - 9.4    Increased levothyroxine to  125 mcg daily    States was normal on repeat                           Follow Up   No follow-ups on file.  Patient was given instructions and counseling regarding her condition or for health maintenance advice. Please see specific information pulled into the AVS if appropriate.         This document has been electronically signed by OWEN Humphrey on September 20, 2023 14:49 CDT.

## 2023-10-16 ENCOUNTER — HOSPITAL ENCOUNTER (OUTPATIENT)
Dept: MRI IMAGING | Facility: HOSPITAL | Age: 63
Discharge: HOME | End: 2023-10-16
Admitting: NEUROLOGICAL SURGERY
Payer: MEDICARE

## 2023-10-16 DIAGNOSIS — G89.29 CHRONIC RIGHT-SIDED LOW BACK PAIN WITH RIGHT-SIDED SCIATICA: ICD-10-CM

## 2023-10-16 DIAGNOSIS — M54.41 CHRONIC RIGHT-SIDED LOW BACK PAIN WITH RIGHT-SIDED SCIATICA: ICD-10-CM

## 2023-10-16 PROCEDURE — 72148 MRI LUMBAR SPINE W/O DYE: CPT

## 2023-10-20 ENCOUNTER — OFFICE VISIT (OUTPATIENT)
Dept: PRIMARY CARE CLINIC | Age: 63
End: 2023-10-20

## 2023-10-20 VITALS
WEIGHT: 184 LBS | DIASTOLIC BLOOD PRESSURE: 70 MMHG | SYSTOLIC BLOOD PRESSURE: 124 MMHG | OXYGEN SATURATION: 99 % | HEIGHT: 66 IN | TEMPERATURE: 98.5 F | BODY MASS INDEX: 29.57 KG/M2 | HEART RATE: 56 BPM

## 2023-10-20 DIAGNOSIS — I10 ESSENTIAL HYPERTENSION: ICD-10-CM

## 2023-10-20 DIAGNOSIS — E03.9 ACQUIRED HYPOTHYROIDISM: ICD-10-CM

## 2023-10-20 DIAGNOSIS — Z85.820 HISTORY OF MELANOMA: ICD-10-CM

## 2023-10-20 DIAGNOSIS — Z12.11 COLON CANCER SCREENING: ICD-10-CM

## 2023-10-20 DIAGNOSIS — I63.50 CEREBROVASCULAR ACCIDENT (CVA) DUE TO OCCLUSION OF CEREBRAL ARTERY (HCC): ICD-10-CM

## 2023-10-20 DIAGNOSIS — M54.41 CHRONIC BILATERAL LOW BACK PAIN WITH BILATERAL SCIATICA: ICD-10-CM

## 2023-10-20 DIAGNOSIS — F32.1 CURRENT MODERATE EPISODE OF MAJOR DEPRESSIVE DISORDER WITHOUT PRIOR EPISODE (HCC): ICD-10-CM

## 2023-10-20 DIAGNOSIS — M54.16 LUMBAR RADICULOPATHY: ICD-10-CM

## 2023-10-20 DIAGNOSIS — R60.0 BILATERAL LEG EDEMA: ICD-10-CM

## 2023-10-20 DIAGNOSIS — E11.69 TYPE 2 DIABETES MELLITUS WITH OTHER SPECIFIED COMPLICATION, WITH LONG-TERM CURRENT USE OF INSULIN (HCC): ICD-10-CM

## 2023-10-20 DIAGNOSIS — K21.9 GASTROESOPHAGEAL REFLUX DISEASE WITHOUT ESOPHAGITIS: ICD-10-CM

## 2023-10-20 DIAGNOSIS — F41.1 GAD (GENERALIZED ANXIETY DISORDER): Primary | ICD-10-CM

## 2023-10-20 DIAGNOSIS — L81.9 PIGMENTED SKIN LESION: ICD-10-CM

## 2023-10-20 DIAGNOSIS — E11.42 DIABETIC POLYNEUROPATHY ASSOCIATED WITH TYPE 2 DIABETES MELLITUS (HCC): ICD-10-CM

## 2023-10-20 DIAGNOSIS — Z79.899 MEDICATION MANAGEMENT: ICD-10-CM

## 2023-10-20 DIAGNOSIS — Z79.4 TYPE 2 DIABETES MELLITUS WITH OTHER SPECIFIED COMPLICATION, WITH LONG-TERM CURRENT USE OF INSULIN (HCC): ICD-10-CM

## 2023-10-20 DIAGNOSIS — R11.0 NAUSEA: ICD-10-CM

## 2023-10-20 DIAGNOSIS — G89.29 CHRONIC BILATERAL LOW BACK PAIN WITH BILATERAL SCIATICA: ICD-10-CM

## 2023-10-20 DIAGNOSIS — Z12.31 ENCOUNTER FOR SCREENING MAMMOGRAM FOR MALIGNANT NEOPLASM OF BREAST: ICD-10-CM

## 2023-10-20 DIAGNOSIS — Z78.0 POST-MENOPAUSAL: ICD-10-CM

## 2023-10-20 DIAGNOSIS — B37.2 SKIN CANDIDIASIS: ICD-10-CM

## 2023-10-20 DIAGNOSIS — M54.42 CHRONIC BILATERAL LOW BACK PAIN WITH BILATERAL SCIATICA: ICD-10-CM

## 2023-10-20 DIAGNOSIS — E87.6 CHRONIC HYPOKALEMIA: ICD-10-CM

## 2023-10-20 PROBLEM — F41.9 ANXIETY DISORDER: Status: ACTIVE | Noted: 2021-09-12

## 2023-10-20 PROBLEM — M43.10 ACQUIRED SPONDYLOLISTHESIS: Status: ACTIVE | Noted: 2022-03-22

## 2023-10-20 PROBLEM — M51.379 DEGENERATION OF LUMBAR OR LUMBOSACRAL INTERVERTEBRAL DISC: Status: ACTIVE | Noted: 2022-03-22

## 2023-10-20 PROBLEM — E55.9 VITAMIN D DEFICIENCY: Status: ACTIVE | Noted: 2022-08-11

## 2023-10-20 PROBLEM — G62.9 NEUROPATHY: Status: ACTIVE | Noted: 2022-08-11

## 2023-10-20 PROBLEM — E78.2 MIXED DIABETIC HYPERLIPIDEMIA ASSOCIATED WITH TYPE 2 DIABETES MELLITUS (HCC): Status: ACTIVE | Noted: 2018-01-25

## 2023-10-20 PROBLEM — I63.9 CEREBROVASCULAR ACCIDENT (CVA) (HCC): Status: ACTIVE | Noted: 2022-08-11

## 2023-10-20 PROBLEM — E78.5 HYPERLIPIDEMIA: Status: ACTIVE | Noted: 2023-10-20

## 2023-10-20 PROBLEM — E11.3593 PROLIFERATIVE DIABETIC RETINOPATHY OF BOTH EYES ASSOCIATED WITH TYPE 2 DIABETES MELLITUS (HCC): Status: ACTIVE | Noted: 2018-01-25

## 2023-10-20 PROBLEM — I87.2 VENOUS INSUFFICIENCY OF BOTH LOWER EXTREMITIES: Status: ACTIVE | Noted: 2018-04-13

## 2023-10-20 PROBLEM — E11.9 TYPE 2 DIABETES MELLITUS (HCC): Status: ACTIVE | Noted: 2023-10-20

## 2023-10-20 PROBLEM — M51.37 DEGENERATION OF LUMBAR OR LUMBOSACRAL INTERVERTEBRAL DISC: Status: ACTIVE | Noted: 2022-03-22

## 2023-10-20 PROBLEM — E11.21 DIABETIC RENAL DISEASE (HCC): Status: ACTIVE | Noted: 2022-08-11

## 2023-10-20 PROBLEM — E04.0 SIMPLE GOITER: Status: ACTIVE | Noted: 2018-01-26

## 2023-10-20 PROBLEM — N18.30 STAGE 3 CHRONIC KIDNEY DISEASE (HCC): Status: ACTIVE | Noted: 2021-09-12

## 2023-10-20 PROBLEM — C43.9 MALIGNANT MELANOMA (HCC): Status: ACTIVE | Noted: 2021-04-12

## 2023-10-20 PROBLEM — F17.200 SMOKING: Status: ACTIVE | Noted: 2018-01-26

## 2023-10-20 LAB
ALCOHOL URINE: NORMAL
AMPHETAMINE SCREEN, URINE: NORMAL
BARBITURATE SCREEN, URINE: NORMAL
BENZODIAZEPINE SCREEN, URINE: POSITIVE
BUPRENORPHINE URINE: NORMAL
COCAINE METABOLITE SCREEN URINE: NORMAL
FENTANYL SCREEN, URINE: NORMAL
GABAPENTIN SCREEN, URINE: NORMAL
MDMA URINE: NORMAL
METHADONE SCREEN, URINE: NORMAL
METHAMPHETAMINE, URINE: NORMAL
OPIATE SCREEN URINE: POSITIVE
OXYCODONE SCREEN URINE: NORMAL
PHENCYCLIDINE SCREEN URINE: NORMAL
PROPOXYPHENE SCREEN, URINE: NORMAL
SYNTHETIC CANNABINOIDS(K2) SCREEN, URINE: NORMAL
THC SCREEN, URINE: NORMAL
TRAMADOL SCREEN URINE: NORMAL
TRICYCLIC ANTIDEPRESSANTS, UR: NORMAL

## 2023-10-20 RX ORDER — LEVOTHYROXINE SODIUM 112 UG/1
112 TABLET ORAL DAILY
COMMUNITY
End: 2023-10-26 | Stop reason: SDUPTHER

## 2023-10-20 RX ORDER — PRAVASTATIN SODIUM 20 MG
TABLET ORAL
COMMUNITY
Start: 2023-10-18 | End: 2023-10-20 | Stop reason: SINTOL

## 2023-10-20 RX ORDER — HYDROCODONE BITARTRATE AND ACETAMINOPHEN 10; 325 MG/1; MG/1
1 TABLET ORAL EVERY 6 HOURS PRN
Qty: 120 TABLET | Refills: 0 | Status: CANCELLED | OUTPATIENT
Start: 2023-10-20 | End: 2023-11-19

## 2023-10-20 RX ORDER — CYCLOBENZAPRINE HCL 10 MG
10 TABLET ORAL 3 TIMES DAILY PRN
COMMUNITY
End: 2023-10-26 | Stop reason: SDUPTHER

## 2023-10-20 RX ORDER — NYSTATIN 100000 U/G
CREAM TOPICAL
COMMUNITY
Start: 2023-07-17 | End: 2023-10-23 | Stop reason: SDUPTHER

## 2023-10-20 RX ORDER — PROMETHAZINE HYDROCHLORIDE 25 MG/1
TABLET ORAL
COMMUNITY
Start: 2023-10-10 | End: 2023-10-23 | Stop reason: SDUPTHER

## 2023-10-20 SDOH — ECONOMIC STABILITY: FOOD INSECURITY: WITHIN THE PAST 12 MONTHS, YOU WORRIED THAT YOUR FOOD WOULD RUN OUT BEFORE YOU GOT MONEY TO BUY MORE.: NEVER TRUE

## 2023-10-20 SDOH — ECONOMIC STABILITY: INCOME INSECURITY: HOW HARD IS IT FOR YOU TO PAY FOR THE VERY BASICS LIKE FOOD, HOUSING, MEDICAL CARE, AND HEATING?: NOT HARD AT ALL

## 2023-10-20 SDOH — ECONOMIC STABILITY: HOUSING INSECURITY
IN THE LAST 12 MONTHS, WAS THERE A TIME WHEN YOU DID NOT HAVE A STEADY PLACE TO SLEEP OR SLEPT IN A SHELTER (INCLUDING NOW)?: NO

## 2023-10-20 SDOH — ECONOMIC STABILITY: FOOD INSECURITY: WITHIN THE PAST 12 MONTHS, THE FOOD YOU BOUGHT JUST DIDN'T LAST AND YOU DIDN'T HAVE MONEY TO GET MORE.: NEVER TRUE

## 2023-10-20 ASSESSMENT — PATIENT HEALTH QUESTIONNAIRE - PHQ9
2. FEELING DOWN, DEPRESSED OR HOPELESS: 3
7. TROUBLE CONCENTRATING ON THINGS, SUCH AS READING THE NEWSPAPER OR WATCHING TELEVISION: 0
3. TROUBLE FALLING OR STAYING ASLEEP: 3
5. POOR APPETITE OR OVEREATING: 1
SUM OF ALL RESPONSES TO PHQ QUESTIONS 1-9: 11
8. MOVING OR SPEAKING SO SLOWLY THAT OTHER PEOPLE COULD HAVE NOTICED. OR THE OPPOSITE, BEING SO FIGETY OR RESTLESS THAT YOU HAVE BEEN MOVING AROUND A LOT MORE THAN USUAL: 0
SUM OF ALL RESPONSES TO PHQ QUESTIONS 1-9: 11
SUM OF ALL RESPONSES TO PHQ QUESTIONS 1-9: 11
SUM OF ALL RESPONSES TO PHQ9 QUESTIONS 1 & 2: 4
4. FEELING TIRED OR HAVING LITTLE ENERGY: 3
6. FEELING BAD ABOUT YOURSELF - OR THAT YOU ARE A FAILURE OR HAVE LET YOURSELF OR YOUR FAMILY DOWN: 0
SUM OF ALL RESPONSES TO PHQ QUESTIONS 1-9: 11
1. LITTLE INTEREST OR PLEASURE IN DOING THINGS: 1
9. THOUGHTS THAT YOU WOULD BE BETTER OFF DEAD, OR OF HURTING YOURSELF: 0
10. IF YOU CHECKED OFF ANY PROBLEMS, HOW DIFFICULT HAVE THESE PROBLEMS MADE IT FOR YOU TO DO YOUR WORK, TAKE CARE OF THINGS AT HOME, OR GET ALONG WITH OTHER PEOPLE: 2

## 2023-10-20 NOTE — PROGRESS NOTES
Reason For Visit:   Emmie Mckeon is a 70 y.o. female who presents to the office to establish with myself today. She was previously seeing myself in [place], [place] for her primary care. She has been seeing other people at [facility] since that time. Combined HPI and A/P:      Diagnosis Orders   1. HERMILO (generalized anxiety disorder)        2. Chronic bilateral low back pain with bilateral sciatica        3. Type 2 diabetes mellitus with other specified complication, with long-term current use of insulin (HCC)  Diabetic Foot Exam    Diabetic Shoe    DISCONTINUED: pioglitazone (ACTOS) 15 MG tablet      4. Diabetic polyneuropathy associated with type 2 diabetes mellitus ([address])        5. Acquired hypothyroidism        6. Gastroesophageal reflux disease without esophagitis  DISCONTINUED: pantoprazole (PROTONIX) 40 MG tablet      7. Pigmented skin lesion  External Referral To Dermatology      8. Skin candidiasis  DISCONTINUED: nystatin (MYCOSTATIN) 656936 UNIT/GM cream      9. Cerebrovascular accident (CVA) due to occlusion of cerebral artery (HCC)  DISCONTINUED: clopidogrel (PLAVIX) 75 MG tablet      10. Current moderate episode of major depressive disorder without prior episode ([address])        11. Colon cancer screening        12. Encounter for screening mammogram for malignant neoplasm of breast  KATLIN DIGITAL SCREEN W OR WO CAD BILATERAL      13. Post-menopausal  DEXA BONE DENSITY 2 SITES      14. Medication management  POCT Rapid Drug Screen      15. Lumbar radiculopathy  DISCONTINUED: gabapentin (NEURONTIN) 300 MG capsule    DISCONTINUED: oxyCODONE-acetaminophen (PERCOCET) 7.5-325 MG per tablet      16. Bilateral leg edema  DISCONTINUED: furosemide (LASIX) 40 MG tablet      17. Essential hypertension  DISCONTINUED: lisinopril (PRINIVIL;ZESTRIL) 10 MG tablet      18. Chronic hypokalemia  DISCONTINUED: potassium chloride (KLOR-CON) 10 MEQ extended release tablet      19.  Nausea  DISCONTINUED: promethazine (PHENERGAN) 25 MG

## 2023-10-23 ENCOUNTER — TELEPHONE (OUTPATIENT)
Dept: PRIMARY CARE CLINIC | Age: 63
End: 2023-10-23

## 2023-10-23 DIAGNOSIS — E87.6 CHRONIC HYPOKALEMIA: ICD-10-CM

## 2023-10-23 DIAGNOSIS — K21.9 GASTROESOPHAGEAL REFLUX DISEASE WITHOUT ESOPHAGITIS: ICD-10-CM

## 2023-10-23 DIAGNOSIS — E03.9 ACQUIRED HYPOTHYROIDISM: ICD-10-CM

## 2023-10-23 DIAGNOSIS — B37.2 SKIN CANDIDIASIS: ICD-10-CM

## 2023-10-23 DIAGNOSIS — I10 ESSENTIAL HYPERTENSION: ICD-10-CM

## 2023-10-23 DIAGNOSIS — R60.0 BILATERAL LEG EDEMA: ICD-10-CM

## 2023-10-23 DIAGNOSIS — E11.69 TYPE 2 DIABETES MELLITUS WITH OTHER SPECIFIED COMPLICATION, WITH LONG-TERM CURRENT USE OF INSULIN (HCC): ICD-10-CM

## 2023-10-23 DIAGNOSIS — F41.1 GENERALIZED ANXIETY DISORDER WITH PANIC ATTACKS: ICD-10-CM

## 2023-10-23 DIAGNOSIS — I25.10 CORONARY ARTERY DISEASE INVOLVING NATIVE CORONARY ARTERY OF NATIVE HEART WITHOUT ANGINA PECTORIS: Primary | ICD-10-CM

## 2023-10-23 DIAGNOSIS — F41.0 GENERALIZED ANXIETY DISORDER WITH PANIC ATTACKS: ICD-10-CM

## 2023-10-23 DIAGNOSIS — R11.0 NAUSEA: ICD-10-CM

## 2023-10-23 DIAGNOSIS — I63.50 CEREBROVASCULAR ACCIDENT (CVA) DUE TO OCCLUSION OF CEREBRAL ARTERY (HCC): ICD-10-CM

## 2023-10-23 DIAGNOSIS — M54.16 LUMBAR RADICULOPATHY: ICD-10-CM

## 2023-10-23 DIAGNOSIS — Z79.4 TYPE 2 DIABETES MELLITUS WITH OTHER SPECIFIED COMPLICATION, WITH LONG-TERM CURRENT USE OF INSULIN (HCC): ICD-10-CM

## 2023-10-23 RX ORDER — CLOPIDOGREL BISULFATE 75 MG/1
75 TABLET ORAL DAILY
Qty: 90 TABLET | Refills: 1 | Status: SHIPPED | OUTPATIENT
Start: 2023-10-23 | End: 2023-10-26 | Stop reason: SDUPTHER

## 2023-10-23 RX ORDER — PIOGLITAZONEHYDROCHLORIDE 15 MG/1
15 TABLET ORAL DAILY
Qty: 90 TABLET | Refills: 2 | Status: SHIPPED | OUTPATIENT
Start: 2023-10-23 | End: 2023-10-26 | Stop reason: SDUPTHER

## 2023-10-23 RX ORDER — PANTOPRAZOLE SODIUM 40 MG/1
40 TABLET, DELAYED RELEASE ORAL DAILY
Qty: 90 TABLET | Refills: 2 | Status: SHIPPED | OUTPATIENT
Start: 2023-10-23 | End: 2023-10-26 | Stop reason: SDUPTHER

## 2023-10-23 RX ORDER — OXYCODONE AND ACETAMINOPHEN 7.5; 325 MG/1; MG/1
1 TABLET ORAL EVERY 8 HOURS PRN
Qty: 90 TABLET | Refills: 0 | Status: SHIPPED | OUTPATIENT
Start: 2023-10-23 | End: 2023-10-26 | Stop reason: SDUPTHER

## 2023-10-23 RX ORDER — FUROSEMIDE 40 MG/1
20 TABLET ORAL DAILY
Qty: 90 TABLET | Refills: 2 | Status: SHIPPED | OUTPATIENT
Start: 2023-10-23 | End: 2023-10-26 | Stop reason: SDUPTHER

## 2023-10-23 RX ORDER — LISINOPRIL 10 MG/1
10 TABLET ORAL DAILY
Qty: 90 TABLET | Refills: 3 | Status: SHIPPED | OUTPATIENT
Start: 2023-10-23 | End: 2023-10-26 | Stop reason: SDUPTHER

## 2023-10-23 RX ORDER — PROMETHAZINE HYDROCHLORIDE 25 MG/1
25 TABLET ORAL EVERY 8 HOURS PRN
Qty: 90 TABLET | Refills: 0 | Status: SHIPPED | OUTPATIENT
Start: 2023-10-23 | End: 2023-10-26 | Stop reason: SDUPTHER

## 2023-10-23 RX ORDER — GABAPENTIN 300 MG/1
300 CAPSULE ORAL 3 TIMES DAILY
Qty: 90 CAPSULE | Refills: 0 | Status: SHIPPED | OUTPATIENT
Start: 2023-10-23 | End: 2023-10-26 | Stop reason: SDUPTHER

## 2023-10-23 RX ORDER — NYSTATIN 100000 U/G
CREAM TOPICAL
Qty: 30 G | Refills: 2 | Status: SHIPPED | OUTPATIENT
Start: 2023-10-23 | End: 2023-10-26 | Stop reason: SDUPTHER

## 2023-10-23 RX ORDER — POTASSIUM CHLORIDE 750 MG/1
10 TABLET, FILM COATED, EXTENDED RELEASE ORAL 2 TIMES DAILY
Qty: 180 TABLET | Refills: 1 | Status: SHIPPED | OUTPATIENT
Start: 2023-10-23 | End: 2023-10-26 | Stop reason: SDUPTHER

## 2023-10-23 NOTE — TELEPHONE ENCOUNTER
Bogdan Lama stated her pharmacy didn't receive her refills. She reported that Dr. Rock Shirley was changing her pain medication Percocet 10 and Clearmax.

## 2023-10-23 NOTE — TELEPHONE ENCOUNTER
Patient states she forgot to inform when she called in earlier today that she also needs two more of her meds refill    furosemide (LASIX) 40 MG tablet   10/23/23  --  Williams Mcdonough MD    Take 0.5 tablets by mouth daily     Vitamin d 50,000 units ? Quantity of 4 for 1 month? Please add on to previous meds patient called in this morning?

## 2023-10-24 ENCOUNTER — TELEPHONE (OUTPATIENT)
Dept: NEUROSURGERY | Facility: CLINIC | Age: 63
End: 2023-10-24
Payer: MEDICARE

## 2023-10-24 ENCOUNTER — TELEPHONE (OUTPATIENT)
Dept: PRIMARY CARE CLINIC | Age: 63
End: 2023-10-24

## 2023-10-24 DIAGNOSIS — E11.69 TYPE 2 DIABETES MELLITUS WITH OTHER SPECIFIED COMPLICATION, WITH LONG-TERM CURRENT USE OF INSULIN (HCC): Primary | ICD-10-CM

## 2023-10-24 DIAGNOSIS — Z79.4 TYPE 2 DIABETES MELLITUS WITH OTHER SPECIFIED COMPLICATION, WITH LONG-TERM CURRENT USE OF INSULIN (HCC): Primary | ICD-10-CM

## 2023-10-24 DIAGNOSIS — L98.9 SKIN LESION: Primary | ICD-10-CM

## 2023-10-24 RX ORDER — DIPHENHYDRAMINE HYDROCHLORIDE 25 MG/1
1 CAPSULE, LIQUID FILLED ORAL
Qty: 1 KIT | Refills: 0 | Status: SHIPPED | OUTPATIENT
Start: 2023-10-24

## 2023-10-24 RX ORDER — GLUCOSAMINE HCL/CHONDROITIN SU 500-400 MG
CAPSULE ORAL
Qty: 50 STRIP | Refills: 5 | Status: SHIPPED | OUTPATIENT
Start: 2023-10-24

## 2023-10-24 NOTE — TELEPHONE ENCOUNTER
Neel Ramos called requesting a refill of the below medication which has been pended for you:     Requested Prescriptions     Pending Prescriptions Disp Refills    Blood Glucose Monitoring Suppl (BLOOD GLUCOSE MONITOR SYSTEM) w/Device KIT 1 kit 0     Si Device by Does not apply route three times daily    blood glucose monitor strips 50 strip 5     Sig: Test 1 time a day       Last Appointment Date: 10/20/2023  Next Appointment Date: 2024    Allergies   Allergen Reactions    Penicillins Anaphylaxis and Rash     Reaction: rash      Sulfa Antibiotics      Reaction: GI upset    Morphine Itching

## 2023-10-24 NOTE — TELEPHONE ENCOUNTER
I did call the patient to let her know that I would talk with Dr Fischer and let her know what he says; however, she didn't answer and she doesn't have a VM set up.    PANCHO EVANS CMA        IT IS OKAY FOR THE HUB TO DELIVER THIS INFORMATION TO THE PATIENT IF THEY RECEIVE THIS CALL BACK

## 2023-10-24 NOTE — TELEPHONE ENCOUNTER
Fritz Mendes stated that the Nystatin cream is not helping the rash on her lower back. She is asking if you will prescribe  Betamethasone Dipropionate? This is what her daughter uses on the same rash. Also she is asking about the depression medication that Dr. Obdulia Argueta had talked about at her appointment.

## 2023-10-24 NOTE — TELEPHONE ENCOUNTER
Patient had an MRI on 10/16/23 and doesn't have an appt with Dr Lynch until 1/25/2024.  She called today to see if she could find out the results of the MRI due to her increasing pain.    I will forward the MRI to Dr Lynch for discussion.    PANCHO LYNCH  [facility] NEUROSURGERY

## 2023-10-25 ENCOUNTER — TELEPHONE (OUTPATIENT)
Dept: PRIMARY CARE CLINIC | Age: 63
End: 2023-10-25

## 2023-10-25 NOTE — TELEPHONE ENCOUNTER
Patient called she is almost out of all of her medications. She is asking that her medication to be sent to the pharmacy.  They have been pend for refill

## 2023-10-26 RX ORDER — POTASSIUM CHLORIDE 750 MG/1
10 TABLET, FILM COATED, EXTENDED RELEASE ORAL 2 TIMES DAILY
Qty: 180 TABLET | Refills: 1 | Status: SHIPPED | OUTPATIENT
Start: 2023-10-26

## 2023-10-26 RX ORDER — GABAPENTIN 300 MG/1
300 CAPSULE ORAL 3 TIMES DAILY
Qty: 90 CAPSULE | Refills: 0 | Status: SHIPPED | OUTPATIENT
Start: 2023-10-26 | End: 2023-11-25

## 2023-10-26 RX ORDER — NYSTATIN 100000 U/G
CREAM TOPICAL
Qty: 30 G | Refills: 2 | Status: SHIPPED | OUTPATIENT
Start: 2023-10-26

## 2023-10-26 RX ORDER — LIRAGLUTIDE 6 MG/ML
7.2 INJECTION SUBCUTANEOUS DAILY
Qty: 2 ADJUSTABLE DOSE PRE-FILLED PEN SYRINGE | Refills: 5 | Status: SHIPPED | OUTPATIENT
Start: 2023-10-26

## 2023-10-26 RX ORDER — CYCLOBENZAPRINE HCL 10 MG
10 TABLET ORAL 3 TIMES DAILY PRN
Qty: 30 TABLET | Refills: 5 | Status: SHIPPED | OUTPATIENT
Start: 2023-10-26

## 2023-10-26 RX ORDER — OXYCODONE AND ACETAMINOPHEN 7.5; 325 MG/1; MG/1
1 TABLET ORAL EVERY 8 HOURS PRN
Qty: 90 TABLET | Refills: 0 | Status: SHIPPED | OUTPATIENT
Start: 2023-10-26 | End: 2023-11-25

## 2023-10-26 RX ORDER — ONDANSETRON 4 MG/1
4 TABLET, FILM COATED ORAL EVERY 8 HOURS PRN
Qty: 30 TABLET | Refills: 1 | Status: SHIPPED | OUTPATIENT
Start: 2023-10-26

## 2023-10-26 RX ORDER — MELOXICAM 15 MG/1
15 TABLET ORAL DAILY
Qty: 30 TABLET | Refills: 0 | Status: SHIPPED | OUTPATIENT
Start: 2023-10-26

## 2023-10-26 RX ORDER — PROMETHAZINE HYDROCHLORIDE 25 MG/1
25 TABLET ORAL EVERY 8 HOURS PRN
Qty: 90 TABLET | Refills: 0 | Status: SHIPPED | OUTPATIENT
Start: 2023-10-26

## 2023-10-26 RX ORDER — INSULIN ASPART 100 [IU]/ML
10 INJECTION, SOLUTION INTRAVENOUS; SUBCUTANEOUS
Qty: 5 ADJUSTABLE DOSE PRE-FILLED PEN SYRINGE | Refills: 3 | Status: SHIPPED | OUTPATIENT
Start: 2023-10-26

## 2023-10-26 RX ORDER — ALPRAZOLAM 1 MG/1
1 TABLET ORAL 4 TIMES DAILY PRN
Qty: 120 TABLET | Refills: 0 | Status: SHIPPED | OUTPATIENT
Start: 2023-10-26 | End: 2023-11-25

## 2023-10-26 RX ORDER — CLOPIDOGREL BISULFATE 75 MG/1
75 TABLET ORAL DAILY
Qty: 90 TABLET | Refills: 1 | Status: SHIPPED | OUTPATIENT
Start: 2023-10-26

## 2023-10-26 RX ORDER — FUROSEMIDE 40 MG/1
20 TABLET ORAL DAILY
Qty: 90 TABLET | Refills: 2 | Status: SHIPPED | OUTPATIENT
Start: 2023-10-26

## 2023-10-26 RX ORDER — LISINOPRIL 10 MG/1
10 TABLET ORAL DAILY
Qty: 90 TABLET | Refills: 3 | Status: SHIPPED | OUTPATIENT
Start: 2023-10-26

## 2023-10-26 RX ORDER — PANTOPRAZOLE SODIUM 40 MG/1
40 TABLET, DELAYED RELEASE ORAL DAILY
Qty: 90 TABLET | Refills: 2 | Status: SHIPPED | OUTPATIENT
Start: 2023-10-26

## 2023-10-26 RX ORDER — LEVOTHYROXINE SODIUM 112 UG/1
112 TABLET ORAL DAILY
Qty: 30 TABLET | Refills: 5 | Status: SHIPPED | OUTPATIENT
Start: 2023-10-26

## 2023-10-26 RX ORDER — PIOGLITAZONEHYDROCHLORIDE 15 MG/1
15 TABLET ORAL DAILY
Qty: 90 TABLET | Refills: 2 | Status: SHIPPED | OUTPATIENT
Start: 2023-10-26

## 2023-10-26 NOTE — TELEPHONE ENCOUNTER
Per Dr Lynch:  Nothing to do at this point, give it more time, and keep scheduled appt.    I called patient and gave her this information.  She states she hit a wheelbarrow loaded with stuff with her car and had to get out and lift the wheelbarrow to move it.  She states that is when her back started hurting again.    I told her to not be lifting things like that and to give it time.  She expressed understanding.      PANCHO LYNCH  [facility] NEUROSURGERY

## 2023-10-27 ENCOUNTER — TELEPHONE (OUTPATIENT)
Dept: PRIMARY CARE CLINIC | Age: 63
End: 2023-10-27

## 2023-10-27 RX ORDER — BETAMETHASONE DIPROPIONATE 0.05 %
OINTMENT (GRAM) TOPICAL
Qty: 45 G | Refills: 0 | Status: SHIPPED | OUTPATIENT
Start: 2023-10-27

## 2023-10-27 NOTE — TELEPHONE ENCOUNTER
Patient calls stating she was to have a prescription sent to [facility] in [place] for anxiety that started with [facility] and was going to be a low dose. Please advise.

## 2023-11-02 ENCOUNTER — TELEPHONE (OUTPATIENT)
Dept: PRIMARY CARE CLINIC | Age: 63
End: 2023-11-02

## 2023-11-02 DIAGNOSIS — Z79.4 TYPE 2 DIABETES MELLITUS WITH OTHER SPECIFIED COMPLICATION, WITH LONG-TERM CURRENT USE OF INSULIN (HCC): ICD-10-CM

## 2023-11-02 DIAGNOSIS — F32.1 CURRENT MODERATE EPISODE OF MAJOR DEPRESSIVE DISORDER WITHOUT PRIOR EPISODE (HCC): Primary | ICD-10-CM

## 2023-11-02 DIAGNOSIS — E11.69 TYPE 2 DIABETES MELLITUS WITH OTHER SPECIFIED COMPLICATION, WITH LONG-TERM CURRENT USE OF INSULIN (HCC): ICD-10-CM

## 2023-11-02 DIAGNOSIS — K59.04 CHRONIC IDIOPATHIC CONSTIPATION: ICD-10-CM

## 2023-11-02 RX ORDER — POLYETHYLENE GLYCOL 3350 17 G/17G
17 POWDER, FOR SOLUTION ORAL DAILY
Qty: 510 G | Refills: 3 | Status: SHIPPED | OUTPATIENT
Start: 2023-11-02 | End: 2024-03-01

## 2023-11-02 RX ORDER — GLUCOSAMINE HCL/CHONDROITIN SU 500-400 MG
CAPSULE ORAL
Qty: 50 STRIP | Refills: 5 | Status: SHIPPED | OUTPATIENT
Start: 2023-11-02

## 2023-11-02 RX ORDER — DIPHENHYDRAMINE HYDROCHLORIDE 25 MG/1
1 CAPSULE, LIQUID FILLED ORAL
Qty: 1 KIT | Refills: 0 | Status: SHIPPED | OUTPATIENT
Start: 2023-11-02

## 2023-11-02 NOTE — TELEPHONE ENCOUNTER
Para Amara called to request a refill on her medication. Prescriptions didn't go throught to the pharmacy and patient request they go to [facility] in [facility].     Last office visit : 10/20/2023   Next office visit : 2024     Requested Prescriptions     Pending Prescriptions Disp Refills    Blood Glucose Monitoring Suppl (BLOOD GLUCOSE MONITOR SYSTEM) w/Device KIT 1 kit 0     Si Device by Does not apply route three times daily    blood glucose monitor strips 50 strip 5     Sig: Test 1 time a day            Luiz Davis LPN

## 2023-11-02 NOTE — TELEPHONE ENCOUNTER
Patient calls regarding Miralax and Vraylar to be sent to [facility] in [place]. Please advise.

## 2023-11-06 NOTE — TELEPHONE ENCOUNTER
Margorie Devon called requesting a refill of the below medication which has been pended for you:     Requested Prescriptions     Pending Prescriptions Disp Refills    Insulin Pen Needle 32G X 4 MM MISC 100 each 3     Si each by Does not apply route daily       Last Appointment Date: 10/20/2023  Next Appointment Date: 2024    Allergies   Allergen Reactions    Penicillins Anaphylaxis and Rash     Reaction: rash      Sulfa Antibiotics      Reaction: GI upset    Morphine Itching

## 2023-11-08 RX ORDER — BETAMETHASONE DIPROPIONATE 0.5 MG/G
CREAM TOPICAL
COMMUNITY
Start: 2023-10-27

## 2023-11-12 ASSESSMENT — ENCOUNTER SYMPTOMS
ABDOMINAL PAIN: 0
SHORTNESS OF BREATH: 0
NAUSEA: 0
CONSTIPATION: 0
TROUBLE SWALLOWING: 0
DIARRHEA: 0
BACK PAIN: 1
COUGH: 0
VOMITING: 0
WHEEZING: 0

## 2023-11-27 DIAGNOSIS — M54.16 LUMBAR RADICULOPATHY: ICD-10-CM

## 2023-11-27 RX ORDER — GABAPENTIN 300 MG/1
300 CAPSULE ORAL 3 TIMES DAILY
Qty: 90 CAPSULE | Refills: 0 | Status: SHIPPED | OUTPATIENT
Start: 2023-11-27 | End: 2023-12-27

## 2023-11-27 RX ORDER — OXYCODONE AND ACETAMINOPHEN 7.5; 325 MG/1; MG/1
1 TABLET ORAL EVERY 8 HOURS PRN
Qty: 90 TABLET | Refills: 0 | Status: SHIPPED | OUTPATIENT
Start: 2023-11-27 | End: 2023-12-27

## 2023-11-27 NOTE — TELEPHONE ENCOUNTER
Deonte Zayas called to request a refill on her medication. Patient only has medication for today. Last office visit : 10/20/2023   Next office visit : 1/25/2024       Medication Contract: 10/20/23   Last UDS: 10/20/23  Last Rx: 10/26/23    Amphetamine Screen, Urine   Date Value Ref Range Status   10/20/2023 neg  Final     Barbiturate Screen, Urine   Date Value Ref Range Status   10/20/2023 neg  Final     Benzodiazepine Screen, Urine   Date Value Ref Range Status   10/20/2023 positive  Final     Comment:     Pt takes Xanax 1 mg     Buprenorphine Urine   Date Value Ref Range Status   10/20/2023 neg  Final     Cocaine Metabolite Screen, Urine   Date Value Ref Range Status   10/20/2023 neg  Final     Gabapentin Screen, Urine   Date Value Ref Range Status   10/20/2023 neg  Final     MDMA, Urine   Date Value Ref Range Status   10/20/2023 neg  Final     Methamphetamine, Urine   Date Value Ref Range Status   10/20/2023 neg  Final     Opiate Scrn, Ur   Date Value Ref Range Status   10/20/2023 positive  Final     Comment:     Pt takes Norco      Oxycodone Screen, Ur   Date Value Ref Range Status   10/20/2023 neg  Final     PCP Screen, Urine   Date Value Ref Range Status   10/20/2023 neg  Final     Propoxyphene Screen, Urine   Date Value Ref Range Status   10/20/2023 neg  Final     THC Screen, Urine   Date Value Ref Range Status   10/20/2023 neg  Final     Tricyclic Antidepressants, Urine   Date Value Ref Range Status   10/20/2023 neg  Final           Requested Prescriptions     Pending Prescriptions Disp Refills    gabapentin (NEURONTIN) 300 MG capsule 90 capsule 0     Sig: Take 1 capsule by mouth 3 times daily for 30 days. Max Daily Amount: 900 mg    oxyCODONE-acetaminophen (PERCOCET) 7.5-325 MG per tablet 90 tablet 0     Sig: Take 1 tablet by mouth every 8 hours as needed for Pain for up to 30 days. Intended supply: 30 days Max Daily Amount: 3 tablets         Please approve or refuse this medication.

## 2023-12-14 DIAGNOSIS — F41.0 GENERALIZED ANXIETY DISORDER WITH PANIC ATTACKS: ICD-10-CM

## 2023-12-14 DIAGNOSIS — F41.1 GENERALIZED ANXIETY DISORDER WITH PANIC ATTACKS: ICD-10-CM

## 2023-12-14 RX ORDER — ALPRAZOLAM 1 MG/1
1 TABLET ORAL 4 TIMES DAILY PRN
Qty: 120 TABLET | Refills: 0 | Status: SHIPPED | OUTPATIENT
Start: 2023-12-14 | End: 2024-01-13

## 2023-12-14 NOTE — TELEPHONE ENCOUNTER
Janie Kirby called to request a refill on her medication. Last office visit : 10/20/2023   Next office visit : 1/25/2024     Last UDS:   Amphetamine Screen, Urine   Date Value Ref Range Status   10/20/2023 neg  Final     Barbiturate Screen, Urine   Date Value Ref Range Status   10/20/2023 neg  Final     Benzodiazepine Screen, Urine   Date Value Ref Range Status   10/20/2023 positive  Final     Comment:     Pt takes Xanax 1 mg     Buprenorphine Urine   Date Value Ref Range Status   10/20/2023 neg  Final     Cocaine Metabolite Screen, Urine   Date Value Ref Range Status   10/20/2023 neg  Final     Gabapentin Screen, Urine   Date Value Ref Range Status   10/20/2023 neg  Final     MDMA, Urine   Date Value Ref Range Status   10/20/2023 neg  Final     Methamphetamine, Urine   Date Value Ref Range Status   10/20/2023 neg  Final     Opiate Scrn, Ur   Date Value Ref Range Status   10/20/2023 positive  Final     Comment:     Pt takes Norco      Oxycodone Screen, Ur   Date Value Ref Range Status   10/20/2023 neg  Final     PCP Screen, Urine   Date Value Ref Range Status   10/20/2023 neg  Final     Propoxyphene Screen, Urine   Date Value Ref Range Status   10/20/2023 neg  Final     THC Screen, Urine   Date Value Ref Range Status   10/20/2023 neg  Final     Tricyclic Antidepressants, Urine   Date Value Ref Range Status   10/20/2023 neg  Final       Last Jerry Vani: 12/14/23  Medication Contract: 10/20/23   Last Fill: 10/26/23    Requested Prescriptions     Pending Prescriptions Disp Refills    ALPRAZolam (XANAX) 1 MG tablet 120 tablet 0     Sig: Take 1 tablet by mouth 4 times daily as needed for Anxiety for up to 30 days. Max Daily Amount: 4 mg                                                       Please approve or refuse this medication.    Toñito Snyder LPN

## 2023-12-27 DIAGNOSIS — M54.16 LUMBAR RADICULOPATHY: ICD-10-CM

## 2023-12-27 NOTE — TELEPHONE ENCOUNTER
Damien Townsend called to request a refill on her medication. Last office visit : 10/20/2023   Next office visit : 1/25/2024     Last UDS:   Amphetamine Screen, Urine   Date Value Ref Range Status   10/20/2023 neg  Final     Barbiturate Screen, Urine   Date Value Ref Range Status   10/20/2023 neg  Final     Benzodiazepine Screen, Urine   Date Value Ref Range Status   10/20/2023 positive  Final     Comment:     Pt takes Xanax 1 mg     Buprenorphine Urine   Date Value Ref Range Status   10/20/2023 neg  Final     Cocaine Metabolite Screen, Urine   Date Value Ref Range Status   10/20/2023 neg  Final     Gabapentin Screen, Urine   Date Value Ref Range Status   10/20/2023 neg  Final     MDMA, Urine   Date Value Ref Range Status   10/20/2023 neg  Final     Methamphetamine, Urine   Date Value Ref Range Status   10/20/2023 neg  Final     Opiate Scrn, Ur   Date Value Ref Range Status   10/20/2023 positive  Final     Comment:     Pt takes Norco      Oxycodone Screen, Ur   Date Value Ref Range Status   10/20/2023 neg  Final     PCP Screen, Urine   Date Value Ref Range Status   10/20/2023 neg  Final     Propoxyphene Screen, Urine   Date Value Ref Range Status   10/20/2023 neg  Final     THC Screen, Urine   Date Value Ref Range Status   10/20/2023 neg  Final     Tricyclic Antidepressants, Urine   Date Value Ref Range Status   10/20/2023 neg  Final       Last Iftikhar Ditto: 12/14/23  Medication Contract: 10/20/23   Last Fill: 11/27/23    Requested Prescriptions     Pending Prescriptions Disp Refills    oxyCODONE-acetaminophen (PERCOCET) 7.5-325 MG per tablet 90 tablet 0     Sig: Take 1 tablet by mouth every 8 hours as needed for Pain for up to 30 days. Intended supply: 30 days Max Daily Amount: 3 tablets    gabapentin (NEURONTIN) 300 MG capsule 90 capsule 0     Sig: Take 1 capsule by mouth 3 times daily for 30 days.  Max Daily Amount: 900 mg                                                       Please approve or refuse this

## 2023-12-28 RX ORDER — GABAPENTIN 300 MG/1
300 CAPSULE ORAL 3 TIMES DAILY
Qty: 90 CAPSULE | Refills: 0 | Status: SHIPPED | OUTPATIENT
Start: 2023-12-28 | End: 2024-01-27

## 2023-12-28 RX ORDER — OXYCODONE AND ACETAMINOPHEN 7.5; 325 MG/1; MG/1
1 TABLET ORAL EVERY 8 HOURS PRN
Qty: 90 TABLET | Refills: 0 | Status: SHIPPED | OUTPATIENT
Start: 2023-12-28 | End: 2024-01-27

## 2024-01-12 DIAGNOSIS — F41.0 GENERALIZED ANXIETY DISORDER WITH PANIC ATTACKS: ICD-10-CM

## 2024-01-12 DIAGNOSIS — F41.1 GENERALIZED ANXIETY DISORDER WITH PANIC ATTACKS: ICD-10-CM

## 2024-01-12 RX ORDER — ALPRAZOLAM 1 MG/1
1 TABLET ORAL 4 TIMES DAILY PRN
Qty: 120 TABLET | Refills: 0 | Status: SHIPPED | OUTPATIENT
Start: 2024-01-12 | End: 2024-02-11

## 2024-01-12 NOTE — TELEPHONE ENCOUNTER
Sierra AGUIRRE Shan called to request a refill on her medication.      Last office visit : 10/20/2023   Next office visit : 1/25/2024     Last UDS:   Amphetamine Screen, Urine   Date Value Ref Range Status   10/20/2023 neg  Final     Barbiturate Screen, Urine   Date Value Ref Range Status   10/20/2023 neg  Final     Benzodiazepine Screen, Urine   Date Value Ref Range Status   10/20/2023 positive  Final     Comment:     Pt takes Xanax 1 mg     Buprenorphine Urine   Date Value Ref Range Status   10/20/2023 neg  Final     Cocaine Metabolite Screen, Urine   Date Value Ref Range Status   10/20/2023 neg  Final     Gabapentin Screen, Urine   Date Value Ref Range Status   10/20/2023 neg  Final     MDMA, Urine   Date Value Ref Range Status   10/20/2023 neg  Final     Methamphetamine, Urine   Date Value Ref Range Status   10/20/2023 neg  Final     Opiate Scrn, Ur   Date Value Ref Range Status   10/20/2023 positive  Final     Comment:     Pt takes Norco      Oxycodone Screen, Ur   Date Value Ref Range Status   10/20/2023 neg  Final     PCP Screen, Urine   Date Value Ref Range Status   10/20/2023 neg  Final     Propoxyphene Screen, Urine   Date Value Ref Range Status   10/20/2023 neg  Final     THC Screen, Urine   Date Value Ref Range Status   10/20/2023 neg  Final     Tricyclic Antidepressants, Urine   Date Value Ref Range Status   10/20/2023 neg  Final       Last Yemi: 12/14/223  Medication Contract: 10/20/2023   Last Fill: 12/14/2023    Requested Prescriptions     Pending Prescriptions Disp Refills    ALPRAZolam (XANAX) 1 MG tablet 120 tablet 0     Sig: Take 1 tablet by mouth 4 times daily as needed for Anxiety for up to 30 days. Max Daily Amount: 4 mg         Please approve or refuse this medication.   Dolores Garner MA

## 2024-01-15 DIAGNOSIS — F41.1 GENERALIZED ANXIETY DISORDER WITH PANIC ATTACKS: ICD-10-CM

## 2024-01-15 DIAGNOSIS — F41.0 GENERALIZED ANXIETY DISORDER WITH PANIC ATTACKS: ICD-10-CM

## 2024-01-15 RX ORDER — ALPRAZOLAM 1 MG/1
TABLET ORAL
Qty: 120 TABLET | Refills: 0 | OUTPATIENT
Start: 2024-01-15

## 2024-01-25 ENCOUNTER — OFFICE VISIT (OUTPATIENT)
Dept: PRIMARY CARE CLINIC | Age: 64
End: 2024-01-25
Payer: MEDICARE

## 2024-01-25 ENCOUNTER — TELEPHONE (OUTPATIENT)
Dept: PRIMARY CARE CLINIC | Age: 64
End: 2024-01-25

## 2024-01-25 VITALS
OXYGEN SATURATION: 98 % | SYSTOLIC BLOOD PRESSURE: 114 MMHG | DIASTOLIC BLOOD PRESSURE: 62 MMHG | WEIGHT: 188.4 LBS | BODY MASS INDEX: 30.28 KG/M2 | TEMPERATURE: 97.1 F | HEART RATE: 78 BPM | HEIGHT: 66 IN

## 2024-01-25 DIAGNOSIS — M54.41 CHRONIC RIGHT-SIDED LOW BACK PAIN WITH RIGHT-SIDED SCIATICA: ICD-10-CM

## 2024-01-25 DIAGNOSIS — F41.1 GENERALIZED ANXIETY DISORDER WITH PANIC ATTACKS: ICD-10-CM

## 2024-01-25 DIAGNOSIS — H92.02 LEFT EAR PAIN: Primary | ICD-10-CM

## 2024-01-25 DIAGNOSIS — E11.3553 STABLE PROLIFERATIVE DIABETIC RETINOPATHY OF BOTH EYES ASSOCIATED WITH TYPE 2 DIABETES MELLITUS (HCC): ICD-10-CM

## 2024-01-25 DIAGNOSIS — N18.31 STAGE 3A CHRONIC KIDNEY DISEASE (HCC): ICD-10-CM

## 2024-01-25 DIAGNOSIS — M54.16 LUMBAR RADICULOPATHY: ICD-10-CM

## 2024-01-25 DIAGNOSIS — E55.9 VITAMIN D DEFICIENCY: ICD-10-CM

## 2024-01-25 DIAGNOSIS — E11.42 DIABETIC POLYNEUROPATHY ASSOCIATED WITH TYPE 2 DIABETES MELLITUS (HCC): ICD-10-CM

## 2024-01-25 DIAGNOSIS — F32.1 CURRENT MODERATE EPISODE OF MAJOR DEPRESSIVE DISORDER WITHOUT PRIOR EPISODE (HCC): ICD-10-CM

## 2024-01-25 DIAGNOSIS — F41.0 GENERALIZED ANXIETY DISORDER WITH PANIC ATTACKS: ICD-10-CM

## 2024-01-25 DIAGNOSIS — G89.29 CHRONIC RIGHT-SIDED LOW BACK PAIN WITH RIGHT-SIDED SCIATICA: ICD-10-CM

## 2024-01-25 PROCEDURE — G8427 DOCREV CUR MEDS BY ELIG CLIN: HCPCS | Performed by: FAMILY MEDICINE

## 2024-01-25 PROCEDURE — 3046F HEMOGLOBIN A1C LEVEL >9.0%: CPT | Performed by: FAMILY MEDICINE

## 2024-01-25 PROCEDURE — G8417 CALC BMI ABV UP PARAM F/U: HCPCS | Performed by: FAMILY MEDICINE

## 2024-01-25 PROCEDURE — 2022F DILAT RTA XM EVC RTNOPTHY: CPT | Performed by: FAMILY MEDICINE

## 2024-01-25 PROCEDURE — G8484 FLU IMMUNIZE NO ADMIN: HCPCS | Performed by: FAMILY MEDICINE

## 2024-01-25 PROCEDURE — 99214 OFFICE O/P EST MOD 30 MIN: CPT | Performed by: FAMILY MEDICINE

## 2024-01-25 PROCEDURE — 1123F ACP DISCUSS/DSCN MKR DOCD: CPT | Performed by: FAMILY MEDICINE

## 2024-01-25 PROCEDURE — 4004F PT TOBACCO SCREEN RCVD TLK: CPT | Performed by: FAMILY MEDICINE

## 2024-01-25 PROCEDURE — 3017F COLORECTAL CA SCREEN DOC REV: CPT | Performed by: FAMILY MEDICINE

## 2024-01-25 PROCEDURE — 1090F PRES/ABSN URINE INCON ASSESS: CPT | Performed by: FAMILY MEDICINE

## 2024-01-25 PROCEDURE — G8400 PT W/DXA NO RESULTS DOC: HCPCS | Performed by: FAMILY MEDICINE

## 2024-01-25 RX ORDER — CHOLECALCIFEROL (VITAMIN D3) 1250 MCG
CAPSULE ORAL
COMMUNITY
End: 2024-01-25 | Stop reason: SDUPTHER

## 2024-01-25 RX ORDER — CHOLECALCIFEROL (VITAMIN D3) 1250 MCG
1 CAPSULE ORAL WEEKLY
Qty: 12 CAPSULE | Refills: 3 | Status: SHIPPED | OUTPATIENT
Start: 2024-01-25

## 2024-01-25 RX ORDER — GABAPENTIN 300 MG/1
CAPSULE ORAL
Qty: 12090 CAPSULE | Refills: 2 | Status: SHIPPED | OUTPATIENT
Start: 2024-01-25 | End: 2024-04-25

## 2024-01-25 RX ORDER — OXYCODONE AND ACETAMINOPHEN 7.5; 325 MG/1; MG/1
1 TABLET ORAL EVERY 8 HOURS PRN
Qty: 90 TABLET | Refills: 0 | Status: CANCELLED | OUTPATIENT
Start: 2024-01-27 | End: 2024-02-26

## 2024-01-25 RX ORDER — OXYCODONE AND ACETAMINOPHEN 10; 325 MG/1; MG/1
1 TABLET ORAL EVERY 8 HOURS PRN
Qty: 90 TABLET | Refills: 0 | Status: SHIPPED | OUTPATIENT
Start: 2024-01-25 | End: 2024-02-24

## 2024-01-25 NOTE — PROGRESS NOTES
most part, but feels she has increased sciatic nerve pain at night. She denies any side effects from the medication.I will continue the Percocet at the current dose. I will increase the Gabapentin to 300 mg 1 tab in the morning and at noon, and 2 tablets at night.      - The patient UDS, drug contract, controlled prescription monitoring is up to date and appropriate.          Return in about 3 months (around 4/25/2024).    We discussed use, benefit, and side effects of prescribed medications.  All patient questions answered.   Patient agreed with treatment plan.   I reviewed available records in our system and care everywhere. In cases where records are not available, the records have been requested and will be reviewed when available.     Subjective      Past Surgical History:   Procedure Laterality Date    CARPAL TUNNEL RELEASE Right     FINGER SURGERY      LEFT POINTER FINGER PARTIALLY CUTOFF    FINGER SURGERY Left     LEFT THUMB FRACTURE WITH LIGAMENT REPAIR    FINGER TRIGGER RELEASE Right      Family History   Problem Relation Age of Onset    Other Mother         AAA    Stroke Father     High Blood Pressure Father     Diabetes Father     Heart Disease Father         CABG    Heart Attack Father 62        MI     Social History     Tobacco Use    Smoking status: Every Day     Current packs/day: 1.00     Average packs/day: 1 pack/day for 16.0 years (16.0 ttl pk-yrs)     Types: Cigarettes    Smokeless tobacco: Never   Substance Use Topics    Alcohol use: Yes     Comment: socially      Current Outpatient Medications   Medication Sig Dispense Refill    oxyCODONE-acetaminophen (PERCOCET)  MG per tablet Take 1 tablet by mouth every 8 hours as needed for Pain for up to 30 days. Intended supply: 30 days Max Daily Amount: 3 tablets 90 tablet 0    gabapentin (NEURONTIN) 300 MG capsule Take one tablet in the morning and at noon, then take 2 tablets at night. Max 4 tablets per day. 25177 capsule 2    Cholecalciferol

## 2024-01-25 NOTE — TELEPHONE ENCOUNTER
Sierra stated she told the nurse that she is suppose to take Vitamin D and she needs a refill. I did not see this in her chart.

## 2024-01-31 PROBLEM — F32.1 CURRENT MODERATE EPISODE OF MAJOR DEPRESSIVE DISORDER WITHOUT PRIOR EPISODE (HCC): Status: ACTIVE | Noted: 2024-01-31

## 2024-01-31 ASSESSMENT — ENCOUNTER SYMPTOMS
DIARRHEA: 0
SHORTNESS OF BREATH: 0
VOMITING: 0
WHEEZING: 0
NAUSEA: 0
BACK PAIN: 1
CONSTIPATION: 0
COUGH: 0
ABDOMINAL PAIN: 0
TROUBLE SWALLOWING: 0

## 2024-02-02 ENCOUNTER — TELEPHONE (OUTPATIENT)
Dept: PRIMARY CARE CLINIC | Age: 64
End: 2024-02-02

## 2024-02-02 NOTE — TELEPHONE ENCOUNTER
Patient calls requesting a PA on her percocet. She stated she had to pay $ 60 last time the percocet was filled. Please advise.

## 2024-02-09 DIAGNOSIS — E87.6 CHRONIC HYPOKALEMIA: ICD-10-CM

## 2024-02-09 RX ORDER — POTASSIUM CHLORIDE 750 MG/1
10 TABLET, FILM COATED, EXTENDED RELEASE ORAL 2 TIMES DAILY
Qty: 180 TABLET | Refills: 1 | Status: SHIPPED | OUTPATIENT
Start: 2024-02-09

## 2024-02-09 NOTE — TELEPHONE ENCOUNTER
Sierra Torrez called to request a refill on her medication.      Last office visit : 1/25/2024   Next office visit : 4/26/2024     Requested Prescriptions     Pending Prescriptions Disp Refills    potassium chloride (KLOR-CON) 10 MEQ extended release tablet 180 tablet 1     Sig: Take 1 tablet by mouth 2 times daily            Dolores Garner MA

## 2024-02-09 NOTE — TELEPHONE ENCOUNTER
----- Message from Day Da Silva sent at 2/9/2024  9:33 AM CST -----  Subject: Refill Request    QUESTIONS  Name of Medication? potassium chloride (KLOR-CON) 10 MEQ extended release   tablet  Patient-reported dosage and instructions? twice daily  How many days do you have left? 0  Preferred Pharmacy? [facility]  Pharmacy phone number (if available)? [phone]  ---------------------------------------------------------------------------  --------------  CALL BACK INFO  What is the best way for the office to contact you? Do not leave any   message, patient will call back for answer  Preferred Call Back Phone Number? [phone]  ---------------------------------------------------------------------------  --------------  SCRIPT ANSWERS  Relationship to Patient? Self

## 2024-02-12 DIAGNOSIS — F41.0 GENERALIZED ANXIETY DISORDER WITH PANIC ATTACKS: ICD-10-CM

## 2024-02-12 DIAGNOSIS — F41.1 GENERALIZED ANXIETY DISORDER WITH PANIC ATTACKS: ICD-10-CM

## 2024-02-12 RX ORDER — ALPRAZOLAM 1 MG/1
1 TABLET ORAL 4 TIMES DAILY PRN
Qty: 120 TABLET | Refills: 0 | Status: SHIPPED | OUTPATIENT
Start: 2024-02-12 | End: 2024-03-13

## 2024-02-12 NOTE — TELEPHONE ENCOUNTER
Sierra AGUIRRE Shan called to request a refill on her medication.      Last office visit : 1/25/2024   Next office visit : 4/26/2024     Last UDS:   Amphetamine Screen, Urine   Date Value Ref Range Status   10/20/2023 neg  Final     Barbiturate Screen, Urine   Date Value Ref Range Status   10/20/2023 neg  Final     Benzodiazepine Screen, Urine   Date Value Ref Range Status   10/20/2023 positive  Final     Comment:     Pt takes Xanax 1 mg     Buprenorphine Urine   Date Value Ref Range Status   10/20/2023 neg  Final     Cocaine Metabolite Screen, Urine   Date Value Ref Range Status   10/20/2023 neg  Final     Gabapentin Screen, Urine   Date Value Ref Range Status   10/20/2023 neg  Final     MDMA, Urine   Date Value Ref Range Status   10/20/2023 neg  Final     Methamphetamine, Urine   Date Value Ref Range Status   10/20/2023 neg  Final     Opiate Scrn, Ur   Date Value Ref Range Status   10/20/2023 positive  Final     Comment:     Pt takes Norco      Oxycodone Screen, Ur   Date Value Ref Range Status   10/20/2023 neg  Final     PCP Screen, Urine   Date Value Ref Range Status   10/20/2023 neg  Final     Propoxyphene Screen, Urine   Date Value Ref Range Status   10/20/2023 neg  Final     THC Screen, Urine   Date Value Ref Range Status   10/20/2023 neg  Final     Tricyclic Antidepressants, Urine   Date Value Ref Range Status   10/20/2023 neg  Final       Last Yemi: 12/14/23  Medication Contract: 10/20/23   Last Fill: 1/12/24    Requested Prescriptions     Pending Prescriptions Disp Refills    ALPRAZolam (XANAX) 1 MG tablet 120 tablet 0     Sig: Take 1 tablet by mouth 4 times daily as needed for Anxiety for up to 30 days. Max Daily Amount: 4 mg         Please approve or refuse this medication.   Loly Huntley LPN

## 2024-02-13 DIAGNOSIS — F41.0 GENERALIZED ANXIETY DISORDER WITH PANIC ATTACKS: ICD-10-CM

## 2024-02-13 DIAGNOSIS — F41.1 GENERALIZED ANXIETY DISORDER WITH PANIC ATTACKS: ICD-10-CM

## 2024-02-13 RX ORDER — ALPRAZOLAM 1 MG/1
TABLET ORAL
Qty: 120 TABLET | Refills: 0 | OUTPATIENT
Start: 2024-02-13

## 2024-02-22 DIAGNOSIS — M54.16 LUMBAR RADICULOPATHY: ICD-10-CM

## 2024-02-22 RX ORDER — OXYCODONE AND ACETAMINOPHEN 10; 325 MG/1; MG/1
1 TABLET ORAL EVERY 8 HOURS PRN
Qty: 90 TABLET | Refills: 0 | Status: SHIPPED | OUTPATIENT
Start: 2024-02-24 | End: 2024-03-25

## 2024-02-22 NOTE — TELEPHONE ENCOUNTER
Sierra AGUIRRE Torrez called to request a refill on her medication.      Last office visit : 1/25/2024   Next office visit : 4/26/2024     Last UDS:   Amphetamine Screen, Urine   Date Value Ref Range Status   10/20/2023 neg  Final     Barbiturate Screen, Urine   Date Value Ref Range Status   10/20/2023 neg  Final     Benzodiazepine Screen, Urine   Date Value Ref Range Status   10/20/2023 positive  Final     Comment:     Pt takes Xanax 1 mg     Buprenorphine Urine   Date Value Ref Range Status   10/20/2023 neg  Final     Cocaine Metabolite Screen, Urine   Date Value Ref Range Status   10/20/2023 neg  Final     Gabapentin Screen, Urine   Date Value Ref Range Status   10/20/2023 neg  Final     MDMA, Urine   Date Value Ref Range Status   10/20/2023 neg  Final     Methamphetamine, Urine   Date Value Ref Range Status   10/20/2023 neg  Final     Opiate Scrn, Ur   Date Value Ref Range Status   10/20/2023 positive  Final     Comment:     Pt takes Norco      Oxycodone Screen, Ur   Date Value Ref Range Status   10/20/2023 neg  Final     PCP Screen, Urine   Date Value Ref Range Status   10/20/2023 neg  Final     Propoxyphene Screen, Urine   Date Value Ref Range Status   10/20/2023 neg  Final     THC Screen, Urine   Date Value Ref Range Status   10/20/2023 neg  Final     Tricyclic Antidepressants, Urine   Date Value Ref Range Status   10/20/2023 neg  Final       Last Yemi: 12/14/23  Medication Contract: 10/20/23   Last Fill: 1/25/24    Requested Prescriptions     Pending Prescriptions Disp Refills    oxyCODONE-acetaminophen (PERCOCET)  MG per tablet 90 tablet 0     Sig: Take 1 tablet by mouth every 8 hours as needed for Pain for up to 30 days. Intended supply: 30 days Max Daily Amount: 3 tablets                                                       Please approve or refuse this medication.   Loly Huntley LPN

## 2024-02-29 DIAGNOSIS — M54.16 LUMBAR RADICULOPATHY: ICD-10-CM

## 2024-02-29 RX ORDER — MELOXICAM 15 MG/1
TABLET ORAL
Qty: 90 TABLET | Refills: 3 | Status: SHIPPED | OUTPATIENT
Start: 2024-02-29

## 2024-03-07 ENCOUNTER — OFFICE VISIT (OUTPATIENT)
Dept: PRIMARY CARE CLINIC | Age: 64
End: 2024-03-07
Payer: MEDICARE

## 2024-03-07 VITALS
HEART RATE: 68 BPM | HEIGHT: 66 IN | DIASTOLIC BLOOD PRESSURE: 78 MMHG | OXYGEN SATURATION: 99 % | WEIGHT: 188 LBS | BODY MASS INDEX: 30.22 KG/M2 | SYSTOLIC BLOOD PRESSURE: 124 MMHG

## 2024-03-07 DIAGNOSIS — Z12.11 COLON CANCER SCREENING: ICD-10-CM

## 2024-03-07 DIAGNOSIS — K59.04 CHRONIC IDIOPATHIC CONSTIPATION: Primary | ICD-10-CM

## 2024-03-07 PROBLEM — I10 PRIMARY HYPERTENSION: Status: ACTIVE | Noted: 2024-01-18

## 2024-03-07 PROBLEM — E66.811 CLASS 1 OBESITY DUE TO EXCESS CALORIES WITH SERIOUS COMORBIDITY AND BODY MASS INDEX (BMI) OF 30.0 TO 30.9 IN ADULT: Status: ACTIVE | Noted: 2022-03-22

## 2024-03-07 PROBLEM — Z87.891 FORMER SMOKER: Status: ACTIVE | Noted: 2022-10-20

## 2024-03-07 PROBLEM — Z79.4 LONG TERM CURRENT USE OF INSULIN (HCC): Status: ACTIVE | Noted: 2022-08-11

## 2024-03-07 PROBLEM — E66.09 CLASS 1 OBESITY DUE TO EXCESS CALORIES WITH SERIOUS COMORBIDITY AND BODY MASS INDEX (BMI) OF 30.0 TO 30.9 IN ADULT: Status: ACTIVE | Noted: 2022-03-22

## 2024-03-07 PROCEDURE — 3074F SYST BP LT 130 MM HG: CPT | Performed by: FAMILY MEDICINE

## 2024-03-07 PROCEDURE — 1123F ACP DISCUSS/DSCN MKR DOCD: CPT | Performed by: FAMILY MEDICINE

## 2024-03-07 PROCEDURE — G8484 FLU IMMUNIZE NO ADMIN: HCPCS | Performed by: FAMILY MEDICINE

## 2024-03-07 PROCEDURE — G8400 PT W/DXA NO RESULTS DOC: HCPCS | Performed by: FAMILY MEDICINE

## 2024-03-07 PROCEDURE — 4004F PT TOBACCO SCREEN RCVD TLK: CPT | Performed by: FAMILY MEDICINE

## 2024-03-07 PROCEDURE — 3078F DIAST BP <80 MM HG: CPT | Performed by: FAMILY MEDICINE

## 2024-03-07 PROCEDURE — G8417 CALC BMI ABV UP PARAM F/U: HCPCS | Performed by: FAMILY MEDICINE

## 2024-03-07 PROCEDURE — 3017F COLORECTAL CA SCREEN DOC REV: CPT | Performed by: FAMILY MEDICINE

## 2024-03-07 PROCEDURE — 99214 OFFICE O/P EST MOD 30 MIN: CPT | Performed by: FAMILY MEDICINE

## 2024-03-07 PROCEDURE — G8427 DOCREV CUR MEDS BY ELIG CLIN: HCPCS | Performed by: FAMILY MEDICINE

## 2024-03-07 PROCEDURE — 1090F PRES/ABSN URINE INCON ASSESS: CPT | Performed by: FAMILY MEDICINE

## 2024-03-07 RX ORDER — LEVOTHYROXINE SODIUM 0.12 MG/1
125 TABLET ORAL DAILY
COMMUNITY
Start: 2024-02-17 | End: 2024-03-07 | Stop reason: DRUGHIGH

## 2024-03-07 RX ORDER — SEMAGLUTIDE 0.68 MG/ML
0.5 INJECTION, SOLUTION SUBCUTANEOUS WEEKLY
COMMUNITY
Start: 2024-02-13 | End: 2024-03-15

## 2024-03-07 ASSESSMENT — PATIENT HEALTH QUESTIONNAIRE - PHQ9
SUM OF ALL RESPONSES TO PHQ QUESTIONS 1-9: 2
5. POOR APPETITE OR OVEREATING: 0
SUM OF ALL RESPONSES TO PHQ QUESTIONS 1-9: 2
SUM OF ALL RESPONSES TO PHQ QUESTIONS 1-9: 2
10. IF YOU CHECKED OFF ANY PROBLEMS, HOW DIFFICULT HAVE THESE PROBLEMS MADE IT FOR YOU TO DO YOUR WORK, TAKE CARE OF THINGS AT HOME, OR GET ALONG WITH OTHER PEOPLE: 0
6. FEELING BAD ABOUT YOURSELF - OR THAT YOU ARE A FAILURE OR HAVE LET YOURSELF OR YOUR FAMILY DOWN: 0
2. FEELING DOWN, DEPRESSED OR HOPELESS: 1
SUM OF ALL RESPONSES TO PHQ9 QUESTIONS 1 & 2: 2
4. FEELING TIRED OR HAVING LITTLE ENERGY: 0
7. TROUBLE CONCENTRATING ON THINGS, SUCH AS READING THE NEWSPAPER OR WATCHING TELEVISION: 0
SUM OF ALL RESPONSES TO PHQ QUESTIONS 1-9: 2
1. LITTLE INTEREST OR PLEASURE IN DOING THINGS: 1
9. THOUGHTS THAT YOU WOULD BE BETTER OFF DEAD, OR OF HURTING YOURSELF: 0
8. MOVING OR SPEAKING SO SLOWLY THAT OTHER PEOPLE COULD HAVE NOTICED. OR THE OPPOSITE, BEING SO FIGETY OR RESTLESS THAT YOU HAVE BEEN MOVING AROUND A LOT MORE THAN USUAL: 0
3. TROUBLE FALLING OR STAYING ASLEEP: 0

## 2024-03-13 DIAGNOSIS — F41.1 GENERALIZED ANXIETY DISORDER WITH PANIC ATTACKS: ICD-10-CM

## 2024-03-13 DIAGNOSIS — F41.0 GENERALIZED ANXIETY DISORDER WITH PANIC ATTACKS: ICD-10-CM

## 2024-03-13 RX ORDER — PLECANATIDE 3 MG/1
1 TABLET ORAL DAILY
Qty: 12 TABLET | Refills: 0 | Status: SHIPPED | COMMUNITY
Start: 2024-03-13

## 2024-03-13 ASSESSMENT — ENCOUNTER SYMPTOMS
COUGH: 0
SHORTNESS OF BREATH: 0
DIARRHEA: 0
NAUSEA: 0
BACK PAIN: 1
WHEEZING: 0
CONSTIPATION: 1
ABDOMINAL PAIN: 1
TROUBLE SWALLOWING: 0
VOMITING: 0

## 2024-03-14 RX ORDER — ALPRAZOLAM 1 MG/1
TABLET ORAL
Qty: 120 TABLET | Refills: 2 | Status: SHIPPED | OUTPATIENT
Start: 2024-03-14 | End: 2024-06-12

## 2024-03-21 DIAGNOSIS — M54.16 LUMBAR RADICULOPATHY: ICD-10-CM

## 2024-03-21 NOTE — TELEPHONE ENCOUNTER
Sierra AGUIRRE Torrez called to request a refill on her medication.      Last office visit : 3/7/2024   Next office visit : 4/26/2024     Last Yemi: 12/14/23  Medication Contract: 10/20/23   Last UDS: 10/20/23  Last Rx: 2/24/24    Amphetamine Screen, Urine   Date Value Ref Range Status   10/20/2023 neg  Final     Barbiturate Screen, Urine   Date Value Ref Range Status   10/20/2023 neg  Final     Benzodiazepine Screen, Urine   Date Value Ref Range Status   10/20/2023 positive  Final     Comment:     Pt takes Xanax 1 mg     Buprenorphine Urine   Date Value Ref Range Status   10/20/2023 neg  Final     Cocaine Metabolite Screen, Urine   Date Value Ref Range Status   10/20/2023 neg  Final     Gabapentin Screen, Urine   Date Value Ref Range Status   10/20/2023 neg  Final     MDMA, Urine   Date Value Ref Range Status   10/20/2023 neg  Final     Methamphetamine, Urine   Date Value Ref Range Status   10/20/2023 neg  Final     Opiate Scrn, Ur   Date Value Ref Range Status   10/20/2023 positive  Final     Comment:     Pt takes Norco      Oxycodone Screen, Ur   Date Value Ref Range Status   10/20/2023 neg  Final     PCP Screen, Urine   Date Value Ref Range Status   10/20/2023 neg  Final     Propoxyphene Screen, Urine   Date Value Ref Range Status   10/20/2023 neg  Final     THC Screen, Urine   Date Value Ref Range Status   10/20/2023 neg  Final     Tricyclic Antidepressants, Urine   Date Value Ref Range Status   10/20/2023 neg  Final           Requested Prescriptions     Pending Prescriptions Disp Refills    oxyCODONE-acetaminophen (PERCOCET)  MG per tablet 90 tablet 0     Sig: Take 1 tablet by mouth every 8 hours as needed for Pain for up to 30 days. Intended supply: 30 days Max Daily Amount: 3 tablets         Please approve or refuse this medication.   Clarissa Durbin MA

## 2024-03-22 RX ORDER — OXYCODONE AND ACETAMINOPHEN 10; 325 MG/1; MG/1
1 TABLET ORAL EVERY 8 HOURS PRN
Qty: 90 TABLET | Refills: 0 | Status: SHIPPED | OUTPATIENT
Start: 2024-03-25 | End: 2024-04-24

## 2024-04-08 ENCOUNTER — TELEPHONE (OUTPATIENT)
Dept: PHARMACY | Facility: CLINIC | Age: 64
End: 2024-04-08

## 2024-04-08 NOTE — TELEPHONE ENCOUNTER
[facility] CLINICAL PHARMACY: ADHERENCE REVIEW  Identified care gap per [place] fills with  [facility]  Pharmacy: ACE/ARB adherence    Medicare and assisted Dual Special Needs Plan - MRDSNP  MA-PCPi  Per insurer report, LIS-0 - co-pays are based on tiers and patient is subject to coverage gap.  Patient also appears to be prescribed: Diabetes    ASSESSMENT    ACE/ARB ADHERENCE    Insurance Records claims through  24  (Prior Year PDC = not reported; YTD PDC = FIRST FILL; Potential Fail Date: 24):   LISINOPRIL TAB 10 MG last filled on 24 for 30 day supply. Next refill due: 24    Prescribed si tablet/capsule daily    Per Insurer Portal: last filled on 24 for 30 day supply.     Per [facility]  Pharmacy: last picked up on 24 for 30 day supply. Billed through [employer]. 1 refills remaining.    BP Readings from Last 3 Encounters:   24 124/78   24 114/62   10/20/23 124/70     CrCl cannot be calculated (Patient's most recent lab result is older than the maximum 180 days allowed.).  Lab Results   Component Value Date    CREATININE 1.10 2023     Lab Results   Component Value Date    K 4.3 2023     DIABETES ADHERENCE    Insurance Records claims through  24  (Prior Year PDC = not reported; YTD PDC = 100%; Potential Fail Date: 24):   OZEMPIC INJ 2MG/3 ML last filled on 24 for 28 day supply. Next refill due: 04.15.24    Prescribed sig:   Inject 0.5 mg into the skin once a week    Per Reconcile Dispense History and Insurer Portal: last filled on 24 for 28 day supply.     Per med list, OZEMPIC INJ 2MG/3 ML ended on 3.15.24. Is pt still taking?    Insurance Records claims through 24 (Prior Year PDC = not reported; YTD PDC = 100%; Potential Fail Date: 24):   PIOGLITAZONE TAB 15 MG last filled on 24 for 30 day supply. Next refill due: 24    Prescribed si tablet/capsule daily    Per Reconcile Dispense

## 2024-04-08 NOTE — TELEPHONE ENCOUNTER
Patient called back regarding adherence.     Patient stated she is still taking Ozempic 2mg/3ml. Stated she takes this every Wednesday each week. Patient did mention she is attempting to get a hold of her provider office recently to see if they can give her a stronger dose of medication for next fill because she feels current dose is not strong enough. Thanked her for calling back and letting us know.       Jaiden Castillo CPhT  [facility]   Phone: [phone], Option #3

## 2024-04-19 DIAGNOSIS — M54.16 LUMBAR RADICULOPATHY: ICD-10-CM

## 2024-04-19 RX ORDER — OXYCODONE AND ACETAMINOPHEN 10; 325 MG/1; MG/1
1 TABLET ORAL EVERY 8 HOURS PRN
Qty: 90 TABLET | Refills: 0 | Status: SHIPPED | OUTPATIENT
Start: 2024-04-19 | End: 2024-05-19

## 2024-04-19 NOTE — TELEPHONE ENCOUNTER
Sierra AGUIRRE Torrez called to request a refill on her medication.      Last office visit : 3/7/2024   Next office visit : 4/26/2024     Last UDS:   Amphetamine Screen, Urine   Date Value Ref Range Status   10/20/2023 neg  Final     Barbiturate Screen, Urine   Date Value Ref Range Status   10/20/2023 neg  Final     Benzodiazepine Screen, Urine   Date Value Ref Range Status   10/20/2023 positive  Final     Comment:     Pt takes Xanax 1 mg     Buprenorphine Urine   Date Value Ref Range Status   10/20/2023 neg  Final     Cocaine Metabolite Screen, Urine   Date Value Ref Range Status   10/20/2023 neg  Final     Gabapentin Screen, Urine   Date Value Ref Range Status   10/20/2023 neg  Final     MDMA, Urine   Date Value Ref Range Status   10/20/2023 neg  Final     Methamphetamine, Urine   Date Value Ref Range Status   10/20/2023 neg  Final     Opiate Scrn, Ur   Date Value Ref Range Status   10/20/2023 positive  Final     Comment:     Pt takes Norco      Oxycodone Screen, Ur   Date Value Ref Range Status   10/20/2023 neg  Final     PCP Screen, Urine   Date Value Ref Range Status   10/20/2023 neg  Final     Propoxyphene Screen, Urine   Date Value Ref Range Status   10/20/2023 neg  Final     THC Screen, Urine   Date Value Ref Range Status   10/20/2023 neg  Final     Tricyclic Antidepressants, Urine   Date Value Ref Range Status   10/20/2023 neg  Final       Last Yemi: 4/19/24  Medication Contract: 10/20/23   Last Fill: 3/22/24    Requested Prescriptions     Pending Prescriptions Disp Refills    oxyCODONE-acetaminophen (PERCOCET)  MG per tablet 90 tablet 0     Sig: Take 1 tablet by mouth every 8 hours as needed for Pain for up to 30 days. Intended supply: 30 days Max Daily Amount: 3 tablets                           Please approve or refuse this medication.   Loly Huntley LPN

## 2024-04-24 ENCOUNTER — TELEPHONE (OUTPATIENT)
Dept: PRIMARY CARE CLINIC | Age: 64
End: 2024-04-24

## 2024-04-24 ENCOUNTER — APPOINTMENT (OUTPATIENT)
Dept: CARDIOLOGY | Facility: HOSPITAL | Age: 64
DRG: 234 | End: 2024-04-24
Payer: MEDICARE

## 2024-04-24 ENCOUNTER — HOSPITAL ENCOUNTER (INPATIENT)
Facility: HOSPITAL | Age: 64
LOS: 12 days | Discharge: HOME | DRG: 234 | End: 2024-05-06
Attending: INTERNAL MEDICINE | Admitting: INTERNAL MEDICINE
Payer: MEDICARE

## 2024-04-24 DIAGNOSIS — I21.4 NON-STEMI (NON-ST ELEVATED MYOCARDIAL INFARCTION): Primary | ICD-10-CM

## 2024-04-24 DIAGNOSIS — I21.4 NSTEMI (NON-ST ELEVATED MYOCARDIAL INFARCTION): ICD-10-CM

## 2024-04-24 DIAGNOSIS — Z74.09 IMPAIRED MOBILITY: ICD-10-CM

## 2024-04-24 LAB
ALBUMIN SERPL-MCNC: 3.9 G/DL (ref 3.5–5.2)
ALBUMIN/GLOB SERPL: 1.6 G/DL
ALP SERPL-CCNC: 59 U/L (ref 39–117)
ALT SERPL W P-5'-P-CCNC: 7 U/L (ref 1–33)
ANION GAP SERPL CALCULATED.3IONS-SCNC: 8 MMOL/L (ref 5–15)
AST SERPL-CCNC: 10 U/L (ref 1–32)
BILIRUB SERPL-MCNC: 0.3 MG/DL (ref 0–1.2)
BUN SERPL-MCNC: 18 MG/DL (ref 8–23)
BUN/CREAT SERPL: 14.6 (ref 7–25)
CALCIUM SPEC-SCNC: 9.6 MG/DL (ref 8.6–10.5)
CHLORIDE SERPL-SCNC: 106 MMOL/L (ref 98–107)
CO2 SERPL-SCNC: 28 MMOL/L (ref 22–29)
CREAT SERPL-MCNC: 1.23 MG/DL (ref 0.57–1)
DEPRECATED RDW RBC AUTO: 43.8 FL (ref 37–54)
EGFRCR SERPLBLD CKD-EPI 2021: 48.6 ML/MIN/1.73
ERYTHROCYTE [DISTWIDTH] IN BLOOD BY AUTOMATED COUNT: 13 % (ref 12.3–15.4)
GEN 5 2HR TROPONIN T REFLEX: 106 NG/L
GLOBULIN UR ELPH-MCNC: 2.5 GM/DL
GLUCOSE BLDC GLUCOMTR-MCNC: 111 MG/DL (ref 70–130)
GLUCOSE BLDC GLUCOMTR-MCNC: 117 MG/DL (ref 70–130)
GLUCOSE BLDC GLUCOMTR-MCNC: 138 MG/DL (ref 70–130)
GLUCOSE SERPL-MCNC: 118 MG/DL (ref 65–99)
HCT VFR BLD AUTO: 38.5 % (ref 34–46.6)
HGB BLD-MCNC: 12.5 G/DL (ref 12–15.9)
MCH RBC QN AUTO: 29.8 PG (ref 26.6–33)
MCHC RBC AUTO-ENTMCNC: 32.5 G/DL (ref 31.5–35.7)
MCV RBC AUTO: 91.9 FL (ref 79–97)
PLATELET # BLD AUTO: 162 10*3/MM3 (ref 140–450)
PMV BLD AUTO: 12.3 FL (ref 6–12)
POTASSIUM SERPL-SCNC: 3.9 MMOL/L (ref 3.5–5.2)
PROT SERPL-MCNC: 6.4 G/DL (ref 6–8.5)
QT INTERVAL: 424 MS
QTC INTERVAL: 430 MS
RBC # BLD AUTO: 4.19 10*6/MM3 (ref 3.77–5.28)
SODIUM SERPL-SCNC: 142 MMOL/L (ref 136–145)
TROPONIN T DELTA: 9 NG/L
TROPONIN T SERPL HS-MCNC: 97 NG/L
WBC NRBC COR # BLD AUTO: 5.48 10*3/MM3 (ref 3.4–10.8)

## 2024-04-24 PROCEDURE — 84484 ASSAY OF TROPONIN QUANT: CPT | Performed by: INTERNAL MEDICINE

## 2024-04-24 PROCEDURE — 25510000001 IOPAMIDOL PER 1 ML: Performed by: INTERNAL MEDICINE

## 2024-04-24 PROCEDURE — 93454 CORONARY ARTERY ANGIO S&I: CPT | Performed by: INTERNAL MEDICINE

## 2024-04-24 PROCEDURE — 25010000002 ENOXAPARIN PER 10 MG: Performed by: INTERNAL MEDICINE

## 2024-04-24 PROCEDURE — 93005 ELECTROCARDIOGRAM TRACING: CPT | Performed by: INTERNAL MEDICINE

## 2024-04-24 PROCEDURE — 93306 TTE W/DOPPLER COMPLETE: CPT | Performed by: INTERNAL MEDICINE

## 2024-04-24 PROCEDURE — 25010000002 HEPARIN (PORCINE) 2000-0.9 UNIT/L-% SOLUTION: Performed by: INTERNAL MEDICINE

## 2024-04-24 PROCEDURE — 4A023N7 MEASUREMENT OF CARDIAC SAMPLING AND PRESSURE, LEFT HEART, PERCUTANEOUS APPROACH: ICD-10-PCS | Performed by: INTERNAL MEDICINE

## 2024-04-24 PROCEDURE — 25010000002 DIPHENHYDRAMINE PER 50 MG: Performed by: INTERNAL MEDICINE

## 2024-04-24 PROCEDURE — C1894 INTRO/SHEATH, NON-LASER: HCPCS | Performed by: INTERNAL MEDICINE

## 2024-04-24 PROCEDURE — 25510000001 PERFLUTREN 6.52 MG/ML SUSPENSION: Performed by: INTERNAL MEDICINE

## 2024-04-24 PROCEDURE — 80053 COMPREHEN METABOLIC PANEL: CPT | Performed by: INTERNAL MEDICINE

## 2024-04-24 PROCEDURE — 93455 CORONARY ART/GRFT ANGIO S&I: CPT | Performed by: INTERNAL MEDICINE

## 2024-04-24 PROCEDURE — B2111ZZ FLUOROSCOPY OF MULTIPLE CORONARY ARTERIES USING LOW OSMOLAR CONTRAST: ICD-10-PCS | Performed by: INTERNAL MEDICINE

## 2024-04-24 PROCEDURE — 82948 REAGENT STRIP/BLOOD GLUCOSE: CPT

## 2024-04-24 PROCEDURE — 25810000003 SODIUM CHLORIDE 0.9 % SOLUTION: Performed by: INTERNAL MEDICINE

## 2024-04-24 PROCEDURE — 99152 MOD SED SAME PHYS/QHP 5/>YRS: CPT | Performed by: INTERNAL MEDICINE

## 2024-04-24 PROCEDURE — 99223 1ST HOSP IP/OBS HIGH 75: CPT | Performed by: INTERNAL MEDICINE

## 2024-04-24 PROCEDURE — B2151ZZ FLUOROSCOPY OF LEFT HEART USING LOW OSMOLAR CONTRAST: ICD-10-PCS | Performed by: INTERNAL MEDICINE

## 2024-04-24 PROCEDURE — 25010000002 MIDAZOLAM PER 1 MG: Performed by: INTERNAL MEDICINE

## 2024-04-24 PROCEDURE — 93010 ELECTROCARDIOGRAM REPORT: CPT | Performed by: INTERNAL MEDICINE

## 2024-04-24 PROCEDURE — 25010000002 FENTANYL CITRATE (PF) 50 MCG/ML SOLUTION: Performed by: INTERNAL MEDICINE

## 2024-04-24 PROCEDURE — C1769 GUIDE WIRE: HCPCS | Performed by: INTERNAL MEDICINE

## 2024-04-24 PROCEDURE — 25010000002 HEPARIN (PORCINE) 1000-0.9 UT/500ML-% SOLUTION: Performed by: INTERNAL MEDICINE

## 2024-04-24 PROCEDURE — 85027 COMPLETE CBC AUTOMATED: CPT | Performed by: INTERNAL MEDICINE

## 2024-04-24 PROCEDURE — 93356 MYOCRD STRAIN IMG SPCKL TRCK: CPT

## 2024-04-24 PROCEDURE — 93306 TTE W/DOPPLER COMPLETE: CPT

## 2024-04-24 PROCEDURE — 93356 MYOCRD STRAIN IMG SPCKL TRCK: CPT | Performed by: INTERNAL MEDICINE

## 2024-04-24 PROCEDURE — 63710000001 INSULIN GLARGINE PER 5 UNITS: Performed by: INTERNAL MEDICINE

## 2024-04-24 RX ORDER — NICOTINE POLACRILEX 4 MG
15 LOZENGE BUCCAL
Status: DISCONTINUED | OUTPATIENT
Start: 2024-04-24 | End: 2024-05-01

## 2024-04-24 RX ORDER — FENTANYL CITRATE 50 UG/ML
INJECTION, SOLUTION INTRAMUSCULAR; INTRAVENOUS
Status: DISCONTINUED | OUTPATIENT
Start: 2024-04-24 | End: 2024-04-24 | Stop reason: HOSPADM

## 2024-04-24 RX ORDER — HYDROCODONE BITARTRATE AND ACETAMINOPHEN 10; 325 MG/1; MG/1
1 TABLET ORAL EVERY 6 HOURS PRN
Status: DISCONTINUED | OUTPATIENT
Start: 2024-04-24 | End: 2024-05-01

## 2024-04-24 RX ORDER — ALPRAZOLAM 0.5 MG/1
1 TABLET ORAL 4 TIMES DAILY PRN
Status: DISCONTINUED | OUTPATIENT
Start: 2024-04-24 | End: 2024-05-01

## 2024-04-24 RX ORDER — DEXTROSE MONOHYDRATE 25 G/50ML
25 INJECTION, SOLUTION INTRAVENOUS
Status: DISCONTINUED | OUTPATIENT
Start: 2024-04-24 | End: 2024-05-01

## 2024-04-24 RX ORDER — LIDOCAINE HYDROCHLORIDE 20 MG/ML
INJECTION, SOLUTION INFILTRATION; PERINEURAL
Status: DISCONTINUED | OUTPATIENT
Start: 2024-04-24 | End: 2024-04-24 | Stop reason: HOSPADM

## 2024-04-24 RX ORDER — CHLORHEXIDINE GLUCONATE 500 MG/1
1 CLOTH TOPICAL EVERY 24 HOURS
Status: DISCONTINUED | OUTPATIENT
Start: 2024-04-25 | End: 2024-04-26 | Stop reason: HOSPADM

## 2024-04-24 RX ORDER — DIPHENHYDRAMINE HYDROCHLORIDE 50 MG/ML
INJECTION INTRAMUSCULAR; INTRAVENOUS
Status: DISCONTINUED | OUTPATIENT
Start: 2024-04-24 | End: 2024-04-24 | Stop reason: HOSPADM

## 2024-04-24 RX ORDER — POLYETHYLENE GLYCOL 3350 17 G/17G
0.5 POWDER, FOR SOLUTION ORAL DAILY PRN
Status: DISCONTINUED | OUTPATIENT
Start: 2024-04-24 | End: 2024-04-24 | Stop reason: SDUPTHER

## 2024-04-24 RX ORDER — HEPARIN SODIUM 200 [USP'U]/100ML
INJECTION, SOLUTION INTRAVENOUS
Status: DISCONTINUED | OUTPATIENT
Start: 2024-04-24 | End: 2024-04-24 | Stop reason: HOSPADM

## 2024-04-24 RX ORDER — PANTOPRAZOLE SODIUM 40 MG/1
40 TABLET, DELAYED RELEASE ORAL DAILY
Status: DISCONTINUED | OUTPATIENT
Start: 2024-04-24 | End: 2024-05-01

## 2024-04-24 RX ORDER — LISINOPRIL 10 MG/1
10 TABLET ORAL DAILY
Status: DISCONTINUED | OUTPATIENT
Start: 2024-04-24 | End: 2024-04-25

## 2024-04-24 RX ORDER — GABAPENTIN 300 MG/1
300 CAPSULE ORAL 3 TIMES DAILY
Status: DISCONTINUED | OUTPATIENT
Start: 2024-04-24 | End: 2024-05-01

## 2024-04-24 RX ORDER — ATORVASTATIN CALCIUM 40 MG/1
40 TABLET, FILM COATED ORAL NIGHTLY
Status: DISCONTINUED | OUTPATIENT
Start: 2024-04-24 | End: 2024-05-01

## 2024-04-24 RX ORDER — SODIUM CHLORIDE 9 MG/ML
75 INJECTION, SOLUTION INTRAVENOUS CONTINUOUS
Status: DISCONTINUED | OUTPATIENT
Start: 2024-04-24 | End: 2024-05-01

## 2024-04-24 RX ORDER — OXYCODONE AND ACETAMINOPHEN 10; 325 MG/1; MG/1
1 TABLET ORAL EVERY 8 HOURS PRN
Status: DISPENSED | OUTPATIENT
Start: 2024-04-24 | End: 2024-04-29

## 2024-04-24 RX ORDER — OXYCODONE AND ACETAMINOPHEN 10; 325 MG/1; MG/1
1 TABLET ORAL EVERY 8 HOURS PRN
COMMUNITY

## 2024-04-24 RX ORDER — CLOPIDOGREL BISULFATE 75 MG/1
75 TABLET ORAL DAILY
Status: DISCONTINUED | OUTPATIENT
Start: 2024-04-24 | End: 2024-04-25

## 2024-04-24 RX ORDER — MIDAZOLAM HYDROCHLORIDE 1 MG/ML
INJECTION INTRAMUSCULAR; INTRAVENOUS
Status: DISCONTINUED | OUTPATIENT
Start: 2024-04-24 | End: 2024-04-24 | Stop reason: HOSPADM

## 2024-04-24 RX ORDER — PIOGLITAZONEHYDROCHLORIDE 15 MG/1
15 TABLET ORAL DAILY
Status: DISCONTINUED | OUTPATIENT
Start: 2024-04-24 | End: 2024-05-01

## 2024-04-24 RX ORDER — INSULIN LISPRO 100 [IU]/ML
2-9 INJECTION, SOLUTION INTRAVENOUS; SUBCUTANEOUS
Status: DISCONTINUED | OUTPATIENT
Start: 2024-04-24 | End: 2024-05-01

## 2024-04-24 RX ORDER — ENOXAPARIN SODIUM 100 MG/ML
0.7 INJECTION SUBCUTANEOUS ONCE
Status: COMPLETED | OUTPATIENT
Start: 2024-04-24 | End: 2024-04-24

## 2024-04-24 RX ORDER — SODIUM CHLORIDE 9 MG/ML
75 INJECTION, SOLUTION INTRAVENOUS CONTINUOUS
Status: DISCONTINUED | OUTPATIENT
Start: 2024-04-24 | End: 2024-04-24

## 2024-04-24 RX ORDER — LEVOTHYROXINE SODIUM 0.12 MG/1
125 TABLET ORAL
Status: DISCONTINUED | OUTPATIENT
Start: 2024-04-24 | End: 2024-05-02

## 2024-04-24 RX ORDER — CYCLOBENZAPRINE HCL 10 MG
5 TABLET ORAL 3 TIMES DAILY PRN
Status: DISCONTINUED | OUTPATIENT
Start: 2024-04-24 | End: 2024-05-06 | Stop reason: HOSPADM

## 2024-04-24 RX ORDER — CHLORHEXIDINE GLUCONATE 500 MG/1
1 CLOTH TOPICAL ONCE
Status: COMPLETED | OUTPATIENT
Start: 2024-04-24 | End: 2024-04-24

## 2024-04-24 RX ORDER — POLYETHYLENE GLYCOL 3350 17 G/17G
17 POWDER, FOR SOLUTION ORAL DAILY PRN
Status: DISCONTINUED | OUTPATIENT
Start: 2024-04-24 | End: 2024-05-01

## 2024-04-24 RX ADMIN — Medication 1 APPLICATION: at 20:45

## 2024-04-24 RX ADMIN — PERFLUTREN 9.78 MG: 6.52 INJECTION, SUSPENSION INTRAVENOUS at 15:55

## 2024-04-24 RX ADMIN — PIOGLITAZONE HYDROCHLORIDE 15 MG: 15 TABLET ORAL at 08:09

## 2024-04-24 RX ADMIN — CYCLOBENZAPRINE HYDROCHLORIDE 5 MG: 5 TABLET, FILM COATED ORAL at 20:44

## 2024-04-24 RX ADMIN — ALPRAZOLAM 1 MG: 0.5 TABLET ORAL at 10:02

## 2024-04-24 RX ADMIN — Medication 1 APPLICATION: at 03:30

## 2024-04-24 RX ADMIN — ATORVASTATIN CALCIUM 40 MG: 40 TABLET, FILM COATED ORAL at 20:44

## 2024-04-24 RX ADMIN — SODIUM CHLORIDE 75 ML/HR: 9 INJECTION, SOLUTION INTRAVENOUS at 09:51

## 2024-04-24 RX ADMIN — PANTOPRAZOLE SODIUM 40 MG: 40 TABLET, DELAYED RELEASE ORAL at 08:09

## 2024-04-24 RX ADMIN — OXYCODONE AND ACETAMINOPHEN 1 TABLET: 325; 10 TABLET ORAL at 13:07

## 2024-04-24 RX ADMIN — OXYCODONE AND ACETAMINOPHEN 1 TABLET: 325; 10 TABLET ORAL at 03:27

## 2024-04-24 RX ADMIN — CHLORHEXIDINE GLUCONATE 1 APPLICATION: 500 CLOTH TOPICAL at 03:31

## 2024-04-24 RX ADMIN — LEVOTHYROXINE SODIUM 125 MCG: 125 TABLET ORAL at 05:51

## 2024-04-24 RX ADMIN — ENOXAPARIN SODIUM 60 MG: 100 INJECTION SUBCUTANEOUS at 03:30

## 2024-04-24 RX ADMIN — INSULIN GLARGINE 35 UNITS: 100 INJECTION, SOLUTION SUBCUTANEOUS at 20:45

## 2024-04-24 RX ADMIN — CLOPIDOGREL BISULFATE 75 MG: 75 TABLET ORAL at 08:09

## 2024-04-24 RX ADMIN — LISINOPRIL 10 MG: 10 TABLET ORAL at 08:09

## 2024-04-24 RX ADMIN — GABAPENTIN 300 MG: 300 CAPSULE ORAL at 17:21

## 2024-04-24 RX ADMIN — GABAPENTIN 300 MG: 300 CAPSULE ORAL at 08:09

## 2024-04-24 RX ADMIN — OXYCODONE AND ACETAMINOPHEN 1 TABLET: 325; 10 TABLET ORAL at 22:55

## 2024-04-24 RX ADMIN — HYDROCODONE BITARTRATE AND ACETAMINOPHEN 1 TABLET: 10; 325 TABLET ORAL at 17:21

## 2024-04-24 NOTE — TELEPHONE ENCOUNTER
FYI---Patient calls stating she was admitted to  for chest pain. She stated she has 99% blockage and is going to have a heart cath on 4/24/24.

## 2024-04-25 ENCOUNTER — APPOINTMENT (OUTPATIENT)
Dept: CT IMAGING | Facility: HOSPITAL | Age: 64
DRG: 234 | End: 2024-04-25
Payer: MEDICARE

## 2024-04-25 ENCOUNTER — APPOINTMENT (OUTPATIENT)
Dept: ULTRASOUND IMAGING | Facility: HOSPITAL | Age: 64
DRG: 234 | End: 2024-04-25
Payer: MEDICARE

## 2024-04-25 ENCOUNTER — ENROLLMENT (OUTPATIENT)
Dept: PHARMACY | Facility: CLINIC | Age: 64
End: 2024-04-25

## 2024-04-25 LAB
ANION GAP SERPL CALCULATED.3IONS-SCNC: 7 MMOL/L (ref 5–15)
BH CV ECHO LEFT VENTRICLE GLOBAL LONGITUDINAL STRAIN: -16.1 %
BH CV ECHO MEAS - AO MAX PG: 5.5 MMHG
BH CV ECHO MEAS - AO MEAN PG: 3 MMHG
BH CV ECHO MEAS - AO ROOT DIAM: 2.8 CM
BH CV ECHO MEAS - AO V2 MAX: 117 CM/SEC
BH CV ECHO MEAS - AO V2 VTI: 26.3 CM
BH CV ECHO MEAS - AVA(I,D): 3.3 CM2
BH CV ECHO MEAS - EDV(CUBED): 112 ML
BH CV ECHO MEAS - EDV(MOD-SP2): 152 ML
BH CV ECHO MEAS - EDV(MOD-SP4): 121 ML
BH CV ECHO MEAS - EF(MOD-SP2): 48.6 %
BH CV ECHO MEAS - EF(MOD-SP4): 56 %
BH CV ECHO MEAS - ESV(CUBED): 32.2 ML
BH CV ECHO MEAS - ESV(MOD-SP2): 78.2 ML
BH CV ECHO MEAS - ESV(MOD-SP4): 53.3 ML
BH CV ECHO MEAS - FS: 34 %
BH CV ECHO MEAS - IVS/LVPW: 1.14 CM
BH CV ECHO MEAS - IVSD: 1.1 CM
BH CV ECHO MEAS - LA DIMENSION: 3.9 CM
BH CV ECHO MEAS - LAT PEAK E' VEL: 6 CM/SEC
BH CV ECHO MEAS - LV DIASTOLIC VOL/BSA (35-75): 62.8 CM2
BH CV ECHO MEAS - LV MASS(C)D: 179.4 GRAMS
BH CV ECHO MEAS - LV MAX PG: 4 MMHG
BH CV ECHO MEAS - LV MEAN PG: 2 MMHG
BH CV ECHO MEAS - LV SYSTOLIC VOL/BSA (12-30): 27.7 CM2
BH CV ECHO MEAS - LV V1 MAX: 99.8 CM/SEC
BH CV ECHO MEAS - LV V1 VTI: 22.6 CM
BH CV ECHO MEAS - LVIDD: 4.8 CM
BH CV ECHO MEAS - LVIDS: 3.2 CM
BH CV ECHO MEAS - LVOT AREA: 3.8 CM2
BH CV ECHO MEAS - LVOT DIAM: 2.2 CM
BH CV ECHO MEAS - LVPWD: 0.97 CM
BH CV ECHO MEAS - MED PEAK E' VEL: 3.5 CM/SEC
BH CV ECHO MEAS - MV A MAX VEL: 62.1 CM/SEC
BH CV ECHO MEAS - MV DEC TIME: 0.3 SEC
BH CV ECHO MEAS - MV E MAX VEL: 46.7 CM/SEC
BH CV ECHO MEAS - MV E/A: 0.75
BH CV ECHO MEAS - RAP SYSTOLE: 5 MMHG
BH CV ECHO MEAS - RVSP: 13.3 MMHG
BH CV ECHO MEAS - SV(LVOT): 85.9 ML
BH CV ECHO MEAS - SV(MOD-SP2): 73.8 ML
BH CV ECHO MEAS - SV(MOD-SP4): 67.7 ML
BH CV ECHO MEAS - SVI(LVOT): 44.6 ML/M2
BH CV ECHO MEAS - SVI(MOD-SP2): 38.3 ML/M2
BH CV ECHO MEAS - SVI(MOD-SP4): 35.1 ML/M2
BH CV ECHO MEAS - TR MAX PG: 8.3 MMHG
BH CV ECHO MEAS - TR MAX VEL: 144 CM/SEC
BH CV ECHO MEASUREMENTS AVERAGE E/E' RATIO: 9.83
BH CV XLRA - RV BASE: 3.9 CM
BH CV XLRA - RV LENGTH: 5.8 CM
BH CV XLRA - RV MID: 3.5 CM
BUN SERPL-MCNC: 20 MG/DL (ref 8–23)
BUN/CREAT SERPL: 13.2 (ref 7–25)
CALCIUM SPEC-SCNC: 9.2 MG/DL (ref 8.6–10.5)
CHLORIDE SERPL-SCNC: 106 MMOL/L (ref 98–107)
CO2 SERPL-SCNC: 28 MMOL/L (ref 22–29)
CREAT SERPL-MCNC: 1.52 MG/DL (ref 0.57–1)
DEPRECATED RDW RBC AUTO: 46 FL (ref 37–54)
EGFRCR SERPLBLD CKD-EPI 2021: 37.7 ML/MIN/1.73
ERYTHROCYTE [DISTWIDTH] IN BLOOD BY AUTOMATED COUNT: 12.9 % (ref 12.3–15.4)
GLUCOSE BLDC GLUCOMTR-MCNC: 126 MG/DL (ref 70–130)
GLUCOSE BLDC GLUCOMTR-MCNC: 137 MG/DL (ref 70–130)
GLUCOSE BLDC GLUCOMTR-MCNC: 145 MG/DL (ref 70–130)
GLUCOSE BLDC GLUCOMTR-MCNC: 88 MG/DL (ref 70–130)
GLUCOSE SERPL-MCNC: 87 MG/DL (ref 65–99)
HBA1C MFR BLD: 6 % (ref 4.8–5.6)
HCT VFR BLD AUTO: 38.7 % (ref 34–46.6)
HGB BLD-MCNC: 12.1 G/DL (ref 12–15.9)
LEFT ATRIUM VOLUME INDEX: 23 ML/M2
LEFT ATRIUM VOLUME: 44.3 ML
MCH RBC QN AUTO: 29.9 PG (ref 26.6–33)
MCHC RBC AUTO-ENTMCNC: 31.3 G/DL (ref 31.5–35.7)
MCV RBC AUTO: 95.6 FL (ref 79–97)
PLATELET # BLD AUTO: 170 10*3/MM3 (ref 140–450)
PMV BLD AUTO: 12.2 FL (ref 6–12)
POTASSIUM SERPL-SCNC: 4 MMOL/L (ref 3.5–5.2)
RBC # BLD AUTO: 4.05 10*6/MM3 (ref 3.77–5.28)
SODIUM SERPL-SCNC: 141 MMOL/L (ref 136–145)
WBC NRBC COR # BLD AUTO: 5.16 10*3/MM3 (ref 3.4–10.8)

## 2024-04-25 PROCEDURE — 25810000003 SODIUM CHLORIDE 0.9 % SOLUTION: Performed by: INTERNAL MEDICINE

## 2024-04-25 PROCEDURE — 25010000002 ENOXAPARIN PER 10 MG: Performed by: INTERNAL MEDICINE

## 2024-04-25 PROCEDURE — 63710000001 INSULIN GLARGINE PER 5 UNITS: Performed by: NURSE PRACTITIONER

## 2024-04-25 PROCEDURE — 83036 HEMOGLOBIN GLYCOSYLATED A1C: CPT | Performed by: NURSE PRACTITIONER

## 2024-04-25 PROCEDURE — 82948 REAGENT STRIP/BLOOD GLUCOSE: CPT

## 2024-04-25 PROCEDURE — 93880 EXTRACRANIAL BILAT STUDY: CPT | Performed by: SURGERY

## 2024-04-25 PROCEDURE — 93010 ELECTROCARDIOGRAM REPORT: CPT | Performed by: INTERNAL MEDICINE

## 2024-04-25 PROCEDURE — 99233 SBSQ HOSP IP/OBS HIGH 50: CPT | Performed by: INTERNAL MEDICINE

## 2024-04-25 PROCEDURE — 80048 BASIC METABOLIC PNL TOTAL CA: CPT | Performed by: NURSE PRACTITIONER

## 2024-04-25 PROCEDURE — 25810000003 SODIUM CHLORIDE 0.9 % SOLUTION: Performed by: NURSE PRACTITIONER

## 2024-04-25 PROCEDURE — 71250 CT THORAX DX C-: CPT

## 2024-04-25 PROCEDURE — 99222 1ST HOSP IP/OBS MODERATE 55: CPT | Performed by: SURGERY

## 2024-04-25 PROCEDURE — 93970 EXTREMITY STUDY: CPT | Performed by: SURGERY

## 2024-04-25 PROCEDURE — 93970 EXTREMITY STUDY: CPT

## 2024-04-25 PROCEDURE — 93880 EXTRACRANIAL BILAT STUDY: CPT

## 2024-04-25 PROCEDURE — 85027 COMPLETE CBC AUTOMATED: CPT | Performed by: NURSE PRACTITIONER

## 2024-04-25 PROCEDURE — 93005 ELECTROCARDIOGRAM TRACING: CPT | Performed by: INTERNAL MEDICINE

## 2024-04-25 RX ORDER — MELOXICAM 15 MG/1
15 TABLET ORAL DAILY
COMMUNITY
End: 2024-05-06 | Stop reason: HOSPADM

## 2024-04-25 RX ORDER — LISINOPRIL 5 MG/1
5 TABLET ORAL DAILY
Status: DISCONTINUED | OUTPATIENT
Start: 2024-04-25 | End: 2024-05-01

## 2024-04-25 RX ORDER — LEVOTHYROXINE SODIUM 0.12 MG/1
125 TABLET ORAL DAILY
COMMUNITY

## 2024-04-25 RX ORDER — SEMAGLUTIDE 1.34 MG/ML
0.5 INJECTION, SOLUTION SUBCUTANEOUS WEEKLY
COMMUNITY

## 2024-04-25 RX ORDER — PIOGLITAZONEHYDROCHLORIDE 15 MG/1
15 TABLET ORAL DAILY
COMMUNITY

## 2024-04-25 RX ORDER — ENOXAPARIN SODIUM 100 MG/ML
1 INJECTION SUBCUTANEOUS EVERY 12 HOURS SCHEDULED
Status: COMPLETED | OUTPATIENT
Start: 2024-04-25 | End: 2024-04-30

## 2024-04-25 RX ORDER — POTASSIUM CHLORIDE 750 MG/1
10 TABLET, FILM COATED, EXTENDED RELEASE ORAL 2 TIMES DAILY
Status: ON HOLD | COMMUNITY
End: 2024-05-06

## 2024-04-25 RX ORDER — ASPIRIN 81 MG/1
81 TABLET ORAL DAILY
Status: DISCONTINUED | OUTPATIENT
Start: 2024-04-25 | End: 2024-05-01

## 2024-04-25 RX ORDER — DIPHENHYDRAMINE HCL 25 MG
25 CAPSULE ORAL EVERY 6 HOURS PRN
COMMUNITY

## 2024-04-25 RX ORDER — NITROGLYCERIN 0.4 MG/1
0.4 TABLET SUBLINGUAL
Status: DISCONTINUED | OUTPATIENT
Start: 2024-04-25 | End: 2024-05-01

## 2024-04-25 RX ADMIN — ATORVASTATIN CALCIUM 40 MG: 40 TABLET, FILM COATED ORAL at 20:53

## 2024-04-25 RX ADMIN — SODIUM CHLORIDE 75 ML/HR: 9 INJECTION, SOLUTION INTRAVENOUS at 12:29

## 2024-04-25 RX ADMIN — INSULIN GLARGINE 25 UNITS: 100 INJECTION, SOLUTION SUBCUTANEOUS at 20:59

## 2024-04-25 RX ADMIN — PIOGLITAZONE HYDROCHLORIDE 15 MG: 15 TABLET ORAL at 09:33

## 2024-04-25 RX ADMIN — LEVOTHYROXINE SODIUM 125 MCG: 125 TABLET ORAL at 05:37

## 2024-04-25 RX ADMIN — NITROGLYCERIN 0.4 MG: 0.4 TABLET SUBLINGUAL at 15:14

## 2024-04-25 RX ADMIN — OXYCODONE AND ACETAMINOPHEN 1 TABLET: 325; 10 TABLET ORAL at 20:53

## 2024-04-25 RX ADMIN — ENOXAPARIN SODIUM 80 MG: 100 INJECTION SUBCUTANEOUS at 18:10

## 2024-04-25 RX ADMIN — SODIUM CHLORIDE 250 ML: 9 INJECTION, SOLUTION INTRAVENOUS at 03:07

## 2024-04-25 RX ADMIN — Medication 1 APPLICATION: at 20:53

## 2024-04-25 RX ADMIN — Medication 1 APPLICATION: at 09:33

## 2024-04-25 RX ADMIN — PANTOPRAZOLE SODIUM 40 MG: 40 TABLET, DELAYED RELEASE ORAL at 09:33

## 2024-04-25 RX ADMIN — ASPIRIN 81 MG: 81 TABLET, COATED ORAL at 09:33

## 2024-04-25 RX ADMIN — ALPRAZOLAM 1 MG: 0.5 TABLET ORAL at 00:45

## 2024-04-25 RX ADMIN — ENOXAPARIN SODIUM 80 MG: 100 INJECTION SUBCUTANEOUS at 09:31

## 2024-04-25 RX ADMIN — CHLORHEXIDINE GLUCONATE 1 APPLICATION: 500 CLOTH TOPICAL at 03:07

## 2024-04-25 RX ADMIN — HYDROCODONE BITARTRATE AND ACETAMINOPHEN 1 TABLET: 10; 325 TABLET ORAL at 14:48

## 2024-04-25 RX ADMIN — HYDROCODONE BITARTRATE AND ACETAMINOPHEN 1 TABLET: 10; 325 TABLET ORAL at 04:23

## 2024-04-26 LAB
ALBUMIN SERPL-MCNC: 3.8 G/DL (ref 3.5–5.2)
ALBUMIN/GLOB SERPL: 1.7 G/DL
ALP SERPL-CCNC: 61 U/L (ref 39–117)
ALT SERPL W P-5'-P-CCNC: 7 U/L (ref 1–33)
ANION GAP SERPL CALCULATED.3IONS-SCNC: 7 MMOL/L (ref 5–15)
AST SERPL-CCNC: 11 U/L (ref 1–32)
BILIRUB SERPL-MCNC: 0.2 MG/DL (ref 0–1.2)
BUN SERPL-MCNC: 16 MG/DL (ref 8–23)
BUN/CREAT SERPL: 11.6 (ref 7–25)
CALCIUM SPEC-SCNC: 8.8 MG/DL (ref 8.6–10.5)
CHLORIDE SERPL-SCNC: 107 MMOL/L (ref 98–107)
CO2 SERPL-SCNC: 27 MMOL/L (ref 22–29)
CREAT SERPL-MCNC: 1.38 MG/DL (ref 0.57–1)
DEPRECATED RDW RBC AUTO: 45.7 FL (ref 37–54)
EGFRCR SERPLBLD CKD-EPI 2021: 42.3 ML/MIN/1.73
ERYTHROCYTE [DISTWIDTH] IN BLOOD BY AUTOMATED COUNT: 13.2 % (ref 12.3–15.4)
GLOBULIN UR ELPH-MCNC: 2.3 GM/DL
GLUCOSE BLDC GLUCOMTR-MCNC: 115 MG/DL (ref 70–130)
GLUCOSE BLDC GLUCOMTR-MCNC: 125 MG/DL (ref 70–130)
GLUCOSE BLDC GLUCOMTR-MCNC: 80 MG/DL (ref 70–130)
GLUCOSE BLDC GLUCOMTR-MCNC: 87 MG/DL (ref 70–130)
GLUCOSE SERPL-MCNC: 78 MG/DL (ref 65–99)
HCT VFR BLD AUTO: 37.9 % (ref 34–46.6)
HGB BLD-MCNC: 12.1 G/DL (ref 12–15.9)
MCH RBC QN AUTO: 30.3 PG (ref 26.6–33)
MCHC RBC AUTO-ENTMCNC: 31.9 G/DL (ref 31.5–35.7)
MCV RBC AUTO: 94.8 FL (ref 79–97)
PLATELET # BLD AUTO: 157 10*3/MM3 (ref 140–450)
PMV BLD AUTO: 12.4 FL (ref 6–12)
POTASSIUM SERPL-SCNC: 4 MMOL/L (ref 3.5–5.2)
PROT SERPL-MCNC: 6.1 G/DL (ref 6–8.5)
QT INTERVAL: 468 MS
QTC INTERVAL: 435 MS
RBC # BLD AUTO: 4 10*6/MM3 (ref 3.77–5.28)
SODIUM SERPL-SCNC: 141 MMOL/L (ref 136–145)
WBC NRBC COR # BLD AUTO: 4.32 10*3/MM3 (ref 3.4–10.8)

## 2024-04-26 PROCEDURE — 63710000001 INSULIN GLARGINE PER 5 UNITS: Performed by: NURSE PRACTITIONER

## 2024-04-26 PROCEDURE — 93010 ELECTROCARDIOGRAM REPORT: CPT | Performed by: INTERNAL MEDICINE

## 2024-04-26 PROCEDURE — 80053 COMPREHEN METABOLIC PANEL: CPT | Performed by: SURGERY

## 2024-04-26 PROCEDURE — 93005 ELECTROCARDIOGRAM TRACING: CPT | Performed by: INTERNAL MEDICINE

## 2024-04-26 PROCEDURE — 63710000001 ONDANSETRON ODT 4 MG TABLET DISPERSIBLE: Performed by: NURSE PRACTITIONER

## 2024-04-26 PROCEDURE — 25810000003 SODIUM CHLORIDE 0.9 % SOLUTION: Performed by: INTERNAL MEDICINE

## 2024-04-26 PROCEDURE — 99232 SBSQ HOSP IP/OBS MODERATE 35: CPT | Performed by: SURGERY

## 2024-04-26 PROCEDURE — 82948 REAGENT STRIP/BLOOD GLUCOSE: CPT

## 2024-04-26 PROCEDURE — 25010000002 ENOXAPARIN PER 10 MG: Performed by: INTERNAL MEDICINE

## 2024-04-26 PROCEDURE — 85027 COMPLETE CBC AUTOMATED: CPT | Performed by: SURGERY

## 2024-04-26 PROCEDURE — 99232 SBSQ HOSP IP/OBS MODERATE 35: CPT | Performed by: INTERNAL MEDICINE

## 2024-04-26 RX ORDER — ISOSORBIDE MONONITRATE 30 MG/1
30 TABLET, EXTENDED RELEASE ORAL
Status: DISCONTINUED | OUTPATIENT
Start: 2024-04-26 | End: 2024-04-27

## 2024-04-26 RX ORDER — ONDANSETRON 4 MG/1
4 TABLET, ORALLY DISINTEGRATING ORAL EVERY 6 HOURS PRN
Status: DISCONTINUED | OUTPATIENT
Start: 2024-04-26 | End: 2024-05-01

## 2024-04-26 RX ORDER — ACETAMINOPHEN 325 MG/1
650 TABLET ORAL EVERY 6 HOURS PRN
Status: DISCONTINUED | OUTPATIENT
Start: 2024-04-26 | End: 2024-05-01

## 2024-04-26 RX ADMIN — PANTOPRAZOLE SODIUM 40 MG: 40 TABLET, DELAYED RELEASE ORAL at 08:28

## 2024-04-26 RX ADMIN — SODIUM CHLORIDE 75 ML/HR: 9 INJECTION, SOLUTION INTRAVENOUS at 12:33

## 2024-04-26 RX ADMIN — INSULIN GLARGINE 25 UNITS: 100 INJECTION, SOLUTION SUBCUTANEOUS at 20:17

## 2024-04-26 RX ADMIN — ONDANSETRON 4 MG: 4 TABLET, ORALLY DISINTEGRATING ORAL at 04:21

## 2024-04-26 RX ADMIN — OXYCODONE AND ACETAMINOPHEN 1 TABLET: 325; 10 TABLET ORAL at 06:32

## 2024-04-26 RX ADMIN — SODIUM CHLORIDE 75 ML/HR: 9 INJECTION, SOLUTION INTRAVENOUS at 08:23

## 2024-04-26 RX ADMIN — ALPRAZOLAM 1 MG: 0.5 TABLET ORAL at 19:29

## 2024-04-26 RX ADMIN — ATORVASTATIN CALCIUM 40 MG: 40 TABLET, FILM COATED ORAL at 20:18

## 2024-04-26 RX ADMIN — PIOGLITAZONE HYDROCHLORIDE 15 MG: 15 TABLET ORAL at 08:28

## 2024-04-26 RX ADMIN — Medication 1 APPLICATION: at 08:28

## 2024-04-26 RX ADMIN — ISOSORBIDE MONONITRATE 30 MG: 30 TABLET, EXTENDED RELEASE ORAL at 08:43

## 2024-04-26 RX ADMIN — GABAPENTIN 300 MG: 300 CAPSULE ORAL at 20:17

## 2024-04-26 RX ADMIN — ALPRAZOLAM 1 MG: 0.5 TABLET ORAL at 01:06

## 2024-04-26 RX ADMIN — NITROGLYCERIN 0.4 MG: 0.4 TABLET SUBLINGUAL at 20:16

## 2024-04-26 RX ADMIN — ASPIRIN 81 MG: 81 TABLET, COATED ORAL at 08:28

## 2024-04-26 RX ADMIN — ENOXAPARIN SODIUM 80 MG: 100 INJECTION SUBCUTANEOUS at 06:32

## 2024-04-26 RX ADMIN — CHLORHEXIDINE GLUCONATE 1 APPLICATION: 500 CLOTH TOPICAL at 04:45

## 2024-04-26 RX ADMIN — GABAPENTIN 300 MG: 300 CAPSULE ORAL at 20:16

## 2024-04-26 RX ADMIN — OXYCODONE AND ACETAMINOPHEN 1 TABLET: 325; 10 TABLET ORAL at 14:54

## 2024-04-26 RX ADMIN — LEVOTHYROXINE SODIUM 125 MCG: 125 TABLET ORAL at 06:32

## 2024-04-26 RX ADMIN — ACETAMINOPHEN 650 MG: 325 TABLET, FILM COATED ORAL at 15:12

## 2024-04-26 RX ADMIN — ENOXAPARIN SODIUM 80 MG: 100 INJECTION SUBCUTANEOUS at 17:45

## 2024-04-27 LAB
GLUCOSE BLDC GLUCOMTR-MCNC: 103 MG/DL (ref 70–130)
GLUCOSE BLDC GLUCOMTR-MCNC: 121 MG/DL (ref 70–130)
GLUCOSE BLDC GLUCOMTR-MCNC: 132 MG/DL (ref 70–130)
GLUCOSE BLDC GLUCOMTR-MCNC: 193 MG/DL (ref 70–130)

## 2024-04-27 PROCEDURE — 25010000002 ENOXAPARIN PER 10 MG: Performed by: INTERNAL MEDICINE

## 2024-04-27 PROCEDURE — 82948 REAGENT STRIP/BLOOD GLUCOSE: CPT

## 2024-04-27 PROCEDURE — 93005 ELECTROCARDIOGRAM TRACING: CPT | Performed by: INTERNAL MEDICINE

## 2024-04-27 PROCEDURE — 25810000003 SODIUM CHLORIDE 0.9 % SOLUTION: Performed by: INTERNAL MEDICINE

## 2024-04-27 PROCEDURE — 63710000001 INSULIN GLARGINE PER 5 UNITS: Performed by: NURSE PRACTITIONER

## 2024-04-27 PROCEDURE — 63710000001 INSULIN LISPRO (HUMAN) PER 5 UNITS: Performed by: INTERNAL MEDICINE

## 2024-04-27 RX ORDER — ISOSORBIDE MONONITRATE 60 MG/1
60 TABLET, EXTENDED RELEASE ORAL
Status: DISCONTINUED | OUTPATIENT
Start: 2024-04-28 | End: 2024-05-01

## 2024-04-27 RX ORDER — GUAIFENESIN 600 MG/1
600 TABLET, EXTENDED RELEASE ORAL EVERY 12 HOURS SCHEDULED
Status: DISCONTINUED | OUTPATIENT
Start: 2024-04-27 | End: 2024-05-01

## 2024-04-27 RX ADMIN — GABAPENTIN 300 MG: 300 CAPSULE ORAL at 08:36

## 2024-04-27 RX ADMIN — ENOXAPARIN SODIUM 80 MG: 100 INJECTION SUBCUTANEOUS at 17:10

## 2024-04-27 RX ADMIN — ISOSORBIDE MONONITRATE 30 MG: 30 TABLET, EXTENDED RELEASE ORAL at 08:36

## 2024-04-27 RX ADMIN — ATORVASTATIN CALCIUM 40 MG: 40 TABLET, FILM COATED ORAL at 20:12

## 2024-04-27 RX ADMIN — INSULIN LISPRO 2 UNITS: 100 INJECTION, SOLUTION INTRAVENOUS; SUBCUTANEOUS at 17:10

## 2024-04-27 RX ADMIN — GABAPENTIN 300 MG: 300 CAPSULE ORAL at 15:26

## 2024-04-27 RX ADMIN — ALPRAZOLAM 1 MG: 0.5 TABLET ORAL at 20:12

## 2024-04-27 RX ADMIN — ASPIRIN 81 MG: 81 TABLET, COATED ORAL at 08:36

## 2024-04-27 RX ADMIN — LEVOTHYROXINE SODIUM 125 MCG: 125 TABLET ORAL at 05:38

## 2024-04-27 RX ADMIN — INSULIN GLARGINE 25 UNITS: 100 INJECTION, SOLUTION SUBCUTANEOUS at 20:12

## 2024-04-27 RX ADMIN — GABAPENTIN 300 MG: 300 CAPSULE ORAL at 20:12

## 2024-04-27 RX ADMIN — HYDROCODONE BITARTRATE AND ACETAMINOPHEN 1 TABLET: 10; 325 TABLET ORAL at 05:47

## 2024-04-27 RX ADMIN — ACETAMINOPHEN 650 MG: 325 TABLET, FILM COATED ORAL at 12:02

## 2024-04-27 RX ADMIN — ENOXAPARIN SODIUM 80 MG: 100 INJECTION SUBCUTANEOUS at 05:38

## 2024-04-27 RX ADMIN — OXYCODONE AND ACETAMINOPHEN 1 TABLET: 325; 10 TABLET ORAL at 15:26

## 2024-04-27 RX ADMIN — PIOGLITAZONE HYDROCHLORIDE 15 MG: 15 TABLET ORAL at 08:36

## 2024-04-27 RX ADMIN — PANTOPRAZOLE SODIUM 40 MG: 40 TABLET, DELAYED RELEASE ORAL at 08:36

## 2024-04-27 RX ADMIN — ALPRAZOLAM 1 MG: 0.5 TABLET ORAL at 03:13

## 2024-04-27 RX ADMIN — LISINOPRIL 5 MG: 5 TABLET ORAL at 08:36

## 2024-04-27 RX ADMIN — SODIUM CHLORIDE 75 ML/HR: 9 INJECTION, SOLUTION INTRAVENOUS at 15:26

## 2024-04-27 RX ADMIN — GUAIFENESIN 600 MG: 600 TABLET, EXTENDED RELEASE ORAL at 22:27

## 2024-04-28 LAB
ANION GAP SERPL CALCULATED.3IONS-SCNC: 5 MMOL/L (ref 5–15)
BUN SERPL-MCNC: 12 MG/DL (ref 8–23)
BUN/CREAT SERPL: 10.7 (ref 7–25)
CALCIUM SPEC-SCNC: 8.8 MG/DL (ref 8.6–10.5)
CHLORIDE SERPL-SCNC: 112 MMOL/L (ref 98–107)
CO2 SERPL-SCNC: 27 MMOL/L (ref 22–29)
CREAT SERPL-MCNC: 1.12 MG/DL (ref 0.57–1)
DEPRECATED RDW RBC AUTO: 44.9 FL (ref 37–54)
EGFRCR SERPLBLD CKD-EPI 2021: 54.3 ML/MIN/1.73
ERYTHROCYTE [DISTWIDTH] IN BLOOD BY AUTOMATED COUNT: 12.8 % (ref 12.3–15.4)
GLUCOSE BLDC GLUCOMTR-MCNC: 117 MG/DL (ref 70–130)
GLUCOSE BLDC GLUCOMTR-MCNC: 127 MG/DL (ref 70–130)
GLUCOSE BLDC GLUCOMTR-MCNC: 75 MG/DL (ref 70–130)
GLUCOSE BLDC GLUCOMTR-MCNC: 87 MG/DL (ref 70–130)
GLUCOSE SERPL-MCNC: 79 MG/DL (ref 65–99)
HCT VFR BLD AUTO: 34.3 % (ref 34–46.6)
HGB BLD-MCNC: 10.7 G/DL (ref 12–15.9)
MCH RBC QN AUTO: 29.9 PG (ref 26.6–33)
MCHC RBC AUTO-ENTMCNC: 31.2 G/DL (ref 31.5–35.7)
MCV RBC AUTO: 95.8 FL (ref 79–97)
PLATELET # BLD AUTO: 129 10*3/MM3 (ref 140–450)
PMV BLD AUTO: 12.6 FL (ref 6–12)
POTASSIUM SERPL-SCNC: 3.7 MMOL/L (ref 3.5–5.2)
RBC # BLD AUTO: 3.58 10*6/MM3 (ref 3.77–5.28)
SODIUM SERPL-SCNC: 144 MMOL/L (ref 136–145)
WBC NRBC COR # BLD AUTO: 4.51 10*3/MM3 (ref 3.4–10.8)

## 2024-04-28 PROCEDURE — 63710000001 ONDANSETRON ODT 4 MG TABLET DISPERSIBLE: Performed by: NURSE PRACTITIONER

## 2024-04-28 PROCEDURE — 63710000001 INSULIN GLARGINE PER 5 UNITS: Performed by: NURSE PRACTITIONER

## 2024-04-28 PROCEDURE — 25010000002 ENOXAPARIN PER 10 MG: Performed by: INTERNAL MEDICINE

## 2024-04-28 PROCEDURE — 80048 BASIC METABOLIC PNL TOTAL CA: CPT | Performed by: INTERNAL MEDICINE

## 2024-04-28 PROCEDURE — 85027 COMPLETE CBC AUTOMATED: CPT | Performed by: INTERNAL MEDICINE

## 2024-04-28 PROCEDURE — 82948 REAGENT STRIP/BLOOD GLUCOSE: CPT

## 2024-04-28 RX ADMIN — HYDROCODONE BITARTRATE AND ACETAMINOPHEN 1 TABLET: 10; 325 TABLET ORAL at 13:34

## 2024-04-28 RX ADMIN — ASPIRIN 81 MG: 81 TABLET, COATED ORAL at 09:32

## 2024-04-28 RX ADMIN — ATORVASTATIN CALCIUM 40 MG: 40 TABLET, FILM COATED ORAL at 20:17

## 2024-04-28 RX ADMIN — ENOXAPARIN SODIUM 80 MG: 100 INJECTION SUBCUTANEOUS at 05:10

## 2024-04-28 RX ADMIN — ALPRAZOLAM 1 MG: 0.5 TABLET ORAL at 17:29

## 2024-04-28 RX ADMIN — INSULIN GLARGINE 25 UNITS: 100 INJECTION, SOLUTION SUBCUTANEOUS at 20:17

## 2024-04-28 RX ADMIN — LISINOPRIL 5 MG: 5 TABLET ORAL at 09:32

## 2024-04-28 RX ADMIN — GABAPENTIN 300 MG: 300 CAPSULE ORAL at 09:32

## 2024-04-28 RX ADMIN — ALPRAZOLAM 1 MG: 0.5 TABLET ORAL at 23:08

## 2024-04-28 RX ADMIN — OXYCODONE AND ACETAMINOPHEN 1 TABLET: 325; 10 TABLET ORAL at 09:34

## 2024-04-28 RX ADMIN — ENOXAPARIN SODIUM 80 MG: 100 INJECTION SUBCUTANEOUS at 17:29

## 2024-04-28 RX ADMIN — ALPRAZOLAM 1 MG: 0.5 TABLET ORAL at 02:45

## 2024-04-28 RX ADMIN — PIOGLITAZONE HYDROCHLORIDE 15 MG: 15 TABLET ORAL at 09:32

## 2024-04-28 RX ADMIN — PANTOPRAZOLE SODIUM 40 MG: 40 TABLET, DELAYED RELEASE ORAL at 09:32

## 2024-04-28 RX ADMIN — ISOSORBIDE MONONITRATE 60 MG: 60 TABLET, EXTENDED RELEASE ORAL at 09:32

## 2024-04-28 RX ADMIN — ALPRAZOLAM 1 MG: 0.5 TABLET ORAL at 09:32

## 2024-04-28 RX ADMIN — ONDANSETRON 4 MG: 4 TABLET, ORALLY DISINTEGRATING ORAL at 09:32

## 2024-04-28 RX ADMIN — LEVOTHYROXINE SODIUM 125 MCG: 125 TABLET ORAL at 05:09

## 2024-04-28 RX ADMIN — HYDROCODONE BITARTRATE AND ACETAMINOPHEN 1 TABLET: 10; 325 TABLET ORAL at 20:23

## 2024-04-29 ENCOUNTER — APPOINTMENT (OUTPATIENT)
Dept: PULMONOLOGY | Facility: HOSPITAL | Age: 64
DRG: 234 | End: 2024-04-29
Payer: MEDICARE

## 2024-04-29 ENCOUNTER — TELEPHONE (OUTPATIENT)
Dept: PRIMARY CARE CLINIC | Age: 64
End: 2024-04-29

## 2024-04-29 LAB
A-A DO2: 16.3 MMHG
ARTERIAL PATENCY WRIST A: ABNORMAL
ATMOSPHERIC PRESS: 748 MMHG
BASE EXCESS BLDA CALC-SCNC: 1.7 MMOL/L (ref 0–2)
BDY SITE: ABNORMAL
BODY TEMPERATURE: 37
COHGB MFR BLD: 0.7 % (ref 0–5)
GLUCOSE BLDC GLUCOMTR-MCNC: 129 MG/DL (ref 70–130)
GLUCOSE BLDC GLUCOMTR-MCNC: 132 MG/DL (ref 70–130)
GLUCOSE BLDC GLUCOMTR-MCNC: 139 MG/DL (ref 70–130)
GLUCOSE BLDC GLUCOMTR-MCNC: 94 MG/DL (ref 70–130)
HCO3 BLDA-SCNC: 26.5 MMOL/L (ref 20–26)
HCT VFR BLD CALC: 34.4 % (ref 38–51)
HGB BLDA-MCNC: 11.2 G/DL (ref 12–16)
Lab: ABNORMAL
METHGB BLD QL: 1.2 % (ref 0–3)
MODALITY: ABNORMAL
OXYHGB MFR BLDV: 94.9 % (ref 94–99)
PCO2 BLDA: 41.3 MM HG (ref 35–45)
PCO2 TEMP ADJ BLD: 41.3 MM HG (ref 35–45)
PH BLDA: 7.42 PH UNITS (ref 7.35–7.45)
PH, TEMP CORRECTED: 7.42 PH UNITS (ref 7.35–7.45)
PO2 BLDA: 84.3 MM HG (ref 83–108)
PO2 TEMP ADJ BLD: 84.3 MM HG (ref 83–108)
POTASSIUM BLDA-SCNC: 3.9 MMOL/L (ref 3.5–5.2)
QT INTERVAL: 434 MS
QTC INTERVAL: 434 MS
SAO2 % BLDCOA: 96.8 % (ref 94–99)
SODIUM BLDA-SCNC: 143 MMOL/L (ref 136–145)
VENTILATOR MODE: ABNORMAL

## 2024-04-29 PROCEDURE — 63710000001 INSULIN GLARGINE PER 5 UNITS: Performed by: NURSE PRACTITIONER

## 2024-04-29 PROCEDURE — 99232 SBSQ HOSP IP/OBS MODERATE 35: CPT | Performed by: NURSE PRACTITIONER

## 2024-04-29 PROCEDURE — 93005 ELECTROCARDIOGRAM TRACING: CPT | Performed by: SURGERY

## 2024-04-29 PROCEDURE — 83050 HGB METHEMOGLOBIN QUAN: CPT

## 2024-04-29 PROCEDURE — 36600 WITHDRAWAL OF ARTERIAL BLOOD: CPT

## 2024-04-29 PROCEDURE — 99232 SBSQ HOSP IP/OBS MODERATE 35: CPT | Performed by: SURGERY

## 2024-04-29 PROCEDURE — 25010000002 ENOXAPARIN PER 10 MG: Performed by: NURSE PRACTITIONER

## 2024-04-29 PROCEDURE — 25010000002 ENOXAPARIN PER 10 MG: Performed by: INTERNAL MEDICINE

## 2024-04-29 PROCEDURE — 82375 ASSAY CARBOXYHB QUANT: CPT

## 2024-04-29 PROCEDURE — 82948 REAGENT STRIP/BLOOD GLUCOSE: CPT

## 2024-04-29 PROCEDURE — 94010 BREATHING CAPACITY TEST: CPT

## 2024-04-29 PROCEDURE — 93010 ELECTROCARDIOGRAM REPORT: CPT | Performed by: INTERNAL MEDICINE

## 2024-04-29 PROCEDURE — 94010 BREATHING CAPACITY TEST: CPT | Performed by: INTERNAL MEDICINE

## 2024-04-29 PROCEDURE — 82805 BLOOD GASES W/O2 SATURATION: CPT

## 2024-04-29 RX ADMIN — ATORVASTATIN CALCIUM 40 MG: 40 TABLET, FILM COATED ORAL at 20:07

## 2024-04-29 RX ADMIN — ISOSORBIDE MONONITRATE 60 MG: 60 TABLET, EXTENDED RELEASE ORAL at 09:34

## 2024-04-29 RX ADMIN — ENOXAPARIN SODIUM 80 MG: 100 INJECTION SUBCUTANEOUS at 06:04

## 2024-04-29 RX ADMIN — LISINOPRIL 5 MG: 5 TABLET ORAL at 10:03

## 2024-04-29 RX ADMIN — HYDROCODONE BITARTRATE AND ACETAMINOPHEN 1 TABLET: 10; 325 TABLET ORAL at 23:20

## 2024-04-29 RX ADMIN — PANTOPRAZOLE SODIUM 40 MG: 40 TABLET, DELAYED RELEASE ORAL at 09:34

## 2024-04-29 RX ADMIN — NITROGLYCERIN 0.4 MG: 0.4 TABLET SUBLINGUAL at 09:22

## 2024-04-29 RX ADMIN — ALPRAZOLAM 1 MG: 0.5 TABLET ORAL at 16:01

## 2024-04-29 RX ADMIN — PIOGLITAZONE HYDROCHLORIDE 15 MG: 15 TABLET ORAL at 09:34

## 2024-04-29 RX ADMIN — HYDROCODONE BITARTRATE AND ACETAMINOPHEN 1 TABLET: 10; 325 TABLET ORAL at 04:30

## 2024-04-29 RX ADMIN — GABAPENTIN 300 MG: 300 CAPSULE ORAL at 20:07

## 2024-04-29 RX ADMIN — ASPIRIN 81 MG: 81 TABLET, COATED ORAL at 09:34

## 2024-04-29 RX ADMIN — LEVOTHYROXINE SODIUM 125 MCG: 125 TABLET ORAL at 06:04

## 2024-04-29 RX ADMIN — INSULIN GLARGINE 25 UNITS: 100 INJECTION, SOLUTION SUBCUTANEOUS at 20:08

## 2024-04-29 RX ADMIN — HYDROCODONE BITARTRATE AND ACETAMINOPHEN 1 TABLET: 10; 325 TABLET ORAL at 14:24

## 2024-04-29 RX ADMIN — ALPRAZOLAM 1 MG: 0.5 TABLET ORAL at 07:20

## 2024-04-29 RX ADMIN — POLYETHYLENE GLYCOL 3350 17 G: 17 POWDER, FOR SOLUTION ORAL at 14:45

## 2024-04-29 RX ADMIN — NITROGLYCERIN 0.4 MG: 0.4 TABLET SUBLINGUAL at 09:17

## 2024-04-29 RX ADMIN — ENOXAPARIN SODIUM 80 MG: 100 INJECTION SUBCUTANEOUS at 18:23

## 2024-04-29 NOTE — TELEPHONE ENCOUNTER
The patient called to let her doctor know that she is having open heart surgery on 05- at [facility]. She said that she will see him for the Hospital  follow up visit.

## 2024-04-30 ENCOUNTER — APPOINTMENT (OUTPATIENT)
Dept: GENERAL RADIOLOGY | Facility: HOSPITAL | Age: 64
DRG: 234 | End: 2024-04-30
Payer: MEDICARE

## 2024-04-30 LAB
ABO GROUP BLD: NORMAL
ANION GAP SERPL CALCULATED.3IONS-SCNC: 6 MMOL/L (ref 5–15)
BASOPHILS # BLD AUTO: 0.02 10*3/MM3 (ref 0–0.2)
BASOPHILS NFR BLD AUTO: 0.4 % (ref 0–1.5)
BLD GP AB SCN SERPL QL: NEGATIVE
BUN SERPL-MCNC: 12 MG/DL (ref 8–23)
BUN/CREAT SERPL: 10.6 (ref 7–25)
CALCIUM SPEC-SCNC: 9.1 MG/DL (ref 8.6–10.5)
CHLORIDE SERPL-SCNC: 109 MMOL/L (ref 98–107)
CHOLEST SERPL-MCNC: 162 MG/DL (ref 0–200)
CO2 SERPL-SCNC: 28 MMOL/L (ref 22–29)
CREAT SERPL-MCNC: 1.13 MG/DL (ref 0.57–1)
DEPRECATED RDW RBC AUTO: 44.4 FL (ref 37–54)
EGFRCR SERPLBLD CKD-EPI 2021: 53.8 ML/MIN/1.73
EOSINOPHIL # BLD AUTO: 0.21 10*3/MM3 (ref 0–0.4)
EOSINOPHIL NFR BLD AUTO: 4.5 % (ref 0.3–6.2)
ERYTHROCYTE [DISTWIDTH] IN BLOOD BY AUTOMATED COUNT: 12.7 % (ref 12.3–15.4)
GLUCOSE BLDC GLUCOMTR-MCNC: 125 MG/DL (ref 70–130)
GLUCOSE BLDC GLUCOMTR-MCNC: 138 MG/DL (ref 70–130)
GLUCOSE BLDC GLUCOMTR-MCNC: 155 MG/DL (ref 70–130)
GLUCOSE BLDC GLUCOMTR-MCNC: 84 MG/DL (ref 70–130)
GLUCOSE SERPL-MCNC: 87 MG/DL (ref 65–99)
HCT VFR BLD AUTO: 34.6 % (ref 34–46.6)
HDLC SERPL-MCNC: 32 MG/DL (ref 40–60)
HGB BLD-MCNC: 10.7 G/DL (ref 12–15.9)
LDLC SERPL CALC-MCNC: 107 MG/DL (ref 0–100)
LDLC/HDLC SERPL: 3.28 {RATIO}
LYMPHOCYTES # BLD AUTO: 1.91 10*3/MM3 (ref 0.7–3.1)
LYMPHOCYTES NFR BLD AUTO: 41.3 % (ref 19.6–45.3)
MCH RBC QN AUTO: 29.4 PG (ref 26.6–33)
MCHC RBC AUTO-ENTMCNC: 30.9 G/DL (ref 31.5–35.7)
MCV RBC AUTO: 95.1 FL (ref 79–97)
MONOCYTES # BLD AUTO: 0.47 10*3/MM3 (ref 0.1–0.9)
MONOCYTES NFR BLD AUTO: 10.2 % (ref 5–12)
NEUTROPHILS NFR BLD AUTO: 2 10*3/MM3 (ref 1.7–7)
NEUTROPHILS NFR BLD AUTO: 43.4 % (ref 42.7–76)
PLATELET # BLD AUTO: 134 10*3/MM3 (ref 140–450)
PMV BLD AUTO: 13 FL (ref 6–12)
POTASSIUM SERPL-SCNC: 4 MMOL/L (ref 3.5–5.2)
QT INTERVAL: 400 MS
QT INTERVAL: 424 MS
QT INTERVAL: 438 MS
QTC INTERVAL: 419 MS
QTC INTERVAL: 433 MS
QTC INTERVAL: 438 MS
RBC # BLD AUTO: 3.64 10*6/MM3 (ref 3.77–5.28)
RH BLD: NEGATIVE
SODIUM SERPL-SCNC: 143 MMOL/L (ref 136–145)
T&S EXPIRATION DATE: NORMAL
TRIGL SERPL-MCNC: 125 MG/DL (ref 0–150)
VLDLC SERPL-MCNC: 23 MG/DL (ref 5–40)
WBC NRBC COR # BLD AUTO: 4.62 10*3/MM3 (ref 3.4–10.8)

## 2024-04-30 PROCEDURE — 82948 REAGENT STRIP/BLOOD GLUCOSE: CPT

## 2024-04-30 PROCEDURE — 86920 COMPATIBILITY TEST SPIN: CPT

## 2024-04-30 PROCEDURE — 71046 X-RAY EXAM CHEST 2 VIEWS: CPT

## 2024-04-30 PROCEDURE — 86902 BLOOD TYPE ANTIGEN DONOR EA: CPT

## 2024-04-30 PROCEDURE — 86900 BLOOD TYPING SEROLOGIC ABO: CPT | Performed by: NURSE PRACTITIONER

## 2024-04-30 PROCEDURE — 80048 BASIC METABOLIC PNL TOTAL CA: CPT | Performed by: NURSE PRACTITIONER

## 2024-04-30 PROCEDURE — 63710000001 INSULIN GLARGINE PER 5 UNITS: Performed by: NURSE PRACTITIONER

## 2024-04-30 PROCEDURE — 25010000002 ENOXAPARIN PER 10 MG: Performed by: NURSE PRACTITIONER

## 2024-04-30 PROCEDURE — 86850 RBC ANTIBODY SCREEN: CPT | Performed by: NURSE PRACTITIONER

## 2024-04-30 PROCEDURE — 86901 BLOOD TYPING SEROLOGIC RH(D): CPT | Performed by: NURSE PRACTITIONER

## 2024-04-30 PROCEDURE — 63710000001 INSULIN LISPRO (HUMAN) PER 5 UNITS: Performed by: INTERNAL MEDICINE

## 2024-04-30 PROCEDURE — 85025 COMPLETE CBC W/AUTO DIFF WBC: CPT | Performed by: NURSE PRACTITIONER

## 2024-04-30 PROCEDURE — 99232 SBSQ HOSP IP/OBS MODERATE 35: CPT | Performed by: NURSE PRACTITIONER

## 2024-04-30 PROCEDURE — 86922 COMPATIBILITY TEST ANTIGLOB: CPT

## 2024-04-30 PROCEDURE — 80061 LIPID PANEL: CPT | Performed by: NURSE PRACTITIONER

## 2024-04-30 RX ORDER — ACETAMINOPHEN 500 MG
1000 TABLET ORAL ONCE
Qty: 2 TABLET | Refills: 0 | Status: COMPLETED | OUTPATIENT
Start: 2024-05-01 | End: 2024-05-01

## 2024-04-30 RX ADMIN — LISINOPRIL 5 MG: 5 TABLET ORAL at 09:01

## 2024-04-30 RX ADMIN — ISOSORBIDE MONONITRATE 60 MG: 60 TABLET, EXTENDED RELEASE ORAL at 09:01

## 2024-04-30 RX ADMIN — INSULIN LISPRO 2 UNITS: 100 INJECTION, SOLUTION INTRAVENOUS; SUBCUTANEOUS at 11:08

## 2024-04-30 RX ADMIN — ALPRAZOLAM 0.5 MG: 0.5 TABLET ORAL at 07:15

## 2024-04-30 RX ADMIN — HYDROCODONE BITARTRATE AND ACETAMINOPHEN 1 TABLET: 10; 325 TABLET ORAL at 11:05

## 2024-04-30 RX ADMIN — ALPRAZOLAM 0.5 MG: 0.5 TABLET ORAL at 14:30

## 2024-04-30 RX ADMIN — LEVOTHYROXINE SODIUM 125 MCG: 125 TABLET ORAL at 05:12

## 2024-04-30 RX ADMIN — INSULIN GLARGINE 25 UNITS: 100 INJECTION, SOLUTION SUBCUTANEOUS at 20:00

## 2024-04-30 RX ADMIN — HYDROCODONE BITARTRATE AND ACETAMINOPHEN 1 TABLET: 10; 325 TABLET ORAL at 17:02

## 2024-04-30 RX ADMIN — GABAPENTIN 300 MG: 300 CAPSULE ORAL at 16:21

## 2024-04-30 RX ADMIN — POLYETHYLENE GLYCOL 3350 17 G: 17 POWDER, FOR SOLUTION ORAL at 09:13

## 2024-04-30 RX ADMIN — ASPIRIN 81 MG: 81 TABLET, COATED ORAL at 09:01

## 2024-04-30 RX ADMIN — ENOXAPARIN SODIUM 80 MG: 100 INJECTION SUBCUTANEOUS at 05:17

## 2024-04-30 RX ADMIN — ATORVASTATIN CALCIUM 40 MG: 40 TABLET, FILM COATED ORAL at 20:00

## 2024-04-30 RX ADMIN — HYDROCODONE BITARTRATE AND ACETAMINOPHEN 1 TABLET: 10; 325 TABLET ORAL at 04:29

## 2024-04-30 RX ADMIN — PANTOPRAZOLE SODIUM 40 MG: 40 TABLET, DELAYED RELEASE ORAL at 09:01

## 2024-04-30 RX ADMIN — PIOGLITAZONE HYDROCHLORIDE 15 MG: 15 TABLET ORAL at 09:01

## 2024-04-30 RX ADMIN — Medication 1 APPLICATION: at 17:02

## 2024-05-01 ENCOUNTER — APPOINTMENT (OUTPATIENT)
Dept: GENERAL RADIOLOGY | Facility: HOSPITAL | Age: 64
DRG: 234 | End: 2024-05-01
Payer: MEDICARE

## 2024-05-01 ENCOUNTER — ANESTHESIA (OUTPATIENT)
Dept: PERIOP | Facility: HOSPITAL | Age: 64
End: 2024-05-01
Payer: MEDICARE

## 2024-05-01 ENCOUNTER — ANESTHESIA EVENT (OUTPATIENT)
Dept: PERIOP | Facility: HOSPITAL | Age: 64
End: 2024-05-01
Payer: MEDICARE

## 2024-05-01 ENCOUNTER — APPOINTMENT (OUTPATIENT)
Dept: CARDIOLOGY | Facility: HOSPITAL | Age: 64
DRG: 234 | End: 2024-05-01
Payer: MEDICARE

## 2024-05-01 PROBLEM — Z72.0 TOBACCO USE: Status: ACTIVE | Noted: 2024-05-01

## 2024-05-01 PROBLEM — N18.31 STAGE 3A CHRONIC KIDNEY DISEASE: Status: ACTIVE | Noted: 2023-02-20

## 2024-05-01 LAB
ALBUMIN SERPL-MCNC: 3.4 G/DL (ref 3.5–5.2)
ALBUMIN SERPL-MCNC: 3.6 G/DL (ref 3.5–5.2)
ALBUMIN SERPL-MCNC: 3.7 G/DL (ref 3.5–5.2)
ALBUMIN/GLOB SERPL: 1.6 G/DL
ALP SERPL-CCNC: 68 U/L (ref 39–117)
ALT SERPL W P-5'-P-CCNC: 41 U/L (ref 1–33)
ANION GAP SERPL CALCULATED.3IONS-SCNC: 11 MMOL/L (ref 5–15)
ANION GAP SERPL CALCULATED.3IONS-SCNC: 7 MMOL/L (ref 5–15)
ANION GAP SERPL CALCULATED.3IONS-SCNC: 7 MMOL/L (ref 5–15)
APTT PPP: 30.7 SECONDS (ref 24.5–36)
APTT PPP: 35.7 SECONDS (ref 24.5–36)
ARTERIAL PATENCY WRIST A: ABNORMAL
AST SERPL-CCNC: 32 U/L (ref 1–32)
ATMOSPHERIC PRESS: 750 MMHG
ATMOSPHERIC PRESS: 751 MMHG
ATMOSPHERIC PRESS: 751 MMHG
BASE EXCESS BLDA CALC-SCNC: 2.4 MMOL/L (ref 0–2)
BASE EXCESS BLDA CALC-SCNC: 2.5 MMOL/L (ref 0–2)
BASE EXCESS BLDA CALC-SCNC: 3.4 MMOL/L (ref 0–2)
BASE EXCESS BLDA CALC-SCNC: 3.8 MMOL/L (ref 0–2)
BASE EXCESS BLDA CALC-SCNC: 4.6 MMOL/L (ref 0–2)
BASE EXCESS BLDA CALC-SCNC: 4.9 MMOL/L (ref 0–2)
BASOPHILS # BLD AUTO: 0.02 10*3/MM3 (ref 0–0.2)
BASOPHILS # BLD AUTO: 0.04 10*3/MM3 (ref 0–0.2)
BASOPHILS NFR BLD AUTO: 0.3 % (ref 0–1.5)
BASOPHILS NFR BLD AUTO: 0.8 % (ref 0–1.5)
BDY SITE: ABNORMAL
BILIRUB SERPL-MCNC: 0.2 MG/DL (ref 0–1.2)
BODY TEMPERATURE: 37
BUN SERPL-MCNC: 13 MG/DL (ref 8–23)
BUN SERPL-MCNC: 14 MG/DL (ref 8–23)
BUN SERPL-MCNC: 15 MG/DL (ref 8–23)
BUN/CREAT SERPL: 11.3 (ref 7–25)
BUN/CREAT SERPL: 11.5 (ref 7–25)
BUN/CREAT SERPL: 11.7 (ref 7–25)
CA-I BLD-MCNC: 4.31 MG/DL (ref 4.6–5.4)
CA-I BLD-MCNC: 4.83 MG/DL (ref 4.6–5.4)
CA-I BLD-MCNC: 4.9 MG/DL (ref 4.6–5.4)
CA-I BLD-MCNC: 5.14 MG/DL (ref 4.6–5.4)
CA-I BLD-MCNC: 5.61 MG/DL (ref 4.6–5.4)
CA-I BLD-MCNC: 5.64 MG/DL (ref 4.6–5.4)
CA-I BLD-MCNC: 5.79 MG/DL (ref 4.6–5.4)
CALCIUM SPEC-SCNC: 10.3 MG/DL (ref 8.6–10.5)
CALCIUM SPEC-SCNC: 9.2 MG/DL (ref 8.6–10.5)
CALCIUM SPEC-SCNC: 9.7 MG/DL (ref 8.6–10.5)
CHLORIDE SERPL-SCNC: 107 MMOL/L (ref 98–107)
CHLORIDE SERPL-SCNC: 108 MMOL/L (ref 98–107)
CHLORIDE SERPL-SCNC: 110 MMOL/L (ref 98–107)
CO2 SERPL-SCNC: 26 MMOL/L (ref 22–29)
CO2 SERPL-SCNC: 28 MMOL/L (ref 22–29)
CO2 SERPL-SCNC: 29 MMOL/L (ref 22–29)
COHGB MFR BLD: 0.2 % (ref 0–5)
COHGB MFR BLD: 0.3 % (ref 0–5)
COHGB MFR BLD: 0.5 % (ref 0–5)
COHGB MFR BLD: 0.6 % (ref 0–5)
COHGB MFR BLD: 0.6 % (ref 0–5)
COHGB MFR BLD: 1.1 % (ref 0–5)
CREAT SERPL-MCNC: 1.15 MG/DL (ref 0.57–1)
CREAT SERPL-MCNC: 1.22 MG/DL (ref 0.57–1)
CREAT SERPL-MCNC: 1.28 MG/DL (ref 0.57–1)
D-LACTATE SERPL-SCNC: 1.1 MMOL/L (ref 0.5–2)
DEPRECATED RDW RBC AUTO: 44.5 FL (ref 37–54)
DEPRECATED RDW RBC AUTO: 44.6 FL (ref 37–54)
DEPRECATED RDW RBC AUTO: 44.6 FL (ref 37–54)
DEPRECATED RDW RBC AUTO: 45 FL (ref 37–54)
EGFRCR SERPLBLD CKD-EPI 2021: 46.3 ML/MIN/1.73
EGFRCR SERPLBLD CKD-EPI 2021: 49 ML/MIN/1.73
EGFRCR SERPLBLD CKD-EPI 2021: 52.6 ML/MIN/1.73
EOSINOPHIL # BLD AUTO: 0.07 10*3/MM3 (ref 0–0.4)
EOSINOPHIL # BLD AUTO: 0.14 10*3/MM3 (ref 0–0.4)
EOSINOPHIL NFR BLD AUTO: 0.9 % (ref 0.3–6.2)
EOSINOPHIL NFR BLD AUTO: 2.6 % (ref 0.3–6.2)
ERYTHROCYTE [DISTWIDTH] IN BLOOD BY AUTOMATED COUNT: 12.8 % (ref 12.3–15.4)
ERYTHROCYTE [DISTWIDTH] IN BLOOD BY AUTOMATED COUNT: 13 % (ref 12.3–15.4)
ERYTHROCYTE [DISTWIDTH] IN BLOOD BY AUTOMATED COUNT: 13 % (ref 12.3–15.4)
ERYTHROCYTE [DISTWIDTH] IN BLOOD BY AUTOMATED COUNT: 13.1 % (ref 12.3–15.4)
FIBRINOGEN PPP-MCNC: 261 MG/DL (ref 240–460)
GLOBULIN UR ELPH-MCNC: 2.3 GM/DL
GLUCOSE BLDC GLUCOMTR-MCNC: 189 MG/DL (ref 70–130)
GLUCOSE BLDC GLUCOMTR-MCNC: 73 MG/DL (ref 70–130)
GLUCOSE BLDC GLUCOMTR-MCNC: 91 MG/DL (ref 70–130)
GLUCOSE BLDC GLUCOMTR-MCNC: 96 MG/DL (ref 70–130)
GLUCOSE SERPL-MCNC: 107 MG/DL (ref 65–99)
GLUCOSE SERPL-MCNC: 109 MG/DL (ref 65–99)
GLUCOSE SERPL-MCNC: 162 MG/DL (ref 65–99)
HCO3 BLDA-SCNC: 26.6 MMOL/L (ref 20–26)
HCO3 BLDA-SCNC: 27.5 MMOL/L (ref 20–26)
HCO3 BLDA-SCNC: 28.2 MMOL/L (ref 20–26)
HCO3 BLDA-SCNC: 28.2 MMOL/L (ref 20–26)
HCO3 BLDA-SCNC: 28.6 MMOL/L (ref 20–26)
HCO3 BLDA-SCNC: 29.1 MMOL/L (ref 20–26)
HCT VFR BLD AUTO: 28.7 % (ref 34–46.6)
HCT VFR BLD AUTO: 29.4 % (ref 34–46.6)
HCT VFR BLD AUTO: 29.4 % (ref 34–46.6)
HCT VFR BLD AUTO: 34.7 % (ref 34–46.6)
HCT VFR BLD CALC: 22.3 % (ref 38–51)
HCT VFR BLD CALC: 24.5 % (ref 38–51)
HCT VFR BLD CALC: 29.9 % (ref 38–51)
HCT VFR BLD CALC: 30.1 % (ref 38–51)
HCT VFR BLD CALC: 30.9 % (ref 38–51)
HCT VFR BLD CALC: 30.9 % (ref 38–51)
HGB BLD-MCNC: 10.8 G/DL (ref 12–15.9)
HGB BLD-MCNC: 9.2 G/DL (ref 12–15.9)
HGB BLDA-MCNC: 10.1 G/DL (ref 12–16)
HGB BLDA-MCNC: 10.1 G/DL (ref 12–16)
HGB BLDA-MCNC: 7.3 G/DL (ref 12–16)
HGB BLDA-MCNC: 8 G/DL (ref 12–16)
HGB BLDA-MCNC: 9.8 G/DL (ref 12–16)
HGB BLDA-MCNC: 9.8 G/DL (ref 12–16)
IMM GRANULOCYTES # BLD AUTO: 0.09 10*3/MM3 (ref 0–0.05)
IMM GRANULOCYTES NFR BLD AUTO: 1.2 % (ref 0–0.5)
INHALED O2 CONCENTRATION: 100 %
INHALED O2 CONCENTRATION: 60 %
INR PPP: 0.87 (ref 0.91–1.09)
INR PPP: 1.05 (ref 0.91–1.09)
LYMPHOCYTES # BLD AUTO: 0.92 10*3/MM3 (ref 0.7–3.1)
LYMPHOCYTES # BLD AUTO: 1.72 10*3/MM3 (ref 0.7–3.1)
LYMPHOCYTES NFR BLD AUTO: 12 % (ref 19.6–45.3)
LYMPHOCYTES NFR BLD AUTO: 32.5 % (ref 19.6–45.3)
Lab: ABNORMAL
MCH RBC QN AUTO: 29.4 PG (ref 26.6–33)
MCH RBC QN AUTO: 29.6 PG (ref 26.6–33)
MCH RBC QN AUTO: 29.6 PG (ref 26.6–33)
MCH RBC QN AUTO: 30.3 PG (ref 26.6–33)
MCHC RBC AUTO-ENTMCNC: 31.1 G/DL (ref 31.5–35.7)
MCHC RBC AUTO-ENTMCNC: 31.3 G/DL (ref 31.5–35.7)
MCHC RBC AUTO-ENTMCNC: 31.3 G/DL (ref 31.5–35.7)
MCHC RBC AUTO-ENTMCNC: 32.1 G/DL (ref 31.5–35.7)
MCV RBC AUTO: 94.4 FL (ref 79–97)
MCV RBC AUTO: 94.5 FL (ref 79–97)
MCV RBC AUTO: 94.5 FL (ref 79–97)
MCV RBC AUTO: 94.6 FL (ref 79–97)
METHGB BLD QL: 0.3 % (ref 0–3)
METHGB BLD QL: 0.7 % (ref 0–3)
METHGB BLD QL: 0.8 % (ref 0–3)
MODALITY: ABNORMAL
MONOCYTES # BLD AUTO: 0.45 10*3/MM3 (ref 0.1–0.9)
MONOCYTES # BLD AUTO: 0.57 10*3/MM3 (ref 0.1–0.9)
MONOCYTES NFR BLD AUTO: 10.8 % (ref 5–12)
MONOCYTES NFR BLD AUTO: 5.9 % (ref 5–12)
NEUTROPHILS NFR BLD AUTO: 2.81 10*3/MM3 (ref 1.7–7)
NEUTROPHILS NFR BLD AUTO: 53.1 % (ref 42.7–76)
NEUTROPHILS NFR BLD AUTO: 6.1 10*3/MM3 (ref 1.7–7)
NEUTROPHILS NFR BLD AUTO: 79.7 % (ref 42.7–76)
NRBC BLD AUTO-RTO: 0 /100 WBC (ref 0–0.2)
OXYHGB MFR BLDV: 94.7 % (ref 94–99)
OXYHGB MFR BLDV: 97.5 % (ref 94–99)
OXYHGB MFR BLDV: 98.1 % (ref 94–99)
OXYHGB MFR BLDV: 98.7 % (ref 94–99)
OXYHGB MFR BLDV: 99.1 % (ref 94–99)
OXYHGB MFR BLDV: 99.4 % (ref 94–99)
PCO2 BLDA: 38.7 MM HG (ref 35–45)
PCO2 BLDA: 39 MM HG (ref 35–45)
PCO2 BLDA: 39 MM HG (ref 35–45)
PCO2 BLDA: 40.7 MM HG (ref 35–45)
PCO2 BLDA: 41 MM HG (ref 35–45)
PCO2 BLDA: 48 MM HG (ref 35–45)
PCO2 TEMP ADJ BLD: 38.7 MM HG (ref 35–45)
PCO2 TEMP ADJ BLD: 39 MM HG (ref 35–45)
PCO2 TEMP ADJ BLD: 39 MM HG (ref 35–45)
PCO2 TEMP ADJ BLD: 40.7 MM HG (ref 35–45)
PCO2 TEMP ADJ BLD: 41 MM HG (ref 35–45)
PCO2 TEMP ADJ BLD: 48 MM HG (ref 35–45)
PEEP RESPIRATORY: 10 CM[H2O]
PEEP RESPIRATORY: 8 CM[H2O]
PH BLDA: 7.38 PH UNITS (ref 7.35–7.45)
PH BLDA: 7.45 PH UNITS (ref 7.35–7.45)
PH BLDA: 7.45 PH UNITS (ref 7.35–7.45)
PH BLDA: 7.46 PH UNITS (ref 7.35–7.45)
PH BLDA: 7.46 PH UNITS (ref 7.35–7.45)
PH BLDA: 7.47 PH UNITS (ref 7.35–7.45)
PH, TEMP CORRECTED: 7.38 PH UNITS (ref 7.35–7.45)
PH, TEMP CORRECTED: 7.45 PH UNITS (ref 7.35–7.45)
PH, TEMP CORRECTED: 7.45 PH UNITS (ref 7.35–7.45)
PH, TEMP CORRECTED: 7.46 PH UNITS (ref 7.35–7.45)
PH, TEMP CORRECTED: 7.46 PH UNITS (ref 7.35–7.45)
PH, TEMP CORRECTED: 7.47 PH UNITS (ref 7.35–7.45)
PHOSPHATE SERPL-MCNC: 3.8 MG/DL (ref 2.5–4.5)
PHOSPHATE SERPL-MCNC: 4.5 MG/DL (ref 2.5–4.5)
PLATELET # BLD AUTO: 104 10*3/MM3 (ref 140–450)
PLATELET # BLD AUTO: 104 10*3/MM3 (ref 140–450)
PLATELET # BLD AUTO: 106 10*3/MM3 (ref 140–450)
PLATELET # BLD AUTO: 138 10*3/MM3 (ref 140–450)
PMV BLD AUTO: 12.6 FL (ref 6–12)
PMV BLD AUTO: 12.6 FL (ref 6–12)
PMV BLD AUTO: 12.7 FL (ref 6–12)
PMV BLD AUTO: 13.2 FL (ref 6–12)
PO2 BLDA: 103 MM HG (ref 83–108)
PO2 BLDA: 132 MM HG (ref 83–108)
PO2 BLDA: 257 MM HG (ref 83–108)
PO2 BLDA: 296 MM HG (ref 83–108)
PO2 BLDA: 71.9 MM HG (ref 83–108)
PO2 BLDA: >547 MM HG (ref 83–108)
PO2 TEMP ADJ BLD: 103 MM HG (ref 83–108)
PO2 TEMP ADJ BLD: 132 MM HG (ref 83–108)
PO2 TEMP ADJ BLD: 257 MM HG (ref 83–108)
PO2 TEMP ADJ BLD: 296 MM HG (ref 83–108)
PO2 TEMP ADJ BLD: 71.9 MM HG (ref 83–108)
PO2 TEMP ADJ BLD: >547 MM HG (ref 83–108)
POTASSIUM BLDA-SCNC: 3.7 MMOL/L (ref 3.5–5.2)
POTASSIUM BLDA-SCNC: 3.8 MMOL/L (ref 3.5–5.2)
POTASSIUM BLDA-SCNC: 4 MMOL/L (ref 3.5–5.2)
POTASSIUM BLDA-SCNC: 4.2 MMOL/L (ref 3.5–5.2)
POTASSIUM BLDA-SCNC: 4.3 MMOL/L (ref 3.5–5.2)
POTASSIUM BLDA-SCNC: 4.4 MMOL/L (ref 3.5–5.2)
POTASSIUM SERPL-SCNC: 3.9 MMOL/L (ref 3.5–5.2)
POTASSIUM SERPL-SCNC: 4 MMOL/L (ref 3.5–5.2)
POTASSIUM SERPL-SCNC: 4.5 MMOL/L (ref 3.5–5.2)
PROT SERPL-MCNC: 5.9 G/DL (ref 6–8.5)
PROTHROMBIN TIME: 12.1 SECONDS (ref 11.8–14.8)
PROTHROMBIN TIME: 14.2 SECONDS (ref 11.8–14.8)
RBC # BLD AUTO: 3.04 10*6/MM3 (ref 3.77–5.28)
RBC # BLD AUTO: 3.11 10*6/MM3 (ref 3.77–5.28)
RBC # BLD AUTO: 3.11 10*6/MM3 (ref 3.77–5.28)
RBC # BLD AUTO: 3.67 10*6/MM3 (ref 3.77–5.28)
SAO2 % BLDCOA: 96 % (ref 94–99)
SAO2 % BLDCOA: 98.8 % (ref 94–99)
SAO2 % BLDCOA: 99.4 % (ref 94–99)
SAO2 % BLDCOA: 99.8 % (ref 94–99)
SAO2 % BLDCOA: >100.1 % (ref 94–99)
SAO2 % BLDCOA: >100.1 % (ref 94–99)
SET MECH RESP RATE: 20
SET MECH RESP RATE: 20
SODIUM BLDA-SCNC: 143 MMOL/L (ref 136–145)
SODIUM BLDA-SCNC: 143 MMOL/L (ref 136–145)
SODIUM BLDA-SCNC: 144 MMOL/L (ref 136–145)
SODIUM BLDA-SCNC: 144 MMOL/L (ref 136–145)
SODIUM BLDA-SCNC: 145 MMOL/L (ref 136–145)
SODIUM BLDA-SCNC: 145 MMOL/L (ref 136–145)
SODIUM SERPL-SCNC: 144 MMOL/L (ref 136–145)
SODIUM SERPL-SCNC: 144 MMOL/L (ref 136–145)
SODIUM SERPL-SCNC: 145 MMOL/L (ref 136–145)
VENTILATOR MODE: ABNORMAL
VENTILATOR MODE: AC
VT ON VENT VENT: 500 ML
VT ON VENT VENT: 500 ML
WBC NRBC COR # BLD AUTO: 5.29 10*3/MM3 (ref 3.4–10.8)
WBC NRBC COR # BLD AUTO: 7.65 10*3/MM3 (ref 3.4–10.8)
WBC NRBC COR # BLD AUTO: 7.65 10*3/MM3 (ref 3.4–10.8)
WBC NRBC COR # BLD AUTO: 7.94 10*3/MM3 (ref 3.4–10.8)

## 2024-05-01 PROCEDURE — 25810000003 SODIUM CHLORIDE 0.9 % SOLUTION: Performed by: INTERNAL MEDICINE

## 2024-05-01 PROCEDURE — 83605 ASSAY OF LACTIC ACID: CPT | Performed by: SURGERY

## 2024-05-01 PROCEDURE — B245ZZ4 ULTRASONOGRAPHY OF LEFT HEART, TRANSESOPHAGEAL: ICD-10-PCS | Performed by: SURGERY

## 2024-05-01 PROCEDURE — 93010 ELECTROCARDIOGRAM REPORT: CPT | Performed by: INTERNAL MEDICINE

## 2024-05-01 PROCEDURE — 80053 COMPREHEN METABOLIC PANEL: CPT | Performed by: NURSE PRACTITIONER

## 2024-05-01 PROCEDURE — 71045 X-RAY EXAM CHEST 1 VIEW: CPT

## 2024-05-01 PROCEDURE — 33534 CABG ARTERIAL TWO: CPT | Performed by: SURGERY

## 2024-05-01 PROCEDURE — 06BQ4ZZ EXCISION OF LEFT SAPHENOUS VEIN, PERCUTANEOUS ENDOSCOPIC APPROACH: ICD-10-PCS | Performed by: SURGERY

## 2024-05-01 PROCEDURE — 85730 THROMBOPLASTIN TIME PARTIAL: CPT | Performed by: NURSE PRACTITIONER

## 2024-05-01 PROCEDURE — C1751 CATH, INF, PER/CENT/MIDLINE: HCPCS | Performed by: NURSE ANESTHETIST, CERTIFIED REGISTERED

## 2024-05-01 PROCEDURE — 25010000002 MIDAZOLAM PER 1 MG: Performed by: NURSE ANESTHETIST, CERTIFIED REGISTERED

## 2024-05-01 PROCEDURE — P9040 RBC LEUKOREDUCED IRRADIATED: HCPCS

## 2024-05-01 PROCEDURE — 25810000003 LACTATED RINGERS PER 1000 ML: Performed by: SURGERY

## 2024-05-01 PROCEDURE — 33518 CABG ARTERY-VEIN TWO: CPT | Performed by: SURGERY

## 2024-05-01 PROCEDURE — 82948 REAGENT STRIP/BLOOD GLUCOSE: CPT

## 2024-05-01 PROCEDURE — 85730 THROMBOPLASTIN TIME PARTIAL: CPT | Performed by: SURGERY

## 2024-05-01 PROCEDURE — 25010000002 PHENYLEPHRINE 10 MG/ML SOLUTION

## 2024-05-01 PROCEDURE — 82330 ASSAY OF CALCIUM: CPT

## 2024-05-01 PROCEDURE — 25810000003 SODIUM CHLORIDE 0.9 % SOLUTION: Performed by: SURGERY

## 2024-05-01 PROCEDURE — 25010000002 BUPIVACAINE (PF) 0.5 % SOLUTION 30 ML VIAL: Performed by: SURGERY

## 2024-05-01 PROCEDURE — 5A1221Z PERFORMANCE OF CARDIAC OUTPUT, CONTINUOUS: ICD-10-PCS | Performed by: SURGERY

## 2024-05-01 PROCEDURE — 25010000002 HEPARIN (PORCINE) PER 1000 UNITS: Performed by: NURSE ANESTHETIST, CERTIFIED REGISTERED

## 2024-05-01 PROCEDURE — 94002 VENT MGMT INPAT INIT DAY: CPT

## 2024-05-01 PROCEDURE — 25010000002 POTASSIUM CHLORIDE PER 2 MEQ OF POTASSIUM

## 2024-05-01 PROCEDURE — 25010000002 ACETAMINOPHEN 10 MG/ML SOLUTION: Performed by: SURGERY

## 2024-05-01 PROCEDURE — 25010000002 PROTAMINE SULFATE PER 10 MG

## 2024-05-01 PROCEDURE — 25810000003 DEXTROSE 5 % WITH KCL 20 MEQ 20 MEQ/L SOLUTION: Performed by: SURGERY

## 2024-05-01 PROCEDURE — C1713 ANCHOR/SCREW BN/BN,TIS/BN: HCPCS | Performed by: SURGERY

## 2024-05-01 PROCEDURE — 82805 BLOOD GASES W/O2 SATURATION: CPT

## 2024-05-01 PROCEDURE — 25810000003 SODIUM CHLORIDE 0.9 % SOLUTION

## 2024-05-01 PROCEDURE — 93005 ELECTROCARDIOGRAM TRACING: CPT | Performed by: SURGERY

## 2024-05-01 PROCEDURE — 25010000002 MORPHINE PER 10 MG: Performed by: SURGERY

## 2024-05-01 PROCEDURE — 25010000002 FUROSEMIDE PER 20 MG

## 2024-05-01 PROCEDURE — 83050 HGB METHEMOGLOBIN QUAN: CPT

## 2024-05-01 PROCEDURE — 82375 ASSAY CARBOXYHB QUANT: CPT

## 2024-05-01 PROCEDURE — 25010000002 MAGNESIUM SULFATE PER 500 MG OF MAGNESIUM: Performed by: NURSE ANESTHETIST, CERTIFIED REGISTERED

## 2024-05-01 PROCEDURE — 86900 BLOOD TYPING SEROLOGIC ABO: CPT

## 2024-05-01 PROCEDURE — 02100Z8 BYPASS CORONARY ARTERY, ONE ARTERY FROM RIGHT INTERNAL MAMMARY, OPEN APPROACH: ICD-10-PCS | Performed by: SURGERY

## 2024-05-01 PROCEDURE — 25010000002 FENTANYL CITRATE (PF) 50 MCG/ML SOLUTION: Performed by: NURSE ANESTHETIST, CERTIFIED REGISTERED

## 2024-05-01 PROCEDURE — P9047 ALBUMIN (HUMAN), 25%, 50ML: HCPCS

## 2024-05-01 PROCEDURE — A4648 IMPLANTABLE TISSUE MARKER: HCPCS | Performed by: SURGERY

## 2024-05-01 PROCEDURE — 25010000002 PROTAMINE SULFATE PER 10 MG: Performed by: NURSE ANESTHETIST, CERTIFIED REGISTERED

## 2024-05-01 PROCEDURE — 25810000003 LACTATED RINGERS PER 1000 ML

## 2024-05-01 PROCEDURE — 84100 ASSAY OF PHOSPHORUS: CPT | Performed by: NURSE PRACTITIONER

## 2024-05-01 PROCEDURE — 25010000002 MANNITOL PER 50 ML

## 2024-05-01 PROCEDURE — 25010000002 EPINEPHRINE PER 0.1 MG: Performed by: SURGERY

## 2024-05-01 PROCEDURE — 85610 PROTHROMBIN TIME: CPT | Performed by: SURGERY

## 2024-05-01 PROCEDURE — 25010000002 VANCOMYCIN 1 G RECONSTITUTED SOLUTION 1 EACH VIAL: Performed by: SURGERY

## 2024-05-01 PROCEDURE — 25010000002 SUGAMMADEX 200 MG/2ML SOLUTION: Performed by: NURSE ANESTHETIST, CERTIFIED REGISTERED

## 2024-05-01 PROCEDURE — 25810000003 SODIUM CHLORIDE 0.9 % SOLUTION 250 ML FLEX CONT: Performed by: NURSE ANESTHETIST, CERTIFIED REGISTERED

## 2024-05-01 PROCEDURE — 93005 ELECTROCARDIOGRAM TRACING: CPT | Performed by: NURSE PRACTITIONER

## 2024-05-01 PROCEDURE — P9016 RBC LEUKOCYTES REDUCED: HCPCS

## 2024-05-01 PROCEDURE — 25010000002 CEFAZOLIN PER 500 MG: Performed by: SURGERY

## 2024-05-01 PROCEDURE — 25010000002 HEPARIN (PORCINE) PER 1000 UNITS

## 2024-05-01 PROCEDURE — 25010000002 AMIODARONE IN DEXTROSE 5% 360-4.14 MG/200ML-% SOLUTION: Performed by: SURGERY

## 2024-05-01 PROCEDURE — 25010000002 ALBUMIN HUMAN 5% PER 50 ML: Performed by: SURGERY

## 2024-05-01 PROCEDURE — 25010000002 HEPARIN (PORCINE) PER 1000 UNITS: Performed by: SURGERY

## 2024-05-01 PROCEDURE — 25010000002 ALBUMIN HUMAN 25% PER 50 ML

## 2024-05-01 PROCEDURE — 85027 COMPLETE CBC AUTOMATED: CPT | Performed by: SURGERY

## 2024-05-01 PROCEDURE — 94799 UNLISTED PULMONARY SVC/PX: CPT

## 2024-05-01 PROCEDURE — 02100Z9 BYPASS CORONARY ARTERY, ONE ARTERY FROM LEFT INTERNAL MAMMARY, OPEN APPROACH: ICD-10-PCS | Performed by: SURGERY

## 2024-05-01 PROCEDURE — 85025 COMPLETE CBC W/AUTO DIFF WBC: CPT | Performed by: NURSE PRACTITIONER

## 2024-05-01 PROCEDURE — P9041 ALBUMIN (HUMAN),5%, 50ML: HCPCS | Performed by: SURGERY

## 2024-05-01 PROCEDURE — 36430 TRANSFUSION BLD/BLD COMPNT: CPT

## 2024-05-01 PROCEDURE — 85610 PROTHROMBIN TIME: CPT | Performed by: NURSE PRACTITIONER

## 2024-05-01 PROCEDURE — 021109W BYPASS CORONARY ARTERY, TWO ARTERIES FROM AORTA WITH AUTOLOGOUS VENOUS TISSUE, OPEN APPROACH: ICD-10-PCS | Performed by: SURGERY

## 2024-05-01 PROCEDURE — C9290 INJ, BUPIVACAINE LIPOSOME: HCPCS | Performed by: SURGERY

## 2024-05-01 PROCEDURE — 85384 FIBRINOGEN ACTIVITY: CPT | Performed by: SURGERY

## 2024-05-01 PROCEDURE — 0 BUPIVACAINE LIPOSOME 1.3 % SUSPENSION 20 ML VIAL: Performed by: SURGERY

## 2024-05-01 PROCEDURE — 25010000002 NICARDIPINE 2.5 MG/ML SOLUTION: Performed by: SURGERY

## 2024-05-01 PROCEDURE — 93318 ECHO TRANSESOPHAGEAL INTRAOP: CPT | Performed by: NURSE ANESTHETIST, CERTIFIED REGISTERED

## 2024-05-01 PROCEDURE — 94640 AIRWAY INHALATION TREATMENT: CPT

## 2024-05-01 PROCEDURE — 33508 ENDOSCOPIC VEIN HARVEST: CPT | Performed by: SURGERY

## 2024-05-01 PROCEDURE — 25010000002 PAPAVERINE PER 60 MG: Performed by: SURGERY

## 2024-05-01 PROCEDURE — 25010000002 MIDAZOLAM PER 1 MG: Performed by: ANESTHESIOLOGY

## 2024-05-01 PROCEDURE — 85025 COMPLETE CBC W/AUTO DIFF WBC: CPT | Performed by: SURGERY

## 2024-05-01 DEVICE — IMPLANTABLE DEVICE: Type: IMPLANTABLE DEVICE | Site: HEART | Status: FUNCTIONAL

## 2024-05-01 DEVICE — LIGACLIP EXTRA LIGATING CLIP CARTRIDGES: 6 TITANIUM CLIPS/ CARTRIDGE (SMALL)
Type: IMPLANTABLE DEVICE | Site: HEART | Status: FUNCTIONAL
Brand: LIGACLIP

## 2024-05-01 DEVICE — SCRW BONE THORECON 2.7X10MM PK/8: Type: IMPLANTABLE DEVICE | Site: HEART | Status: FUNCTIONAL

## 2024-05-01 DEVICE — WAX BONE HEMO NAT 2.5G: Type: IMPLANTABLE DEVICE | Site: CHEST | Status: FUNCTIONAL

## 2024-05-01 DEVICE — KT STERNALOCK XP X/PLATE: Type: IMPLANTABLE DEVICE | Site: CHEST | Status: FUNCTIONAL

## 2024-05-01 DEVICE — KT HEMOST ABS SURGIFOAM PORCN 1GRAM: Type: IMPLANTABLE DEVICE | Site: CHEST | Status: FUNCTIONAL

## 2024-05-01 DEVICE — KT PLT STERNALOCK/XP AXILR SHRP: Type: IMPLANTABLE DEVICE | Site: CHEST | Status: FUNCTIONAL

## 2024-05-01 DEVICE — PLEDGET INCISIONLINE REINF TFE SFT PTFE 1.5X3X7MM WHT: Type: IMPLANTABLE DEVICE | Site: HEART | Status: FUNCTIONAL

## 2024-05-01 DEVICE — DISK-SHAPED STYLE, SILICONE (1 PER STERILE PKG)
Type: IMPLANTABLE DEVICE | Site: HEART | Status: FUNCTIONAL
Brand: SCANLAN® RADIOMARK® GRAFT MARKERS

## 2024-05-01 RX ORDER — PROTAMINE SULFATE 10 MG/ML
INJECTION, SOLUTION INTRAVENOUS AS NEEDED
Status: DISCONTINUED | OUTPATIENT
Start: 2024-05-01 | End: 2024-05-01 | Stop reason: SURG

## 2024-05-01 RX ORDER — MIDAZOLAM HYDROCHLORIDE 1 MG/ML
INJECTION INTRAMUSCULAR; INTRAVENOUS AS NEEDED
Status: DISCONTINUED | OUTPATIENT
Start: 2024-05-01 | End: 2024-05-01 | Stop reason: SURG

## 2024-05-01 RX ORDER — SODIUM CHLORIDE 0.9 % (FLUSH) 0.9 %
10 SYRINGE (ML) INJECTION AS NEEDED
Status: DISCONTINUED | OUTPATIENT
Start: 2024-05-01 | End: 2024-05-01 | Stop reason: HOSPADM

## 2024-05-01 RX ORDER — LEVOTHYROXINE SODIUM 0.12 MG/1
125 TABLET ORAL
Status: DISCONTINUED | OUTPATIENT
Start: 2024-05-02 | End: 2024-05-06 | Stop reason: HOSPADM

## 2024-05-01 RX ORDER — SODIUM CHLORIDE 0.9 % (FLUSH) 0.9 %
3-10 SYRINGE (ML) INJECTION AS NEEDED
Status: DISCONTINUED | OUTPATIENT
Start: 2024-05-01 | End: 2024-05-01 | Stop reason: HOSPADM

## 2024-05-01 RX ORDER — CLOPIDOGREL BISULFATE 75 MG/1
75 TABLET ORAL DAILY
Status: DISCONTINUED | OUTPATIENT
Start: 2024-05-02 | End: 2024-05-06 | Stop reason: HOSPADM

## 2024-05-01 RX ORDER — SODIUM CHLORIDE, SODIUM LACTATE, POTASSIUM CHLORIDE, CALCIUM CHLORIDE 600; 310; 30; 20 MG/100ML; MG/100ML; MG/100ML; MG/100ML
1000 INJECTION, SOLUTION INTRAVENOUS CONTINUOUS
Status: DISCONTINUED | OUTPATIENT
Start: 2024-05-01 | End: 2024-05-01

## 2024-05-01 RX ORDER — POTASSIUM CHLORIDE, DEXTROSE MONOHYDRATE 150; 5 MG/100ML; G/100ML
30 INJECTION, SOLUTION INTRAVENOUS CONTINUOUS
Status: DISCONTINUED | OUTPATIENT
Start: 2024-05-01 | End: 2024-05-06 | Stop reason: HOSPADM

## 2024-05-01 RX ORDER — MEPERIDINE HYDROCHLORIDE 50 MG/ML
25 INJECTION INTRAMUSCULAR; INTRAVENOUS; SUBCUTANEOUS
Status: DISCONTINUED | OUTPATIENT
Start: 2024-05-01 | End: 2024-05-02

## 2024-05-01 RX ORDER — ALBUMIN, HUMAN INJ 5% 5 %
500 SOLUTION INTRAVENOUS AS NEEDED
Status: DISPENSED | OUTPATIENT
Start: 2024-05-01 | End: 2024-05-04

## 2024-05-01 RX ORDER — SODIUM CHLORIDE 0.9 % (FLUSH) 0.9 %
10 SYRINGE (ML) INJECTION EVERY 12 HOURS SCHEDULED
Status: DISCONTINUED | OUTPATIENT
Start: 2024-05-01 | End: 2024-05-01 | Stop reason: HOSPADM

## 2024-05-01 RX ORDER — BISACODYL 5 MG/1
10 TABLET, DELAYED RELEASE ORAL 2 TIMES DAILY
Status: DISCONTINUED | OUTPATIENT
Start: 2024-05-02 | End: 2024-05-06 | Stop reason: HOSPADM

## 2024-05-01 RX ORDER — LIDOCAINE HYDROCHLORIDE 10 MG/ML
0.5 INJECTION, SOLUTION EPIDURAL; INFILTRATION; INTRACAUDAL; PERINEURAL ONCE AS NEEDED
Status: DISCONTINUED | OUTPATIENT
Start: 2024-05-01 | End: 2024-05-01 | Stop reason: HOSPADM

## 2024-05-01 RX ORDER — NICOTINE POLACRILEX 4 MG
15 LOZENGE BUCCAL
Status: DISCONTINUED | OUTPATIENT
Start: 2024-05-01 | End: 2024-05-02

## 2024-05-01 RX ORDER — IPRATROPIUM BROMIDE AND ALBUTEROL SULFATE 2.5; .5 MG/3ML; MG/3ML
3 SOLUTION RESPIRATORY (INHALATION)
Status: DISCONTINUED | OUTPATIENT
Start: 2024-05-01 | End: 2024-05-03

## 2024-05-01 RX ORDER — FENTANYL CITRATE 50 UG/ML
INJECTION, SOLUTION INTRAMUSCULAR; INTRAVENOUS AS NEEDED
Status: DISCONTINUED | OUTPATIENT
Start: 2024-05-01 | End: 2024-05-01 | Stop reason: SURG

## 2024-05-01 RX ORDER — ALBUTEROL SULFATE 2.5 MG/3ML
2.5 SOLUTION RESPIRATORY (INHALATION) EVERY 4 HOURS PRN
Status: ACTIVE | OUTPATIENT
Start: 2024-05-01 | End: 2024-05-02

## 2024-05-01 RX ORDER — OXYCODONE HYDROCHLORIDE 10 MG/1
10 TABLET ORAL EVERY 4 HOURS PRN
Status: DISCONTINUED | OUTPATIENT
Start: 2024-05-01 | End: 2024-05-06 | Stop reason: HOSPADM

## 2024-05-01 RX ORDER — ATORVASTATIN CALCIUM 10 MG/1
20 TABLET, FILM COATED ORAL NIGHTLY
Status: DISCONTINUED | OUTPATIENT
Start: 2024-05-02 | End: 2024-05-06 | Stop reason: HOSPADM

## 2024-05-01 RX ORDER — DEXMEDETOMIDINE HYDROCHLORIDE 4 UG/ML
INJECTION, SOLUTION INTRAVENOUS CONTINUOUS PRN
Status: DISCONTINUED | OUTPATIENT
Start: 2024-05-01 | End: 2024-05-01 | Stop reason: SURG

## 2024-05-01 RX ORDER — SODIUM CHLORIDE 9 MG/ML
40 INJECTION, SOLUTION INTRAVENOUS AS NEEDED
Status: DISCONTINUED | OUTPATIENT
Start: 2024-05-01 | End: 2024-05-01 | Stop reason: HOSPADM

## 2024-05-01 RX ORDER — SODIUM CHLORIDE 9 MG/ML
30 INJECTION, SOLUTION INTRAVENOUS CONTINUOUS PRN
Status: DISCONTINUED | OUTPATIENT
Start: 2024-05-01 | End: 2024-05-01 | Stop reason: HOSPADM

## 2024-05-01 RX ORDER — ACETAMINOPHEN 325 MG/1
650 TABLET ORAL EVERY 4 HOURS PRN
Status: DISCONTINUED | OUTPATIENT
Start: 2024-05-02 | End: 2024-05-06 | Stop reason: HOSPADM

## 2024-05-01 RX ORDER — NITROGLYCERIN 20 MG/100ML
5 INJECTION INTRAVENOUS CONTINUOUS
Status: DISCONTINUED | OUTPATIENT
Start: 2024-05-01 | End: 2024-05-06 | Stop reason: HOSPADM

## 2024-05-01 RX ORDER — SODIUM CHLORIDE 0.9 % (FLUSH) 0.9 %
30 SYRINGE (ML) INJECTION ONCE AS NEEDED
Status: DISCONTINUED | OUTPATIENT
Start: 2024-05-01 | End: 2024-05-01 | Stop reason: HOSPADM

## 2024-05-01 RX ORDER — IBUPROFEN 600 MG/1
1 TABLET ORAL
Status: DISCONTINUED | OUTPATIENT
Start: 2024-05-01 | End: 2024-05-01 | Stop reason: HOSPADM

## 2024-05-01 RX ORDER — ASPIRIN 81 MG/1
162 TABLET, CHEWABLE ORAL ONCE
Status: COMPLETED | OUTPATIENT
Start: 2024-05-02 | End: 2024-05-02

## 2024-05-01 RX ORDER — SODIUM CHLORIDE 0.9 % (FLUSH) 0.9 %
3 SYRINGE (ML) INJECTION EVERY 12 HOURS SCHEDULED
Status: DISCONTINUED | OUTPATIENT
Start: 2024-05-01 | End: 2024-05-01 | Stop reason: HOSPADM

## 2024-05-01 RX ORDER — HEPARIN SODIUM 1000 [USP'U]/ML
INJECTION, SOLUTION INTRAVENOUS; SUBCUTANEOUS AS NEEDED
Status: DISCONTINUED | OUTPATIENT
Start: 2024-05-01 | End: 2024-05-01 | Stop reason: SURG

## 2024-05-01 RX ORDER — SUFENTANIL CITRATE 50 UG/ML
INJECTION EPIDURAL; INTRAVENOUS AS NEEDED
Status: DISCONTINUED | OUTPATIENT
Start: 2024-05-01 | End: 2024-05-01 | Stop reason: SURG

## 2024-05-01 RX ORDER — METOPROLOL TARTRATE 1 MG/ML
INJECTION, SOLUTION INTRAVENOUS AS NEEDED
Status: DISCONTINUED | OUTPATIENT
Start: 2024-05-01 | End: 2024-05-01 | Stop reason: SURG

## 2024-05-01 RX ORDER — SODIUM CHLORIDE 9 MG/ML
INJECTION, SOLUTION INTRAVENOUS CONTINUOUS PRN
Status: DISCONTINUED | OUTPATIENT
Start: 2024-05-01 | End: 2024-05-01 | Stop reason: SURG

## 2024-05-01 RX ORDER — PREGABALIN 25 MG/1
25 CAPSULE ORAL EVERY 12 HOURS
Status: DISCONTINUED | OUTPATIENT
Start: 2024-05-02 | End: 2024-05-03

## 2024-05-01 RX ORDER — DEXMEDETOMIDINE HYDROCHLORIDE 4 UG/ML
.2-1.5 INJECTION, SOLUTION INTRAVENOUS
Status: DISCONTINUED | OUTPATIENT
Start: 2024-05-01 | End: 2024-05-04 | Stop reason: HOSPADM

## 2024-05-01 RX ORDER — OXYCODONE HYDROCHLORIDE 5 MG/1
5 TABLET ORAL EVERY 4 HOURS PRN
Status: DISCONTINUED | OUTPATIENT
Start: 2024-05-01 | End: 2024-05-06 | Stop reason: HOSPADM

## 2024-05-01 RX ORDER — MIDAZOLAM HYDROCHLORIDE 1 MG/ML
2 INJECTION INTRAMUSCULAR; INTRAVENOUS
Status: DISCONTINUED | OUTPATIENT
Start: 2024-05-01 | End: 2024-05-01 | Stop reason: HOSPADM

## 2024-05-01 RX ORDER — LIDOCAINE HYDROCHLORIDE 20 MG/ML
INJECTION, SOLUTION EPIDURAL; INFILTRATION; INTRACAUDAL; PERINEURAL AS NEEDED
Status: DISCONTINUED | OUTPATIENT
Start: 2024-05-01 | End: 2024-05-01 | Stop reason: SURG

## 2024-05-01 RX ORDER — DEXTROSE MONOHYDRATE 25 G/50ML
10-50 INJECTION, SOLUTION INTRAVENOUS
Status: DISCONTINUED | OUTPATIENT
Start: 2024-05-01 | End: 2024-05-01 | Stop reason: HOSPADM

## 2024-05-01 RX ORDER — ASPIRIN 81 MG/1
81 TABLET ORAL DAILY
Status: DISCONTINUED | OUTPATIENT
Start: 2024-05-03 | End: 2024-05-06 | Stop reason: HOSPADM

## 2024-05-01 RX ORDER — ACETAMINOPHEN 10 MG/ML
1000 INJECTION, SOLUTION INTRAVENOUS EVERY 8 HOURS
Status: COMPLETED | OUTPATIENT
Start: 2024-05-01 | End: 2024-05-02

## 2024-05-01 RX ORDER — DEXTROSE MONOHYDRATE 25 G/50ML
10-50 INJECTION, SOLUTION INTRAVENOUS
Status: DISCONTINUED | OUTPATIENT
Start: 2024-05-01 | End: 2024-05-02

## 2024-05-01 RX ORDER — ACETAMINOPHEN 650 MG/1
650 SUPPOSITORY RECTAL EVERY 4 HOURS PRN
Status: DISCONTINUED | OUTPATIENT
Start: 2024-05-02 | End: 2024-05-06 | Stop reason: HOSPADM

## 2024-05-01 RX ORDER — ROCURONIUM BROMIDE 10 MG/ML
INJECTION, SOLUTION INTRAVENOUS AS NEEDED
Status: DISCONTINUED | OUTPATIENT
Start: 2024-05-01 | End: 2024-05-01 | Stop reason: SURG

## 2024-05-01 RX ORDER — SODIUM CHLORIDE, SODIUM LACTATE, POTASSIUM CHLORIDE, CALCIUM CHLORIDE 600; 310; 30; 20 MG/100ML; MG/100ML; MG/100ML; MG/100ML
100 INJECTION, SOLUTION INTRAVENOUS CONTINUOUS
Status: DISCONTINUED | OUTPATIENT
Start: 2024-05-01 | End: 2024-05-01

## 2024-05-01 RX ORDER — POLYETHYLENE GLYCOL 3350 17 G/17G
17 POWDER, FOR SOLUTION ORAL DAILY
Status: DISCONTINUED | OUTPATIENT
Start: 2024-05-02 | End: 2024-05-06 | Stop reason: HOSPADM

## 2024-05-01 RX ORDER — MAGNESIUM HYDROXIDE 1200 MG/15ML
LIQUID ORAL AS NEEDED
Status: DISCONTINUED | OUTPATIENT
Start: 2024-05-01 | End: 2024-05-01 | Stop reason: HOSPADM

## 2024-05-01 RX ORDER — MAGNESIUM SULFATE HEPTAHYDRATE 500 MG/ML
INJECTION, SOLUTION INTRAMUSCULAR; INTRAVENOUS AS NEEDED
Status: DISCONTINUED | OUTPATIENT
Start: 2024-05-01 | End: 2024-05-01 | Stop reason: SURG

## 2024-05-01 RX ORDER — ACETAMINOPHEN 160 MG/5ML
650 SOLUTION ORAL EVERY 4 HOURS PRN
Status: DISCONTINUED | OUTPATIENT
Start: 2024-05-02 | End: 2024-05-06 | Stop reason: HOSPADM

## 2024-05-01 RX ORDER — EPHEDRINE SULFATE 50 MG/ML
INJECTION INTRAVENOUS AS NEEDED
Status: DISCONTINUED | OUTPATIENT
Start: 2024-05-01 | End: 2024-05-01 | Stop reason: SURG

## 2024-05-01 RX ORDER — CALCIUM CHLORIDE 100 MG/ML
INJECTION INTRAVENOUS; INTRAVENTRICULAR AS NEEDED
Status: DISCONTINUED | OUTPATIENT
Start: 2024-05-01 | End: 2024-05-01 | Stop reason: SURG

## 2024-05-01 RX ORDER — SODIUM CHLORIDE 0.9 % (FLUSH) 0.9 %
3 SYRINGE (ML) INJECTION AS NEEDED
Status: DISCONTINUED | OUTPATIENT
Start: 2024-05-01 | End: 2024-05-01 | Stop reason: HOSPADM

## 2024-05-01 RX ORDER — CHLORHEXIDINE GLUCONATE ORAL RINSE 1.2 MG/ML
15 SOLUTION DENTAL EVERY 12 HOURS SCHEDULED
Status: DISCONTINUED | OUTPATIENT
Start: 2024-05-01 | End: 2024-05-05

## 2024-05-01 RX ORDER — ONDANSETRON 2 MG/ML
4 INJECTION INTRAMUSCULAR; INTRAVENOUS EVERY 6 HOURS PRN
Status: DISCONTINUED | OUTPATIENT
Start: 2024-05-01 | End: 2024-05-06 | Stop reason: HOSPADM

## 2024-05-01 RX ORDER — MORPHINE SULFATE 2 MG/ML
2 INJECTION, SOLUTION INTRAMUSCULAR; INTRAVENOUS
Status: DISCONTINUED | OUTPATIENT
Start: 2024-05-01 | End: 2024-05-04 | Stop reason: HOSPADM

## 2024-05-01 RX ORDER — NICOTINE POLACRILEX 4 MG
15 LOZENGE BUCCAL
Status: DISCONTINUED | OUTPATIENT
Start: 2024-05-01 | End: 2024-05-01 | Stop reason: HOSPADM

## 2024-05-01 RX ORDER — ENOXAPARIN SODIUM 100 MG/ML
40 INJECTION SUBCUTANEOUS DAILY
Status: DISCONTINUED | OUTPATIENT
Start: 2024-05-02 | End: 2024-05-06 | Stop reason: HOSPADM

## 2024-05-01 RX ORDER — CHLORHEXIDINE GLUCONATE ORAL RINSE 1.2 MG/ML
15 SOLUTION DENTAL EVERY 12 HOURS
Status: DISCONTINUED | OUTPATIENT
Start: 2024-05-01 | End: 2024-05-01 | Stop reason: SDUPTHER

## 2024-05-01 RX ORDER — VECURONIUM BROMIDE 1 MG/ML
INJECTION, POWDER, LYOPHILIZED, FOR SOLUTION INTRAVENOUS AS NEEDED
Status: DISCONTINUED | OUTPATIENT
Start: 2024-05-01 | End: 2024-05-01 | Stop reason: SURG

## 2024-05-01 RX ORDER — NOREPINEPHRINE BITARTRATE 0.03 MG/ML
.02-.3 INJECTION, SOLUTION INTRAVENOUS CONTINUOUS PRN
Status: DISCONTINUED | OUTPATIENT
Start: 2024-05-01 | End: 2024-05-04 | Stop reason: HOSPADM

## 2024-05-01 RX ADMIN — SUFENTANIL CITRATE 100 MCG: 50 INJECTION, SOLUTION EPIDURAL; INTRAVENOUS at 13:28

## 2024-05-01 RX ADMIN — FENTANYL CITRATE 100 MCG: 50 INJECTION, SOLUTION INTRAMUSCULAR; INTRAVENOUS at 16:56

## 2024-05-01 RX ADMIN — PANTOPRAZOLE SODIUM 40 MG: 40 TABLET, DELAYED RELEASE ORAL at 08:17

## 2024-05-01 RX ADMIN — SUFENTANIL CITRATE 50 MCG: 50 INJECTION, SOLUTION EPIDURAL; INTRAVENOUS at 14:30

## 2024-05-01 RX ADMIN — ALPRAZOLAM 1 MG: 0.5 TABLET ORAL at 06:52

## 2024-05-01 RX ADMIN — MIDAZOLAM HYDROCHLORIDE 2 MG: 1 INJECTION, SOLUTION INTRAMUSCULAR; INTRAVENOUS at 13:28

## 2024-05-01 RX ADMIN — HYDROCODONE BITARTRATE AND ACETAMINOPHEN 1 TABLET: 10; 325 TABLET ORAL at 06:52

## 2024-05-01 RX ADMIN — LEVOTHYROXINE SODIUM 125 MCG: 125 TABLET ORAL at 06:52

## 2024-05-01 RX ADMIN — CHLORHEXIDINE GLUCONATE 0.12% ORAL RINSE 15 ML: 1.2 LIQUID ORAL at 20:22

## 2024-05-01 RX ADMIN — GABAPENTIN 300 MG: 300 CAPSULE ORAL at 08:15

## 2024-05-01 RX ADMIN — VECURONIUM BROMIDE 5 MG: 1 INJECTION, POWDER, LYOPHILIZED, FOR SOLUTION INTRAVENOUS at 16:47

## 2024-05-01 RX ADMIN — MIDAZOLAM HYDROCHLORIDE 3 MG: 1 INJECTION, SOLUTION INTRAMUSCULAR; INTRAVENOUS at 13:22

## 2024-05-01 RX ADMIN — DEXTROSE 15 G: 15 GEL ORAL at 11:24

## 2024-05-01 RX ADMIN — HEPARIN SODIUM 25000 UNITS: 1000 INJECTION, SOLUTION INTRAVENOUS; SUBCUTANEOUS at 15:25

## 2024-05-01 RX ADMIN — DEXTROSE MONOHYDRATE AND POTASSIUM CHLORIDE INJECTION, SOLUTION 30 ML/HR: 5; .149 INJECTION, SOLUTION INTRAVENOUS at 19:32

## 2024-05-01 RX ADMIN — SODIUM CHLORIDE: 9 INJECTION, SOLUTION INTRAVENOUS at 17:56

## 2024-05-01 RX ADMIN — NOREPINEPHRINE BITARTRATE 3.2 MCG: 1 INJECTION, SOLUTION, CONCENTRATE INTRAVENOUS at 16:55

## 2024-05-01 RX ADMIN — SODIUM CHLORIDE: 9 INJECTION, SOLUTION INTRAVENOUS at 16:37

## 2024-05-01 RX ADMIN — SUFENTANIL CITRATE 50 MCG: 50 INJECTION, SOLUTION EPIDURAL; INTRAVENOUS at 15:22

## 2024-05-01 RX ADMIN — CALCIUM CHLORIDE 250 MG: 100 INJECTION INTRAVENOUS; INTRAVENTRICULAR at 17:30

## 2024-05-01 RX ADMIN — FENTANYL CITRATE 150 MCG: 50 INJECTION, SOLUTION INTRAMUSCULAR; INTRAVENOUS at 17:19

## 2024-05-01 RX ADMIN — MIDAZOLAM HYDROCHLORIDE 5 MG: 1 INJECTION, SOLUTION INTRAMUSCULAR; INTRAVENOUS at 16:44

## 2024-05-01 RX ADMIN — VECURONIUM BROMIDE 5 MG: 1 INJECTION, POWDER, LYOPHILIZED, FOR SOLUTION INTRAVENOUS at 15:22

## 2024-05-01 RX ADMIN — EPHEDRINE SULFATE 10 MG: 50 INJECTION INTRAVENOUS at 13:44

## 2024-05-01 RX ADMIN — AMINOCAPROIC ACID 5 G: 250 INJECTION, SOLUTION INTRAVENOUS at 13:41

## 2024-05-01 RX ADMIN — ACETAMINOPHEN 1000 MG: 500 TABLET, FILM COATED ORAL at 06:52

## 2024-05-01 RX ADMIN — ASPIRIN 81 MG: 81 TABLET, COATED ORAL at 08:17

## 2024-05-01 RX ADMIN — Medication 1 APPLICATION: at 05:18

## 2024-05-01 RX ADMIN — ACETAMINOPHEN 1000 MG: 10 INJECTION, SOLUTION INTRAVENOUS at 20:07

## 2024-05-01 RX ADMIN — LIDOCAINE HYDROCHLORIDE 100 MG: 20 INJECTION, SOLUTION EPIDURAL; INFILTRATION; INTRACAUDAL; PERINEURAL at 17:27

## 2024-05-01 RX ADMIN — MAGNESIUM SULFATE HEPTAHYDRATE 1 G: 500 INJECTION, SOLUTION INTRAMUSCULAR; INTRAVENOUS at 17:25

## 2024-05-01 RX ADMIN — SUGAMMADEX 200 MG: 100 INJECTION, SOLUTION INTRAVENOUS at 18:21

## 2024-05-01 RX ADMIN — CALCIUM CHLORIDE 250 MG: 100 INJECTION INTRAVENOUS; INTRAVENTRICULAR at 17:48

## 2024-05-01 RX ADMIN — NOREPINEPHRINE BITARTRATE 3.2 MCG: 1 INJECTION, SOLUTION, CONCENTRATE INTRAVENOUS at 15:31

## 2024-05-01 RX ADMIN — SUFENTANIL CITRATE 100 MCG: 50 INJECTION, SOLUTION EPIDURAL; INTRAVENOUS at 16:44

## 2024-05-01 RX ADMIN — AMIODARONE HYDROCHLORIDE 1 MG/MIN: 1.8 INJECTION, SOLUTION INTRAVENOUS at 17:49

## 2024-05-01 RX ADMIN — METOPROLOL TARTRATE 2 MG: 5 INJECTION INTRAVENOUS at 13:36

## 2024-05-01 RX ADMIN — AMIODARONE HYDROCHLORIDE 1 MG/MIN: 1.8 INJECTION, SOLUTION INTRAVENOUS at 19:32

## 2024-05-01 RX ADMIN — DEXTROSE MONOHYDRATE AND POTASSIUM CHLORIDE INJECTION, SOLUTION 30 ML/HR: 5; .149 INJECTION, SOLUTION INTRAVENOUS at 19:31

## 2024-05-01 RX ADMIN — LISINOPRIL 5 MG: 5 TABLET ORAL at 08:17

## 2024-05-01 RX ADMIN — VECURONIUM BROMIDE 5 MG: 1 INJECTION, POWDER, LYOPHILIZED, FOR SOLUTION INTRAVENOUS at 14:30

## 2024-05-01 RX ADMIN — NOREPINEPHRINE BITARTRATE 3.2 MCG: 1 INJECTION, SOLUTION, CONCENTRATE INTRAVENOUS at 17:30

## 2024-05-01 RX ADMIN — CALCIUM CHLORIDE 250 MG: 100 INJECTION INTRAVENOUS; INTRAVENTRICULAR at 17:04

## 2024-05-01 RX ADMIN — CALCIUM CHLORIDE 250 MG: 100 INJECTION INTRAVENOUS; INTRAVENTRICULAR at 17:11

## 2024-05-01 RX ADMIN — SODIUM CHLORIDE 7.5 MG/HR: 9 INJECTION, SOLUTION INTRAVENOUS at 19:00

## 2024-05-01 RX ADMIN — PROTAMINE SULFATE 200 MG: 10 INJECTION, SOLUTION INTRAVENOUS at 16:54

## 2024-05-01 RX ADMIN — DEXMEDETOMIDINE HYDROCHLORIDE 1 MCG/KG/HR: 4 INJECTION, SOLUTION INTRAVENOUS at 21:34

## 2024-05-01 RX ADMIN — ROCURONIUM BROMIDE 100 MG: 10 INJECTION, SOLUTION INTRAVENOUS at 13:28

## 2024-05-01 RX ADMIN — Medication 1 APPLICATION: at 20:22

## 2024-05-01 RX ADMIN — NOREPINEPHRINE BITARTRATE 0.02 MCG/KG/MIN: 0.03 INJECTION, SOLUTION INTRAVENOUS at 19:28

## 2024-05-01 RX ADMIN — CEFAZOLIN 2 G: 2 INJECTION, POWDER, FOR SOLUTION INTRAMUSCULAR; INTRAVENOUS at 20:06

## 2024-05-01 RX ADMIN — EPHEDRINE SULFATE 10 MG: 50 INJECTION INTRAVENOUS at 13:59

## 2024-05-01 RX ADMIN — SODIUM CHLORIDE, POTASSIUM CHLORIDE, SODIUM LACTATE AND CALCIUM CHLORIDE 1000 ML: 600; 310; 30; 20 INJECTION, SOLUTION INTRAVENOUS at 12:16

## 2024-05-01 RX ADMIN — ISOSORBIDE MONONITRATE 60 MG: 60 TABLET, EXTENDED RELEASE ORAL at 08:17

## 2024-05-01 RX ADMIN — MIDAZOLAM HYDROCHLORIDE 2 MG: 1 INJECTION, SOLUTION INTRAMUSCULAR; INTRAVENOUS at 12:48

## 2024-05-01 RX ADMIN — HEPARIN SODIUM 5000 UNITS: 1000 INJECTION, SOLUTION INTRAVENOUS; SUBCUTANEOUS at 15:14

## 2024-05-01 RX ADMIN — MORPHINE SULFATE 2 MG: 2 INJECTION, SOLUTION INTRAMUSCULAR; INTRAVENOUS at 23:42

## 2024-05-01 RX ADMIN — DEXMEDETOMIDINE HYDROCHLORIDE 0.4 MCG/KG/HR: 4 INJECTION, SOLUTION INTRAVENOUS at 15:06

## 2024-05-01 RX ADMIN — MORPHINE SULFATE 2 MG: 2 INJECTION, SOLUTION INTRAMUSCULAR; INTRAVENOUS at 19:11

## 2024-05-01 RX ADMIN — IPRATROPIUM BROMIDE AND ALBUTEROL SULFATE 3 ML: .5; 3 SOLUTION RESPIRATORY (INHALATION) at 20:13

## 2024-05-01 RX ADMIN — ALBUMIN (HUMAN) 500 ML: 12.5 INJECTION, SOLUTION INTRAVENOUS at 18:45

## 2024-05-01 NOTE — ANESTHESIA PROCEDURE NOTES
Central Line      Patient reassessed immediately prior to procedure    Patient location during procedure: OR  Start time: 5/1/2024 1:40 PM  Indications: vascular access, MD/Surgeon request and central pressure monitoring  Staff  Anesthesiologist: Demetra Jarrett MD  Preanesthetic Checklist  Completed: patient identified, IV checked, site marked, risks and benefits discussed, surgical consent, monitors and equipment checked, pre-op evaluation and timeout performed  Central Line Prep  Sterile Tech:cap, gloves, gown, mask and sterile barriers  Prep: chloraprep  Patient monitoring: blood pressure monitoring, continuous pulse oximetry and EKG  Central Line Procedure  Laterality:right  Location:internal jugular  Catheter Type:MAC and [place]oss[place]-Quinn  Guidance:ultrasound guided  PROCEDURE NOTE/ULTRASOUND INTERPRETATION.  Using ultrasound guidance the potential vascular sites for insertion of the catheter were visualized to determine the patency of the vessel to be used for vascular access.  After selecting the appropriate site for insertion, the needle was visualized under ultrasound being inserted into the internal jugular vein, followed by ultrasound confirmation of wire and catheter placement. There were no abnormalities seen on ultrasound; an image was taken; and the patient tolerated the procedure with no complications.   Assessment  Post procedure:biopatch applied, line sutured and occlusive dressing applied  Assessement:blood return through all ports and chest x-ray ordered  Complications:no  Patient Tolerance:patient tolerated the procedure well with no apparent complications  Additional Notes  [place] secured at 57 cm

## 2024-05-01 NOTE — ANESTHESIA PREPROCEDURE EVALUATION
Anesthesia Evaluation     Patient summary reviewed   no history of anesthetic complications:   NPO Solid Status: > 8 hours             Airway   Mallampati: II  TM distance: >3 FB  No difficulty expected  Dental    (+) upper dentures    Pulmonary    (+) a smoker Current,shortness of breath  (-) asthma, sleep apnea  Cardiovascular     (+) hypertension, past MI , CAD, angina, hyperlipidemia  (-) cardiac stents    ROS comment: PFO closure 11 years ago    Cardiac cath:   Conclusion 4/24/2024     Distal left main has approximately 40 to 50% stenosis  Ostial left main coronary artery has approximate 40% stenosis  Proximal left anterior descending coronary artery has sequential 60% stenosis  Mid left anterior descending coronary artery has a 95% stenosis  First diagonal branch has ostial 90% stenosis  Ostial and proximal left circumflex coronary artery has 70% stenosis  Proximal right coronary artery has 95% stenosis  No obstructive disease noted of the distal right coronary artery  No obvious obstructive disease of right posterior lateral or right posterior descending coronary artery  Patient is overall very intolerant to cath repeatedly complaining of discomfort and anxiety     Bilateral subclavian arteries are patent  Patent right internal mammary artery  Left internal mammary artery is not well-visualized    Echo:  ·  Left ventricular systolic function is low normal. Left ventricular ejection fraction appears to be 51 - 55%.  ·  Left ventricular diastolic function was indeterminate.  ·  Estimated right ventricular systolic pressure from tricuspid regurgitation is normal (<35 mmHg).           Neuro/Psych  (+) CVA (left facial weakness, term memory loss- 11 years ago)  (-) seizures, TIA  GI/Hepatic/Renal/Endo    (+) GERD, renal disease- CRI, diabetes mellitus using insulin, thyroid problem     Musculoskeletal     (+) back pain, chronic pain  Abdominal    Substance History      OB/GYN          Other   blood dyscrasia  thrombocytopenia,   history of cancer                Anesthesia Plan    ASA 4     Kaya, CVL, general and PAC     intravenous induction     Anesthetic plan, risks, benefits, and alternatives have been provided, discussed and informed consent has been obtained with: patient.    Use of blood products discussed with patient  Consented to blood products.      CODE STATUS:    Level Of Support Discussed With: Patient  Code Status (Patient has no pulse and is not breathing): CPR (Attempt to Resuscitate)  Medical Interventions (Patient has pulse or is breathing): Full Support

## 2024-05-01 NOTE — ANESTHESIA PROCEDURE NOTES
Arterial Line      Patient reassessed immediately prior to procedure    Patient location during procedure: pre-op  Start time: 5/1/2024 12:52 PM   Line placed for hemodynamic monitoring, ABGs/Labs/ISTAT and MD/Surgeon request.  Performed By   Anesthesiologist: Demetra Jarrett MD   Preanesthetic Checklist  Completed: patient identified, IV checked, site marked, risks and benefits discussed, surgical consent, monitors and equipment checked, pre-op evaluation and timeout performed  Arterial Line Prep    Sterile Tech: gloves, sterile barriers and cap  Prep: ChloraPrep  Patient monitoring: blood pressure monitoring, continuous pulse oximetry and EKG  Arterial Line Procedure   Laterality:left  Location:  radial artery  Catheter size: 20 G   Guidance: ultrasound guided  PROCEDURE NOTE/ULTRASOUND INTERPRETATION.  Using ultrasound guidance the potential vascular sites for insertion of the catheter were visualized to determine the patency of the vessel to be used for vascular access.  After selecting the appropriate site for insertion, the needle was visualized under ultrasound being inserted into the radial artery, followed by ultrasound confirmation of wire and catheter placement. There were no abnormalities seen on ultrasound; an image was taken; and the patient tolerated the procedure with no complications.   Number of attempts: 1  Successful placement: yes   Post Assessment   Dressing Type: occlusive dressing applied, secured with tape and wrist guard applied.   Complications no  Circ/Move/Sens Assessment: normal and unchanged.   Patient Tolerance: patient tolerated the procedure well with no apparent complications

## 2024-05-01 NOTE — ANESTHESIA PROCEDURE NOTES
Intra-Op Anesthesia CLARA    Procedure Performed: Intra-Op Anesthesia CLARA       Start Time:  5/1/2024 1:50 PM       End Time:      Preanesthesia Checklist:  Patient identified, IV assessed, risks and benefits discussed, monitors and equipment assessed, procedure being performed at surgeon's request and anesthesia consent obtained.    General Procedure Information  CLARA Placed for monitoring purposes only -- This is not a diagnostic CLARA  Diagnostic Indications for Echo:  hemodynamic monitoring  Physician Requesting Echo: Himanshu Lutz MD  Location performed:  OR  Intubated  Bite block not placed  Heart visualized  Probe Insertion:  Easy  Probe Type:  Multiplane  Modalities:  2D only, color flow mapping and continuous wave Doppler        Anesthesia Information  Performed Personally  Anesthesiologist:  Demetra Jarrett MD      Echocardiogram Comments:       CLARA placed at surgeon request.     Please see cardiology diagnostic evaluation for complete report.

## 2024-05-01 NOTE — ANESTHESIA PROCEDURE NOTES
Airway  Urgency: elective    Date/Time: 5/1/2024 1:32 PM  Airway not difficult    General Information and Staff    Patient location during procedure: OR  CRNA/CAA: Salvatore Willett CRNA    Indications and Patient Condition  Indications for airway management: airway protection    Preoxygenated: yes  Mask difficulty assessment: 1 - vent by mask    Final Airway Details  Final airway type: endotracheal airway      Successful airway: ETT  Cuffed: yes   Successful intubation technique: video laryngoscopy  Facilitating devices/methods: intubating stylet  Endotracheal tube insertion site: oral  Blade: Osborne  Blade size: 3  ETT size (mm): 7.5  Cormack-Lehane Classification: grade I - full view of glottis  Placement verified by: capnometry   Measured from: lips  ETT/EBT  to lips (cm): 21  Number of attempts at approach: 1  Assessment: lips, teeth, and gum same as pre-op and atraumatic intubation

## 2024-05-02 ENCOUNTER — APPOINTMENT (OUTPATIENT)
Dept: GENERAL RADIOLOGY | Facility: HOSPITAL | Age: 64
DRG: 234 | End: 2024-05-02
Payer: MEDICARE

## 2024-05-02 LAB
ANION GAP SERPL CALCULATED.3IONS-SCNC: 11 MMOL/L (ref 5–15)
ANION GAP SERPL CALCULATED.3IONS-SCNC: 13 MMOL/L (ref 5–15)
ARTERIAL PATENCY WRIST A: ABNORMAL
ATMOSPHERIC PRESS: 750 MMHG
BASE EXCESS BLDA CALC-SCNC: -0.7 MMOL/L (ref 0–2)
BASOPHILS # BLD AUTO: 0.05 10*3/MM3 (ref 0–0.2)
BASOPHILS NFR BLD AUTO: 0.4 % (ref 0–1.5)
BDY SITE: ABNORMAL
BODY TEMPERATURE: 37
BUN SERPL-MCNC: 17 MG/DL (ref 8–23)
BUN SERPL-MCNC: 18 MG/DL (ref 8–23)
BUN/CREAT SERPL: 12 (ref 7–25)
BUN/CREAT SERPL: 12.7 (ref 7–25)
CALCIUM SPEC-SCNC: 9.4 MG/DL (ref 8.6–10.5)
CALCIUM SPEC-SCNC: 9.5 MG/DL (ref 8.6–10.5)
CHLORIDE SERPL-SCNC: 103 MMOL/L (ref 98–107)
CHLORIDE SERPL-SCNC: 106 MMOL/L (ref 98–107)
CO2 SERPL-SCNC: 23 MMOL/L (ref 22–29)
CO2 SERPL-SCNC: 24 MMOL/L (ref 22–29)
CREAT SERPL-MCNC: 1.42 MG/DL (ref 0.57–1)
CREAT SERPL-MCNC: 1.42 MG/DL (ref 0.57–1)
DEPRECATED RDW RBC AUTO: 45.7 FL (ref 37–54)
DEPRECATED RDW RBC AUTO: 46.5 FL (ref 37–54)
EGFRCR SERPLBLD CKD-EPI 2021: 40.9 ML/MIN/1.73
EGFRCR SERPLBLD CKD-EPI 2021: 40.9 ML/MIN/1.73
EOSINOPHIL # BLD AUTO: 0.01 10*3/MM3 (ref 0–0.4)
EOSINOPHIL NFR BLD AUTO: 0.1 % (ref 0.3–6.2)
ERYTHROCYTE [DISTWIDTH] IN BLOOD BY AUTOMATED COUNT: 13.2 % (ref 12.3–15.4)
ERYTHROCYTE [DISTWIDTH] IN BLOOD BY AUTOMATED COUNT: 13.3 % (ref 12.3–15.4)
GLUCOSE BLDC GLUCOMTR-MCNC: 137 MG/DL (ref 70–130)
GLUCOSE BLDC GLUCOMTR-MCNC: 155 MG/DL (ref 70–130)
GLUCOSE BLDC GLUCOMTR-MCNC: 158 MG/DL (ref 70–130)
GLUCOSE BLDC GLUCOMTR-MCNC: 164 MG/DL (ref 70–130)
GLUCOSE BLDC GLUCOMTR-MCNC: 172 MG/DL (ref 70–130)
GLUCOSE BLDC GLUCOMTR-MCNC: 180 MG/DL (ref 70–130)
GLUCOSE BLDC GLUCOMTR-MCNC: 194 MG/DL (ref 70–130)
GLUCOSE BLDC GLUCOMTR-MCNC: 204 MG/DL (ref 70–130)
GLUCOSE BLDC GLUCOMTR-MCNC: 205 MG/DL (ref 70–130)
GLUCOSE BLDC GLUCOMTR-MCNC: 209 MG/DL (ref 70–130)
GLUCOSE SERPL-MCNC: 161 MG/DL (ref 65–99)
GLUCOSE SERPL-MCNC: 206 MG/DL (ref 65–99)
HCO3 BLDA-SCNC: 24.7 MMOL/L (ref 20–26)
HCT VFR BLD AUTO: 27.3 % (ref 34–46.6)
HCT VFR BLD AUTO: 29.4 % (ref 34–46.6)
HGB BLD-MCNC: 8.6 G/DL (ref 12–15.9)
HGB BLD-MCNC: 9.4 G/DL (ref 12–15.9)
IMM GRANULOCYTES # BLD AUTO: 0.08 10*3/MM3 (ref 0–0.05)
IMM GRANULOCYTES NFR BLD AUTO: 0.6 % (ref 0–0.5)
INHALED O2 CONCENTRATION: 35 %
INR PPP: 0.99 (ref 0.91–1.09)
LYMPHOCYTES # BLD AUTO: 0.44 10*3/MM3 (ref 0.7–3.1)
LYMPHOCYTES NFR BLD AUTO: 3.4 % (ref 19.6–45.3)
Lab: ABNORMAL
MAGNESIUM SERPL-MCNC: 2.2 MG/DL (ref 1.6–2.4)
MCH RBC QN AUTO: 30.1 PG (ref 26.6–33)
MCH RBC QN AUTO: 30.3 PG (ref 26.6–33)
MCHC RBC AUTO-ENTMCNC: 31.5 G/DL (ref 31.5–35.7)
MCHC RBC AUTO-ENTMCNC: 32 G/DL (ref 31.5–35.7)
MCV RBC AUTO: 94.8 FL (ref 79–97)
MCV RBC AUTO: 95.5 FL (ref 79–97)
MODALITY: ABNORMAL
MONOCYTES # BLD AUTO: 1.3 10*3/MM3 (ref 0.1–0.9)
MONOCYTES NFR BLD AUTO: 10.2 % (ref 5–12)
NEUTROPHILS NFR BLD AUTO: 10.92 10*3/MM3 (ref 1.7–7)
NEUTROPHILS NFR BLD AUTO: 85.3 % (ref 42.7–76)
NRBC BLD AUTO-RTO: 0 /100 WBC (ref 0–0.2)
PCO2 BLDA: 42.7 MM HG (ref 35–45)
PCO2 TEMP ADJ BLD: 42.7 MM HG (ref 35–45)
PEEP RESPIRATORY: 5 CM[H2O]
PH BLDA: 7.37 PH UNITS (ref 7.35–7.45)
PH, TEMP CORRECTED: 7.37 PH UNITS (ref 7.35–7.45)
PLATELET # BLD AUTO: 127 10*3/MM3 (ref 140–450)
PLATELET # BLD AUTO: 98 10*3/MM3 (ref 140–450)
PMV BLD AUTO: 12.5 FL (ref 6–12)
PMV BLD AUTO: 13.1 FL (ref 6–12)
PO2 BLDA: 100 MM HG (ref 83–108)
PO2 TEMP ADJ BLD: 100 MM HG (ref 83–108)
POTASSIUM SERPL-SCNC: 3.9 MMOL/L (ref 3.5–5.2)
POTASSIUM SERPL-SCNC: 4.1 MMOL/L (ref 3.5–5.2)
PROTHROMBIN TIME: 13.5 SECONDS (ref 11.8–14.8)
PSV: 10 CMH2O
RBC # BLD AUTO: 2.86 10*6/MM3 (ref 3.77–5.28)
RBC # BLD AUTO: 3.1 10*6/MM3 (ref 3.77–5.28)
SAO2 % BLDCOA: 98.1 % (ref 94–99)
SODIUM SERPL-SCNC: 138 MMOL/L (ref 136–145)
SODIUM SERPL-SCNC: 142 MMOL/L (ref 136–145)
VENTILATOR MODE: ABNORMAL
WBC NRBC COR # BLD AUTO: 12.69 10*3/MM3 (ref 3.4–10.8)
WBC NRBC COR # BLD AUTO: 12.8 10*3/MM3 (ref 3.4–10.8)

## 2024-05-02 PROCEDURE — 94799 UNLISTED PULMONARY SVC/PX: CPT

## 2024-05-02 PROCEDURE — 25010000002 METOCLOPRAMIDE PER 10 MG: Performed by: NURSE PRACTITIONER

## 2024-05-02 PROCEDURE — 83735 ASSAY OF MAGNESIUM: CPT | Performed by: SURGERY

## 2024-05-02 PROCEDURE — 82948 REAGENT STRIP/BLOOD GLUCOSE: CPT

## 2024-05-02 PROCEDURE — 93005 ELECTROCARDIOGRAM TRACING: CPT | Performed by: SURGERY

## 2024-05-02 PROCEDURE — 93010 ELECTROCARDIOGRAM REPORT: CPT | Performed by: INTERNAL MEDICINE

## 2024-05-02 PROCEDURE — 25010000002 AMIODARONE IN DEXTROSE 5% 360-4.14 MG/200ML-% SOLUTION: Performed by: SURGERY

## 2024-05-02 PROCEDURE — 80048 BASIC METABOLIC PNL TOTAL CA: CPT | Performed by: NURSE PRACTITIONER

## 2024-05-02 PROCEDURE — 71045 X-RAY EXAM CHEST 1 VIEW: CPT

## 2024-05-02 PROCEDURE — 94664 DEMO&/EVAL PT USE INHALER: CPT

## 2024-05-02 PROCEDURE — 94761 N-INVAS EAR/PLS OXIMETRY MLT: CPT

## 2024-05-02 PROCEDURE — 25010000002 MORPHINE PER 10 MG: Performed by: SURGERY

## 2024-05-02 PROCEDURE — 63710000001 INSULIN LISPRO (HUMAN) PER 5 UNITS: Performed by: SURGERY

## 2024-05-02 PROCEDURE — 82803 BLOOD GASES ANY COMBINATION: CPT

## 2024-05-02 PROCEDURE — 25010000002 ALBUMIN HUMAN 5% PER 50 ML: Performed by: SURGERY

## 2024-05-02 PROCEDURE — 0 INSULIN REGULAR HUMAN PER 5 UNITS: Performed by: SURGERY

## 2024-05-02 PROCEDURE — 85610 PROTHROMBIN TIME: CPT | Performed by: SURGERY

## 2024-05-02 PROCEDURE — P9041 ALBUMIN (HUMAN),5%, 50ML: HCPCS | Performed by: SURGERY

## 2024-05-02 PROCEDURE — 25010000002 ENOXAPARIN PER 10 MG: Performed by: SURGERY

## 2024-05-02 PROCEDURE — 97116 GAIT TRAINING THERAPY: CPT

## 2024-05-02 PROCEDURE — 85027 COMPLETE CBC AUTOMATED: CPT | Performed by: NURSE PRACTITIONER

## 2024-05-02 PROCEDURE — 97162 PT EVAL MOD COMPLEX 30 MIN: CPT

## 2024-05-02 PROCEDURE — 25010000002 ACETAMINOPHEN 10 MG/ML SOLUTION: Performed by: SURGERY

## 2024-05-02 PROCEDURE — 25010000002 CEFAZOLIN PER 500 MG: Performed by: SURGERY

## 2024-05-02 PROCEDURE — 80048 BASIC METABOLIC PNL TOTAL CA: CPT | Performed by: SURGERY

## 2024-05-02 PROCEDURE — 85025 COMPLETE CBC W/AUTO DIFF WBC: CPT | Performed by: SURGERY

## 2024-05-02 RX ORDER — METOCLOPRAMIDE HYDROCHLORIDE 5 MG/ML
10 INJECTION INTRAMUSCULAR; INTRAVENOUS EVERY 6 HOURS
Status: COMPLETED | OUTPATIENT
Start: 2024-05-02 | End: 2024-05-03

## 2024-05-02 RX ORDER — AMIODARONE HYDROCHLORIDE 200 MG/1
400 TABLET ORAL
Status: DISCONTINUED | OUTPATIENT
Start: 2024-05-02 | End: 2024-05-06 | Stop reason: HOSPADM

## 2024-05-02 RX ORDER — INSULIN LISPRO 100 [IU]/ML
3-14 INJECTION, SOLUTION INTRAVENOUS; SUBCUTANEOUS
Status: DISCONTINUED | OUTPATIENT
Start: 2024-05-02 | End: 2024-05-06 | Stop reason: HOSPADM

## 2024-05-02 RX ORDER — DEXTROSE MONOHYDRATE 25 G/50ML
25 INJECTION, SOLUTION INTRAVENOUS
Status: DISCONTINUED | OUTPATIENT
Start: 2024-05-02 | End: 2024-05-06 | Stop reason: HOSPADM

## 2024-05-02 RX ORDER — NICOTINE POLACRILEX 4 MG
15 LOZENGE BUCCAL
Status: DISCONTINUED | OUTPATIENT
Start: 2024-05-02 | End: 2024-05-06 | Stop reason: HOSPADM

## 2024-05-02 RX ORDER — ACETAMINOPHEN 325 MG/1
650 TABLET ORAL EVERY 6 HOURS
Status: DISCONTINUED | OUTPATIENT
Start: 2024-05-02 | End: 2024-05-06 | Stop reason: HOSPADM

## 2024-05-02 RX ORDER — ALPRAZOLAM 0.5 MG/1
0.5 TABLET ORAL 4 TIMES DAILY PRN
Status: DISCONTINUED | OUTPATIENT
Start: 2024-05-02 | End: 2024-05-06 | Stop reason: HOSPADM

## 2024-05-02 RX ORDER — LIDOCAINE 4 G/G
2 PATCH TOPICAL
Status: DISCONTINUED | OUTPATIENT
Start: 2024-05-02 | End: 2024-05-06 | Stop reason: HOSPADM

## 2024-05-02 RX ADMIN — LIDOCAINE 2 PATCH: 4 PATCH TOPICAL at 21:08

## 2024-05-02 RX ADMIN — CEFAZOLIN 2 G: 2 INJECTION, POWDER, FOR SOLUTION INTRAMUSCULAR; INTRAVENOUS at 19:40

## 2024-05-02 RX ADMIN — OXYCODONE HYDROCHLORIDE 10 MG: 5 TABLET ORAL at 18:38

## 2024-05-02 RX ADMIN — METOPROLOL TARTRATE 12.5 MG: 25 TABLET, FILM COATED ORAL at 20:39

## 2024-05-02 RX ADMIN — METOCLOPRAMIDE HYDROCHLORIDE 10 MG: 5 INJECTION INTRAMUSCULAR; INTRAVENOUS at 08:03

## 2024-05-02 RX ADMIN — OXYCODONE HYDROCHLORIDE 10 MG: 5 TABLET ORAL at 02:31

## 2024-05-02 RX ADMIN — ACETAMINOPHEN 650 MG: 325 TABLET, FILM COATED ORAL at 13:53

## 2024-05-02 RX ADMIN — ALPRAZOLAM 0.5 MG: 0.5 TABLET ORAL at 20:39

## 2024-05-02 RX ADMIN — OXYCODONE HYDROCHLORIDE 10 MG: 5 TABLET ORAL at 22:38

## 2024-05-02 RX ADMIN — POLYETHYLENE GLYCOL 3350 17 G: 17 POWDER, FOR SOLUTION ORAL at 08:03

## 2024-05-02 RX ADMIN — ASPIRIN 162 MG: 81 TABLET, CHEWABLE ORAL at 08:04

## 2024-05-02 RX ADMIN — ACETAMINOPHEN 650 MG: 325 TABLET, FILM COATED ORAL at 08:02

## 2024-05-02 RX ADMIN — ACETAMINOPHEN 650 MG: 325 TABLET, FILM COATED ORAL at 19:39

## 2024-05-02 RX ADMIN — Medication 1 APPLICATION: at 08:02

## 2024-05-02 RX ADMIN — LEVOTHYROXINE SODIUM 125 MCG: 125 TABLET ORAL at 06:38

## 2024-05-02 RX ADMIN — CLOPIDOGREL BISULFATE 75 MG: 75 TABLET, FILM COATED ORAL at 17:04

## 2024-05-02 RX ADMIN — IPRATROPIUM BROMIDE AND ALBUTEROL SULFATE 3 ML: .5; 3 SOLUTION RESPIRATORY (INHALATION) at 10:24

## 2024-05-02 RX ADMIN — Medication: at 22:40

## 2024-05-02 RX ADMIN — LIDOCAINE 2 PATCH: 4 PATCH TOPICAL at 02:18

## 2024-05-02 RX ADMIN — OXYCODONE HYDROCHLORIDE 10 MG: 5 TABLET ORAL at 14:30

## 2024-05-02 RX ADMIN — METOPROLOL TARTRATE 12.5 MG: 25 TABLET, FILM COATED ORAL at 10:18

## 2024-05-02 RX ADMIN — METOCLOPRAMIDE HYDROCHLORIDE 10 MG: 5 INJECTION INTRAMUSCULAR; INTRAVENOUS at 13:53

## 2024-05-02 RX ADMIN — SODIUM CHLORIDE 2.9 UNITS/HR: 9 INJECTION, SOLUTION INTRAVENOUS at 00:03

## 2024-05-02 RX ADMIN — PREGABALIN 25 MG: 25 CAPSULE ORAL at 05:04

## 2024-05-02 RX ADMIN — IPRATROPIUM BROMIDE AND ALBUTEROL SULFATE 3 ML: .5; 3 SOLUTION RESPIRATORY (INHALATION) at 18:53

## 2024-05-02 RX ADMIN — ACETAMINOPHEN 1000 MG: 10 INJECTION, SOLUTION INTRAVENOUS at 02:39

## 2024-05-02 RX ADMIN — PREGABALIN 25 MG: 25 CAPSULE ORAL at 17:04

## 2024-05-02 RX ADMIN — BISACODYL 10 MG: 5 TABLET, COATED ORAL at 08:02

## 2024-05-02 RX ADMIN — METOCLOPRAMIDE HYDROCHLORIDE 10 MG: 5 INJECTION INTRAMUSCULAR; INTRAVENOUS at 19:40

## 2024-05-02 RX ADMIN — CEFAZOLIN 2 G: 2 INJECTION, POWDER, FOR SOLUTION INTRAMUSCULAR; INTRAVENOUS at 03:35

## 2024-05-02 RX ADMIN — MORPHINE SULFATE 2 MG: 2 INJECTION, SOLUTION INTRAMUSCULAR; INTRAVENOUS at 01:23

## 2024-05-02 RX ADMIN — ENOXAPARIN SODIUM 40 MG: 100 INJECTION SUBCUTANEOUS at 08:04

## 2024-05-02 RX ADMIN — CEFAZOLIN 2 G: 2 INJECTION, POWDER, FOR SOLUTION INTRAMUSCULAR; INTRAVENOUS at 11:01

## 2024-05-02 RX ADMIN — ALBUMIN (HUMAN) 500 ML: 12.5 INJECTION, SOLUTION INTRAVENOUS at 08:02

## 2024-05-02 RX ADMIN — CHLORHEXIDINE GLUCONATE 0.12% ORAL RINSE 15 ML: 1.2 LIQUID ORAL at 08:03

## 2024-05-02 RX ADMIN — ALBUMIN (HUMAN) 500 ML: 12.5 INJECTION, SOLUTION INTRAVENOUS at 03:04

## 2024-05-02 RX ADMIN — OXYCODONE HYDROCHLORIDE 10 MG: 5 TABLET ORAL at 06:23

## 2024-05-02 RX ADMIN — AMIODARONE HYDROCHLORIDE 400 MG: 200 TABLET ORAL at 11:01

## 2024-05-02 RX ADMIN — CYCLOBENZAPRINE HYDROCHLORIDE 5 MG: 5 TABLET, FILM COATED ORAL at 03:23

## 2024-05-02 RX ADMIN — AMIODARONE HYDROCHLORIDE 0.5 MG/MIN: 1.8 INJECTION, SOLUTION INTRAVENOUS at 01:15

## 2024-05-02 RX ADMIN — INSULIN LISPRO 5 UNITS: 100 INJECTION, SOLUTION INTRAVENOUS; SUBCUTANEOUS at 11:11

## 2024-05-02 RX ADMIN — AMIODARONE HYDROCHLORIDE 1 MG/MIN: 1.8 INJECTION, SOLUTION INTRAVENOUS at 00:17

## 2024-05-02 RX ADMIN — OXYCODONE HYDROCHLORIDE 10 MG: 5 TABLET ORAL at 10:17

## 2024-05-02 RX ADMIN — ATORVASTATIN CALCIUM 20 MG: 10 TABLET, FILM COATED ORAL at 20:39

## 2024-05-02 RX ADMIN — IPRATROPIUM BROMIDE AND ALBUTEROL SULFATE 3 ML: .5; 3 SOLUTION RESPIRATORY (INHALATION) at 14:36

## 2024-05-02 RX ADMIN — IPRATROPIUM BROMIDE AND ALBUTEROL SULFATE 3 ML: .5; 3 SOLUTION RESPIRATORY (INHALATION) at 06:22

## 2024-05-02 RX ADMIN — CHLORHEXIDINE GLUCONATE 0.12% ORAL RINSE 15 ML: 1.2 LIQUID ORAL at 20:39

## 2024-05-02 RX ADMIN — INSULIN LISPRO 3 UNITS: 100 INJECTION, SOLUTION INTRAVENOUS; SUBCUTANEOUS at 21:15

## 2024-05-02 RX ADMIN — BISACODYL 10 MG: 5 TABLET, COATED ORAL at 20:39

## 2024-05-02 RX ADMIN — INSULIN LISPRO 3 UNITS: 100 INJECTION, SOLUTION INTRAVENOUS; SUBCUTANEOUS at 16:43

## 2024-05-02 NOTE — ANESTHESIA POSTPROCEDURE EVALUATION
"Patient: Milagros Parson    Procedure Summary       Date: 05/01/24 Room / Location: [facility] OR  /  [facility] OR    Anesthesia Start: 1319 Anesthesia Stop: 1824    Procedure: CORONARY ARTERY BYPASS GRAFTING X4, BILATERAL INTERNAL MAMMARY ARTERY GRAFT, LEFT ENDOSCOPIC VEIN HARVEST, TRANSESOPHAGEAL ECHOCARDIOGRAM (Chest) Diagnosis:       NSTEMI (non-ST elevated myocardial infarction)      (NSTEMI (non-ST elevated myocardial infarction) [I21.4])    Surgeons: Himanshu Lutz MD Provider: Salvatore Willett CRNA    Anesthesia Type: [place], CVL, general, PAC ASA Status: 4            Anesthesia Type: Kaya, CVL, general, PAC    Vitals  Vitals Value Taken Time   BP 96/48 05/02/24 1001   Temp 97.6 °F (36.4 °C) 05/02/24 0800   Pulse 67 05/02/24 1003   Resp 22 05/02/24 0627   SpO2 98 % 05/02/24 1003   Vitals shown include unfiled device data.        Post Anesthesia Care and Evaluation    Patient location during evaluation: PACU  Patient participation: complete - patient participated  Level of consciousness: awake and alert  Pain management: adequate    Airway patency: patent  Anesthetic complications: No anesthetic complications    Cardiovascular status: acceptable  Respiratory status: acceptable  Hydration status: acceptable    Comments: Blood pressure (P) 98/48, pulse (P) 67, temperature 97.6 °F (36.4 °C), temperature source Oral, resp. rate 22, height 167.6 cm (66\"), weight 91.4 kg (201 lb 9.6 oz), SpO2 (P) 97%, not currently breastfeeding.    Pt discharged from PACU based on chris score >8    "

## 2024-05-03 ENCOUNTER — APPOINTMENT (OUTPATIENT)
Dept: GENERAL RADIOLOGY | Facility: HOSPITAL | Age: 64
DRG: 234 | End: 2024-05-03
Payer: MEDICARE

## 2024-05-03 LAB
ANION GAP SERPL CALCULATED.3IONS-SCNC: 10 MMOL/L (ref 5–15)
ANION GAP SERPL CALCULATED.3IONS-SCNC: 10 MMOL/L (ref 5–15)
BUN SERPL-MCNC: 21 MG/DL (ref 8–23)
BUN SERPL-MCNC: 22 MG/DL (ref 8–23)
BUN/CREAT SERPL: 13.7 (ref 7–25)
BUN/CREAT SERPL: 15.5 (ref 7–25)
CALCIUM SPEC-SCNC: 9.1 MG/DL (ref 8.6–10.5)
CALCIUM SPEC-SCNC: 9.4 MG/DL (ref 8.6–10.5)
CHLORIDE SERPL-SCNC: 100 MMOL/L (ref 98–107)
CHLORIDE SERPL-SCNC: 102 MMOL/L (ref 98–107)
CO2 SERPL-SCNC: 24 MMOL/L (ref 22–29)
CO2 SERPL-SCNC: 26 MMOL/L (ref 22–29)
CREAT SERPL-MCNC: 1.42 MG/DL (ref 0.57–1)
CREAT SERPL-MCNC: 1.53 MG/DL (ref 0.57–1)
DEPRECATED RDW RBC AUTO: 48.4 FL (ref 37–54)
DEPRECATED RDW RBC AUTO: 49.2 FL (ref 37–54)
EGFRCR SERPLBLD CKD-EPI 2021: 37.4 ML/MIN/1.73
EGFRCR SERPLBLD CKD-EPI 2021: 40.9 ML/MIN/1.73
ERYTHROCYTE [DISTWIDTH] IN BLOOD BY AUTOMATED COUNT: 13.8 % (ref 12.3–15.4)
ERYTHROCYTE [DISTWIDTH] IN BLOOD BY AUTOMATED COUNT: 13.9 % (ref 12.3–15.4)
GLUCOSE BLDC GLUCOMTR-MCNC: 148 MG/DL (ref 70–130)
GLUCOSE BLDC GLUCOMTR-MCNC: 171 MG/DL (ref 70–130)
GLUCOSE BLDC GLUCOMTR-MCNC: 204 MG/DL (ref 70–130)
GLUCOSE BLDC GLUCOMTR-MCNC: 223 MG/DL (ref 70–130)
GLUCOSE SERPL-MCNC: 155 MG/DL (ref 65–99)
GLUCOSE SERPL-MCNC: 245 MG/DL (ref 65–99)
HCT VFR BLD AUTO: 26.4 % (ref 34–46.6)
HCT VFR BLD AUTO: 28.7 % (ref 34–46.6)
HGB BLD-MCNC: 8.3 G/DL (ref 12–15.9)
HGB BLD-MCNC: 9 G/DL (ref 12–15.9)
MCH RBC QN AUTO: 30 PG (ref 26.6–33)
MCH RBC QN AUTO: 30.4 PG (ref 26.6–33)
MCHC RBC AUTO-ENTMCNC: 31.4 G/DL (ref 31.5–35.7)
MCHC RBC AUTO-ENTMCNC: 31.4 G/DL (ref 31.5–35.7)
MCV RBC AUTO: 95.7 FL (ref 79–97)
MCV RBC AUTO: 96.7 FL (ref 79–97)
PLATELET # BLD AUTO: 114 10*3/MM3 (ref 140–450)
PLATELET # BLD AUTO: 130 10*3/MM3 (ref 140–450)
PMV BLD AUTO: 13.3 FL (ref 6–12)
PMV BLD AUTO: 13.9 FL (ref 6–12)
POTASSIUM SERPL-SCNC: 4 MMOL/L (ref 3.5–5.2)
POTASSIUM SERPL-SCNC: 4.2 MMOL/L (ref 3.5–5.2)
QT INTERVAL: 358 MS
QT INTERVAL: 434 MS
QT INTERVAL: 454 MS
QTC INTERVAL: 386 MS
QTC INTERVAL: 447 MS
QTC INTERVAL: 479 MS
RBC # BLD AUTO: 2.73 10*6/MM3 (ref 3.77–5.28)
RBC # BLD AUTO: 3 10*6/MM3 (ref 3.77–5.28)
SODIUM SERPL-SCNC: 136 MMOL/L (ref 136–145)
SODIUM SERPL-SCNC: 136 MMOL/L (ref 136–145)
WBC NRBC COR # BLD AUTO: 12.4 10*3/MM3 (ref 3.4–10.8)
WBC NRBC COR # BLD AUTO: 13.29 10*3/MM3 (ref 3.4–10.8)

## 2024-05-03 PROCEDURE — 82948 REAGENT STRIP/BLOOD GLUCOSE: CPT

## 2024-05-03 PROCEDURE — 63710000001 INSULIN LISPRO (HUMAN) PER 5 UNITS: Performed by: NURSE PRACTITIONER

## 2024-05-03 PROCEDURE — 85027 COMPLETE CBC AUTOMATED: CPT | Performed by: SURGERY

## 2024-05-03 PROCEDURE — 97116 GAIT TRAINING THERAPY: CPT

## 2024-05-03 PROCEDURE — 25010000002 CEFAZOLIN PER 500 MG: Performed by: SURGERY

## 2024-05-03 PROCEDURE — 93010 ELECTROCARDIOGRAM REPORT: CPT | Performed by: EMERGENCY MEDICINE

## 2024-05-03 PROCEDURE — 25010000002 FUROSEMIDE PER 20 MG: Performed by: NURSE PRACTITIONER

## 2024-05-03 PROCEDURE — 25010000002 ENOXAPARIN PER 10 MG: Performed by: SURGERY

## 2024-05-03 PROCEDURE — 85027 COMPLETE CBC AUTOMATED: CPT | Performed by: NURSE PRACTITIONER

## 2024-05-03 PROCEDURE — 94799 UNLISTED PULMONARY SVC/PX: CPT

## 2024-05-03 PROCEDURE — 80048 BASIC METABOLIC PNL TOTAL CA: CPT | Performed by: NURSE PRACTITIONER

## 2024-05-03 PROCEDURE — 94664 DEMO&/EVAL PT USE INHALER: CPT

## 2024-05-03 PROCEDURE — 25010000002 METOCLOPRAMIDE PER 10 MG: Performed by: NURSE PRACTITIONER

## 2024-05-03 PROCEDURE — 71045 X-RAY EXAM CHEST 1 VIEW: CPT

## 2024-05-03 PROCEDURE — 93005 ELECTROCARDIOGRAM TRACING: CPT | Performed by: SURGERY

## 2024-05-03 PROCEDURE — 94761 N-INVAS EAR/PLS OXIMETRY MLT: CPT

## 2024-05-03 PROCEDURE — 63710000001 INSULIN LISPRO (HUMAN) PER 5 UNITS: Performed by: SURGERY

## 2024-05-03 PROCEDURE — 80048 BASIC METABOLIC PNL TOTAL CA: CPT | Performed by: SURGERY

## 2024-05-03 RX ORDER — METOPROLOL TARTRATE 50 MG/1
25 TABLET, FILM COATED ORAL EVERY 12 HOURS SCHEDULED
Status: DISCONTINUED | OUTPATIENT
Start: 2024-05-03 | End: 2024-05-06 | Stop reason: HOSPADM

## 2024-05-03 RX ORDER — GABAPENTIN 100 MG/1
200 CAPSULE ORAL 3 TIMES DAILY
Status: DISCONTINUED | OUTPATIENT
Start: 2024-05-03 | End: 2024-05-06 | Stop reason: HOSPADM

## 2024-05-03 RX ORDER — FUROSEMIDE 10 MG/ML
20 INJECTION INTRAMUSCULAR; INTRAVENOUS ONCE
Status: COMPLETED | OUTPATIENT
Start: 2024-05-03 | End: 2024-05-03

## 2024-05-03 RX ADMIN — CLOPIDOGREL BISULFATE 75 MG: 75 TABLET, FILM COATED ORAL at 18:05

## 2024-05-03 RX ADMIN — ATORVASTATIN CALCIUM 20 MG: 10 TABLET, FILM COATED ORAL at 20:52

## 2024-05-03 RX ADMIN — CHLORHEXIDINE GLUCONATE 0.12% ORAL RINSE 15 ML: 1.2 LIQUID ORAL at 08:35

## 2024-05-03 RX ADMIN — IPRATROPIUM BROMIDE AND ALBUTEROL SULFATE 3 ML: .5; 3 SOLUTION RESPIRATORY (INHALATION) at 06:24

## 2024-05-03 RX ADMIN — CEFAZOLIN 2 G: 2 INJECTION, POWDER, FOR SOLUTION INTRAMUSCULAR; INTRAVENOUS at 04:58

## 2024-05-03 RX ADMIN — OXYCODONE HYDROCHLORIDE 10 MG: 5 TABLET ORAL at 08:34

## 2024-05-03 RX ADMIN — ALPRAZOLAM 0.5 MG: 0.5 TABLET ORAL at 20:20

## 2024-05-03 RX ADMIN — METOPROLOL TARTRATE 25 MG: 50 TABLET, FILM COATED ORAL at 20:20

## 2024-05-03 RX ADMIN — AMIODARONE HYDROCHLORIDE 400 MG: 200 TABLET ORAL at 08:36

## 2024-05-03 RX ADMIN — PREGABALIN 25 MG: 25 CAPSULE ORAL at 06:10

## 2024-05-03 RX ADMIN — OXYCODONE HYDROCHLORIDE 10 MG: 5 TABLET ORAL at 18:05

## 2024-05-03 RX ADMIN — ACETAMINOPHEN 650 MG: 325 TABLET, FILM COATED ORAL at 01:36

## 2024-05-03 RX ADMIN — INSULIN LISPRO 5 UNITS: 100 INJECTION, SOLUTION INTRAVENOUS; SUBCUTANEOUS at 11:40

## 2024-05-03 RX ADMIN — OXYCODONE HYDROCHLORIDE 10 MG: 5 TABLET ORAL at 04:10

## 2024-05-03 RX ADMIN — BISACODYL 10 MG: 5 TABLET, COATED ORAL at 08:34

## 2024-05-03 RX ADMIN — INSULIN LISPRO 5 UNITS: 100 INJECTION, SOLUTION INTRAVENOUS; SUBCUTANEOUS at 16:59

## 2024-05-03 RX ADMIN — ALPRAZOLAM 0.5 MG: 0.5 TABLET ORAL at 09:49

## 2024-05-03 RX ADMIN — ACETAMINOPHEN 650 MG: 325 TABLET, FILM COATED ORAL at 20:19

## 2024-05-03 RX ADMIN — ENOXAPARIN SODIUM 40 MG: 100 INJECTION SUBCUTANEOUS at 08:35

## 2024-05-03 RX ADMIN — OXYCODONE HYDROCHLORIDE 10 MG: 5 TABLET ORAL at 12:22

## 2024-05-03 RX ADMIN — POLYETHYLENE GLYCOL 3350 17 G: 17 POWDER, FOR SOLUTION ORAL at 08:35

## 2024-05-03 RX ADMIN — GABAPENTIN 200 MG: 100 CAPSULE ORAL at 20:20

## 2024-05-03 RX ADMIN — BISACODYL 10 MG: 5 TABLET, COATED ORAL at 20:20

## 2024-05-03 RX ADMIN — LEVOTHYROXINE SODIUM 125 MCG: 125 TABLET ORAL at 06:10

## 2024-05-03 RX ADMIN — INSULIN LISPRO 3 UNITS: 100 INJECTION, SOLUTION INTRAVENOUS; SUBCUTANEOUS at 20:19

## 2024-05-03 RX ADMIN — FUROSEMIDE 20 MG: 10 INJECTION, SOLUTION INTRAMUSCULAR; INTRAVENOUS at 09:49

## 2024-05-03 RX ADMIN — CHLORHEXIDINE GLUCONATE 0.12% ORAL RINSE 15 ML: 1.2 LIQUID ORAL at 20:19

## 2024-05-03 RX ADMIN — ACETAMINOPHEN 650 MG: 325 TABLET, FILM COATED ORAL at 08:35

## 2024-05-03 RX ADMIN — Medication 1 APPLICATION: at 08:35

## 2024-05-03 RX ADMIN — METOCLOPRAMIDE HYDROCHLORIDE 10 MG: 5 INJECTION INTRAMUSCULAR; INTRAVENOUS at 01:36

## 2024-05-03 RX ADMIN — ACETAMINOPHEN 650 MG: 325 TABLET, FILM COATED ORAL at 15:16

## 2024-05-03 RX ADMIN — METOPROLOL TARTRATE 25 MG: 50 TABLET, FILM COATED ORAL at 08:45

## 2024-05-03 RX ADMIN — GABAPENTIN 200 MG: 100 CAPSULE ORAL at 15:16

## 2024-05-03 RX ADMIN — ASPIRIN 81 MG: 81 TABLET, COATED ORAL at 08:36

## 2024-05-03 RX ADMIN — OXYCODONE HYDROCHLORIDE 10 MG: 5 TABLET ORAL at 22:02

## 2024-05-04 ENCOUNTER — APPOINTMENT (OUTPATIENT)
Dept: GENERAL RADIOLOGY | Facility: HOSPITAL | Age: 64
DRG: 234 | End: 2024-05-04
Payer: MEDICARE

## 2024-05-04 LAB
ANION GAP SERPL CALCULATED.3IONS-SCNC: 8 MMOL/L (ref 5–15)
BH BB BLOOD EXPIRATION DATE: NORMAL
BH BB BLOOD TYPE BARCODE: 9500
BH BB DISPENSE STATUS: NORMAL
BH BB PRODUCT CODE: NORMAL
BH BB UNIT NUMBER: NORMAL
BUN SERPL-MCNC: 23 MG/DL (ref 8–23)
BUN/CREAT SERPL: 17.7 (ref 7–25)
CALCIUM SPEC-SCNC: 9.1 MG/DL (ref 8.6–10.5)
CHLORIDE SERPL-SCNC: 104 MMOL/L (ref 98–107)
CO2 SERPL-SCNC: 29 MMOL/L (ref 22–29)
CREAT SERPL-MCNC: 1.3 MG/DL (ref 0.57–1)
CROSSMATCH INTERPRETATION: NORMAL
DEPRECATED RDW RBC AUTO: 47.8 FL (ref 37–54)
EGFRCR SERPLBLD CKD-EPI 2021: 45.4 ML/MIN/1.73
ERYTHROCYTE [DISTWIDTH] IN BLOOD BY AUTOMATED COUNT: 13.5 % (ref 12.3–15.4)
GLUCOSE BLDC GLUCOMTR-MCNC: 136 MG/DL (ref 70–130)
GLUCOSE BLDC GLUCOMTR-MCNC: 168 MG/DL (ref 70–130)
GLUCOSE BLDC GLUCOMTR-MCNC: 216 MG/DL (ref 70–130)
GLUCOSE BLDC GLUCOMTR-MCNC: 85 MG/DL (ref 70–130)
GLUCOSE SERPL-MCNC: 150 MG/DL (ref 65–99)
HCT VFR BLD AUTO: 28.1 % (ref 34–46.6)
HGB BLD-MCNC: 8.9 G/DL (ref 12–15.9)
MCH RBC QN AUTO: 30.4 PG (ref 26.6–33)
MCHC RBC AUTO-ENTMCNC: 31.7 G/DL (ref 31.5–35.7)
MCV RBC AUTO: 95.9 FL (ref 79–97)
PLATELET # BLD AUTO: 126 10*3/MM3 (ref 140–450)
PMV BLD AUTO: 13.3 FL (ref 6–12)
POTASSIUM SERPL-SCNC: 3.7 MMOL/L (ref 3.5–5.2)
POTASSIUM SERPL-SCNC: 3.8 MMOL/L (ref 3.5–5.2)
POTASSIUM SERPL-SCNC: 4.9 MMOL/L (ref 3.5–5.2)
RBC # BLD AUTO: 2.93 10*6/MM3 (ref 3.77–5.28)
SODIUM SERPL-SCNC: 141 MMOL/L (ref 136–145)
UNIT  ABO: NORMAL
UNIT  RH: NORMAL
WBC NRBC COR # BLD AUTO: 9.2 10*3/MM3 (ref 3.4–10.8)

## 2024-05-04 PROCEDURE — 25010000002 ENOXAPARIN PER 10 MG: Performed by: NURSE PRACTITIONER

## 2024-05-04 PROCEDURE — 71046 X-RAY EXAM CHEST 2 VIEWS: CPT

## 2024-05-04 PROCEDURE — 97116 GAIT TRAINING THERAPY: CPT

## 2024-05-04 PROCEDURE — 80048 BASIC METABOLIC PNL TOTAL CA: CPT | Performed by: NURSE PRACTITIONER

## 2024-05-04 PROCEDURE — 63710000001 INSULIN LISPRO (HUMAN) PER 5 UNITS: Performed by: NURSE PRACTITIONER

## 2024-05-04 PROCEDURE — 82948 REAGENT STRIP/BLOOD GLUCOSE: CPT

## 2024-05-04 PROCEDURE — 25010000002 FUROSEMIDE PER 20 MG: Performed by: NURSE PRACTITIONER

## 2024-05-04 PROCEDURE — 84132 ASSAY OF SERUM POTASSIUM: CPT | Performed by: SURGERY

## 2024-05-04 PROCEDURE — 85027 COMPLETE CBC AUTOMATED: CPT | Performed by: NURSE PRACTITIONER

## 2024-05-04 RX ORDER — POTASSIUM CHLORIDE 750 MG/1
40 CAPSULE, EXTENDED RELEASE ORAL 2 TIMES DAILY WITH MEALS
Status: DISCONTINUED | OUTPATIENT
Start: 2024-05-04 | End: 2024-05-05

## 2024-05-04 RX ORDER — FUROSEMIDE 10 MG/ML
20 INJECTION INTRAMUSCULAR; INTRAVENOUS
Status: DISCONTINUED | OUTPATIENT
Start: 2024-05-04 | End: 2024-05-06 | Stop reason: HOSPADM

## 2024-05-04 RX ORDER — POTASSIUM CHLORIDE 750 MG/1
20 CAPSULE, EXTENDED RELEASE ORAL ONCE
Status: COMPLETED | OUTPATIENT
Start: 2024-05-04 | End: 2024-05-04

## 2024-05-04 RX ADMIN — POLYETHYLENE GLYCOL 3350 17 G: 17 POWDER, FOR SOLUTION ORAL at 08:52

## 2024-05-04 RX ADMIN — FUROSEMIDE 20 MG: 10 INJECTION, SOLUTION INTRAMUSCULAR; INTRAVENOUS at 08:55

## 2024-05-04 RX ADMIN — ALPRAZOLAM 0.5 MG: 0.5 TABLET ORAL at 08:53

## 2024-05-04 RX ADMIN — CLOPIDOGREL BISULFATE 75 MG: 75 TABLET, FILM COATED ORAL at 17:01

## 2024-05-04 RX ADMIN — ACETAMINOPHEN 650 MG: 325 TABLET, FILM COATED ORAL at 20:52

## 2024-05-04 RX ADMIN — GABAPENTIN 200 MG: 100 CAPSULE ORAL at 08:53

## 2024-05-04 RX ADMIN — POTASSIUM CHLORIDE 20 MEQ: 750 CAPSULE, EXTENDED RELEASE ORAL at 08:52

## 2024-05-04 RX ADMIN — POTASSIUM CHLORIDE 20 MEQ: 750 CAPSULE, EXTENDED RELEASE ORAL at 16:58

## 2024-05-04 RX ADMIN — ALPRAZOLAM 0.5 MG: 0.5 TABLET ORAL at 20:52

## 2024-05-04 RX ADMIN — ALPRAZOLAM 0.5 MG: 0.5 TABLET ORAL at 15:09

## 2024-05-04 RX ADMIN — ACETAMINOPHEN 650 MG: 325 TABLET, FILM COATED ORAL at 13:29

## 2024-05-04 RX ADMIN — LEVOTHYROXINE SODIUM 125 MCG: 125 TABLET ORAL at 05:45

## 2024-05-04 RX ADMIN — BISACODYL 10 MG: 5 TABLET, COATED ORAL at 20:51

## 2024-05-04 RX ADMIN — LIDOCAINE 2 PATCH: 4 PATCH TOPICAL at 20:51

## 2024-05-04 RX ADMIN — POTASSIUM CHLORIDE 40 MEQ: 750 CAPSULE, EXTENDED RELEASE ORAL at 08:53

## 2024-05-04 RX ADMIN — CHLORHEXIDINE GLUCONATE 0.12% ORAL RINSE 15 ML: 1.2 LIQUID ORAL at 20:50

## 2024-05-04 RX ADMIN — BISACODYL 10 MG: 5 TABLET, COATED ORAL at 09:06

## 2024-05-04 RX ADMIN — ACETAMINOPHEN 650 MG: 325 TABLET, FILM COATED ORAL at 01:32

## 2024-05-04 RX ADMIN — AMIODARONE HYDROCHLORIDE 400 MG: 200 TABLET ORAL at 08:53

## 2024-05-04 RX ADMIN — OXYCODONE HYDROCHLORIDE 5 MG: 5 TABLET ORAL at 12:50

## 2024-05-04 RX ADMIN — OXYCODONE HYDROCHLORIDE 10 MG: 5 TABLET ORAL at 22:26

## 2024-05-04 RX ADMIN — ACETAMINOPHEN 650 MG: 325 TABLET, FILM COATED ORAL at 08:58

## 2024-05-04 RX ADMIN — POTASSIUM CHLORIDE 40 MEQ: 750 CAPSULE, EXTENDED RELEASE ORAL at 17:01

## 2024-05-04 RX ADMIN — ATORVASTATIN CALCIUM 20 MG: 10 TABLET, FILM COATED ORAL at 20:51

## 2024-05-04 RX ADMIN — ASPIRIN 81 MG: 81 TABLET, COATED ORAL at 08:53

## 2024-05-04 RX ADMIN — GABAPENTIN 200 MG: 100 CAPSULE ORAL at 15:09

## 2024-05-04 RX ADMIN — OXYCODONE HYDROCHLORIDE 5 MG: 5 TABLET ORAL at 17:18

## 2024-05-04 RX ADMIN — INSULIN LISPRO 5 UNITS: 100 INJECTION, SOLUTION INTRAVENOUS; SUBCUTANEOUS at 12:47

## 2024-05-04 RX ADMIN — METOPROLOL TARTRATE 25 MG: 50 TABLET, FILM COATED ORAL at 20:51

## 2024-05-04 RX ADMIN — METOPROLOL TARTRATE 25 MG: 50 TABLET, FILM COATED ORAL at 08:54

## 2024-05-04 RX ADMIN — FUROSEMIDE 20 MG: 10 INJECTION, SOLUTION INTRAMUSCULAR; INTRAVENOUS at 17:01

## 2024-05-04 RX ADMIN — CHLORHEXIDINE GLUCONATE 0.12% ORAL RINSE 15 ML: 1.2 LIQUID ORAL at 08:53

## 2024-05-04 RX ADMIN — GABAPENTIN 200 MG: 100 CAPSULE ORAL at 20:52

## 2024-05-04 RX ADMIN — OXYCODONE HYDROCHLORIDE 10 MG: 5 TABLET ORAL at 08:53

## 2024-05-04 RX ADMIN — INSULIN LISPRO 3 UNITS: 100 INJECTION, SOLUTION INTRAVENOUS; SUBCUTANEOUS at 20:50

## 2024-05-04 RX ADMIN — ENOXAPARIN SODIUM 40 MG: 100 INJECTION SUBCUTANEOUS at 08:58

## 2024-05-05 ENCOUNTER — APPOINTMENT (OUTPATIENT)
Dept: GENERAL RADIOLOGY | Facility: HOSPITAL | Age: 64
DRG: 234 | End: 2024-05-05
Payer: MEDICARE

## 2024-05-05 LAB
ANION GAP SERPL CALCULATED.3IONS-SCNC: 6 MMOL/L (ref 5–15)
BUN SERPL-MCNC: 22 MG/DL (ref 8–23)
BUN/CREAT SERPL: 13.9 (ref 7–25)
CALCIUM SPEC-SCNC: 9 MG/DL (ref 8.6–10.5)
CHLORIDE SERPL-SCNC: 102 MMOL/L (ref 98–107)
CO2 SERPL-SCNC: 31 MMOL/L (ref 22–29)
CREAT SERPL-MCNC: 1.58 MG/DL (ref 0.57–1)
DEPRECATED RDW RBC AUTO: 47.8 FL (ref 37–54)
EGFRCR SERPLBLD CKD-EPI 2021: 36 ML/MIN/1.73
ERYTHROCYTE [DISTWIDTH] IN BLOOD BY AUTOMATED COUNT: 13.6 % (ref 12.3–15.4)
GLUCOSE BLDC GLUCOMTR-MCNC: 124 MG/DL (ref 70–130)
GLUCOSE BLDC GLUCOMTR-MCNC: 152 MG/DL (ref 70–130)
GLUCOSE BLDC GLUCOMTR-MCNC: 181 MG/DL (ref 70–130)
GLUCOSE BLDC GLUCOMTR-MCNC: 239 MG/DL (ref 70–130)
GLUCOSE SERPL-MCNC: 155 MG/DL (ref 65–99)
HCT VFR BLD AUTO: 25.4 % (ref 34–46.6)
HGB BLD-MCNC: 7.9 G/DL (ref 12–15.9)
MCH RBC QN AUTO: 30 PG (ref 26.6–33)
MCHC RBC AUTO-ENTMCNC: 31.1 G/DL (ref 31.5–35.7)
MCV RBC AUTO: 96.6 FL (ref 79–97)
PLATELET # BLD AUTO: 168 10*3/MM3 (ref 140–450)
PMV BLD AUTO: 13 FL (ref 6–12)
POTASSIUM SERPL-SCNC: 5 MMOL/L (ref 3.5–5.2)
RBC # BLD AUTO: 2.63 10*6/MM3 (ref 3.77–5.28)
SODIUM SERPL-SCNC: 139 MMOL/L (ref 136–145)
WBC NRBC COR # BLD AUTO: 7.76 10*3/MM3 (ref 3.4–10.8)

## 2024-05-05 PROCEDURE — 97110 THERAPEUTIC EXERCISES: CPT

## 2024-05-05 PROCEDURE — 82948 REAGENT STRIP/BLOOD GLUCOSE: CPT

## 2024-05-05 PROCEDURE — 71045 X-RAY EXAM CHEST 1 VIEW: CPT

## 2024-05-05 PROCEDURE — 25010000002 FUROSEMIDE PER 20 MG: Performed by: NURSE PRACTITIONER

## 2024-05-05 PROCEDURE — 25010000002 ENOXAPARIN PER 10 MG: Performed by: NURSE PRACTITIONER

## 2024-05-05 PROCEDURE — 97116 GAIT TRAINING THERAPY: CPT

## 2024-05-05 PROCEDURE — 80048 BASIC METABOLIC PNL TOTAL CA: CPT | Performed by: NURSE PRACTITIONER

## 2024-05-05 PROCEDURE — 63710000001 INSULIN LISPRO (HUMAN) PER 5 UNITS: Performed by: NURSE PRACTITIONER

## 2024-05-05 PROCEDURE — 85027 COMPLETE CBC AUTOMATED: CPT | Performed by: NURSE PRACTITIONER

## 2024-05-05 RX ADMIN — METOPROLOL TARTRATE 25 MG: 50 TABLET, FILM COATED ORAL at 08:48

## 2024-05-05 RX ADMIN — ACETAMINOPHEN 650 MG: 325 TABLET, FILM COATED ORAL at 20:39

## 2024-05-05 RX ADMIN — METOPROLOL TARTRATE 25 MG: 50 TABLET, FILM COATED ORAL at 20:40

## 2024-05-05 RX ADMIN — OXYCODONE HYDROCHLORIDE 10 MG: 5 TABLET ORAL at 14:27

## 2024-05-05 RX ADMIN — OXYCODONE HYDROCHLORIDE 10 MG: 5 TABLET ORAL at 08:49

## 2024-05-05 RX ADMIN — CYCLOBENZAPRINE HYDROCHLORIDE 5 MG: 5 TABLET, FILM COATED ORAL at 13:20

## 2024-05-05 RX ADMIN — ALPRAZOLAM 0.5 MG: 0.5 TABLET ORAL at 22:41

## 2024-05-05 RX ADMIN — ALPRAZOLAM 0.5 MG: 0.5 TABLET ORAL at 18:49

## 2024-05-05 RX ADMIN — ALPRAZOLAM 0.5 MG: 0.5 TABLET ORAL at 08:50

## 2024-05-05 RX ADMIN — ENOXAPARIN SODIUM 40 MG: 100 INJECTION SUBCUTANEOUS at 08:48

## 2024-05-05 RX ADMIN — OXYCODONE HYDROCHLORIDE 10 MG: 5 TABLET ORAL at 03:37

## 2024-05-05 RX ADMIN — GABAPENTIN 200 MG: 100 CAPSULE ORAL at 08:48

## 2024-05-05 RX ADMIN — FUROSEMIDE 20 MG: 10 INJECTION, SOLUTION INTRAMUSCULAR; INTRAVENOUS at 18:49

## 2024-05-05 RX ADMIN — CLOPIDOGREL BISULFATE 75 MG: 75 TABLET, FILM COATED ORAL at 18:49

## 2024-05-05 RX ADMIN — ASPIRIN 81 MG: 81 TABLET, COATED ORAL at 08:50

## 2024-05-05 RX ADMIN — ATORVASTATIN CALCIUM 20 MG: 10 TABLET, FILM COATED ORAL at 20:40

## 2024-05-05 RX ADMIN — AMIODARONE HYDROCHLORIDE 400 MG: 200 TABLET ORAL at 08:38

## 2024-05-05 RX ADMIN — INSULIN LISPRO 3 UNITS: 100 INJECTION, SOLUTION INTRAVENOUS; SUBCUTANEOUS at 20:40

## 2024-05-05 RX ADMIN — GABAPENTIN 200 MG: 100 CAPSULE ORAL at 20:39

## 2024-05-05 RX ADMIN — FUROSEMIDE 20 MG: 10 INJECTION, SOLUTION INTRAMUSCULAR; INTRAVENOUS at 08:48

## 2024-05-05 RX ADMIN — ACETAMINOPHEN 650 MG: 325 TABLET, FILM COATED ORAL at 05:29

## 2024-05-05 RX ADMIN — LEVOTHYROXINE SODIUM 125 MCG: 125 TABLET ORAL at 05:05

## 2024-05-05 RX ADMIN — INSULIN LISPRO 5 UNITS: 100 INJECTION, SOLUTION INTRAVENOUS; SUBCUTANEOUS at 11:46

## 2024-05-05 RX ADMIN — OXYCODONE HYDROCHLORIDE 10 MG: 5 TABLET ORAL at 20:39

## 2024-05-06 ENCOUNTER — APPOINTMENT (OUTPATIENT)
Dept: GENERAL RADIOLOGY | Facility: HOSPITAL | Age: 64
DRG: 234 | End: 2024-05-06
Payer: MEDICARE

## 2024-05-06 ENCOUNTER — READMISSION MANAGEMENT (OUTPATIENT)
Dept: CALL CENTER | Facility: HOSPITAL | Age: 64
End: 2024-05-06
Payer: MEDICARE

## 2024-05-06 VITALS
HEIGHT: 66 IN | RESPIRATION RATE: 16 BRPM | BODY MASS INDEX: 30.79 KG/M2 | TEMPERATURE: 98.7 F | HEART RATE: 75 BPM | DIASTOLIC BLOOD PRESSURE: 47 MMHG | SYSTOLIC BLOOD PRESSURE: 120 MMHG | OXYGEN SATURATION: 93 % | WEIGHT: 191.6 LBS

## 2024-05-06 LAB
ANION GAP SERPL CALCULATED.3IONS-SCNC: 9 MMOL/L (ref 5–15)
BUN SERPL-MCNC: 22 MG/DL (ref 8–23)
BUN/CREAT SERPL: 15.6 (ref 7–25)
CALCIUM SPEC-SCNC: 9.2 MG/DL (ref 8.6–10.5)
CHLORIDE SERPL-SCNC: 98 MMOL/L (ref 98–107)
CO2 SERPL-SCNC: 33 MMOL/L (ref 22–29)
CREAT SERPL-MCNC: 1.41 MG/DL (ref 0.57–1)
DEPRECATED RDW RBC AUTO: 48.3 FL (ref 37–54)
EGFRCR SERPLBLD CKD-EPI 2021: 41.2 ML/MIN/1.73
ERYTHROCYTE [DISTWIDTH] IN BLOOD BY AUTOMATED COUNT: 13.5 % (ref 12.3–15.4)
GLUCOSE BLDC GLUCOMTR-MCNC: 134 MG/DL (ref 70–130)
GLUCOSE SERPL-MCNC: 136 MG/DL (ref 65–99)
HCT VFR BLD AUTO: 28.8 % (ref 34–46.6)
HGB BLD-MCNC: 8.8 G/DL (ref 12–15.9)
MCH RBC QN AUTO: 29.5 PG (ref 26.6–33)
MCHC RBC AUTO-ENTMCNC: 30.6 G/DL (ref 31.5–35.7)
MCV RBC AUTO: 96.6 FL (ref 79–97)
PLATELET # BLD AUTO: 228 10*3/MM3 (ref 140–450)
PMV BLD AUTO: 11.9 FL (ref 6–12)
POTASSIUM SERPL-SCNC: 4.1 MMOL/L (ref 3.5–5.2)
RBC # BLD AUTO: 2.98 10*6/MM3 (ref 3.77–5.28)
SODIUM SERPL-SCNC: 140 MMOL/L (ref 136–145)
WBC NRBC COR # BLD AUTO: 5.9 10*3/MM3 (ref 3.4–10.8)

## 2024-05-06 PROCEDURE — 71046 X-RAY EXAM CHEST 2 VIEWS: CPT

## 2024-05-06 PROCEDURE — 25010000002 ENOXAPARIN PER 10 MG: Performed by: NURSE PRACTITIONER

## 2024-05-06 PROCEDURE — 80048 BASIC METABOLIC PNL TOTAL CA: CPT | Performed by: NURSE PRACTITIONER

## 2024-05-06 PROCEDURE — 25010000002 FUROSEMIDE PER 20 MG: Performed by: NURSE PRACTITIONER

## 2024-05-06 PROCEDURE — 85027 COMPLETE CBC AUTOMATED: CPT | Performed by: NURSE PRACTITIONER

## 2024-05-06 PROCEDURE — 82948 REAGENT STRIP/BLOOD GLUCOSE: CPT

## 2024-05-06 PROCEDURE — 97116 GAIT TRAINING THERAPY: CPT

## 2024-05-06 RX ORDER — AMIODARONE HYDROCHLORIDE 200 MG/1
200 TABLET ORAL DAILY
Qty: 21 TABLET | Refills: 0 | Status: SHIPPED | OUTPATIENT
Start: 2024-05-06 | End: 2024-05-27

## 2024-05-06 RX ORDER — FUROSEMIDE 20 MG/1
20 TABLET ORAL DAILY
Qty: 7 TABLET | Refills: 0 | Status: SHIPPED | OUTPATIENT
Start: 2024-05-06 | End: 2024-05-13

## 2024-05-06 RX ORDER — ASPIRIN 81 MG/1
81 TABLET ORAL DAILY
Qty: 30 TABLET | Refills: 2 | Status: SHIPPED | OUTPATIENT
Start: 2024-05-06

## 2024-05-06 RX ORDER — LISINOPRIL 5 MG/1
5 TABLET ORAL DAILY
Qty: 30 TABLET | Refills: 2 | Status: SHIPPED | OUTPATIENT
Start: 2024-05-06

## 2024-05-06 RX ORDER — ATORVASTATIN CALCIUM 40 MG/1
40 TABLET, FILM COATED ORAL NIGHTLY
Qty: 30 TABLET | Refills: 2 | Status: SHIPPED | OUTPATIENT
Start: 2024-05-06

## 2024-05-06 RX ORDER — POTASSIUM CHLORIDE 1500 MG/1
20 TABLET, EXTENDED RELEASE ORAL DAILY
Qty: 7 TABLET | Refills: 0 | Status: SHIPPED | OUTPATIENT
Start: 2024-05-06 | End: 2024-05-13

## 2024-05-06 RX ADMIN — ACETAMINOPHEN 650 MG: 325 TABLET, FILM COATED ORAL at 09:16

## 2024-05-06 RX ADMIN — OXYCODONE HYDROCHLORIDE 10 MG: 5 TABLET ORAL at 04:58

## 2024-05-06 RX ADMIN — LEVOTHYROXINE SODIUM 125 MCG: 125 TABLET ORAL at 04:58

## 2024-05-06 RX ADMIN — ASPIRIN 81 MG: 81 TABLET, COATED ORAL at 09:16

## 2024-05-06 RX ADMIN — FUROSEMIDE 20 MG: 10 INJECTION, SOLUTION INTRAMUSCULAR; INTRAVENOUS at 09:16

## 2024-05-06 RX ADMIN — ENOXAPARIN SODIUM 40 MG: 100 INJECTION SUBCUTANEOUS at 09:16

## 2024-05-06 RX ADMIN — ALPRAZOLAM 0.5 MG: 0.5 TABLET ORAL at 10:34

## 2024-05-06 RX ADMIN — AMIODARONE HYDROCHLORIDE 400 MG: 200 TABLET ORAL at 09:16

## 2024-05-06 RX ADMIN — METOPROLOL TARTRATE 25 MG: 50 TABLET, FILM COATED ORAL at 09:16

## 2024-05-07 ENCOUNTER — TELEPHONE (OUTPATIENT)
Dept: CARDIAC SURGERY | Facility: CLINIC | Age: 64
End: 2024-05-07
Payer: MEDICARE

## 2024-05-07 ENCOUNTER — TELEPHONE (OUTPATIENT)
Dept: INTERNAL MEDICINE | Age: 64
End: 2024-05-07

## 2024-05-07 LAB
QT INTERVAL: 452 MS
QTC INTERVAL: 439 MS

## 2024-05-07 NOTE — TELEPHONE ENCOUNTER
Care Transitions Initial Follow Up Call    Outreach made within 2 business days of discharge: Yes  Workman's comp claim?No  Patient: Sierra Torrez   Patient : 1960   MRN: [id number]   Reason for Admission: CABG  Discharge Date: 24       Spoke with: Sierra Torrez    Discharge department/facility: [facility]    TCM Interactive Patient Contact:  Was patient able to fill all prescriptions: Yes  Was patient instructed to bring all medications to the follow-up visit: Yes  Is patient taking all medications as directed in the discharge summary? Yes  Does patient understand their discharge instructions: Yes  Does patient have questions or concerns that need addressed prior to 7-14 day follow up office visit: no    The patient reported that her incision is bleeding a small amount. The patient's daughter has reached out to Cardiology to inform them. The patient reports her pain level is a 9 this morning after taking pain medication at 7 this morning. She denies any fever, nausea/vomiting, or chest pain. She does have swelling around her right foot that was present before the CABG.       Scheduled appointment with PCP within 7-14 days    Follow Up  Future Appointments   Date Time Provider Department Center   5/10/2024  7:20 AM Heriberto Almanza MD [facility]       Taty Puri MA

## 2024-05-09 NOTE — PROGRESS NOTES
Reason For Visit:       Combined HPI and A/P:   {No diagnosis found. (Refresh or delete this SmartLink)}    1.) 2.) Anxiety:      - This is a chronic, controlled problem. She is currently taking Xanax 1 mg 1 tab QID prn. She feels that this is helping to control her symptoms. She denies any side effects from the medication.I will continue these medications at the current dose.       - The patient continue to require the Xanax to control their symptoms. We will continue to monitor for opportunities to decrease the dose in efforts to be able to wean off of this in the future.        - The patient UDS, drug contract, controlled prescription monitoring is up to date and appropriate.      2.) 3.) Chronic Pain:      - This is a chronic, stable problem. She had a lumber fusion by Jack L1-L5. She recently had a MRI of L spine. She is currently taking Percocet  mg 1 tab TID and Gabapentin 300 mg 1 cap TID. She feels that this is helping to control her symptoms for the most part, but feels she has increased sciatic nerve pain at night. She denies any side effects from the medication.I will continue the Percocet at the current dose. I will increase the Gabapentin to 300 mg 1 tab in the morning and at noon, and 2 tablets at night.      - The patient UDS, drug contract, controlled prescription monitoring is up to date and appropriate.      3.)    4.)    5.)    6.)    Screenings:    Vaccines:    No follow-ups on file.    We discussed use, benefit, and side effects of prescribed medications.  All patient questions answered.   Patient agreed with treatment plan.   I reviewed available records in our system and care everywhere. In cases where records are not available, the records have been requested and will be reviewed when available.     Subjective      Past Surgical History:   Procedure Laterality Date    CARPAL TUNNEL RELEASE Right     FINGER SURGERY      LEFT POINTER FINGER PARTIALLY CUTOFF    FINGER SURGERY Left

## 2024-05-10 ENCOUNTER — HOSPITAL ENCOUNTER (OUTPATIENT)
Dept: GENERAL RADIOLOGY | Facility: HOSPITAL | Age: 64
Discharge: HOME | End: 2024-05-10
Payer: MEDICARE

## 2024-05-10 ENCOUNTER — OFFICE VISIT (OUTPATIENT)
Dept: PRIMARY CARE CLINIC | Age: 64
End: 2024-05-10

## 2024-05-10 ENCOUNTER — LAB (OUTPATIENT)
Dept: LAB | Facility: HOSPITAL | Age: 64
End: 2024-05-10
Payer: MEDICARE

## 2024-05-10 ENCOUNTER — OFFICE VISIT (OUTPATIENT)
Dept: CARDIAC SURGERY | Facility: CLINIC | Age: 64
End: 2024-05-10
Payer: MEDICARE

## 2024-05-10 VITALS
HEART RATE: 70 BPM | WEIGHT: 193 LBS | HEIGHT: 66 IN | DIASTOLIC BLOOD PRESSURE: 58 MMHG | BODY MASS INDEX: 31.02 KG/M2 | OXYGEN SATURATION: 92 % | SYSTOLIC BLOOD PRESSURE: 102 MMHG

## 2024-05-10 VITALS
HEIGHT: 66 IN | DIASTOLIC BLOOD PRESSURE: 62 MMHG | SYSTOLIC BLOOD PRESSURE: 130 MMHG | BODY MASS INDEX: 27.97 KG/M2 | WEIGHT: 174 LBS | HEART RATE: 76 BPM | OXYGEN SATURATION: 99 %

## 2024-05-10 DIAGNOSIS — L98.9 SKIN LESION: ICD-10-CM

## 2024-05-10 DIAGNOSIS — N18.31 STAGE 3A CHRONIC KIDNEY DISEASE: ICD-10-CM

## 2024-05-10 DIAGNOSIS — Z79.4 TYPE 2 DIABETES MELLITUS WITH HYPERGLYCEMIA, WITH LONG-TERM CURRENT USE OF INSULIN: ICD-10-CM

## 2024-05-10 DIAGNOSIS — M54.16 LUMBAR RADICULOPATHY: ICD-10-CM

## 2024-05-10 DIAGNOSIS — F41.0 GENERALIZED ANXIETY DISORDER WITH PANIC ATTACKS: ICD-10-CM

## 2024-05-10 DIAGNOSIS — Z09 HOSPITAL DISCHARGE FOLLOW-UP: Primary | ICD-10-CM

## 2024-05-10 DIAGNOSIS — I21.4 NON-STEMI (NON-ST ELEVATED MYOCARDIAL INFARCTION): Primary | ICD-10-CM

## 2024-05-10 DIAGNOSIS — I21.4 NON-STEMI (NON-ST ELEVATED MYOCARDIAL INFARCTION): ICD-10-CM

## 2024-05-10 DIAGNOSIS — E11.65 TYPE 2 DIABETES MELLITUS WITH HYPERGLYCEMIA, WITH LONG-TERM CURRENT USE OF INSULIN: ICD-10-CM

## 2024-05-10 DIAGNOSIS — F41.1 GENERALIZED ANXIETY DISORDER WITH PANIC ATTACKS: ICD-10-CM

## 2024-05-10 PROBLEM — Z72.0 TOBACCO USE: Status: ACTIVE | Noted: 2024-05-01

## 2024-05-10 LAB
ANION GAP SERPL CALCULATED.3IONS-SCNC: 11 MMOL/L (ref 5–15)
BASOPHILS # BLD AUTO: 0.05 10*3/MM3 (ref 0–0.2)
BASOPHILS NFR BLD AUTO: 0.5 % (ref 0–1.5)
BUN SERPL-MCNC: 13 MG/DL (ref 8–23)
BUN/CREAT SERPL: 9 (ref 7–25)
CALCIUM SPEC-SCNC: 9.2 MG/DL (ref 8.6–10.5)
CHLORIDE SERPL-SCNC: 99 MMOL/L (ref 98–107)
CO2 SERPL-SCNC: 30 MMOL/L (ref 22–29)
CREAT SERPL-MCNC: 1.45 MG/DL (ref 0.57–1)
DEPRECATED RDW RBC AUTO: 47.9 FL (ref 37–54)
EGFRCR SERPLBLD CKD-EPI 2021: 39.9 ML/MIN/1.73
EOSINOPHIL # BLD AUTO: 0.29 10*3/MM3 (ref 0–0.4)
EOSINOPHIL NFR BLD AUTO: 3.2 % (ref 0.3–6.2)
ERYTHROCYTE [DISTWIDTH] IN BLOOD BY AUTOMATED COUNT: 13.3 % (ref 12.3–15.4)
GLUCOSE SERPL-MCNC: 151 MG/DL (ref 65–99)
HCT VFR BLD AUTO: 31.1 % (ref 34–46.6)
HGB BLD-MCNC: 9.4 G/DL (ref 12–15.9)
IMM GRANULOCYTES # BLD AUTO: 0.14 10*3/MM3 (ref 0–0.05)
IMM GRANULOCYTES NFR BLD AUTO: 1.5 % (ref 0–0.5)
LYMPHOCYTES # BLD AUTO: 1.48 10*3/MM3 (ref 0.7–3.1)
LYMPHOCYTES NFR BLD AUTO: 16.1 % (ref 19.6–45.3)
MCH RBC QN AUTO: 29.6 PG (ref 26.6–33)
MCHC RBC AUTO-ENTMCNC: 30.2 G/DL (ref 31.5–35.7)
MCV RBC AUTO: 97.8 FL (ref 79–97)
MONOCYTES # BLD AUTO: 0.88 10*3/MM3 (ref 0.1–0.9)
MONOCYTES NFR BLD AUTO: 9.6 % (ref 5–12)
NEUTROPHILS NFR BLD AUTO: 6.33 10*3/MM3 (ref 1.7–7)
NEUTROPHILS NFR BLD AUTO: 69.1 % (ref 42.7–76)
NRBC BLD AUTO-RTO: 0 /100 WBC (ref 0–0.2)
PLATELET # BLD AUTO: 487 10*3/MM3 (ref 140–450)
PMV BLD AUTO: 10.4 FL (ref 6–12)
POTASSIUM SERPL-SCNC: 4.6 MMOL/L (ref 3.5–5.2)
RBC # BLD AUTO: 3.18 10*6/MM3 (ref 3.77–5.28)
SODIUM SERPL-SCNC: 140 MMOL/L (ref 136–145)
WBC NRBC COR # BLD AUTO: 9.17 10*3/MM3 (ref 3.4–10.8)

## 2024-05-10 PROCEDURE — 85025 COMPLETE CBC W/AUTO DIFF WBC: CPT

## 2024-05-10 PROCEDURE — 71046 X-RAY EXAM CHEST 2 VIEWS: CPT

## 2024-05-10 PROCEDURE — 80048 BASIC METABOLIC PNL TOTAL CA: CPT

## 2024-05-10 PROCEDURE — 36415 COLL VENOUS BLD VENIPUNCTURE: CPT

## 2024-05-10 RX ORDER — OXYCODONE AND ACETAMINOPHEN 10; 325 MG/1; MG/1
1 TABLET ORAL EVERY 6 HOURS PRN
Qty: 120 TABLET | Refills: 0 | Status: SHIPPED | OUTPATIENT
Start: 2024-05-10 | End: 2024-06-09

## 2024-05-10 RX ORDER — AMIODARONE HYDROCHLORIDE 200 MG/1
200 TABLET ORAL DAILY
COMMUNITY
Start: 2024-05-06 | End: 2024-05-28

## 2024-05-10 RX ORDER — TIZANIDINE 4 MG/1
1 TABLET ORAL EVERY 8 HOURS
COMMUNITY

## 2024-05-10 RX ORDER — BLOOD SUGAR DIAGNOSTIC
STRIP MISCELLANEOUS
COMMUNITY
Start: 2024-05-07

## 2024-05-10 RX ORDER — DOXYCYCLINE HYCLATE 100 MG/1
100 CAPSULE ORAL DAILY
COMMUNITY
Start: 2024-04-02

## 2024-05-10 RX ORDER — ATORVASTATIN CALCIUM 40 MG/1
40 TABLET, FILM COATED ORAL NIGHTLY
COMMUNITY
Start: 2024-05-06

## 2024-05-10 RX ORDER — LISINOPRIL 5 MG/1
5 TABLET ORAL DAILY
COMMUNITY
Start: 2024-05-06

## 2024-05-10 RX ORDER — SEMAGLUTIDE 0.68 MG/ML
0.5 INJECTION, SOLUTION SUBCUTANEOUS WEEKLY
COMMUNITY
Start: 2024-04-22 | End: 2026-04-23

## 2024-05-10 RX ORDER — LEVOTHYROXINE SODIUM 0.12 MG/1
125 TABLET ORAL DAILY
COMMUNITY
Start: 2024-03-18

## 2024-05-10 RX ORDER — BETAMETHASONE DIPROPIONATE 0.05 %
OINTMENT (GRAM) TOPICAL
Qty: 45 G | Refills: 0 | Status: SHIPPED | OUTPATIENT
Start: 2024-05-10

## 2024-05-10 RX ORDER — INSULIN GLARGINE 100 [IU]/ML
35 INJECTION, SOLUTION SUBCUTANEOUS NIGHTLY
COMMUNITY
Start: 2024-05-07

## 2024-05-10 RX ORDER — BLOOD-GLUCOSE METER
EACH MISCELLANEOUS
COMMUNITY
Start: 2024-05-07

## 2024-05-10 RX ORDER — PROMETHAZINE HYDROCHLORIDE 25 MG/1
25 TABLET ORAL
COMMUNITY

## 2024-05-10 RX ORDER — ALPRAZOLAM 1 MG/1
TABLET ORAL
Qty: 120 TABLET | Refills: 2 | Status: SHIPPED | OUTPATIENT
Start: 2024-05-10 | End: 2024-07-15

## 2024-05-10 RX ORDER — DIPHENHYDRAMINE HCL 25 MG
25 CAPSULE ORAL EVERY 6 HOURS PRN
COMMUNITY
End: 2024-05-10

## 2024-05-10 RX ORDER — LANCETS 30 GAUGE
EACH MISCELLANEOUS
COMMUNITY
Start: 2024-05-07

## 2024-05-10 RX ORDER — POTASSIUM CHLORIDE 1500 MG/1
20 TABLET, EXTENDED RELEASE ORAL ONCE
COMMUNITY
Start: 2024-05-06 | End: 2024-05-10 | Stop reason: DRUGHIGH

## 2024-05-10 RX ORDER — BETAMETHASONE DIPROPIONATE 0.5 MG/G
1 CREAM TOPICAL
COMMUNITY

## 2024-05-10 NOTE — PROGRESS NOTES
Post-Discharge Transitional Care  Follow Up      Sierra Torrez   YOB: 1960    Date of Office Visit:  5/10/2024  Date of Admission: 4/24/2024  Date of Discharge: 5/6/2024  Risk of hospital readmission (high >=14%. Medium >=10%) :No data recorded    Care management risk score Rising risk (score 2-5) and Complex Care (Scores >=6): No Risk Score On File     Non face to face  following discharge, date last encounter closed (first attempt may have been earlier): 05/07/2024    Call initiated 2 business days of discharge: Yes    ASSESSMENT/PLAN:   Hospital discharge follow-up  -     GA DISCHARGE MEDS RECONCILED W/ CURRENT OUTPATIENT MED LIST  Skin lesion  -     betamethasone dipropionate 0.05 % ointment; Apply to the skin lesion topically daily., Disp-45 g, R-0, Normal  Generalized anxiety disorder with panic attacks  -     ALPRAZolam (XANAX) 1 MG tablet; TAKE ONE TABLET 4 TIMES DAILY AS NEEDED FOR ANXIETY . . .MAY CAUSE DROWSINESS!!, Disp-120 tablet, R-2Normal  Lumbar radiculopathy  -     oxyCODONE-acetaminophen (PERCOCET)  MG per tablet; Take 1 tablet by mouth every 6 hours as needed for Pain for up to 30 days. Intended supply: 30 days Max Daily Amount: 4 tablets, Disp-120 tablet, R-0Normal      Medical Decision Making: moderate complexity  No follow-ups on file.           Subjective:   HPI:  Follow up of Hospital problems/diagnosis(es):     **Non-STEMI (non-ST elevated myocardial infarction)   Tobacco use   NSTEMI (non-ST elevated myocardial infarction)   Overweight (BMI 25.0-29.9)   Stage 3a chronic kidney disease   Type 2 diabetes mellitus with hyperglycemia, with long-term current use of insulin   Primary hypertension     Inpatient course copied per DC summary at the time of discharge:    \" Date of Admission: 4/24/2024  Date of Discharge: 5/6/2024  Primary Care Physician: Heriberto Almanza MD      Procedures Performed:   CABG x4 (LIMA to LAD, FLORENTINO to RCA, SVG to Diagonal Artery, SVG to

## 2024-05-15 ENCOUNTER — READMISSION MANAGEMENT (OUTPATIENT)
Dept: CALL CENTER | Facility: HOSPITAL | Age: 64
End: 2024-05-15
Payer: MEDICARE

## 2024-05-15 NOTE — OUTREACH NOTE
CT Surgery Week 1 Survey      Flowsheet Row Responses   [facility] patient discharged from? [place]   Does the patient have one of the following disease processes/diagnoses(primary or secondary)? Cardiothoracic surgery   Week 1 attempt successful? Yes   Call start time 1314   Call end time 1338   Discharge diagnosis NSTEMI, CABG x 4 using left leg vein harvest   Meds reviewed with patient/caregiver? Yes   Is the patient having any side effects they believe may be caused by any medication additions or changes? No   Does the patient have all medications related to this admission filled (includes all antibiotics, pain medications, cardiac medications, etc.) Yes   Is the patient taking all medications as directed (includes completed medication regime)? Yes   Medication comments Pt reports that she was told not to take Semaglutide and is not taking the Liraglutide.   Does the patient have a primary care provider?  Yes   Does the patient have an appointment scheduled with their C/T surgeon? Yes   Has the patient kept scheduled appointments due by today? N/A   Comments Pt reports having some aching pains under her arms/sides of breasts.Pt reports that she was more active yesterday, doing laundry.RN educated pt on using ice/heat and supportive sports bra for comfort, pt v/u. Pt is currently not wearing the chest support that she was DC with r/t laundering the item.RN educated pt to use soft non-wire sports bra to support her breasts to aide in the discomfort she is feeling, pt v/u. Pt is tolerating po intake, denies issues with voiding and reports that she continues doing deep breathing/cough throughout the day. Pt reports ambulation is difficult r/t arthritis which is her baseline, RN encouraged pt to continue freq ambulation during the day, pt v/u. Pt denies s/sx at incision sites. Pt denies any other issues at this time   Did the patient receive a copy of their discharge instructions? Yes   Nursing interventions  Reviewed instructions with patient   What is the patient's perception of their health status since discharge? Improving   Nursing interventions Nurse provided patient education   Is the patient/caregiver able to teach back normal signs of recovery? Pain or discomfort at incisional site   Nursing interventions Reassured on normal signs of recovery   Is the patient /caregiver able to teach back basic post-op care? Continue use of incentive spirometry at least 1 week post discharge, Practice cough and deep breath every 4 hours while awake, Hold pillow to support chest when coughing, No tub bath, swimming, or hot tub until instructed by MD, Lifting as instructed by MD in discharge instructions   Is the patient/caregiver able to teach back signs and symptoms of incisional infection? Increased redness, swelling or pain at the incisonal site, Increased drainage or bleeding, Pus or odor from incision, Fever   Is the patient/caregiver able to teach back steps to recovery at home? Set small, achievable goals for return to baseline health, Rest and rebuild strength, gradually increase activity   Is the patient/caregiver able to teach back the hierarchy of who to call/visit for symptoms/problems? PCP, Specialist, Home health nurse, Urgent Care, ED, 911 Yes   Week 1 call completed? Yes   Call end time [id number]              Eli TANG - Registered Nurse

## 2024-05-16 ENCOUNTER — TELEPHONE (OUTPATIENT)
Dept: CARDIAC SURGERY | Facility: CLINIC | Age: 64
End: 2024-05-16

## 2024-05-16 NOTE — TELEPHONE ENCOUNTER
Patient canceled her appointment with me today due to transportation issues.  Appointment was rescheduled for 5/30/2024.  Called to check on patient and she states she is still about 3 to 4 pounds above her baseline with some lower extremity edema.  She does report she is losing weight on Lasix 20 mg daily.  I have advised her to continue this dosing and appointment with me needs to be rescheduled to 5/23/2024 at 10:00.  She has been advised to arrive early for labs prior to appointment.

## 2024-05-20 ENCOUNTER — TELEPHONE (OUTPATIENT)
Dept: CARDIAC SURGERY | Facility: CLINIC | Age: 64
End: 2024-05-20
Payer: MEDICARE

## 2024-05-20 ENCOUNTER — OFFICE VISIT (OUTPATIENT)
Dept: CARDIAC SURGERY | Facility: CLINIC | Age: 64
End: 2024-05-20
Payer: MEDICARE

## 2024-05-20 VITALS
HEIGHT: 66 IN | DIASTOLIC BLOOD PRESSURE: 68 MMHG | WEIGHT: 193.4 LBS | SYSTOLIC BLOOD PRESSURE: 125 MMHG | BODY MASS INDEX: 31.08 KG/M2

## 2024-05-20 DIAGNOSIS — Z51.89 VISIT FOR WOUND CHECK: Primary | ICD-10-CM

## 2024-05-20 PROCEDURE — 3074F SYST BP LT 130 MM HG: CPT

## 2024-05-20 PROCEDURE — 99024 POSTOP FOLLOW-UP VISIT: CPT

## 2024-05-20 PROCEDURE — 3078F DIAST BP <80 MM HG: CPT

## 2024-05-20 NOTE — TELEPHONE ENCOUNTER
Spoke with patient. She reports that she has a 1.5 in open area to her sternal incision. She reports minimal drainage. No fevers or chills reported. Will discuss further with Dr. Lutz.

## 2024-05-20 NOTE — TELEPHONE ENCOUNTER
PTS DAUGHTER CALLED BACK TO SEE IF DR KHAN HAD ADVISED-ADVIASED HER AS SOON AS HE DID WE WOULD ARNALDO OUT TO HER. PLEASE CALL PT WHEN POSSIBLE TO ADVISE

## 2024-05-20 NOTE — TELEPHONE ENCOUNTER
Pt was seen in ER over the weekend for abscess on chest incision.  Pt calling to see if she needs to come in today or if we need to rx abx for pt or what to do re: this.  Pt has appt with Eveline WEAVER on Thursday but she was not sure if she was supposed to wait until then.  Can reach her at #[phone]/sondra

## 2024-05-20 NOTE — PROGRESS NOTES
"Subjective   Chief Complaint   Patient presents with    Wound Check     Patient is here for wound check     Patient ID: Milagros Parson is a 64 y.o. female who is here for follow-up having had CABG x 4 (LIMA to LAD, JONATHON to RCA, SVG to diagonal artery, SVG to OM) by Dr. Lutz on 5/1/2024.    History of Present Illness  Patient presents today for a wound check.  She went to [facility] ER this weekend for a fluid-filled collection to the mid sternal incision.  CT chest was performed at [facility] found a small fluid collection adjacent to the manubrium anteriorly measuring approximately 0.9 x 2.1 x 2 cm.  She presents today with her daughter.  She denies fever or chills.  She reports minimal serosanguineous drainage from incision.  Weight today is stable.  Patient reports that she was on Lasix but stopped it after taking 40 mg daily for 2 days and 20 mg daily for 1 day.  Per notes from her last office visit patient should be taking Lasix 20 mg daily.    The following portions of the patient's history were reviewed and updated as appropriate: allergies, current medications, past family history, past medical history, past social history, past surgical history and problem list.    Review of Systems   Constitutional:  Positive for fatigue (From surgery, improving).   HENT:  Negative for sore throat and trouble swallowing.    Respiratory:  Negative for shortness of breath.    Cardiovascular:  Negative for chest pain, palpitations and leg swelling.   Skin:  Positive for wound.   Neurological:  Negative for dizziness and weakness.     Objective   Visit Vitals  /68 (BP Location: Right arm, Patient Position: Sitting, Cuff Size: Adult)   Ht 167.6 cm (66\")   Wt 87.7 kg (193 lb 6.4 oz)   BMI 31.22 kg/m²   Oxygen saturation: 96%  Heart rate: 71 bpm    Physical Exam  Constitutional:       General: She is not in acute distress.  HENT:      Head: Normocephalic and atraumatic.   Eyes:      Pupils: " Pupils are equal, round, and reactive to light.   Cardiovascular:      Rate and Rhythm: Normal rate and regular rhythm.      Heart sounds: No murmur heard.     No friction rub. No gallop.   Pulmonary:      Effort: Pulmonary effort is normal.      Breath sounds: Normal breath sounds. No wheezing, rhonchi or rales.   Abdominal:      General: There is no distension.      Palpations: Abdomen is soft.   Musculoskeletal:         General: Normal range of motion.      Cervical back: Normal range of motion and neck supple.      Right lower leg: No edema.      Left lower leg: No edema.   Skin:     General: Skin is warm and dry.      Comments: Upper 1.5 inch of sternotomy incision is slightly open with a small amount of serous drainage.  No signs of infection.  No sternal clicks appreciated.  Left saphenectomy site is clean, dry and intact without signs of infection   Neurological:      General: No focal deficit present.      Mental Status: She is alert and oriented to person, place, and time.   Psychiatric:         Mood and Affect: Mood normal.         Behavior: Behavior normal.       Assessment & Plan       Diagnoses and all orders for this visit:    1. Visit for wound check (Primary)      Ms. Parson presents today for a wound check.  The upper 1.5 inch of her sternotomy incision is slightly open with a small amount of serous drainage. She reports that she has not been taking her Lasix. I have instructed her to take her Lasix as prescribed. We discussed current sternotomy precautions and how these will not be advanced yet. Continue post op surgical wound care: shower daily with antibacterial soap and water, avoid use of lotions, creams, ointments, powders etc, allow steri strips to fall off on their own.  Apply dry dressing and change as needed for drainage.  Following post op cardiac surgery home instructions.     Return in 1 week with OWEN Castanon for a wound check.    Provided support and encouragement. All  questions have been answered to the best of my ability. Will discuss starting cardiac rehabilitation at official 1 month post op visit. Patient has follow up with Dr. Lutz in a few weeks. Patient has follow up with Cardiology in a few weeks. Patient has been instructed to contact our office with any questions or concerns should they arise prior to the next office visit.     Milagros Parson  reports that she quit smoking about 4 weeks ago. Her smoking use included cigarettes. She started smoking about 36 years ago. She has a 36.3 pack-year smoking history. She has been exposed to tobacco smoke. She has never used smokeless tobacco. I have educated her on the risk of diseases from using tobacco products such as cancer, COPD, and heart disease.     Advance Care Planning   ACP discussion was declined by the patient. Patient does not have an advance directive, declines further assistance.        Eveline Medrano, APRN

## 2024-05-20 NOTE — TELEPHONE ENCOUNTER
Per Eveline DAVIS - please place on her schedule today at either 1330 or 1430.     I have also sent a request for CT done at [facility] to be electronically sent to us.

## 2024-05-24 ENCOUNTER — READMISSION MANAGEMENT (OUTPATIENT)
Dept: CALL CENTER | Facility: HOSPITAL | Age: 64
End: 2024-05-24
Payer: MEDICARE

## 2024-05-24 NOTE — OUTREACH NOTE
CT Surgery Week 2 Survey      Flowsheet Row Responses   [facility] facility patient discharged from? [place]   Does the patient have one of the following disease processes/diagnoses(primary or secondary)? Cardiothoracic surgery   Week 2 attempt successful? No   Unsuccessful attempts Attempt 1            Eli Lema Registered Nurse

## 2024-05-28 ENCOUNTER — TELEPHONE (OUTPATIENT)
Dept: CARDIAC SURGERY | Facility: CLINIC | Age: 64
End: 2024-05-28

## 2024-05-28 PROBLEM — Z51.89 VISIT FOR WOUND CHECK: Status: ACTIVE | Noted: 2024-05-28

## 2024-05-28 NOTE — TELEPHONE ENCOUNTER
Caller: Eb Milagros    Relationship: Self    Best call back number: [phone]    What is the best time to reach you: ANY    Who are you requesting to speak with (clinical staff, provider,  specific staff member): JAMES    Do you know the name of the person who called: ANASTACIA    What was the call regarding:PT UNABLE TO MAKE TODAY'S FOLLOW UP PT ADDED TO NEXT AVAILABLE & WAITLIST    PT ALSO HAS QUESTIONS ABOUT USING PAIN BE GONE IF SHE CAN PUT THAT ON HER CHEST BONE.     PT WANTING DRS INSTRUCTION IF ITS OKAY TO USE- PER PT IT HELPS.    Is it okay if the provider responds through MyChart: NA

## 2024-05-28 NOTE — TELEPHONE ENCOUNTER
Called and spoke with patient.  I have advised her she is not to put anything on her sternal incision and is to only use antibacterial soap and water.  The hub has rescheduled her appointment with me for 6/13/2024 however patient has a surgical wound and I need to see her sooner.  She states she can come tomorrow at 1030.  Please add to my schedule

## 2024-05-29 ENCOUNTER — OFFICE VISIT (OUTPATIENT)
Dept: CARDIAC SURGERY | Facility: CLINIC | Age: 64
End: 2024-05-29
Payer: MEDICARE

## 2024-05-29 ENCOUNTER — READMISSION MANAGEMENT (OUTPATIENT)
Dept: CALL CENTER | Facility: HOSPITAL | Age: 64
End: 2024-05-29
Payer: MEDICARE

## 2024-05-29 ENCOUNTER — LAB (OUTPATIENT)
Dept: LAB | Facility: HOSPITAL | Age: 64
End: 2024-05-29
Payer: MEDICARE

## 2024-05-29 VITALS
OXYGEN SATURATION: 96 % | HEART RATE: 59 BPM | SYSTOLIC BLOOD PRESSURE: 112 MMHG | WEIGHT: 188.6 LBS | HEIGHT: 66 IN | DIASTOLIC BLOOD PRESSURE: 68 MMHG | BODY MASS INDEX: 30.31 KG/M2

## 2024-05-29 DIAGNOSIS — I21.4 NON-STEMI (NON-ST ELEVATED MYOCARDIAL INFARCTION): ICD-10-CM

## 2024-05-29 DIAGNOSIS — E11.65 TYPE 2 DIABETES MELLITUS WITH HYPERGLYCEMIA, WITH LONG-TERM CURRENT USE OF INSULIN: ICD-10-CM

## 2024-05-29 DIAGNOSIS — Z79.4 TYPE 2 DIABETES MELLITUS WITH HYPERGLYCEMIA, WITH LONG-TERM CURRENT USE OF INSULIN: ICD-10-CM

## 2024-05-29 DIAGNOSIS — I21.4 NSTEMI (NON-ST ELEVATED MYOCARDIAL INFARCTION): Primary | ICD-10-CM

## 2024-05-29 DIAGNOSIS — N18.31 STAGE 3A CHRONIC KIDNEY DISEASE: ICD-10-CM

## 2024-05-29 LAB
ANION GAP SERPL CALCULATED.3IONS-SCNC: 9 MMOL/L (ref 5–15)
BASOPHILS # BLD AUTO: 0.08 10*3/MM3 (ref 0–0.2)
BASOPHILS NFR BLD AUTO: 1.3 % (ref 0–1.5)
BUN SERPL-MCNC: 20 MG/DL (ref 8–23)
BUN/CREAT SERPL: 13.4 (ref 7–25)
CALCIUM SPEC-SCNC: 9.9 MG/DL (ref 8.6–10.5)
CHLORIDE SERPL-SCNC: 103 MMOL/L (ref 98–107)
CO2 SERPL-SCNC: 28 MMOL/L (ref 22–29)
CREAT SERPL-MCNC: 1.49 MG/DL (ref 0.57–1)
DEPRECATED RDW RBC AUTO: 44.7 FL (ref 37–54)
EGFRCR SERPLBLD CKD-EPI 2021: 38.6 ML/MIN/1.73
EOSINOPHIL # BLD AUTO: 0.31 10*3/MM3 (ref 0–0.4)
EOSINOPHIL NFR BLD AUTO: 5 % (ref 0.3–6.2)
ERYTHROCYTE [DISTWIDTH] IN BLOOD BY AUTOMATED COUNT: 13 % (ref 12.3–15.4)
GLUCOSE SERPL-MCNC: 133 MG/DL (ref 65–99)
HCT VFR BLD AUTO: 37.4 % (ref 34–46.6)
HGB BLD-MCNC: 11.6 G/DL (ref 12–15.9)
IMM GRANULOCYTES # BLD AUTO: 0.01 10*3/MM3 (ref 0–0.05)
IMM GRANULOCYTES NFR BLD AUTO: 0.2 % (ref 0–0.5)
LYMPHOCYTES # BLD AUTO: 1.8 10*3/MM3 (ref 0.7–3.1)
LYMPHOCYTES NFR BLD AUTO: 28.9 % (ref 19.6–45.3)
MCH RBC QN AUTO: 29.4 PG (ref 26.6–33)
MCHC RBC AUTO-ENTMCNC: 31 G/DL (ref 31.5–35.7)
MCV RBC AUTO: 94.9 FL (ref 79–97)
MONOCYTES # BLD AUTO: 0.51 10*3/MM3 (ref 0.1–0.9)
MONOCYTES NFR BLD AUTO: 8.2 % (ref 5–12)
NEUTROPHILS NFR BLD AUTO: 3.52 10*3/MM3 (ref 1.7–7)
NEUTROPHILS NFR BLD AUTO: 56.4 % (ref 42.7–76)
NRBC BLD AUTO-RTO: 0 /100 WBC (ref 0–0.2)
PLATELET # BLD AUTO: 254 10*3/MM3 (ref 140–450)
PMV BLD AUTO: 11.1 FL (ref 6–12)
POTASSIUM SERPL-SCNC: 4.8 MMOL/L (ref 3.5–5.2)
RBC # BLD AUTO: 3.94 10*6/MM3 (ref 3.77–5.28)
SODIUM SERPL-SCNC: 140 MMOL/L (ref 136–145)
WBC NRBC COR # BLD AUTO: 6.23 10*3/MM3 (ref 3.4–10.8)

## 2024-05-29 PROCEDURE — 1159F MED LIST DOCD IN RCRD: CPT | Performed by: NURSE PRACTITIONER

## 2024-05-29 PROCEDURE — 99024 POSTOP FOLLOW-UP VISIT: CPT | Performed by: NURSE PRACTITIONER

## 2024-05-29 PROCEDURE — 85025 COMPLETE CBC W/AUTO DIFF WBC: CPT

## 2024-05-29 PROCEDURE — 36415 COLL VENOUS BLD VENIPUNCTURE: CPT

## 2024-05-29 PROCEDURE — 3074F SYST BP LT 130 MM HG: CPT | Performed by: NURSE PRACTITIONER

## 2024-05-29 PROCEDURE — 80048 BASIC METABOLIC PNL TOTAL CA: CPT

## 2024-05-29 PROCEDURE — 3078F DIAST BP <80 MM HG: CPT | Performed by: NURSE PRACTITIONER

## 2024-05-29 PROCEDURE — 1160F RVW MEDS BY RX/DR IN RCRD: CPT | Performed by: NURSE PRACTITIONER

## 2024-05-29 RX ORDER — AMIODARONE HYDROCHLORIDE 200 MG/1
1 TABLET ORAL DAILY
COMMUNITY
Start: 2024-05-06

## 2024-05-29 RX ORDER — DOXYCYCLINE HYCLATE 100 MG/1
1 CAPSULE ORAL DAILY
COMMUNITY
Start: 2024-04-02

## 2024-05-29 NOTE — PROGRESS NOTES
"Subjective   Chief Complaint   Patient presents with    Post-op Follow-up     Pt is here for post op follow up w/ labs    Pt had CABG x 4 on 05/01/2024       Patient ID: Milagros Parson is a 64 y.o. female who is here for follow-up having had CABG x4 (LIMA to LAD, JONATHON to RCA, SVG to Diagonal Artery, SVG to OM) by Dr. Lutz on 5/1/2024     History of Present Illness  Ms. Parson is a 64-year-old female who underwent the above listed procedure by Dr. Lutz on 5/1/2024.  Postoperative recovery was significant for pain control and diuresis.  She met criteria for discharge home on postop day 5.  Weight at the time of discharge was 6 pounds above her baseline and she was sent home with Lasix 20 mg daily for 7 days.  Weight today is up 2 pounds since discharge.  She given amiodarone 200 mg daily to be taken for 3 weeks due to torsades during surgery.  No prescription pain medication at the time of discharge as she is on chronic pain medication at home and she was advised to resume this and was agreeable.  I saw her in the office several weeks ago and she continued to have ongoing shortness of breath and lower extremity edema.  She was advised to increase Lasix to 40 mg daily for 2 days and then decrease dosing back to 20 mg daily.  She was advised follow up the next week but was not able to keep this appointment.   In the interim, she was evaluated by OWEN Soriano tissue sternal wound dehiscence.  She was advised to cover wound with a dry dressing and follow-up with me today for recheck.  She states she has been covering incision with a dry gauze.  She does report that she has used a \"bone help\" cream on her incision due to the pain.  She does report ongoing fatigue and states she is probably not walking as much as she should, 10 to 15 minutes at a time.  Glucose has been 160-200.    The following portions of the patient's history were reviewed and updated as appropriate: allergies, current medications, past family " "history, past medical history, past social history, past surgical history and problem list.    Review of Systems   Constitutional:  Positive for fatigue. Negative for chills, diaphoresis and fever.   HENT:  Negative for trouble swallowing and voice change.    Eyes:  Negative for visual disturbance.   Respiratory:  Negative for chest tightness and shortness of breath.    Cardiovascular:  Negative for chest pain, palpitations and leg swelling.   Gastrointestinal:  Negative for abdominal pain, diarrhea, nausea and vomiting.   Musculoskeletal:  Negative for arthralgias and myalgias.   Skin:  Positive for wound. Negative for color change, pallor and rash.   Neurological:  Negative for dizziness, syncope and light-headedness.   Psychiatric/Behavioral:  Negative for agitation, confusion and sleep disturbance. The patient is not nervous/anxious.        Objective   Visit Vitals  /68 (BP Location: Left arm, Patient Position: Sitting, Cuff Size: Adult)   Pulse 59   Ht 167.6 cm (66\")   Wt 85.5 kg (188 lb 9.6 oz)   SpO2 96%   BMI 30.44 kg/m²         Physical Exam  Vitals reviewed.   Constitutional:       General: She is not in acute distress.  HENT:      Head: Normocephalic.   Eyes:      Pupils: Pupils are equal, round, and reactive to light.   Cardiovascular:      Rate and Rhythm: Normal rate and regular rhythm.      Heart sounds: Normal heart sounds. No murmur heard.  Pulmonary:      Breath sounds: Normal breath sounds. No wheezing or rales.   Abdominal:      General: There is no distension.      Palpations: Abdomen is soft.      Tenderness: There is no abdominal tenderness.   Musculoskeletal:         General: No swelling or tenderness.      Right lower leg: No edema.      Left lower leg: No edema.   Skin:     General: Skin is warm and dry.      Comments: Sternum is stable, no clicks.  Sternal incision is clean dry and intact.  There is a 2 cm area of wound dehiscence at the mid sternal incision.  No purulent drainage, " erythema, or warmth.  There is mild fibrinous tissue noted.  Area packed with dry gauze and covered with dry dressing..  Saphenectomy site is clean dry and intact.   Neurological:      General: No focal deficit present.      Mental Status: She is alert and oriented to person, place, and time.   Psychiatric:         Mood and Affect: Mood normal.         Behavior: Behavior normal.         Thought Content: Thought content normal.         Judgment: Judgment normal.             Assessment & Plan         Diagnoses and all orders for this visit:    1. NSTEMI (non-ST elevated myocardial infarction) (Primary)    2. Type 2 diabetes mellitus with hyperglycemia, with long-term current use of insulin    3. Stage 3a chronic kidney disease             Overall, Milagros Parson is doing well.  Labs reviewed today and are stable.  No further Lasix as she is near baseline weight and has minimal to no lower extremity edema.  Defer further diuresis recommendations per nephrology.  She does have wound dehiscence at the mid sternal incision.  She is not wearing her surgical bra today and the brawl that she does have on is not supportive.  We discussed the importance of wearing her surgical bra or well supportive bra all as I suspect wound dehiscence is due to inadequate bra support.  She verbalized understanding to this.  I have advised her to pack sternal wound with wet-to-dry dressings twice daily.  Clean with antibacterial soap and water only, no ointments, creams, or lotions on surgical incisions.  She reports she is eating and drinking well.  I did advise her, okay to restart ozempic at this time.  She verbalized understanding to this.  We again discussed the importance of increasing ambulation with her goal to be 30 minutes twice daily by the time she sees Dr. Lutz for her 6-week postop follow-up visit.  She verbalizes understanding to this.  We discussed current sternotomy precautions and how these will not be advanced yet.  Provided support and encouragement. All questions have been answered to the best of my ability. Will discuss starting cardiac rehabilitation at official 1 month post op visit with Dr. Lutz on 6/17/2024.  Patient has follow up with Cardiology in a few weeks. Patient has been instructed to contact our office with any questions or concerns should they arise prior to the next office visit.  RTC on 6/6/2024 to see OWEN Soriano for wound check.          Milagros Parson  reports that she quit smoking about 5 weeks ago. Her smoking use included cigarettes. She started smoking about 36 years ago. She has a 36.3 pack-year smoking history. She has been exposed to tobacco smoke. She has never used smokeless tobacco. I have educated her on the risk of diseases from using tobacco products such as cancer, COPD, and heart disease.     I spent 3 minutes counseling the patient.    Advance Care Planning   ACP discussion was held with the patient during this visit. Patient does not have an advance directive, declines further assistance.

## 2024-05-29 NOTE — OUTREACH NOTE
CT Surgery Week 2 Survey      Flowsheet Row Responses   [facility] patient discharged from? [place]   Does the patient have one of the following disease processes/diagnoses(primary or secondary)? Cardiothoracic surgery   Week 2 attempt successful? Yes   Call start time 1531   Call end time 1535   Discharge diagnosis NSTEMI, CABG x 4 using left leg vein harvest   Meds reviewed with patient/caregiver? Yes   Is the patient having any side effects they believe may be caused by any medication additions or changes? No   Does the patient have all medications related to this admission filled (includes all antibiotics, pain medications, cardiac medications, etc.) Yes   Is the patient taking all medications as directed (includes completed medication regime)? Yes   Does the patient have a primary care provider?  Yes   Does the patient have an appointment scheduled with their C/T surgeon? Yes   Has the patient kept scheduled appointments due by today? Yes   Has home health visited the patient within 72 hours of discharge? N/A   Psychosocial issues? No   Did the patient receive a copy of their discharge instructions? Yes   Nursing interventions Reviewed instructions with patient   What is the patient's perception of their health status since discharge? Improving   Nursing interventions Nurse provided patient education   Is the patient/caregiver able to teach back normal signs of recovery? Pain or discomfort at incisional site   Nursing interventions Reassured on normal signs of recovery   Is the patient /caregiver able to teach back basic post-op care? Shower daily, Use a clean wash cloth and antibacterial bar or liquid soap to clean incisions, If the steri-strips are falling off, it is okay to remove them. (If applicable), Lifting as instructed by MD in discharge instructions, No tub bath, swimming, or hot tub until instructed by MD   Is the patient/caregiver able to teach back signs and symptoms of incisional infection?  Increased redness, swelling or pain at the incisonal site, Increased drainage or bleeding, Incisional warmth, Pus or odor from incision, Fever   Is the patient/caregiver able to teach back steps to recovery at home? Set small, achievable goals for return to baseline health, Rest and rebuild strength, gradually increase activity, Eat a well-balance diet   Is the patient /caregiver able to teach back the importance of cardiac rehab? Yes   Is the patient/caregiver able to teach back the hierarchy of who to call/visit for symptoms/problems? PCP, Specialist, Home health nurse, Urgent Care, ED, 911 Yes   Week 2 call completed? Yes   Call end time [id number]            Gladys DAVIS - Registered Nurse

## 2024-06-06 ENCOUNTER — OFFICE VISIT (OUTPATIENT)
Dept: CARDIAC SURGERY | Facility: CLINIC | Age: 64
End: 2024-06-06
Payer: MEDICARE

## 2024-06-06 VITALS
DIASTOLIC BLOOD PRESSURE: 62 MMHG | HEIGHT: 66 IN | OXYGEN SATURATION: 96 % | HEART RATE: 58 BPM | WEIGHT: 189.2 LBS | BODY MASS INDEX: 30.41 KG/M2 | SYSTOLIC BLOOD PRESSURE: 116 MMHG

## 2024-06-06 DIAGNOSIS — Z51.89 VISIT FOR WOUND CHECK: Primary | ICD-10-CM

## 2024-06-06 PROCEDURE — 99024 POSTOP FOLLOW-UP VISIT: CPT

## 2024-06-06 NOTE — PROGRESS NOTES
"Subjective   Chief Complaint   Patient presents with    Post-op Follow-up     Pt had CABG x 4 on 05/01/2024    Wound Check       History of Present Illness  Ms. Parson is a 64-year-old female who presents today for a wound check.  She was seen in clinic on 5/29/2024.  At that time she had a 2 inch wound dehiscence mid sternal incision.  She was instructed to do wet-to-dry dressing changes twice daily.  She was not wearing a bra with appropriate support at that time.  She returns today, accompanied by her daughter.  She denies any new or worsening symptoms.  She denies fever or chills.  Denies sternal clicks.  She denies drainage from incisions.    The following portions of the patient's history were reviewed and updated as appropriate: allergies, current medications, past family history, past medical history, past social history, past surgical history and problem list.    Review of Systems   Constitutional:  Positive for fatigue.   HENT:  Negative for sore throat and trouble swallowing.    Cardiovascular:  Negative for chest pain, palpitations and leg swelling.   Musculoskeletal:  Negative for back pain.   Skin:  Positive for wound.   Neurological:  Negative for dizziness and weakness.   Psychiatric/Behavioral:  Negative for agitation.        Objective   Visit Vitals  /62 (BP Location: Right arm, Patient Position: Sitting, Cuff Size: Adult)   Pulse 58   Ht 167.6 cm (66\")   Wt 85.8 kg (189 lb 3.2 oz)   SpO2 96%   BMI 30.54 kg/m²     Physical Exam  Constitutional:       General: She is not in acute distress.  HENT:      Head: Normocephalic and atraumatic.   Eyes:      Pupils: Pupils are equal, round, and reactive to light.   Cardiovascular:      Rate and Rhythm: Normal rate and regular rhythm.      Heart sounds: No murmur heard.     No friction rub. No gallop.   Pulmonary:      Effort: Pulmonary effort is normal.      Breath sounds: Normal breath sounds. No wheezing, rhonchi or rales.   Abdominal:      " General: There is no distension.      Palpations: Abdomen is soft.   Musculoskeletal:         General: Normal range of motion.      Cervical back: Normal range of motion and neck supple.      Right lower leg: No edema.      Left lower leg: No edema.   Skin:     General: Skin is warm and dry.      Comments: 1.5 inch wound dehiscence to medial sternotomy incision.  Left saphenectomy site is clean, dry and intact without signs of infection.   Neurological:      General: No focal deficit present.      Mental Status: She is alert and oriented to person, place, and time.   Psychiatric:         Mood and Affect: Mood normal.         Behavior: Behavior normal.     Assessment & Plan     Diagnoses and all orders for this visit:    1. Visit for wound check (Primary)      Overall, Milagros Parson is doing well.  Returns today for a wound check.  She has been doing wet-to-dry dressing changes twice daily.  Wound dehiscence appears to be healing measuring 1.5 in long, previously 2 in long. No drainage or signs of infection noted.  She denies fever or chills.  No sternal clicks. She is wearing a bra with appropriate support today. I will have her continue wet-to-dry dressing changes twice daily.  She will see Dr. Lutz on June 17.  Continue post op surgical wound care: shower daily with antibacterial soap and water, avoid use of lotions, creams, ointments, powders etc, allow steri strips to fall off on their own. Following post op cardiac surgery home instructions.     We discussed the importance of strict glycemic control in the post operative setting and this impacts wound healing, infection risk, and future cardiovascular disease. Continue to follow with primary care for management of diabetes mellitus.     Provided support and encouragement. All questions have been answered to the best of my ability. Will discuss starting cardiac rehabilitation at official 1 month post op visit. Patient has follow up with Lutz in a few weeks.  Patient has follow up with Cardiology in a few weeks. Patient has been instructed to contact our office with any questions or concerns should they arise prior to the next office visit.     Milagros Parson is a non-smoker and therefore does not need tobacco cessation education/counseling.       Advance Care Planning   ACP discussion was declined by the patient. Patient does not have an advance directive, declines further assistance.    Eveline Medrano, APRN

## 2024-06-10 DIAGNOSIS — M54.16 LUMBAR RADICULOPATHY: ICD-10-CM

## 2024-06-10 RX ORDER — OXYCODONE AND ACETAMINOPHEN 10; 325 MG/1; MG/1
1 TABLET ORAL EVERY 6 HOURS PRN
Qty: 120 TABLET | Refills: 0 | Status: SHIPPED | OUTPATIENT
Start: 2024-06-10 | End: 2024-07-10

## 2024-06-10 NOTE — TELEPHONE ENCOUNTER
Sierra AGUIRRE Shan called to request a refill on her medication.      Last office visit : 5/10/2024   Next office visit : 6/20/2024     Last Yemi: 4/19/24  Medication Contract: 10/20/23   Last UDS:10/20/23   Last Rx: 5/10/24    Benzodiazepine Screen, Urine   Date Value Ref Range Status   10/20/2023 positive  Final     Comment:     Pt takes Xanax 1 mg     Buprenorphine Urine   Date Value Ref Range Status   10/20/2023 neg  Final     Cocaine Metabolite Screen, Urine   Date Value Ref Range Status   10/20/2023 neg  Final     Gabapentin Screen, Urine   Date Value Ref Range Status   10/20/2023 neg  Final     MDMA, Urine   Date Value Ref Range Status   10/20/2023 neg  Final     Oxycodone Screen, Ur   Date Value Ref Range Status   10/20/2023 neg  Final     Propoxyphene Screen, Urine   Date Value Ref Range Status   10/20/2023 neg  Final     THC Screen, Urine   Date Value Ref Range Status   10/20/2023 neg  Final     Tricyclic Antidepressants, Urine   Date Value Ref Range Status   10/20/2023 neg  Final           Requested Prescriptions     Pending Prescriptions Disp Refills    oxyCODONE-acetaminophen (PERCOCET)  MG per tablet 120 tablet 0     Sig: Take 1 tablet by mouth every 6 hours as needed for Pain for up to 30 days. Intended supply: 30 days Max Daily Amount: 4 tablets         Please approve or refuse this medication.   Clarissa Durbin MA

## 2024-06-12 PROBLEM — Z51.89 VISIT FOR WOUND CHECK: Status: ACTIVE | Noted: 2024-06-12

## 2024-06-20 ENCOUNTER — OFFICE VISIT (OUTPATIENT)
Dept: CARDIAC SURGERY | Facility: CLINIC | Age: 64
End: 2024-06-20
Payer: MEDICARE

## 2024-06-20 VITALS
HEART RATE: 57 BPM | HEIGHT: 66 IN | BODY MASS INDEX: 29.92 KG/M2 | OXYGEN SATURATION: 96 % | DIASTOLIC BLOOD PRESSURE: 78 MMHG | WEIGHT: 186.2 LBS | SYSTOLIC BLOOD PRESSURE: 120 MMHG

## 2024-06-20 DIAGNOSIS — I21.4 NON-STEMI (NON-ST ELEVATED MYOCARDIAL INFARCTION): Primary | ICD-10-CM

## 2024-06-20 DIAGNOSIS — I21.4 NSTEMI (NON-ST ELEVATED MYOCARDIAL INFARCTION): Primary | ICD-10-CM

## 2024-06-20 PROCEDURE — 3078F DIAST BP <80 MM HG: CPT | Performed by: SURGERY

## 2024-06-20 PROCEDURE — 99024 POSTOP FOLLOW-UP VISIT: CPT | Performed by: SURGERY

## 2024-06-20 PROCEDURE — 3074F SYST BP LT 130 MM HG: CPT | Performed by: SURGERY

## 2024-06-20 NOTE — PROGRESS NOTES
[facility] Cardiothoracic Surgery  PROGRESS NOTE     Procedure Performed: CABG x4 (LIMA to LAD, JONATHON to RCA, SVG to Diagonal Artery, SVG to OM)   Date of Procedure:  5/1/2024      History of Present Illness  The patient presents for postoperative follow-up. She is accompanied by her daughter.    The patient reports a general sense of well-being, however, she has been experiencing recent illness, which she attributes to the incorrect dosage of Ozempic. This illness has resulted in vomiting and chest pain. She intends to resume her Victoza regimen. Her preoperative chest pain has subsided, and she reports significant improvement in her leg condition. Despite attempts to wear her bra, she continues to experience discomfort. She has abstained from smoking and has resumed driving.       Objective:  There were no vitals filed for this visit.           PE:  Vitals:    06/20/24 1106   BP: 120/78   Pulse: 57   SpO2: 96%     GENERAL: NAD, resting comfortably, normal palor  CARDIOVASCULAR: regular, regular rate, sinus, stable sternum, mid sternal clean wound ~1cm in length with healthy granulation  PULMONARY: Normal bilateral breath sounds, no labored breathing  ABDOMEN: soft, nt/nd  EXTREMITIES: mild peripheral edema, normal pulses, normal ROM    Physical Exam                 Lab Results (last 72 hours)      ** No results found for the last 72 hours. **         Results         Assessment/Plan         Assessment & Plan    Ms. Parson is a 64-year-old female who now 6 weeks status post four-vessel bypass for NSTEMI.  She has done well after surgery no recurrent anginal type symptoms is doing the things she wants to without problem.  She has not been very compliant with her sternal precautions per instructions or wearing overall and with this had a small superior incision wound, this has been managed with wet-to-dry dressing and is healing appropriately no signs of infection and is very superficial.    We  discussed return to normal activity on progressive basis she understands and agrees she is already been driving.  She did return to activity sooner than was recommended.  She should continue wet-to-dry dressings and will have follow-up with us to monitor this.  She has an appointment with Dr. Valencia's office next month.  I would prefer she remain on DAPT for 6 months postop then okay to transition to aspirin only when okay with Dr. Valencia's office.    Will make referral to [facility] cardiac rehab.  Thank you for trusting the care of Ms. Parson.  Please do not hesitate to call questions or concerns.       Himanshu Lutz MD   Cardiothoracic Surgeon      Patient or patient representative verbalized consent for the use of Ambient Listening during the visit with  Himanshu Lutz MD for chart documentation. 6/20/2024  11:30 CDT

## 2024-06-20 NOTE — LETTER
June 20, 2024       No Recipients    Patient: Milagros Parson   YOB: 1960   Date of Visit: 6/20/2024       Dear Austyn Marsh MD,    Milagros Parson was in my office today. Below are the relevant portions of my assessment and plan of care.    Ms. Parson is a 64-year-old female who now 6 weeks status post four-vessel bypass for NSTEMI.  She has done well after surgery no recurrent anginal type symptoms is doing the things she wants to without problem.  She has not been very compliant with her sternal precautions per instructions or wearing overall and with this had a small superior incision wound, this has been managed with wet-to-dry dressing and is healing appropriately no signs of infection and is very superficial.    We discussed return to normal activity on progressive basis she understands and agrees she is already been driving.  She did return to activity sooner than was recommended.  She should continue wet-to-dry dressings and will have follow-up with us to monitor this.  She has an appointment with Dr. Valencia's office next month.  I would prefer she remain on DAPT for 6 months postop then okay to transition to aspirin only when okay with Dr. Valencia's office.    Will make referral to [facility] cardiac rehab.  Thank you for trusting the care of Ms. Parson.  Please do not hesitate to call questions or concerns.         Sincerely,        Himanshu Lutz MD        CC:   No Recipients

## 2024-07-03 ENCOUNTER — TELEPHONE (OUTPATIENT)
Dept: NEUROSURGERY | Facility: CLINIC | Age: 64
End: 2024-07-03
Payer: MEDICARE

## 2024-07-03 NOTE — TELEPHONE ENCOUNTER
Called to confirm patient's appt with Dr Fischer on Thursday 7.11.24 @ 3:15 pm  - no answer so left a message to call me back      kyler guadarrama CMA      IT IS OKAY FOR THE HUB TO DELIVER THIS INFORMATION TO THE PATIENT IF THEY RECEIVE THIS CALL BACK

## 2024-07-11 ENCOUNTER — OFFICE VISIT (OUTPATIENT)
Dept: PRIMARY CARE CLINIC | Age: 64
End: 2024-07-11
Payer: MEDICARE

## 2024-07-11 VITALS
OXYGEN SATURATION: 97 % | BODY MASS INDEX: 30.05 KG/M2 | WEIGHT: 187 LBS | HEIGHT: 66 IN | HEART RATE: 60 BPM | DIASTOLIC BLOOD PRESSURE: 70 MMHG | SYSTOLIC BLOOD PRESSURE: 110 MMHG

## 2024-07-11 DIAGNOSIS — I10 PRIMARY HYPERTENSION: ICD-10-CM

## 2024-07-11 DIAGNOSIS — M54.16 LUMBAR RADICULOPATHY: ICD-10-CM

## 2024-07-11 DIAGNOSIS — E03.9 ACQUIRED HYPOTHYROIDISM: ICD-10-CM

## 2024-07-11 DIAGNOSIS — J30.2 SEASONAL ALLERGIES: ICD-10-CM

## 2024-07-11 DIAGNOSIS — M25.561 CHRONIC PAIN OF RIGHT KNEE: ICD-10-CM

## 2024-07-11 DIAGNOSIS — G89.29 CHRONIC PAIN OF RIGHT KNEE: ICD-10-CM

## 2024-07-11 DIAGNOSIS — D50.8 OTHER IRON DEFICIENCY ANEMIA: ICD-10-CM

## 2024-07-11 DIAGNOSIS — F41.1 GENERALIZED ANXIETY DISORDER WITH PANIC ATTACKS: Primary | ICD-10-CM

## 2024-07-11 DIAGNOSIS — F41.0 GENERALIZED ANXIETY DISORDER WITH PANIC ATTACKS: Primary | ICD-10-CM

## 2024-07-11 DIAGNOSIS — E11.21 TYPE 2 DIABETES MELLITUS WITH DIABETIC NEPHROPATHY, WITHOUT LONG-TERM CURRENT USE OF INSULIN (HCC): ICD-10-CM

## 2024-07-11 DIAGNOSIS — K21.9 GASTROESOPHAGEAL REFLUX DISEASE WITHOUT ESOPHAGITIS: ICD-10-CM

## 2024-07-11 PROBLEM — Z51.89 VISIT FOR WOUND CHECK: Status: ACTIVE | Noted: 2024-05-28

## 2024-07-11 LAB
ALBUMIN SERPL-MCNC: 4.2 G/DL (ref 3.5–5.2)
ALP SERPL-CCNC: 81 U/L (ref 35–104)
ALT SERPL-CCNC: 11 U/L (ref 5–33)
ANION GAP SERPL CALCULATED.3IONS-SCNC: 8 MMOL/L (ref 7–19)
AST SERPL-CCNC: 13 U/L (ref 5–32)
BASOPHILS # BLD: 0.1 K/UL (ref 0–0.2)
BASOPHILS NFR BLD: 1.8 % (ref 0–1)
BILIRUB DIRECT SERPL-MCNC: 0.1 MG/DL (ref 0–0.3)
BILIRUB INDIRECT SERPL-MCNC: 0.1 MG/DL (ref 0.1–1)
BILIRUB SERPL-MCNC: 0.2 MG/DL (ref 0.2–1.2)
BUN SERPL-MCNC: 18 MG/DL (ref 8–23)
CALCIUM SERPL-MCNC: 9.6 MG/DL (ref 8.8–10.2)
CHLORIDE SERPL-SCNC: 102 MMOL/L (ref 98–111)
CO2 SERPL-SCNC: 28 MMOL/L (ref 22–29)
CREAT SERPL-MCNC: 1.8 MG/DL (ref 0.5–0.9)
EOSINOPHIL # BLD: 0.2 K/UL (ref 0–0.6)
EOSINOPHIL NFR BLD: 3.4 % (ref 0–5)
ERYTHROCYTE [DISTWIDTH] IN BLOOD BY AUTOMATED COUNT: 13.2 % (ref 11.5–14.5)
GLUCOSE SERPL-MCNC: 123 MG/DL (ref 74–109)
HBA1C MFR BLD: 6.1 % (ref 4–6)
HCT VFR BLD AUTO: 41.9 % (ref 37–47)
HGB BLD-MCNC: 12.6 G/DL (ref 12–16)
IMM GRANULOCYTES # BLD: 0 K/UL
IRON SATN MFR SERPL: 19 % (ref 14–50)
IRON SERPL-MCNC: 68 UG/DL (ref 37–145)
LYMPHOCYTES # BLD: 2.3 K/UL (ref 1.1–4.5)
LYMPHOCYTES NFR BLD: 40.4 % (ref 20–40)
MCH RBC QN AUTO: 28.6 PG (ref 27–31)
MCHC RBC AUTO-ENTMCNC: 30.1 G/DL (ref 33–37)
MCV RBC AUTO: 95.2 FL (ref 81–99)
MONOCYTES # BLD: 0.5 K/UL (ref 0–0.9)
MONOCYTES NFR BLD: 9.6 % (ref 0–10)
NEUTROPHILS # BLD: 2.5 K/UL (ref 1.5–7.5)
NEUTS SEG NFR BLD: 44.6 % (ref 50–65)
PLATELET # BLD AUTO: 191 K/UL (ref 130–400)
PMV BLD AUTO: 12.4 FL (ref 9.4–12.3)
POTASSIUM SERPL-SCNC: 5.1 MMOL/L (ref 3.5–5)
PROT SERPL-MCNC: 7.3 G/DL (ref 6.6–8.7)
RBC # BLD AUTO: 4.4 M/UL (ref 4.2–5.4)
SODIUM SERPL-SCNC: 138 MMOL/L (ref 136–145)
TIBC SERPL-MCNC: 357 UG/DL (ref 250–400)
TSH SERPL DL<=0.005 MIU/L-ACNC: 56.52 UIU/ML (ref 0.27–4.2)
WBC # BLD AUTO: 5.6 K/UL (ref 4.8–10.8)

## 2024-07-11 PROCEDURE — G8417 CALC BMI ABV UP PARAM F/U: HCPCS | Performed by: FAMILY MEDICINE

## 2024-07-11 PROCEDURE — 1123F ACP DISCUSS/DSCN MKR DOCD: CPT | Performed by: FAMILY MEDICINE

## 2024-07-11 PROCEDURE — 4004F PT TOBACCO SCREEN RCVD TLK: CPT | Performed by: FAMILY MEDICINE

## 2024-07-11 PROCEDURE — 99214 OFFICE O/P EST MOD 30 MIN: CPT | Performed by: FAMILY MEDICINE

## 2024-07-11 PROCEDURE — G8400 PT W/DXA NO RESULTS DOC: HCPCS | Performed by: FAMILY MEDICINE

## 2024-07-11 PROCEDURE — 3046F HEMOGLOBIN A1C LEVEL >9.0%: CPT | Performed by: FAMILY MEDICINE

## 2024-07-11 PROCEDURE — 3017F COLORECTAL CA SCREEN DOC REV: CPT | Performed by: FAMILY MEDICINE

## 2024-07-11 PROCEDURE — G8427 DOCREV CUR MEDS BY ELIG CLIN: HCPCS | Performed by: FAMILY MEDICINE

## 2024-07-11 PROCEDURE — 1090F PRES/ABSN URINE INCON ASSESS: CPT | Performed by: FAMILY MEDICINE

## 2024-07-11 PROCEDURE — 3078F DIAST BP <80 MM HG: CPT | Performed by: FAMILY MEDICINE

## 2024-07-11 PROCEDURE — 2022F DILAT RTA XM EVC RTNOPTHY: CPT | Performed by: FAMILY MEDICINE

## 2024-07-11 PROCEDURE — 3074F SYST BP LT 130 MM HG: CPT | Performed by: FAMILY MEDICINE

## 2024-07-11 RX ORDER — MELOXICAM 15 MG/1
15 TABLET ORAL DAILY
COMMUNITY
Start: 2024-06-19

## 2024-07-11 RX ORDER — AZELASTINE 1 MG/ML
1 SPRAY, METERED NASAL 2 TIMES DAILY
Qty: 30 ML | Refills: 1 | Status: SHIPPED | OUTPATIENT
Start: 2024-07-11

## 2024-07-11 RX ORDER — OXYCODONE AND ACETAMINOPHEN 10; 325 MG/1; MG/1
1 TABLET ORAL EVERY 6 HOURS PRN
Qty: 120 TABLET | Refills: 0 | Status: SHIPPED | OUTPATIENT
Start: 2024-07-11 | End: 2024-08-10

## 2024-07-11 RX ORDER — ALPRAZOLAM 1 MG/1
TABLET ORAL
Qty: 120 TABLET | Refills: 2 | Status: SHIPPED | OUTPATIENT
Start: 2024-07-11 | End: 2024-09-15

## 2024-07-11 RX ORDER — LORATADINE 10 MG/1
10 TABLET ORAL NIGHTLY
Qty: 30 TABLET | Refills: 2 | Status: SHIPPED | OUTPATIENT
Start: 2024-07-11

## 2024-07-11 RX ORDER — GABAPENTIN 300 MG/1
CAPSULE ORAL
Qty: 120 CAPSULE | Refills: 2 | Status: SHIPPED | OUTPATIENT
Start: 2024-07-11 | End: 2024-12-26

## 2024-07-11 ASSESSMENT — ENCOUNTER SYMPTOMS
DIARRHEA: 0
ABDOMINAL PAIN: 0
TROUBLE SWALLOWING: 0
COUGH: 0
BACK PAIN: 1
CONSTIPATION: 0
SHORTNESS OF BREATH: 0
NAUSEA: 0
VOMITING: 0
WHEEZING: 0

## 2024-07-11 NOTE — PROGRESS NOTES
(NOVOLOG FLEXPEN) 100 UNIT/ML injection pen Inject 10 Units into the skin 3 times daily (before meals) 5 Adjustable Dose Pre-filled Pen Syringe 3    cyclobenzaprine (FLEXERIL) 10 MG tablet Take 1 tablet by mouth 3 times daily as needed for Muscle spasms 30 tablet 5    cariprazine hcl (VRAYLAR) 1.5 MG capsule Take 1 capsule by mouth daily (Patient not taking: Reported on 5/10/2024) 30 capsule 1     No current facility-administered medications for this visit.     Allergies   Allergen Reactions    Penicillins Anaphylaxis and Rash     Reaction: rash    Other Reaction(s): ENCOUNTERDATE:2/16/2018    Sulfamethoxazole-Trimethoprim Anaphylaxis and Rash    Ibuprofen      Other Reaction(s): ENCOUNTERDATE:2/16/2018    Sulfa Antibiotics      Reaction: GI upset    Codeine Nausea And Vomiting    Morphine Itching       Review of Systems   Constitutional:  Positive for fatigue. Negative for chills and fever.   HENT:  Negative for hearing loss and trouble swallowing.    Eyes:  Negative for visual disturbance.   Respiratory:  Negative for cough, shortness of breath and wheezing.    Cardiovascular:  Positive for chest pain. Negative for palpitations.   Gastrointestinal:  Negative for abdominal pain, constipation, diarrhea, nausea and vomiting.   Musculoskeletal:  Positive for arthralgias, back pain and myalgias.   Skin:  Negative for rash and wound.   Neurological:  Negative for dizziness, seizures and syncope.   Psychiatric/Behavioral:  Positive for dysphoric mood and sleep disturbance. The patient is nervous/anxious.          Objective:   /70   Pulse 60   Ht 1.676 m (5' 5.98\")   Wt 84.8 kg (187 lb)   SpO2 97%   BMI 30.20 kg/m²   Physical Exam  Vitals and nursing note reviewed.   Constitutional:       General: She is not in acute distress.     Appearance: Normal appearance. She is obese. She is not ill-appearing.   HENT:      Head: Normocephalic and atraumatic.   Cardiovascular:      Rate and Rhythm: Normal rate and regular

## 2024-07-12 ENCOUNTER — TELEPHONE (OUTPATIENT)
Dept: PRIMARY CARE CLINIC | Age: 64
End: 2024-07-12

## 2024-07-12 DIAGNOSIS — E03.9 ACQUIRED HYPOTHYROIDISM: Primary | ICD-10-CM

## 2024-07-12 DIAGNOSIS — R79.89 ELEVATED SERUM CREATININE: Primary | ICD-10-CM

## 2024-07-12 RX ORDER — LEVOTHYROXINE SODIUM 0.15 MG/1
150 TABLET ORAL DAILY
Qty: 90 TABLET | Refills: 1 | Status: SHIPPED | OUTPATIENT
Start: 2024-07-12

## 2024-07-12 NOTE — TELEPHONE ENCOUNTER
----- Message from Heriberto Almanza MD sent at 7/11/2024  6:28 PM CDT -----  - Her TSH was elevated. We need to increase the levothyroxine dose from 125 mcg to 150 mcg daily. PTM.   - HbA1c is at goal of 6.1.  - Her creatinine is elevated at 1.8 with a decrease GFR of 31. This has increased from her last check in the hospital. This was 1.4. This is likely from dehydration. She needs to increase her fluid intake. I recommend that we recheck this in one week. PTM.   - CBC, Hepatic function panel, Iron, are wnl.

## 2024-07-15 ENCOUNTER — TELEPHONE (OUTPATIENT)
Dept: CARDIAC SURGERY | Facility: CLINIC | Age: 64
End: 2024-07-15

## 2024-07-15 NOTE — TELEPHONE ENCOUNTER
Called to check on patient.  Per Dr. Lutz's instruction, she was to follow-up with me for wound check if sternal incision was not completely healed by this time.  Patient states that incision is now completely closed and healed.  No drainage.  No erythema or warmth.  She will try to upload a photo to my chart if she is able to do this.  Otherwise I have advised to her to call me if she has any further issues with sternal incision.  She verbalizes understanding and is agreeable.  Please cancel appointment with me today.

## 2024-07-17 ENCOUNTER — TELEPHONE (OUTPATIENT)
Dept: CARDIOLOGY | Facility: CLINIC | Age: 64
End: 2024-07-17
Payer: MEDICARE

## 2024-07-17 ENCOUNTER — APPOINTMENT (OUTPATIENT)
Dept: GENERAL RADIOLOGY | Facility: HOSPITAL | Age: 64
End: 2024-07-17
Payer: MEDICARE

## 2024-07-17 ENCOUNTER — HOSPITAL ENCOUNTER (EMERGENCY)
Facility: HOSPITAL | Age: 64
Discharge: HOME | End: 2024-07-18
Attending: EMERGENCY MEDICINE
Payer: MEDICARE

## 2024-07-17 DIAGNOSIS — R00.1 BRADYCARDIA: ICD-10-CM

## 2024-07-17 DIAGNOSIS — R53.1 GENERALIZED WEAKNESS: Primary | ICD-10-CM

## 2024-07-17 LAB
ALBUMIN SERPL-MCNC: 4.2 G/DL (ref 3.5–5.2)
ALBUMIN/GLOB SERPL: 1.2 G/DL
ALP SERPL-CCNC: 80 U/L (ref 39–117)
ALT SERPL W P-5'-P-CCNC: 11 U/L (ref 1–33)
ANION GAP SERPL CALCULATED.3IONS-SCNC: 9 MMOL/L (ref 5–15)
AST SERPL-CCNC: 13 U/L (ref 1–32)
B PARAPERT DNA SPEC QL NAA+PROBE: NOT DETECTED
B PERT DNA SPEC QL NAA+PROBE: NOT DETECTED
BASOPHILS # BLD AUTO: 0.07 10*3/MM3 (ref 0–0.2)
BASOPHILS NFR BLD AUTO: 1.1 % (ref 0–1.5)
BILIRUB SERPL-MCNC: 0.3 MG/DL (ref 0–1.2)
BUN SERPL-MCNC: 21 MG/DL (ref 8–23)
BUN/CREAT SERPL: 12.9 (ref 7–25)
C PNEUM DNA NPH QL NAA+NON-PROBE: NOT DETECTED
CALCIUM SPEC-SCNC: 9.6 MG/DL (ref 8.6–10.5)
CHLORIDE SERPL-SCNC: 102 MMOL/L (ref 98–107)
CO2 SERPL-SCNC: 28 MMOL/L (ref 22–29)
CREAT SERPL-MCNC: 1.63 MG/DL (ref 0.57–1)
DEPRECATED RDW RBC AUTO: 45.1 FL (ref 37–54)
EGFRCR SERPLBLD CKD-EPI 2021: 34.6 ML/MIN/1.73
EOSINOPHIL # BLD AUTO: 0.15 10*3/MM3 (ref 0–0.4)
EOSINOPHIL NFR BLD AUTO: 2.5 % (ref 0.3–6.2)
ERYTHROCYTE [DISTWIDTH] IN BLOOD BY AUTOMATED COUNT: 13.3 % (ref 12.3–15.4)
FLUAV SUBTYP SPEC NAA+PROBE: NOT DETECTED
FLUBV RNA ISLT QL NAA+PROBE: NOT DETECTED
GLOBULIN UR ELPH-MCNC: 3.6 GM/DL
GLUCOSE SERPL-MCNC: 133 MG/DL (ref 65–99)
HADV DNA SPEC NAA+PROBE: NOT DETECTED
HCOV 229E RNA SPEC QL NAA+PROBE: NOT DETECTED
HCOV HKU1 RNA SPEC QL NAA+PROBE: NOT DETECTED
HCOV NL63 RNA SPEC QL NAA+PROBE: NOT DETECTED
HCOV OC43 RNA SPEC QL NAA+PROBE: NOT DETECTED
HCT VFR BLD AUTO: 40.4 % (ref 34–46.6)
HGB BLD-MCNC: 12.6 G/DL (ref 12–15.9)
HMPV RNA NPH QL NAA+NON-PROBE: NOT DETECTED
HOLD SPECIMEN: NORMAL
HPIV1 RNA ISLT QL NAA+PROBE: NOT DETECTED
HPIV2 RNA SPEC QL NAA+PROBE: NOT DETECTED
HPIV3 RNA NPH QL NAA+PROBE: NOT DETECTED
HPIV4 P GENE NPH QL NAA+PROBE: NOT DETECTED
IMM GRANULOCYTES # BLD AUTO: 0.01 10*3/MM3 (ref 0–0.05)
IMM GRANULOCYTES NFR BLD AUTO: 0.2 % (ref 0–0.5)
INR PPP: 0.96 (ref 0.91–1.09)
LYMPHOCYTES # BLD AUTO: 2.21 10*3/MM3 (ref 0.7–3.1)
LYMPHOCYTES NFR BLD AUTO: 36.1 % (ref 19.6–45.3)
M PNEUMO IGG SER IA-ACNC: NOT DETECTED
MAGNESIUM SERPL-MCNC: 1.9 MG/DL (ref 1.6–2.4)
MCH RBC QN AUTO: 28.4 PG (ref 26.6–33)
MCHC RBC AUTO-ENTMCNC: 31.2 G/DL (ref 31.5–35.7)
MCV RBC AUTO: 91 FL (ref 79–97)
MONOCYTES # BLD AUTO: 0.41 10*3/MM3 (ref 0.1–0.9)
MONOCYTES NFR BLD AUTO: 6.7 % (ref 5–12)
NEUTROPHILS NFR BLD AUTO: 3.27 10*3/MM3 (ref 1.7–7)
NEUTROPHILS NFR BLD AUTO: 53.4 % (ref 42.7–76)
NRBC BLD AUTO-RTO: 0 /100 WBC (ref 0–0.2)
NT-PROBNP SERPL-MCNC: 288.9 PG/ML (ref 0–900)
PLATELET # BLD AUTO: 202 10*3/MM3 (ref 140–450)
PMV BLD AUTO: 12.4 FL (ref 6–12)
POTASSIUM SERPL-SCNC: 4.6 MMOL/L (ref 3.5–5.2)
PROT SERPL-MCNC: 7.8 G/DL (ref 6–8.5)
PROTHROMBIN TIME: 13.2 SECONDS (ref 11.8–14.8)
RBC # BLD AUTO: 4.44 10*6/MM3 (ref 3.77–5.28)
RHINOVIRUS RNA SPEC NAA+PROBE: NOT DETECTED
RSV RNA NPH QL NAA+NON-PROBE: NOT DETECTED
SARS-COV-2 RNA NPH QL NAA+NON-PROBE: NOT DETECTED
SODIUM SERPL-SCNC: 139 MMOL/L (ref 136–145)
TROPONIN T SERPL HS-MCNC: 8 NG/L
WBC NRBC COR # BLD AUTO: 6.12 10*3/MM3 (ref 3.4–10.8)
WHOLE BLOOD HOLD COAG: NORMAL
WHOLE BLOOD HOLD SPECIMEN: NORMAL

## 2024-07-17 PROCEDURE — 84484 ASSAY OF TROPONIN QUANT: CPT | Performed by: EMERGENCY MEDICINE

## 2024-07-17 PROCEDURE — 83735 ASSAY OF MAGNESIUM: CPT | Performed by: EMERGENCY MEDICINE

## 2024-07-17 PROCEDURE — 80053 COMPREHEN METABOLIC PANEL: CPT | Performed by: EMERGENCY MEDICINE

## 2024-07-17 PROCEDURE — 71045 X-RAY EXAM CHEST 1 VIEW: CPT

## 2024-07-17 PROCEDURE — 99284 EMERGENCY DEPT VISIT MOD MDM: CPT

## 2024-07-17 PROCEDURE — 0202U NFCT DS 22 TRGT SARS-COV-2: CPT | Performed by: EMERGENCY MEDICINE

## 2024-07-17 PROCEDURE — 85610 PROTHROMBIN TIME: CPT | Performed by: EMERGENCY MEDICINE

## 2024-07-17 PROCEDURE — 36415 COLL VENOUS BLD VENIPUNCTURE: CPT

## 2024-07-17 PROCEDURE — 93005 ELECTROCARDIOGRAM TRACING: CPT

## 2024-07-17 PROCEDURE — 85025 COMPLETE CBC W/AUTO DIFF WBC: CPT | Performed by: EMERGENCY MEDICINE

## 2024-07-17 PROCEDURE — 83880 ASSAY OF NATRIURETIC PEPTIDE: CPT | Performed by: EMERGENCY MEDICINE

## 2024-07-17 RX ORDER — SODIUM CHLORIDE 0.9 % (FLUSH) 0.9 %
10 SYRINGE (ML) INJECTION AS NEEDED
Status: DISCONTINUED | OUTPATIENT
Start: 2024-07-17 | End: 2024-07-18 | Stop reason: HOSPADM

## 2024-07-17 NOTE — TELEPHONE ENCOUNTER
JUST ELISE...    PATIENTS DAUGHTER, APRIL, CALLED TO LET US KNOW THAT HER MOTHER HAS DECLINED SIGNIFICANTLY OVER THE LAST TWO DAYS. SHE HAS BECOME VERY WEAK, SOA & HR HAS REMAINED IN THE LOW 40s. SHE IS VERY CONCERNED.    PT IS S/P CABG 5/1/2024  & REPORTS NO SIGNS OF INFECTION WITH STERNAL INCISION.    ADVISED SHE TAKE HER TO THE ER FOR FURTHER EVALUATION

## 2024-07-18 VITALS
HEIGHT: 66 IN | DIASTOLIC BLOOD PRESSURE: 78 MMHG | SYSTOLIC BLOOD PRESSURE: 144 MMHG | TEMPERATURE: 98 F | HEART RATE: 60 BPM | RESPIRATION RATE: 20 BRPM | WEIGHT: 186.29 LBS | BODY MASS INDEX: 29.94 KG/M2 | OXYGEN SATURATION: 98 %

## 2024-07-18 LAB
BACTERIA UR QL AUTO: ABNORMAL /HPF
BILIRUB UR QL STRIP: NEGATIVE
CLARITY UR: CLEAR
COLOR UR: YELLOW
GEN 5 2HR TROPONIN T REFLEX: 9 NG/L
GLUCOSE UR STRIP-MCNC: NEGATIVE MG/DL
HGB UR QL STRIP.AUTO: NEGATIVE
HYALINE CASTS UR QL AUTO: ABNORMAL /LPF
KETONES UR QL STRIP: NEGATIVE
LEUKOCYTE ESTERASE UR QL STRIP.AUTO: ABNORMAL
NITRITE UR QL STRIP: NEGATIVE
PH UR STRIP.AUTO: <=5 [PH] (ref 5–8)
PROT UR QL STRIP: NEGATIVE
QT INTERVAL: 414 MS
QTC INTERVAL: 396 MS
RBC # UR STRIP: ABNORMAL /HPF
REF LAB TEST METHOD: ABNORMAL
SP GR UR STRIP: 1.01 (ref 1–1.03)
SQUAMOUS #/AREA URNS HPF: ABNORMAL /HPF
TROPONIN T DELTA: 1 NG/L
UROBILINOGEN UR QL STRIP: ABNORMAL
WBC # UR STRIP: ABNORMAL /HPF

## 2024-07-18 PROCEDURE — 81001 URINALYSIS AUTO W/SCOPE: CPT | Performed by: EMERGENCY MEDICINE

## 2024-07-18 PROCEDURE — 84484 ASSAY OF TROPONIN QUANT: CPT | Performed by: EMERGENCY MEDICINE

## 2024-07-18 NOTE — ED PROVIDER NOTES
"Subjective   History of Present Illness  64-year-old female presents to the ED with complaint of generalized weakness fatigue, low heart rate.  She has history of hypertension, hyperlipidemia, diabetes, coronary disease status post recent CABG 5/2024.  She has not started cardiac rehab yet.  Patient states she has had 3-day history of generalized weakness fatigue, has been \"lying in bed\" not doing much activity.  She reports feeling short of breath with minimal exertion.  She follow-up with a primary doctor last week and was told her kidney function was a little abnormal.  She is not having any chest pain, abdominal pain, nausea, vomiting, diarrhea or constipation.  Her symptoms are moderate severity with no aggravating alleviating factors.  Checked her heart rate at home earlier today and it was in the 40s.  She contacted her daughter who recommended come to the ED for evaluation.  Heart rate on arrival to the emergency department 58.    History provided by:  Patient      Review of Systems   All other systems reviewed and are negative.      Past Medical History:   Diagnosis Date    Arthritis     Atrial septal defect, secundum     Chest pain 11/17/2016    CKD (chronic kidney disease), stage II 01/25/2018    Diabetes mellitus     DIABETES , UNCOMPLICATED, TYPE II     Diabetic polyneuropathy associated with type 2 diabetes mellitus 01/25/2018    Disease of thyroid gland     GERD (gastroesophageal reflux disease)     HLD (hyperlipidemia) 11/17/2016    HTN (hypertension) 11/17/2016    Hyperlipidemia, mixed     Hypertension associated with diabetes 01/25/2018    Hypertension, benign     Low back pain     Melanoma of lower leg, right 01/25/2018    Mixed diabetic hyperlipidemia associated with type 2 diabetes mellitus 01/25/2018    Nonspecific chest pain     Proliferative diabetic retinopathy of both eyes associated with type 2 diabetes mellitus 01/25/2018    Stroke 2015    SHORT-TERM MEMORY LOSS, L FACIAL WEAKNESS    Type " 2 diabetes mellitus with hyperglycemia, with long-term current use of insulin 01/25/2018       Allergies   Allergen Reactions    Bactrim [Sulfamethoxazole-Trimethoprim] Anaphylaxis and Rash    Penicillins Anaphylaxis and Rash     Reaction: rash  Reaction: rash      Morphine Itching    Sulfa Antibiotics GI Intolerance     Reaction: GI upset  Reaction: GI upset       Past Surgical History:   Procedure Laterality Date    ANTERIOR LUMBAR EXPOSURE N/A 9/23/2022    Procedure: ANTERIOR LUMBAR EXPOSURE;  Surgeon: Aubrey Flor DO;  Location:  [facility] OR;  Service: Vascular;  Laterality: N/A;    ATRIAL SEPTAL DEFECT REPAIR      CARDIAC CATHETERIZATION N/A 4/24/2024    Procedure: Left Heart Cath;  Surgeon: Marcello Valencia MD;  Location:  [facility] CATH INVASIVE LOCATION;  Service: Cardiology;  Laterality: N/A;    CARPAL TUNNEL RELEASE      CORONARY ARTERY BYPASS GRAFT N/A 5/1/2024    Procedure: CORONARY ARTERY BYPASS GRAFTING X4, BILATERAL INTERNAL MAMMARY ARTERY GRAFT, LEFT ENDOSCOPIC VEIN HARVEST, TRANSESOPHAGEAL ECHOCARDIOGRAM;  Surgeon: Himanshu Lutz MD;  Location:  [facility] OR;  Service: Cardiothoracic;  Laterality: N/A;    FEMORAL LYMPH NODE BIOPSY/EXCISON      FINGER SURGERY      HYSTERECTOMY      LUMBAR LAMINECTOMY ANTERIOR LUMBAR INTERBODY FUSION Right 9/23/2022    Procedure: LUMBAR DISCECTOMY,ANTERIOR LUMBAR INTERBODY FUSION RIGHT LEISA- LAMINECTOMY, FACETECTOMY, FORAMENOTOMY, POSTEIOR PEDICLE SCREW INSTRUMENTATION L5-S1 RIGHT ROBOT.;  Surgeon: Guanako Fischer MD;  Location:  [facility] OR;  Service: Neurosurgery;  Laterality: Right;    LUMBAR LAMINECTOMY WITH FUSION Right 9/23/2022    Procedure: POSTEIOR PEDICLE SCREW INSTRUMENTATION L5-S1;  Surgeon: Guanako Fischer MD;  Location:  [facility] OR;  Service: Neurosurgery;  Laterality: Right;    OTHER SURGICAL HISTORY      melanoma removed right buttock       Family History   Problem Relation Age of Onset    Heart disease Father        Social History     Socioeconomic History     Marital status:    Tobacco Use    Smoking status: Former     Current packs/day: 0.00     Average packs/day: 1 pack/day for 36.3 years (36.3 ttl pk-yrs)     Types: Cigarettes     Start date:      Quit date: 2024     Years since quittin.2     Passive exposure: Past    Smokeless tobacco: Never   Vaping Use    Vaping status: Former   Substance and Sexual Activity    Alcohol use: Not Currently     Comment: Occ    Drug use: No    Sexual activity: Defer           Objective   Physical Exam  Vitals and nursing note reviewed.   Constitutional:       Appearance: Normal appearance. She is normal weight.   HENT:      Head: Normocephalic and atraumatic.      Nose: Nose normal. No congestion or rhinorrhea.   Cardiovascular:      Rate and Rhythm: Regular rhythm. Bradycardia present.      Heart sounds: Normal heart sounds. No murmur heard.  Pulmonary:      Effort: Pulmonary effort is normal.      Breath sounds: Normal breath sounds. No wheezing, rhonchi or rales.   Abdominal:      General: Abdomen is flat. Bowel sounds are normal.      Palpations: Abdomen is soft.   Musculoskeletal:      Cervical back: Normal range of motion and neck supple.      Right lower leg: No edema.      Left lower leg: No edema.   Skin:     General: Skin is warm and dry.   Neurological:      General: No focal deficit present.      Mental Status: She is alert and oriented to person, place, and time. Mental status is at baseline.         Procedures         Lab Results (last 24 hours)       Procedure Component Value Units Date/Time    CBC & Differential [[id number]]  (Abnormal) Collected: 24    Specimen: Blood Updated: 24    Narrative:      The following orders were created for panel order CBC & Differential.  Procedure                               Abnormality         Status                     ---------                               -----------         ------                     CBC Auto Differential[[id number]]         Abnormal            Final result                 Please view results for these tests on the individual orders.    Comprehensive Metabolic Panel [[id number]]  (Abnormal) Collected: 07/17/24 2159    Specimen: Blood Updated: 07/17/24 2238     Glucose 133 mg/dL      BUN 21 mg/dL      Creatinine 1.63 mg/dL      Sodium 139 mmol/L      Potassium 4.6 mmol/L      Chloride 102 mmol/L      CO2 28.0 mmol/L      Calcium 9.6 mg/dL      Total Protein 7.8 g/dL      Albumin 4.2 g/dL      ALT (SGPT) 11 U/L      AST (SGOT) 13 U/L      Alkaline Phosphatase 80 U/L      Total Bilirubin 0.3 mg/dL      Globulin 3.6 gm/dL      A/G Ratio 1.2 g/dL      BUN/Creatinine Ratio 12.9     Anion Gap 9.0 mmol/L      eGFR 34.6 mL/min/1.73     Narrative:      GFR Normal >60  Chronic Kidney Disease <60  Kidney Failure <15      Protime-INR [[id number]]  (Normal) Collected: 07/17/24 2159    Specimen: Blood Updated: 07/17/24 2231     Protime 13.2 Seconds      INR 0.96    Magnesium [[id number]]  (Normal) Collected: 07/17/24 2159    Specimen: Blood Updated: 07/17/24 2238     Magnesium 1.9 mg/dL     BNP [[id number]]  (Normal) Collected: 07/17/24 2159    Specimen: Blood Updated: 07/17/24 2236     proBNP 288.9 pg/mL     Narrative:      This assay is used as an aid in the diagnosis of individuals suspected of having heart failure. It can be used as an aid in the diagnosis of acute decompensated heart failure (ADHF) in patients presenting with signs and symptoms of ADHF to the emergency department (ED). In addition, NT-proBNP of <300 pg/mL indicates ADHF is not likely.    Age Range Result Interpretation  NT-proBNP Concentration (pg/mL:      <50             Positive            >450                   Gray                 300-450                    Negative             <300    50-75           Positive            >900                  Gray                300-900                  Negative            <300      >75             Positive            >1800                   Murrell                300-1800                  Negative            <300    High Sensitivity Troponin T [[id number]]  (Normal) Collected: 07/17/24 2159    Specimen: Blood Updated: 07/17/24 2235     HS Troponin T 8 ng/L     Narrative:      High Sensitive Troponin T Reference Range:  <14.0 ng/L- Negative Female for AMI  <22.0 ng/L- Negative Male for AMI  >=14 - Abnormal Female indicating possible myocardial injury.  >=22 - Abnormal Male indicating possible myocardial injury.   Clinicians would have to utilize clinical acumen, EKG, Troponin, and serial changes to determine if it is an Acute Myocardial Infarction or myocardial injury due to an underlying chronic condition.         CBC Auto Differential [[id number]]  (Abnormal) Collected: 07/17/24 2159    Specimen: Blood Updated: 07/17/24 2224     WBC 6.12 10*3/mm3      RBC 4.44 10*6/mm3      Hemoglobin 12.6 g/dL      Hematocrit 40.4 %      MCV 91.0 fL      MCH 28.4 pg      MCHC 31.2 g/dL      RDW 13.3 %      RDW-SD 45.1 fl      MPV 12.4 fL      Platelets 202 10*3/mm3      Neutrophil % 53.4 %      Lymphocyte % 36.1 %      Monocyte % 6.7 %      Eosinophil % 2.5 %      Basophil % 1.1 %      Immature Grans % 0.2 %      Neutrophils, Absolute 3.27 10*3/mm3      Lymphocytes, Absolute 2.21 10*3/mm3      Monocytes, Absolute 0.41 10*3/mm3      Eosinophils, Absolute 0.15 10*3/mm3      Basophils, Absolute 0.07 10*3/mm3      Immature Grans, Absolute 0.01 10*3/mm3      nRBC 0.0 /100 WBC     Respiratory Panel PCR w/COVID-19(SARS-CoV-2) [facility] In-House, NP Swab in UTM/VTM, 2 HR TAT - Swab, Nasopharynx [[id number]]  (Normal) Collected: 07/17/24 2245    Specimen: Swab from Nasopharynx Updated: 07/17/24 2342     ADENOVIRUS, PCR Not Detected     Coronavirus 229E Not Detected     Coronavirus HKU1 Not Detected     Coronavirus NL63 Not Detected     Coronavirus OC43 Not Detected     COVID19 Not Detected     Human Metapneumovirus Not Detected     Human Rhinovirus/Enterovirus Not  Detected     Influenza A PCR Not Detected     Influenza B PCR Not Detected     Parainfluenza Virus 1 Not Detected     Parainfluenza Virus 2 Not Detected     Parainfluenza Virus 3 Not Detected     Parainfluenza Virus 4 Not Detected     RSV, PCR Not Detected     Bordetella pertussis pcr Not Detected     Bordetella parapertussis PCR Not Detected     Chlamydophila pneumoniae PCR Not Detected     Mycoplasma pneumo by PCR Not Detected    Narrative:      In the setting of a positive respiratory panel with a viral infection PLUS a negative procalcitonin without other underlying concern for bacterial infection, consider observing off antibiotics or discontinuation of antibiotics and continue supportive care. If the respiratory panel is positive for atypical bacterial infection (Bordetella pertussis, Chlamydophila pneumoniae, or Mycoplasma pneumoniae), consider antibiotic de-escalation to target atypical bacterial infection.    Urinalysis With Culture If Indicated - Urine, Clean Catch [[id number]]  (Abnormal) Collected: 07/18/24 0010    Specimen: Urine, Clean Catch Updated: 07/18/24 0043     Color, UA Yellow     Appearance, UA Clear     pH, UA <=5.0     Specific Gravity, UA 1.013     Glucose, UA Negative     Ketones, UA Negative     Bilirubin, UA Negative     Blood, UA Negative     Protein, UA Negative     Leuk Esterase, UA Moderate (2+)     Nitrite, UA Negative     Urobilinogen, UA 0.2 E.U./dL    Narrative:      In absence of clinical symptoms, the presence of pyuria, bacteria, and/or nitrites on the urinalysis result does not correlate with infection.    High Sensitivity Troponin T 2Hr [[id number]]  (Normal) Collected: 07/18/24 0010    Specimen: Blood Updated: 07/18/24 0037     HS Troponin T 9 ng/L      Troponin T Delta 1 ng/L     Narrative:      High Sensitive Troponin T Reference Range:  <14.0 ng/L- Negative Female for AMI  <22.0 ng/L- Negative Male for AMI  >=14 - Abnormal Female indicating possible myocardial  injury.  >=22 - Abnormal Male indicating possible myocardial injury.   Clinicians would have to utilize clinical acumen, EKG, Troponin, and serial changes to determine if it is an Acute Myocardial Infarction or myocardial injury due to an underlying chronic condition.         Urinalysis, Microscopic Only - Urine, Clean Catch [[id number]]  (Abnormal) Collected: 07/18/24 0010    Specimen: Urine, Clean Catch Updated: 07/18/24 0043     RBC, UA 0-2 /HPF      WBC, UA 3-5 /HPF      Comment: Urine culture not indicated.        Bacteria, UA None Seen /HPF      Squamous Epithelial Cells, UA 0-2 /HPF      Hyaline Casts, UA None Seen /LPF      Methodology Manual Light Microscopy         No Radiology Exams Resulted Within Past 24 Hours   ED Course  ED Course as of 07/18/24 0109   Thu Jul 18, 2024   0106 64-year-old female with history of hypertension, hyperlipidemia, diabetes, coronary disease status post CABG 5/2024 and is yet to start cardiac rehab presents to the ED with several day history of worsening generalized weakness, fatigue, low heart rate.   Was told she had abnormal kidney function her last doctor appointment.  BUN/creatinine has improved compared to labs obtained last week.  Remains slightly elevated at baseline.  Creatinine 1.63.  Initial and repeat troponin 8 and 9 respectively, flat trajectory.  BNP normal at 288.  Chest x-ray clear, no evidence for pneumonia or pulmonary edema.  Does have chronic elevated left hemidiaphragm.  Labs otherwise reassuring.  Respiratory panel negative.  Patient was encouraged to remain active at home and to follow-up with her doctor regarding cardiac rehab.  Regarding her bradycardia, heart rate has been in the 50s to low 60s during ER stay.  Blood pressures been normal.  Patient reports heart rate in the 40s at home.  Recommend she keep a log of her heart rate and blood pressure, follow-up with her cardiologist as an outpatient. [AW]      ED Course User Index  [AW] Micah Lee  MD Fabienne                                             Medical Decision Making  Amount and/or Complexity of Data Reviewed  Labs: ordered.  Radiology: ordered.    Risk  Prescription drug management.        Final diagnoses:   Generalized weakness   Bradycardia       ED Disposition  ED Disposition       ED Disposition   Discharge    Condition   Stable    Comment   --               Austyn Marsh MD  [address] [place] [address] Dr Cortes [address] [place] [id number]  [phone]    Schedule an appointment as soon as possible for a visit   As needed         Medication List      No changes were made to your prescriptions during this visit.            Micah Lee MD  07/18/24 0109

## 2024-07-23 DIAGNOSIS — I63.50 CEREBROVASCULAR ACCIDENT (CVA) DUE TO OCCLUSION OF CEREBRAL ARTERY (HCC): ICD-10-CM

## 2024-07-23 DIAGNOSIS — I25.10 CORONARY ARTERY DISEASE INVOLVING NATIVE CORONARY ARTERY OF NATIVE HEART WITHOUT ANGINA PECTORIS: ICD-10-CM

## 2024-07-23 RX ORDER — CLOPIDOGREL BISULFATE 75 MG/1
TABLET ORAL
Qty: 30 TABLET | Refills: 4 | Status: SHIPPED | OUTPATIENT
Start: 2024-07-23

## 2024-07-29 ENCOUNTER — TELEPHONE (OUTPATIENT)
Dept: PRIMARY CARE CLINIC | Age: 64
End: 2024-07-29

## 2024-07-29 DIAGNOSIS — M54.16 LUMBAR RADICULOPATHY: ICD-10-CM

## 2024-07-29 NOTE — TELEPHONE ENCOUNTER
Sierra AGUIRRE Shan called to request a refill on her medication.  The date on the prescription has been changed to  8/10/24.      Last office visit : 7/11/2024   Next office visit : 10/11/2024     Last Yemi: 4/19/24  Medication Contract: 10/20/23   Last UDS: 10/20/23  Last Rx: 7/11/24    Benzodiazepine Screen, Urine   Date Value Ref Range Status   10/20/2023 positive  Final     Comment:     Pt takes Xanax 1 mg     Buprenorphine Urine   Date Value Ref Range Status   10/20/2023 neg  Final     Cocaine Metabolite Screen, Urine   Date Value Ref Range Status   10/20/2023 neg  Final     Gabapentin Screen, Urine   Date Value Ref Range Status   10/20/2023 neg  Final     MDMA, Urine   Date Value Ref Range Status   10/20/2023 neg  Final     Oxycodone Screen, Ur   Date Value Ref Range Status   10/20/2023 neg  Final     Propoxyphene Screen, Urine   Date Value Ref Range Status   10/20/2023 neg  Final     THC Screen, Urine   Date Value Ref Range Status   10/20/2023 neg  Final     Tricyclic Antidepressants, Urine   Date Value Ref Range Status   10/20/2023 neg  Final           Requested Prescriptions     Pending Prescriptions Disp Refills    oxyCODONE-acetaminophen (PERCOCET)  MG per tablet 120 tablet 0     Sig: Take 1 tablet by mouth every 6 hours as needed for Pain for up to 30 days. Intended supply: 30 days Max Daily Amount: 4 tablets         Please approve or refuse this medication.   Clarissa Durbin MA

## 2024-07-29 NOTE — TELEPHONE ENCOUNTER
Sierra reports she had labs when she went the ED 7/17/24 at [facility] for her knee.

## 2024-07-30 RX ORDER — OXYCODONE AND ACETAMINOPHEN 10; 325 MG/1; MG/1
1 TABLET ORAL EVERY 6 HOURS PRN
Qty: 120 TABLET | Refills: 0 | Status: SHIPPED | OUTPATIENT
Start: 2024-08-10 | End: 2024-09-09

## 2024-08-02 ENCOUNTER — TELEPHONE (OUTPATIENT)
Dept: PRIMARY CARE CLINIC | Age: 64
End: 2024-08-02

## 2024-08-02 NOTE — TELEPHONE ENCOUNTER
Sierra reported she has been outside picking tomatoes didn't think she was outside very long.  She stated she is feeling very bad headache, dizzy, chest pain, nausea so bad she thinks she could vomit, she hasn't checked her heart rate or blood pressure. She explained her heart rate has been in the 50's since she had open heart surgery. I recommended she go to the ED. She stated she didn't think she could come to [place], I agreed with her she needed to go to the nearest ED.

## 2024-08-13 ENCOUNTER — OFFICE VISIT (OUTPATIENT)
Dept: CARDIOLOGY | Facility: CLINIC | Age: 64
End: 2024-08-13
Payer: MEDICARE

## 2024-08-13 VITALS
OXYGEN SATURATION: 97 % | SYSTOLIC BLOOD PRESSURE: 136 MMHG | HEIGHT: 66 IN | WEIGHT: 188 LBS | BODY MASS INDEX: 30.22 KG/M2 | HEART RATE: 73 BPM | DIASTOLIC BLOOD PRESSURE: 70 MMHG

## 2024-08-13 DIAGNOSIS — I25.10 CORONARY ARTERY DISEASE INVOLVING NATIVE CORONARY ARTERY OF NATIVE HEART WITHOUT ANGINA PECTORIS: Primary | ICD-10-CM

## 2024-08-13 DIAGNOSIS — E78.5 HYPERLIPIDEMIA LDL GOAL <70: ICD-10-CM

## 2024-08-13 DIAGNOSIS — Z79.4 TYPE 2 DIABETES MELLITUS WITH OTHER CIRCULATORY COMPLICATION, WITH LONG-TERM CURRENT USE OF INSULIN: ICD-10-CM

## 2024-08-13 DIAGNOSIS — E11.59 TYPE 2 DIABETES MELLITUS WITH OTHER CIRCULATORY COMPLICATION, WITH LONG-TERM CURRENT USE OF INSULIN: ICD-10-CM

## 2024-08-13 DIAGNOSIS — I10 PRIMARY HYPERTENSION: ICD-10-CM

## 2024-08-13 DIAGNOSIS — E66.09 CLASS 1 OBESITY DUE TO EXCESS CALORIES WITH SERIOUS COMORBIDITY AND BODY MASS INDEX (BMI) OF 30.0 TO 30.9 IN ADULT: ICD-10-CM

## 2024-08-13 RX ORDER — LISINOPRIL 5 MG/1
2.5 TABLET ORAL DAILY
Qty: 45 TABLET | Refills: 2 | Status: SHIPPED | OUTPATIENT
Start: 2024-08-13

## 2024-08-13 RX ORDER — LEVOTHYROXINE SODIUM 0.15 MG/1
1 TABLET ORAL DAILY
COMMUNITY
Start: 2024-07-12

## 2024-08-13 RX ORDER — POTASSIUM CHLORIDE 750 MG/1
TABLET, FILM COATED, EXTENDED RELEASE ORAL
COMMUNITY
Start: 2024-08-05

## 2024-08-13 RX ORDER — FUROSEMIDE 40 MG/1
40 TABLET ORAL DAILY PRN
COMMUNITY
Start: 2024-07-19

## 2024-08-13 NOTE — PROGRESS NOTES
"    Subjective:     Encounter Date:08/13/2024      Patient ID: Milagros Parson is a 64 y.o. female.    Chief Complaint:\"chest pain\"  Coronary Artery Disease  Presents for follow-up visit. Risk factors include hyperlipidemia. The symptoms have been stable.   Hypertension  This is a chronic problem. The current episode started more than 1 year ago. The problem has been stable since onset. The problem is controlled.   Hyperlipidemia      Patient presents today as a follow up. She was [place] [facility] [place] and admitted to [facility] by Dr. Quezada after presenting to the hospital with complaints of substernal chest discomfort with radiation to her neck. She was also noted to have an elevated troponin. She underwent a LHC per interventional cardiologist after arrival with angiography revealing multivessel disease. CT surgery was consulted and she ultimately underwent 4v CABG (LIMA-LAD, JONATHON-RCA, SVG-diagonal and SVG-OM) per Dr. Lutz on 5/1/24. She was noted to have torsades during surgery therefore was placed on Amiodarone for 3 weeks post-op. She has also had issues with sternal wound healing.    She presents today and notes overall she feels well. She does complain of her chest hurting after she pulled weeds. She also notes she has been vaping for the last month. She also has not been taking her lopressor due to issues with her HR being low. She also reports she has stopped her plavix because she was needing to take meloxicam for knee pain and was told she cannot take meloxicam and plavix at the same time. She is weighing herself and reports weights of 183-184lbs.   The following portions of the patient's history were reviewed and updated as appropriate: allergies, current medications, past family history, past medical history, past social history, past surgical history and problem list.    Allergies   Allergen Reactions    Bactrim [Sulfamethoxazole-Trimethoprim] Anaphylaxis and Rash    Penicillins Anaphylaxis and " Rash     Reaction: rash  Reaction: rash      Morphine Itching    Sulfa Antibiotics GI Intolerance     Reaction: GI upset  Reaction: GI upset       Current Outpatient Medications:     ALPRAZolam (XANAX) 1 MG tablet, Take 1 tablet by mouth 4 (Four) Times a Day As Needed for Anxiety., Disp: , Rfl:     aspirin 81 MG EC tablet, Take 1 tablet by mouth Daily., Disp: 30 tablet, Rfl: 2    atorvastatin (LIPITOR) 40 MG tablet, Take 1 tablet by mouth Every Night., Disp: 30 tablet, Rfl: 2    Blood Glucose Monitoring Suppl (ONE TOUCH ULTRA 2) w/Device kit, , Disp: , Rfl:     clopidogrel (PLAVIX) 75 MG tablet, Take 1 tablet by mouth Daily., Disp: , Rfl:     cyclobenzaprine (FLEXERIL) 10 MG tablet, Take 1 tablet by mouth 3 (Three) Times a Day As Needed for Muscle Spasms., Disp: , Rfl:     furosemide (LASIX) 40 MG tablet, Take 1 tablet by mouth Daily As Needed (weight gain or swelling)., Disp: , Rfl:     gabapentin (NEURONTIN) 300 MG capsule, Take 1 capsule by mouth 4 (Four) Times a Day. Take one tablet in the morning and at noon, then take 2 tablets at night. Max 4 tablets per day., Disp: , Rfl:     Insulin Aspart (novoLOG) 100 UNIT/ML injection, Inject 1-10 Units under the skin into the appropriate area as directed 3 (Three) Times a Day Before Meals. Pt uses sliding scale, Disp: 6 mL, Rfl: 11    Lancets (OneTouch Delica Plus [id number]) misc, , Disp: , Rfl:     levothyroxine (SYNTHROID, LEVOTHROID) 150 MCG tablet, Take 1 tablet by mouth Daily., Disp: , Rfl:     lisinopril (PRINIVIL,ZESTRIL) 5 MG tablet, Take 0.5 tablets by mouth Daily., Disp: 45 tablet, Rfl: 2    metoprolol tartrate (LOPRESSOR) 25 MG tablet, Take 0.5 tablets by mouth 2 (Two) Times a Day., Disp: 90 tablet, Rfl: 3    NON FORMULARY, Topical Gabapentin Compound from [facility], Disp: , Rfl:     OneTouch Ultra Test test strip, , Disp: , Rfl:     oxyCODONE-acetaminophen (PERCOCET)  MG per tablet, Take 1 tablet by mouth Every 8 (Eight) Hours As Needed  for Moderate Pain., Disp: , Rfl:     pantoprazole (PROTONIX) 40 MG EC tablet, Take 1 tablet by mouth Daily., Disp: , Rfl:     pioglitazone (ACTOS) 15 MG tablet, Take 1 tablet by mouth Daily., Disp: , Rfl:     polyethylene glycol (MIRALAX) 17 GM/SCOOP powder, Take 17 g by mouth Daily As Needed (constipation)., Disp: , Rfl:     potassium chloride 10 MEQ CR tablet, Takes when taking Lasix, Disp: , Rfl:     promethazine (PHENERGAN) 25 MG tablet, Take 1 tablet by mouth., Disp: , Rfl:     Semaglutide,0.25 or 0.5MG/DOS, (Ozempic, 0.25 or 0.5 MG/DOSE,) 2 MG/1.5ML solution pen-injector, Inject 0.5 mg under the skin into the appropriate area as directed 1 (One) Time Per Week., Disp: , Rfl:     betamethasone, augmented, (DIPROLENE) 0.05 % cream, Apply 1 Application topically to the appropriate area as directed. (Patient not taking: Reported on 8/13/2024), Disp: , Rfl:     doxycycline (VIBRAMYCIN) 100 MG capsule, Take 1 capsule by mouth Daily. (Patient not taking: Reported on 8/13/2024), Disp: , Rfl:     Insulin Glargine (BASAGLAR KWIKPEN) 100 UNIT/ML injection pen, Inject 35 Units under the skin into the appropriate area as directed Every Night. (Patient not taking: Reported on 8/13/2024), Disp: 6 mL, Rfl: 11    Liraglutide (Victoza) 18 MG/3ML solution pen-injector injection, Inject 1.2 mg under the skin into the appropriate area as directed Daily. (Patient not taking: Reported on 8/13/2024), Disp: 9 mL, Rfl: 11    naloxone (NARCAN) 4 MG/0.1ML nasal spray, 1 spray into the nostril(s) as directed by provider Daily As Needed for Opioid Reversal. (Patient not taking: Reported on 8/13/2024), Disp: , Rfl:   Past Medical History:   Diagnosis Date    Arthritis     Atrial septal defect, secundum     Chest pain 11/17/2016    CKD (chronic kidney disease), stage II 01/25/2018    Diabetes mellitus     DIABETES , UNCOMPLICATED, TYPE II     Diabetic polyneuropathy associated with type 2 diabetes mellitus 01/25/2018    Disease of thyroid  "gland     GERD (gastroesophageal reflux disease)     HLD (hyperlipidemia) 2016    HTN (hypertension) 2016    Hyperlipidemia, mixed     Hypertension associated with diabetes 2018    Hypertension, benign     Low back pain     Melanoma of lower leg, right 2018    Mixed diabetic hyperlipidemia associated with type 2 diabetes mellitus 2018    Nonspecific chest pain     Proliferative diabetic retinopathy of both eyes associated with type 2 diabetes mellitus 2018    Stroke 2015    SHORT-TERM MEMORY LOSS, L FACIAL WEAKNESS    Type 2 diabetes mellitus with hyperglycemia, with long-term current use of insulin 2018       Social History     Socioeconomic History    Marital status:    Tobacco Use    Smoking status: Former     Current packs/day: 0.00     Average packs/day: 1 pack/day for 36.3 years (36.3 ttl pk-yrs)     Types: Cigarettes     Start date:      Quit date: 2024     Years since quittin.3     Passive exposure: Past    Smokeless tobacco: Never   Vaping Use    Vaping status: Former    Passive vaping exposure: Yes   Substance and Sexual Activity    Alcohol use: Not Currently     Comment: Occ    Drug use: No    Sexual activity: Defer       ROS       Objective:     Physical Exam        ECG 12 Lead    Date/Time: 2024 3:52 PM  Performed by: Deedee Carreon APRN    Authorized by: Deedee Carreon APRN  Comparison: compared with previous ECG from 2024  Similar to previous ECG  Rhythm: sinus rhythm  Rate: normal  BPM: 73  Conduction: left bundle branch block  Q waves: V1    ST Segments: ST segments normal  T inversion: III, V4, V5 and V6  QRS axis: left  Other findings: T wave abnormality    Clinical impression: abnormal EKG        /70 (BP Location: Left arm, Patient Position: Sitting, Cuff Size: Adult)   Pulse 73   Ht 167.6 cm (66\")   Wt 85.3 kg (188 lb)   SpO2 97%   BMI 30.34 kg/m²     Lab Review:   I have reviewed previous office notes, " recent labs and recent cardiac testing.     Lab Results   Component Value Date    CHOL 162 04/30/2024    TRIG 125 04/30/2024    HDL 32 (L) 04/30/2024     (H) 04/30/2024     Results for orders placed during the hospital encounter of 04/24/24    Intra-Op TAVR Transesophageal Echo    Interpretation Summary    Left ventricular systolic function is normal.    Previous ASD closure device is visualized and appears in good position.    Conclusion 4/24/2024     Distal left main has approximately 40 to 50% stenosis  Ostial left main coronary artery has approximate 40% stenosis  Proximal left anterior descending coronary artery has sequential 60% stenosis  Mid left anterior descending coronary artery has a 95% stenosis  First diagonal branch has ostial 90% stenosis  Ostial and proximal left circumflex coronary artery has 70% stenosis  Proximal right coronary artery has 95% stenosis  No obstructive disease noted of the distal right coronary artery  No obvious obstructive disease of right posterior lateral or right posterior descending coronary artery  Patient is overall very intolerant to cath repeatedly complaining of discomfort and anxiety     Bilateral subclavian arteries are patent  Patent right internal mammary artery  Left internal mammary artery is not well-visualized         Assessment:          Diagnosis Plan   1. Coronary artery disease involving native coronary artery of native heart without angina pectoris        2. Primary hypertension        3. Hyperlipidemia LDL goal <70        4. Type 2 diabetes mellitus with other circulatory complication, with long-term current use of insulin        5. Class 1 obesity due to excess calories with serious comorbidity and body mass index (BMI) of 30.0 to 30.9 in adult               Plan:       1 CAD- stable. S/p 4v CABG in 5/2024. Recommend she restart her plavix as she will need to take DAPT for a minimum of 1 yr post-NSTEMI. Also educated her avoid NSAIDs as they  increase risk for bleeding with use of ASA and plavix.   2, HTN- controlled. She reports she has not been taking her medications as prescribed as she has been worried about her low HR and BP. Will reduce her lopressor to 12.5mg BID and lisinopril to 2.5mg daily.   3. HLD- last LDL was in April and elevated. Was placed on lipitor. Needs a repeat lipid panel.   4. DM2 with CAD- being managed per endocrinology.   5. BMI- BMI is >= 30 and <35. (Class 1 Obesity). The following options were offered after discussion;: weight loss educational material (shared in after visit summary)    Educated on risks of vaping and recommend cessation.       Follow up in 3 months or sooner if symptoms worsen.     I spent 30 minutes caring for Milagros on this date of service. This time includes time spent by me in the following activities:preparing for the visit, reviewing tests, obtaining and/or reviewing a separately obtained history, performing a medically appropriate examination and/or evaluation , counseling and educating the patient/family/caregiver, ordering medications, tests, or procedures, and documenting information in the medical record    I spent 2 minutes on the separately reported service of EKG interpretation. This time is not included in the time used to support the E/M service also reported today.

## 2024-09-05 DIAGNOSIS — M54.16 LUMBAR RADICULOPATHY: ICD-10-CM

## 2024-09-09 RX ORDER — OXYCODONE AND ACETAMINOPHEN 10; 325 MG/1; MG/1
1 TABLET ORAL EVERY 6 HOURS PRN
Qty: 120 TABLET | Refills: 0 | Status: SHIPPED | OUTPATIENT
Start: 2024-09-09 | End: 2024-10-09

## 2024-09-24 ENCOUNTER — TELEPHONE (OUTPATIENT)
Dept: PRIMARY CARE CLINIC | Age: 64
End: 2024-09-24

## 2024-10-04 DIAGNOSIS — M54.16 LUMBAR RADICULOPATHY: ICD-10-CM

## 2024-10-04 NOTE — TELEPHONE ENCOUNTER
Sierra AGUIRRE Shan called to request a refill on her medication.      Last office visit : 7/11/2024   Next office visit : 10/11/2024     Last UDS:   Benzodiazepine Screen, Urine   Date Value Ref Range Status   10/20/2023 positive  Final     Comment:     Pt takes Xanax 1 mg     Buprenorphine Urine   Date Value Ref Range Status   10/20/2023 neg  Final     Cocaine Metabolite Screen, Urine   Date Value Ref Range Status   10/20/2023 neg  Final     Gabapentin Screen, Urine   Date Value Ref Range Status   10/20/2023 neg  Final     Oxycodone Screen, Ur   Date Value Ref Range Status   10/20/2023 neg  Final     Propoxyphene Screen, Urine   Date Value Ref Range Status   10/20/2023 neg  Final     THC Screen, Urine   Date Value Ref Range Status   10/20/2023 neg  Final     Tricyclic Antidepressants, Urine   Date Value Ref Range Status   10/20/2023 neg  Final       Last Yemi: 10/4/24  Medication Contract: 10/20/23   Last Fill: 9/9/24    Requested Prescriptions     Pending Prescriptions Disp Refills    oxyCODONE-acetaminophen (PERCOCET)  MG per tablet 120 tablet 0     Sig: Take 1 tablet by mouth every 6 hours as needed for Pain for up to 30 days. Intended supply: 30 days Max Daily Amount: 4 tablets         Please approve or refuse this medication.   Loly Huntley LPN

## 2024-10-07 RX ORDER — OXYCODONE AND ACETAMINOPHEN 10; 325 MG/1; MG/1
1 TABLET ORAL EVERY 6 HOURS PRN
Qty: 120 TABLET | Refills: 0 | Status: SHIPPED | OUTPATIENT
Start: 2024-10-09 | End: 2024-11-08

## 2024-10-15 ENCOUNTER — OFFICE VISIT (OUTPATIENT)
Dept: PRIMARY CARE CLINIC | Age: 64
End: 2024-10-15

## 2024-10-15 VITALS
SYSTOLIC BLOOD PRESSURE: 132 MMHG | HEART RATE: 62 BPM | BODY MASS INDEX: 29.89 KG/M2 | WEIGHT: 186 LBS | HEIGHT: 66 IN | OXYGEN SATURATION: 99 % | DIASTOLIC BLOOD PRESSURE: 78 MMHG

## 2024-10-15 DIAGNOSIS — R60.0 BILATERAL LEG EDEMA: ICD-10-CM

## 2024-10-15 DIAGNOSIS — Z79.899 MEDICATION MANAGEMENT: ICD-10-CM

## 2024-10-15 DIAGNOSIS — Z79.4 TYPE 2 DIABETES MELLITUS WITH OTHER SPECIFIED COMPLICATION, WITH LONG-TERM CURRENT USE OF INSULIN (HCC): ICD-10-CM

## 2024-10-15 DIAGNOSIS — E11.69 TYPE 2 DIABETES MELLITUS WITH OTHER SPECIFIED COMPLICATION, WITH LONG-TERM CURRENT USE OF INSULIN (HCC): ICD-10-CM

## 2024-10-15 DIAGNOSIS — K21.9 GASTROESOPHAGEAL REFLUX DISEASE WITHOUT ESOPHAGITIS: ICD-10-CM

## 2024-10-15 DIAGNOSIS — K59.04 CHRONIC IDIOPATHIC CONSTIPATION: ICD-10-CM

## 2024-10-15 DIAGNOSIS — Z74.09 IMPAIRED MOBILITY AND ADLS: ICD-10-CM

## 2024-10-15 DIAGNOSIS — G62.9 NEUROPATHY: ICD-10-CM

## 2024-10-15 DIAGNOSIS — Z78.9 IMPAIRED MOBILITY AND ADLS: ICD-10-CM

## 2024-10-15 DIAGNOSIS — E78.2 MIXED HYPERLIPIDEMIA: ICD-10-CM

## 2024-10-15 DIAGNOSIS — E55.9 VITAMIN D DEFICIENCY: ICD-10-CM

## 2024-10-15 DIAGNOSIS — E87.6 CHRONIC HYPOKALEMIA: ICD-10-CM

## 2024-10-15 DIAGNOSIS — M54.16 LUMBAR RADICULOPATHY: ICD-10-CM

## 2024-10-15 DIAGNOSIS — F41.1 GENERALIZED ANXIETY DISORDER WITH PANIC ATTACKS: ICD-10-CM

## 2024-10-15 DIAGNOSIS — R11.0 NAUSEA: ICD-10-CM

## 2024-10-15 DIAGNOSIS — Z00.00 WELCOME TO MEDICARE PREVENTIVE VISIT: Primary | ICD-10-CM

## 2024-10-15 DIAGNOSIS — B37.2 SKIN CANDIDIASIS: ICD-10-CM

## 2024-10-15 DIAGNOSIS — F41.0 GENERALIZED ANXIETY DISORDER WITH PANIC ATTACKS: ICD-10-CM

## 2024-10-15 PROBLEM — Z87.74 STATUS POST PATENT FORAMEN OVALE CLOSURE: Status: ACTIVE | Noted: 2024-10-15

## 2024-10-15 PROBLEM — R41.3 SHORT-TERM MEMORY LOSS: Status: ACTIVE | Noted: 2024-10-15

## 2024-10-15 PROBLEM — Z98.890 HISTORY OF BACK SURGERY: Status: ACTIVE | Noted: 2024-10-15

## 2024-10-15 PROBLEM — R07.9 CHEST PAIN: Status: ACTIVE | Noted: 2024-10-15

## 2024-10-15 PROBLEM — Z90.710 H/O: HYSTERECTOMY: Status: ACTIVE | Noted: 2024-10-15

## 2024-10-15 PROBLEM — I25.10 CORONARY ARTERY DISEASE INVOLVING NATIVE CORONARY ARTERY OF NATIVE HEART WITHOUT ANGINA PECTORIS: Status: ACTIVE | Noted: 2024-08-13

## 2024-10-15 PROBLEM — C43.71: Status: ACTIVE | Noted: 2024-10-15

## 2024-10-15 PROBLEM — I89.0 ACQUIRED LYMPHEDEMA OF LOWER EXTREMITY: Status: ACTIVE | Noted: 2024-10-15

## 2024-10-15 RX ORDER — OXYCODONE HYDROCHLORIDE 10 MG/1
1 TABLET ORAL EVERY 6 HOURS PRN
COMMUNITY
End: 2024-10-15 | Stop reason: ALTCHOICE

## 2024-10-15 RX ORDER — LEVETIRACETAM 250 MG/1
250 TABLET ORAL 2 TIMES DAILY
COMMUNITY

## 2024-10-15 ASSESSMENT — PATIENT HEALTH QUESTIONNAIRE - PHQ9
2. FEELING DOWN, DEPRESSED OR HOPELESS: MORE THAN HALF THE DAYS
6. FEELING BAD ABOUT YOURSELF - OR THAT YOU ARE A FAILURE OR HAVE LET YOURSELF OR YOUR FAMILY DOWN: NOT AT ALL
SUM OF ALL RESPONSES TO PHQ QUESTIONS 1-9: 12
SUM OF ALL RESPONSES TO PHQ QUESTIONS 1-9: 12
10. IF YOU CHECKED OFF ANY PROBLEMS, HOW DIFFICULT HAVE THESE PROBLEMS MADE IT FOR YOU TO DO YOUR WORK, TAKE CARE OF THINGS AT HOME, OR GET ALONG WITH OTHER PEOPLE: VERY DIFFICULT
1. LITTLE INTEREST OR PLEASURE IN DOING THINGS: NEARLY EVERY DAY
SUM OF ALL RESPONSES TO PHQ QUESTIONS 1-9: 12
SUM OF ALL RESPONSES TO PHQ9 QUESTIONS 1 & 2: 5
7. TROUBLE CONCENTRATING ON THINGS, SUCH AS READING THE NEWSPAPER OR WATCHING TELEVISION: NOT AT ALL
9. THOUGHTS THAT YOU WOULD BE BETTER OFF DEAD, OR OF HURTING YOURSELF: NOT AT ALL
8. MOVING OR SPEAKING SO SLOWLY THAT OTHER PEOPLE COULD HAVE NOTICED. OR THE OPPOSITE, BEING SO FIGETY OR RESTLESS THAT YOU HAVE BEEN MOVING AROUND A LOT MORE THAN USUAL: NOT AT ALL
3. TROUBLE FALLING OR STAYING ASLEEP: NEARLY EVERY DAY
5. POOR APPETITE OR OVEREATING: SEVERAL DAYS
SUM OF ALL RESPONSES TO PHQ QUESTIONS 1-9: 12
4. FEELING TIRED OR HAVING LITTLE ENERGY: NEARLY EVERY DAY

## 2024-10-15 ASSESSMENT — LIFESTYLE VARIABLES
HOW OFTEN DO YOU HAVE A DRINK CONTAINING ALCOHOL: NEVER
HOW MANY STANDARD DRINKS CONTAINING ALCOHOL DO YOU HAVE ON A TYPICAL DAY: PATIENT DOES NOT DRINK

## 2024-10-15 NOTE — PROGRESS NOTES
Reason For Visit:       Combined HPI and A/P:   {No diagnosis found. (Refresh or delete this SmartLink)}    1.) 2.) Chronic Pain:      - This is a chronic, stable problem. She had a lumber fusion by Jack L1-L5. She recently had a MRI of L spine. She is currently taking Percocet  mg 1 tab TID and Gabapentin 300 mg 1 cap in morning, 1 cap at noon, and 2 at night. She feels that this is helping to control her symptoms for the most part. I will continue the Percocet and Gabapentin at the current doses       - The patient continue to require the Percocet and Gabapentin to control their symptoms. We will continue to monitor for opportunities to decrease the dose as we are able to wean.       - The patient UDS, drug contract, controlled prescription monitoring is up to date and appropriate.     2.) 3.) Diabetes Mellitus Type 2:           - This is a chronic, stable problem. She follows with FAROOQ Schmitt with endocrinology for this.            - The patient has nephropathy, neuropathy, CAD, and hx of stroke. She had numbness of her bilateral feet.            - The patient currently takes Victoza 18 mg/ 3ml 1.2 mL subq daily and Actos 15 mg 1 tab daily.  I will continue these medications at the current dose.           - Patient's foot exam is up to date.            - Patient is on an ACE Inhibitor.     3.) 1.) Anxiety:      - This is a chronic, controlled problem. She is currently taking Xanax 1 mg 1 tab QID prn. She feels that this is helping to control her symptoms. She denies any side effects from the medication. I will continue the Xanax at the current dose.       - The patient continue to require the Xanax to control their symptoms. We will continue to monitor for opportunities to decrease the dose in efforts to be able to wean off of this in the future.        - The patient UDS, drug contract, controlled prescription monitoring is up to date and appropriate.   
      CareTeam (Including outside providers/suppliers regularly involved in providing care):   Patient Care Team:  Heriberto Almanza MD as PCP - General (Family Medicine)  Heriberto Almanza MD as PCP - Empaneled Provider  Fani Delgado APRN as Advanced Practice Nurse (Family Nurse Practitioner)      Reviewed and updated this visit:  Tobacco  Allergies  Meds  Med Hx  Surg Hx  Soc Hx  Fam Hx        Heriberto Almanza MD

## 2024-10-17 ENCOUNTER — TELEPHONE (OUTPATIENT)
Dept: PRIMARY CARE CLINIC | Age: 64
End: 2024-10-17

## 2024-10-17 NOTE — TELEPHONE ENCOUNTER
The patient called  to report that she was seen on Tuesday and wants to know why her medications have not been sent to the pharmacy. The doctor was to order a lift chair refill meds.

## 2024-10-18 NOTE — TELEPHONE ENCOUNTER
Dr. Almanza has not closed her chart, everything is pended for him to sign medication wise. He has not put anything in about a lift chair. I will say something Monday when he returns. He did say he was going to work in his baskets and charts so it may get done this weekend.

## 2024-10-21 DIAGNOSIS — M54.16 LUMBAR RADICULOPATHY: Primary | ICD-10-CM

## 2024-10-21 DIAGNOSIS — I63.50 CEREBROVASCULAR ACCIDENT (CVA) DUE TO OCCLUSION OF CEREBRAL ARTERY (HCC): ICD-10-CM

## 2024-10-21 DIAGNOSIS — E11.42 DIABETIC POLYNEUROPATHY ASSOCIATED WITH TYPE 2 DIABETES MELLITUS (HCC): ICD-10-CM

## 2024-10-21 RX ORDER — NYSTATIN 100000 U/G
CREAM TOPICAL
Qty: 30 G | Refills: 2 | Status: SHIPPED | OUTPATIENT
Start: 2024-10-21

## 2024-10-21 RX ORDER — PIOGLITAZONEHYDROCHLORIDE 15 MG/1
15 TABLET ORAL DAILY
Qty: 90 TABLET | Refills: 2 | Status: SHIPPED | OUTPATIENT
Start: 2024-10-21

## 2024-10-21 RX ORDER — DULOXETIN HYDROCHLORIDE 30 MG/1
30 CAPSULE, DELAYED RELEASE ORAL DAILY
Qty: 30 CAPSULE | Refills: 5 | Status: SHIPPED | OUTPATIENT
Start: 2024-10-21

## 2024-10-21 RX ORDER — FUROSEMIDE 40 MG/1
20 TABLET ORAL DAILY
Qty: 90 TABLET | Refills: 2 | Status: SHIPPED | OUTPATIENT
Start: 2024-10-21

## 2024-10-21 RX ORDER — POTASSIUM CHLORIDE 750 MG/1
10 TABLET, EXTENDED RELEASE ORAL DAILY
Qty: 90 TABLET | Refills: 2 | Status: SHIPPED | OUTPATIENT
Start: 2024-10-21

## 2024-10-21 RX ORDER — PANTOPRAZOLE SODIUM 40 MG/1
40 TABLET, DELAYED RELEASE ORAL DAILY
Qty: 90 TABLET | Refills: 2 | Status: SHIPPED | OUTPATIENT
Start: 2024-10-21

## 2024-10-21 RX ORDER — NICOTINE 21 MG/24HR
1 PATCH, TRANSDERMAL 24 HOURS TRANSDERMAL DAILY
Qty: 30 PATCH | Refills: 1 | Status: SHIPPED | OUTPATIENT
Start: 2024-10-21 | End: 2024-12-20

## 2024-10-21 RX ORDER — CYCLOBENZAPRINE HCL 10 MG
10 TABLET ORAL 3 TIMES DAILY PRN
Qty: 30 TABLET | Refills: 5 | Status: SHIPPED | OUTPATIENT
Start: 2024-10-21

## 2024-10-21 RX ORDER — PLECANATIDE 3 MG/1
1 TABLET ORAL DAILY
Qty: 30 TABLET | Refills: 2 | Status: SHIPPED | OUTPATIENT
Start: 2024-10-21

## 2024-10-21 RX ORDER — INSULIN ASPART 100 [IU]/ML
10 INJECTION, SOLUTION INTRAVENOUS; SUBCUTANEOUS
Qty: 5 ADJUSTABLE DOSE PRE-FILLED PEN SYRINGE | Refills: 3 | Status: SHIPPED | OUTPATIENT
Start: 2024-10-21

## 2024-10-27 PROBLEM — C43.9 MALIGNANT MELANOMA (HCC): Status: RESOLVED | Noted: 2021-04-12 | Resolved: 2024-10-27

## 2024-10-27 PROBLEM — C43.71: Status: RESOLVED | Noted: 2024-10-15 | Resolved: 2024-10-27

## 2024-10-27 RX ORDER — ALPRAZOLAM 1 MG/1
TABLET ORAL
Qty: 120 TABLET | Refills: 2 | Status: SHIPPED | OUTPATIENT
Start: 2024-10-27 | End: 2024-12-20

## 2024-10-27 RX ORDER — PROMETHAZINE HYDROCHLORIDE 25 MG/1
25 TABLET ORAL EVERY 8 HOURS PRN
Qty: 90 TABLET | Refills: 0 | Status: SHIPPED | OUTPATIENT
Start: 2024-10-27

## 2024-10-27 RX ORDER — GABAPENTIN 300 MG/1
CAPSULE ORAL
Qty: 120 CAPSULE | Refills: 2 | Status: SHIPPED | OUTPATIENT
Start: 2024-10-27 | End: 2025-04-01

## 2024-10-28 ENCOUNTER — TELEPHONE (OUTPATIENT)
Dept: PRIMARY CARE CLINIC | Age: 64
End: 2024-10-28

## 2024-10-28 NOTE — TELEPHONE ENCOUNTER
Patient calls regarding lift chair order to be sent to [facility] in [place].    She also requesting that her Cymbalta be changed to Lexapro at a low dose.     She also stated she has been having headaches for the last 4 to 5 days. Her blood pressure was 100/60 and her heart rate around 60. She stated she felt bad. Advised to go to ED to be evaluated. She voiced understanding. Please advise.

## 2024-10-31 ENCOUNTER — TELEPHONE (OUTPATIENT)
Dept: CARDIOLOGY | Facility: CLINIC | Age: 64
End: 2024-10-31

## 2024-10-31 NOTE — TELEPHONE ENCOUNTER
.    Caller: RUBEN    Relationship to patient: DIANNE Calles call back number: [phone]     Chief complaint: SICK WITH THE FLU     Type of visit: 3 MONTH FU     Requested date: ANYTHING IN AFTERNOON     If rescheduling, when is the original appointment: 10.31.2024     Additional notes:N/A

## 2024-11-01 DIAGNOSIS — M54.16 LUMBAR RADICULOPATHY: ICD-10-CM

## 2024-11-01 DIAGNOSIS — E11.42 DIABETIC POLYNEUROPATHY ASSOCIATED WITH TYPE 2 DIABETES MELLITUS (HCC): ICD-10-CM

## 2024-11-01 DIAGNOSIS — I63.50 CEREBROVASCULAR ACCIDENT (CVA) DUE TO OCCLUSION OF CEREBRAL ARTERY (HCC): ICD-10-CM

## 2024-11-01 RX ORDER — OXYCODONE AND ACETAMINOPHEN 10; 325 MG/1; MG/1
1 TABLET ORAL EVERY 6 HOURS PRN
Qty: 120 TABLET | Refills: 0 | Status: SHIPPED | OUTPATIENT
Start: 2024-11-08 | End: 2024-12-08

## 2024-11-01 NOTE — TELEPHONE ENCOUNTER
Sierra AGUIRRE Shan called to request a refill on her medication.      Last office visit : 10/15/2024   Next office visit : 1/16/2025     Last UDS:   Benzodiazepine Screen, Urine   Date Value Ref Range Status   10/20/2023 positive  Final     Comment:     Pt takes Xanax 1 mg     Buprenorphine Urine   Date Value Ref Range Status   10/20/2023 neg  Final     Cocaine Metabolite Screen, Urine   Date Value Ref Range Status   10/20/2023 neg  Final     Gabapentin Screen, Urine   Date Value Ref Range Status   10/20/2023 neg  Final     Oxycodone Screen, Ur   Date Value Ref Range Status   10/20/2023 neg  Final     Propoxyphene Screen, Urine   Date Value Ref Range Status   10/20/2023 neg  Final     THC Screen, Urine   Date Value Ref Range Status   10/20/2023 neg  Final     Tricyclic Antidepressants, Urine   Date Value Ref Range Status   10/20/2023 neg  Final       Last Yemi: 10/4/24  Medication Contract: 10/15/24   Last Fill: 10/9/24    Requested Prescriptions     Pending Prescriptions Disp Refills    oxyCODONE-acetaminophen (PERCOCET)  MG per tablet 120 tablet 0     Sig: Take 1 tablet by mouth every 6 hours as needed for Pain for up to 30 days. Intended supply: 30 days Max Daily Amount: 4 tablets         Please approve or refuse this medication.   Loly Huntley LPN

## 2024-11-15 ENCOUNTER — TELEPHONE (OUTPATIENT)
Dept: PRIMARY CARE CLINIC | Age: 64
End: 2024-11-15

## 2024-11-20 DIAGNOSIS — M54.16 LUMBAR RADICULOPATHY: ICD-10-CM

## 2024-11-22 RX ORDER — GABAPENTIN 300 MG/1
CAPSULE ORAL
Qty: 120 CAPSULE | Refills: 2 | Status: SHIPPED | OUTPATIENT
Start: 2024-11-22 | End: 2025-02-21

## 2024-11-25 ENCOUNTER — OFFICE VISIT (OUTPATIENT)
Dept: PRIMARY CARE CLINIC | Age: 64
End: 2024-11-25
Payer: MEDICAID

## 2024-11-25 VITALS
WEIGHT: 189 LBS | DIASTOLIC BLOOD PRESSURE: 82 MMHG | SYSTOLIC BLOOD PRESSURE: 136 MMHG | OXYGEN SATURATION: 100 % | HEIGHT: 66 IN | BODY MASS INDEX: 30.37 KG/M2 | HEART RATE: 63 BPM

## 2024-11-25 DIAGNOSIS — Z79.4 TYPE 2 DIABETES MELLITUS WITH DIABETIC NEPHROPATHY, WITH LONG-TERM CURRENT USE OF INSULIN (HCC): ICD-10-CM

## 2024-11-25 DIAGNOSIS — G89.29 CHRONIC PAIN OF BOTH KNEES: Primary | ICD-10-CM

## 2024-11-25 DIAGNOSIS — M25.562 CHRONIC PAIN OF BOTH KNEES: Primary | ICD-10-CM

## 2024-11-25 DIAGNOSIS — E11.21 TYPE 2 DIABETES MELLITUS WITH DIABETIC NEPHROPATHY, WITH LONG-TERM CURRENT USE OF INSULIN (HCC): ICD-10-CM

## 2024-11-25 DIAGNOSIS — M54.16 LUMBAR RADICULOPATHY: ICD-10-CM

## 2024-11-25 DIAGNOSIS — M25.561 CHRONIC PAIN OF BOTH KNEES: Primary | ICD-10-CM

## 2024-11-25 DIAGNOSIS — F41.1 GENERALIZED ANXIETY DISORDER WITH PANIC ATTACKS: ICD-10-CM

## 2024-11-25 DIAGNOSIS — F41.0 GENERALIZED ANXIETY DISORDER WITH PANIC ATTACKS: ICD-10-CM

## 2024-11-25 PROBLEM — E05.90 HYPERTHYROIDISM: Status: ACTIVE | Noted: 2024-11-05

## 2024-11-25 PROBLEM — Z85.828 HISTORY OF MALIGNANT NEOPLASM OF SKIN: Status: ACTIVE | Noted: 2021-09-12

## 2024-11-25 PROCEDURE — 3075F SYST BP GE 130 - 139MM HG: CPT | Performed by: FAMILY MEDICINE

## 2024-11-25 PROCEDURE — 99214 OFFICE O/P EST MOD 30 MIN: CPT | Performed by: FAMILY MEDICINE

## 2024-11-25 PROCEDURE — 3079F DIAST BP 80-89 MM HG: CPT | Performed by: FAMILY MEDICINE

## 2024-11-25 PROCEDURE — 1123F ACP DISCUSS/DSCN MKR DOCD: CPT | Performed by: FAMILY MEDICINE

## 2024-11-25 PROCEDURE — 3044F HG A1C LEVEL LT 7.0%: CPT | Performed by: FAMILY MEDICINE

## 2024-11-25 RX ORDER — TIRZEPATIDE 2.5 MG/.5ML
2.5 INJECTION, SOLUTION SUBCUTANEOUS WEEKLY
COMMUNITY
Start: 2024-10-21 | End: 2024-11-25 | Stop reason: SINTOL

## 2024-11-25 SDOH — ECONOMIC STABILITY: FOOD INSECURITY: WITHIN THE PAST 12 MONTHS, THE FOOD YOU BOUGHT JUST DIDN'T LAST AND YOU DIDN'T HAVE MONEY TO GET MORE.: NEVER TRUE

## 2024-11-25 SDOH — ECONOMIC STABILITY: FOOD INSECURITY: WITHIN THE PAST 12 MONTHS, YOU WORRIED THAT YOUR FOOD WOULD RUN OUT BEFORE YOU GOT MONEY TO BUY MORE.: NEVER TRUE

## 2024-11-25 SDOH — ECONOMIC STABILITY: INCOME INSECURITY: HOW HARD IS IT FOR YOU TO PAY FOR THE VERY BASICS LIKE FOOD, HOUSING, MEDICAL CARE, AND HEATING?: NOT HARD AT ALL

## 2024-11-25 NOTE — PROGRESS NOTES
use: Yes     Comment: socially      Current Outpatient Medications   Medication Sig Dispense Refill    oxyCODONE-acetaminophen (PERCOCET)  MG per tablet Take 1 tablet by mouth every 6 hours as needed for Pain for up to 30 days. Intended supply: 30 days Max Daily Amount: 4 tablets 120 tablet 0    insulin glargine (LANTUS SOLOSTAR) 100 UNIT/ML injection pen Inject 10 Units into the skin nightly 5 Adjustable Dose Pre-filled Pen Syringe 3    gabapentin (NEURONTIN) 300 MG capsule TAKE ONE CAPSULE IN THE MORNING, TAKE ONE CAPSULE AT NOON, ANC TAKE 2 CAPSULES IN THE EVENING . . .MAY CAUSE DROWSINESS!! 120 capsule 2    Lift Chair MISC by Does not apply route 1 each 0    promethazine (PHENERGAN) 25 MG tablet Take 1 tablet by mouth every 8 hours as needed for Nausea 90 tablet 0    ALPRAZolam (XANAX) 1 MG tablet TAKE ONE TABLET 4 TIMES DAILY AS NEEDED FOR ANXIETY . . .MAY CAUSE DROWSINESS!! 120 tablet 2    Plecanatide (TRULANCE) 3 MG TABS Take 1 tablet by mouth daily Sample info NDC 51038-723-27 Lot 22D25 EXP 05/2024 #12 30 tablet 2    pantoprazole (PROTONIX) 40 MG tablet Take 1 tablet by mouth daily 90 tablet 2    pioglitazone (ACTOS) 15 MG tablet Take 1 tablet by mouth daily 90 tablet 2    potassium chloride (KLOR-CON) 10 MEQ extended release tablet Take 1 tablet by mouth daily 90 tablet 2    nystatin (MYCOSTATIN) 839184 UNIT/GM cream APPLY TOPICALLY TO THE AFFECTED AREA THREE TIMES DAILY FOR 7 DAYS 30 g 2    insulin aspart (NOVOLOG FLEXPEN) 100 UNIT/ML injection pen Inject 10 Units into the skin 3 times daily (before meals) 5 Adjustable Dose Pre-filled Pen Syringe 3    furosemide (LASIX) 40 MG tablet Take 0.5 tablets by mouth daily 90 tablet 2    cyclobenzaprine (FLEXERIL) 10 MG tablet Take 1 tablet by mouth 3 times daily as needed for Muscle spasms 30 tablet 5    DULoxetine (CYMBALTA) 30 MG extended release capsule Take 1 capsule by mouth daily 30 capsule 5    Misc. Devices (WALL GRAB BAR) MISC Tub grab bar 1 each 0

## 2024-11-26 ENCOUNTER — TELEPHONE (OUTPATIENT)
Dept: PRIMARY CARE CLINIC | Age: 64
End: 2024-11-26

## 2024-11-26 RX ORDER — INSULIN GLARGINE 100 [IU]/ML
10 INJECTION, SOLUTION SUBCUTANEOUS NIGHTLY
Qty: 5 ADJUSTABLE DOSE PRE-FILLED PEN SYRINGE | Refills: 3 | Status: SHIPPED | OUTPATIENT
Start: 2024-11-26

## 2024-11-26 RX ORDER — OXYCODONE AND ACETAMINOPHEN 10; 325 MG/1; MG/1
1 TABLET ORAL EVERY 6 HOURS PRN
Qty: 120 TABLET | Refills: 0 | Status: SHIPPED | OUTPATIENT
Start: 2024-11-26 | End: 2024-12-26

## 2024-11-26 NOTE — TELEPHONE ENCOUNTER
The patient was seen on [id number]. She is needing her Lantus flex pen called into [facility]. She is not taking the Mounjaro because it makes her sick. She is needing this Lantus called in she is to be starting it karel. This is a new script.

## 2024-12-02 ASSESSMENT — ENCOUNTER SYMPTOMS
DIARRHEA: 0
TROUBLE SWALLOWING: 0
COUGH: 0
VOMITING: 0
BACK PAIN: 1
WHEEZING: 0
ABDOMINAL PAIN: 0
SHORTNESS OF BREATH: 0
CONSTIPATION: 1
NAUSEA: 0

## 2024-12-03 ENCOUNTER — TELEPHONE (OUTPATIENT)
Dept: PRIMARY CARE CLINIC | Age: 64
End: 2024-12-03

## 2024-12-03 NOTE — TELEPHONE ENCOUNTER
Patient's daughter, Mirna calling asking about the paperwork on her power lift chair. Please call her at [phone]

## 2024-12-09 ENCOUNTER — TELEPHONE (OUTPATIENT)
Dept: PRIMARY CARE CLINIC | Age: 64
End: 2024-12-09

## 2024-12-09 NOTE — TELEPHONE ENCOUNTER
The patient is calling to get her lift chair order sent to a DME facility , she reports [facility]  will only cover 300 dollars and [facility] does not carry the lift chairs. Patient does report that Dr. Amado informed her that he would send the order for the lift chair to another DME facility.

## 2025-01-02 ENCOUNTER — TELEPHONE (OUTPATIENT)
Dept: PRIMARY CARE CLINIC | Age: 65
End: 2025-01-02

## 2025-01-02 DIAGNOSIS — M54.16 LUMBAR RADICULOPATHY: Primary | ICD-10-CM

## 2025-01-02 NOTE — TELEPHONE ENCOUNTER
Sierra AGUIRRE Shan called to request a refill on her medication.      Last office visit : 11/25/2024   Next office visit : 1/16/2025     Last UDS:   Benzodiazepine Screen, Urine   Date Value Ref Range Status   10/20/2023 positive  Final     Comment:     Pt takes Xanax 1 mg     Buprenorphine Urine   Date Value Ref Range Status   10/20/2023 neg  Final     Cocaine Metabolite Screen, Urine   Date Value Ref Range Status   10/20/2023 neg  Final     Gabapentin Screen, Urine   Date Value Ref Range Status   10/20/2023 neg  Final     Oxycodone Screen, Ur   Date Value Ref Range Status   10/20/2023 neg  Final     Propoxyphene Screen, Urine   Date Value Ref Range Status   10/20/2023 neg  Final     THC Screen, Urine   Date Value Ref Range Status   10/20/2023 neg  Final     Tricyclic Antidepressants, Urine   Date Value Ref Range Status   10/20/2023 neg  Final       Last Yemi: 11/26/2024  Medication Contract: 10/15/2024   Last Fill: 11/26/2024    Requested Prescriptions     Pending Prescriptions Disp Refills    oxyCODONE-acetaminophen (PERCOCET)  MG per tablet 120 tablet 0     Sig: Take 1 tablet by mouth every 6 hours as needed for Pain for up to 30 days. Intended supply: 30 days Max Daily Amount: 4 tablets         Please approve or refuse this medication.   Dolores Garner MA

## 2025-01-05 RX ORDER — OXYCODONE AND ACETAMINOPHEN 10; 325 MG/1; MG/1
1 TABLET ORAL EVERY 6 HOURS PRN
Qty: 120 TABLET | Refills: 0 | Status: SHIPPED | OUTPATIENT
Start: 2025-01-05 | End: 2025-02-04

## 2025-01-22 ENCOUNTER — TELEPHONE (OUTPATIENT)
Dept: NEUROSURGERY | Facility: CLINIC | Age: 65
End: 2025-01-22
Payer: MEDICARE

## 2025-01-22 NOTE — TELEPHONE ENCOUNTER
Patient has cancelled 4 appts (including appt on 1/28/25 would make #5).  She called and left a VM stating she needed to cancel and reschedule the 1/28/25.  I called her back and explained that our policy is 3 cancellations/no shows then we require a new referral.  She said she would be here b/c she really needs to be seen but she lives alone, having some other health issues, having car trouble, but she will keep the appt.      PANCHO EVANS [facility]  CLINICAL COORDINATOR  DR KEISHA LYNCH  [facility] [place] [facility]

## 2025-01-28 ENCOUNTER — OFFICE VISIT (OUTPATIENT)
Dept: NEUROSURGERY | Facility: CLINIC | Age: 65
End: 2025-01-28
Payer: MEDICARE

## 2025-01-28 VITALS — BODY MASS INDEX: 30.05 KG/M2 | WEIGHT: 187 LBS | HEIGHT: 66 IN

## 2025-01-28 DIAGNOSIS — M54.41 CHRONIC RIGHT-SIDED LOW BACK PAIN WITH RIGHT-SIDED SCIATICA: Primary | ICD-10-CM

## 2025-01-28 DIAGNOSIS — Z87.891 FORMER SMOKER: ICD-10-CM

## 2025-01-28 DIAGNOSIS — E66.09 CLASS 1 OBESITY DUE TO EXCESS CALORIES WITH SERIOUS COMORBIDITY AND BODY MASS INDEX (BMI) OF 30.0 TO 30.9 IN ADULT: ICD-10-CM

## 2025-01-28 DIAGNOSIS — E66.811 CLASS 1 OBESITY DUE TO EXCESS CALORIES WITH SERIOUS COMORBIDITY AND BODY MASS INDEX (BMI) OF 30.0 TO 30.9 IN ADULT: ICD-10-CM

## 2025-01-28 DIAGNOSIS — G89.29 CHRONIC RIGHT-SIDED LOW BACK PAIN WITH RIGHT-SIDED SCIATICA: Primary | ICD-10-CM

## 2025-01-28 PROCEDURE — 99214 OFFICE O/P EST MOD 30 MIN: CPT | Performed by: NEUROLOGICAL SURGERY

## 2025-01-28 PROCEDURE — 1160F RVW MEDS BY RX/DR IN RCRD: CPT | Performed by: NEUROLOGICAL SURGERY

## 2025-01-28 PROCEDURE — 1159F MED LIST DOCD IN RCRD: CPT | Performed by: NEUROLOGICAL SURGERY

## 2025-01-28 RX ORDER — PLECANATIDE 3 MG/1
1 TABLET ORAL
COMMUNITY
Start: 2024-10-21

## 2025-01-28 RX ORDER — DULOXETIN HYDROCHLORIDE 30 MG/1
1 CAPSULE, DELAYED RELEASE ORAL DAILY
COMMUNITY
Start: 2024-10-21

## 2025-01-28 RX ORDER — MELOXICAM 15 MG/1
TABLET ORAL
COMMUNITY
Start: 2025-01-15

## 2025-01-28 RX ORDER — OXYCODONE AND ACETAMINOPHEN 10; 325 MG/1; MG/1
1 TABLET ORAL
COMMUNITY
Start: 2025-01-05 | End: 2025-02-05

## 2025-01-28 NOTE — PATIENT INSTRUCTIONS
"PATIENT TO CONTINUE TO FOLLOW UP WITH HER PRIMARY CARE PROVIDER FOR YEARLY PHYSICAL EXAMS TO ENSURE COMPLETE HEALTH MAINTENANCE      BMI for Adults  Body mass index (BMI) is a number found using a person's weight and height. BMI can help tell how much of a person's weight is made up of fat. BMI does not measure body fat directly. It is used instead of tests that directly measure body fat, which can be difficult and expensive.  What are BMI measurements used for?  BMI is useful to:  Find out if your weight puts you at higher risk for medical problems.  Help recommend changes, such as in diet and exercise. This can help you reach a healthy weight. BMI screening can be done again to see if these changes are working.  How is BMI calculated?  Your height and weight are measured. The BMI is found from those numbers. This can be done with [place] or metric measurements. Note that charts and online BMI calculators are available to help you find your BMI quickly and easily without doing these calculations.  To calculate your BMI in [place] measurements:  Measure your weight in pounds (lb).  Multiply the number of pounds by 703.  So, for an adult who weighs 150 lb, multiply that number by 703: 150 x 703, which equals 105,450.  Measure your height in inches. Then multiply that number by itself to get a measurement called \"inches squared.\"  So, for an adult who is 70 inches tall, the \"inches squared\" measurement is 70 inches x 70 inches, which equals 4,900 inches squared.  Divide the total from step 2 (number of lb x 703) by the total from step 3 (inches squared): 105,450 ÷ 4,900 = 21.5. This is your BMI.  To calculate your BMI in metric measurements:    Measure your weight in kilograms (kg).  For this example, the weight is 70 kg.  Measure your height in meters (m). Then multiply that number by itself to get a measurement called \"meters squared.\"  So, for an adult who is 1.75 m tall, the \"meters squared\" measurement is 1.75 m x 1.75 " m, which equals 3.1 meters squared.  Divide the number of kilograms (your weight) by the meters squared number. In this example: 70 ÷ 3.1 = 22.6. This is your BMI.  What do the results mean?  BMI charts are used to see if you are underweight, normal weight, overweight, or obese. The following guidelines will be used:  Underweight: BMI less than 18.5.  Normal weight: BMI between 18.5 and 24.9.  Overweight: BMI between 25 and 29.9.  Obese: BMI of 30 or above.  BMI is a tool and cannot diagnose a condition. Talk with your health care provider about what your BMI means for you. Keep these notes in mind:  Weight includes fat and muscle. Someone with a muscular build, such as an athlete, may have a BMI that is higher than 24.9. In cases like these, BMI is not a correct measure of body fat.  If you have a BMI of 25 or higher, your provider may need to do more testing to find out if excess body fat is the cause.  BMI is measured the same way for males and females. Females usually have more body fat than males of the same height and weight.  Where to find more information  For more information about BMI, including tools to quickly find your BMI, go to:  Centers for Disease Control and Prevention: cdc.gov  American Heart Association: heart.org  National Heart, Lung, and Blood [place]: nhlbi.nih.gov  This information is not intended to replace advice given to you by your health care provider. Make sure you discuss any questions you have with your health care provider.  Document Revised: 09/07/2023 Document Reviewed: 08/31/2023  [employer] Patient Education © 2024 [employer]DASH Eating Plan  DASH stands for Dietary Approaches to Stop Hypertension. The DASH eating plan is a healthy eating plan that has been shown to:  Lower high blood pressure (hypertension).  Reduce your risk for type 2 diabetes, heart disease, and stroke.  Help with weight loss.  What are tips for following this plan?  Reading food labels  Check food labels  "for the amount of salt (sodium) per serving. Choose foods with less than 5 percent of the Daily Value (DV) of sodium. In general, foods with less than 300 milligrams (mg) of sodium per serving fit into this eating plan.  To find whole grains, look for the word \"whole\" as the first word in the ingredient list.  Shopping  Buy products labeled as \"low-sodium\" or \"no salt added.\"  Buy fresh foods. Avoid canned foods and pre-made or frozen meals.  Cooking  Try not to add salt when you cook. Use salt-free seasonings or herbs instead of table salt or sea salt. Check with your health care provider or pharmacist before using salt substitutes.  Do not hook foods. Cook foods in healthy ways, such as baking, boiling, grilling, roasting, or broiling.  Cook using oils that are good for your heart. These include olive, canola, avocado, soybean, and sunflower oil.  Meal planning    Eat a balanced diet. This should include:  4 or more servings of fruits and 4 or more servings of vegetables each day. Try to fill half of your plate with fruits and vegetables.  6-8 servings of whole grains each day.  6 or less servings of lean meat, poultry, or fish each day. 1 oz is 1 serving. A 3 oz (85 g) serving of meat is about the same size as the palm of your hand. One egg is 1 oz (28 g).  2-3 servings of low-fat dairy each day. One serving is 1 cup (237 mL).  1 serving of nuts, seeds, or beans 5 times each week.  2-3 servings of heart-healthy fats. Healthy fats called omega-3 fatty acids are found in foods such as walnuts, flaxseeds, fortified milks, and eggs. These fats are also found in cold-water fish, such as sardines, salmon, and mackerel.  Limit how much you eat of:  Canned or prepackaged foods.  Food that is high in trans fat, such as fried foods.  Food that is high in saturated fat, such as fatty meat.  Desserts and other sweets, sugary drinks, and other foods with added sugar.  Full-fat dairy products.  Do not salt foods before " eating.  Do not eat more than 4 egg yolks a week.  Try to eat at least 2 vegetarian meals a week.  Eat more home-cooked food and less restaurant, buffet, and fast food.  Lifestyle  When eating at a restaurant, ask if your food can be made with less salt or no salt.  If you drink alcohol:  Limit how much you have to:  0-1 drink a day if you are female.  0-2 drinks a day if you are male.  Know how much alcohol is in your drink. In the [place] one drink is one 12 oz bottle of beer (355 mL), one 5 oz glass of wine (148 mL), or one 1½ oz glass of hard liquor (44 mL).  General information  Avoid eating more than 2,300 mg of salt a day. If you have hypertension, you may need to reduce your sodium intake to 1,500 mg a day.  Work with your provider to stay at a healthy body weight or lose weight. Ask what the best weight range is for you.  On most days of the week, get at least 30 minutes of exercise that causes your heart to beat faster. This may include walking, swimming, or biking.  Work with your provider or dietitian to adjust your eating plan to meet your specific calorie needs.  What foods should I eat?  Fruits  All fresh, dried, or frozen fruit. Canned fruits that are in their natural juice and do not have sugar added to them.  Vegetables  Fresh or frozen vegetables that are raw, steamed, roasted, or grilled. Low-sodium or reduced-sodium tomato and vegetable juice. Low-sodium or reduced-sodium tomato sauce and tomato paste. Low-sodium or reduced-sodium canned vegetables.  Grains  Whole-grain or whole-wheat bread. Whole-grain or whole-wheat pasta. Brown rice. Oatmeal. Quinoa. Bulgur. Whole-grain and low-sodium cereals. Nereida bread. Low-fat, low-sodium crackers. Whole-wheat flour tortillas.  Meats and other proteins  Skinless chicken or turkey. Ground chicken or turkey. Pork with fat trimmed off. Fish and seafood. Egg whites. Dried beans, peas, or lentils. Unsalted nuts, nut butters, and seeds. Unsalted canned beans. Lean  cuts of beef with fat trimmed off. Low-sodium, lean precooked or cured meat, such as sausages or meat loaves.  Dairy  Low-fat (1%) or fat-free (skim) milk. Reduced-fat, low-fat, or fat-free cheeses. Nonfat, low-sodium ricotta or cottage cheese. Low-fat or nonfat yogurt. Low-fat, low-sodium cheese.  Fats and oils  Soft margarine without trans fats. Vegetable oil. Reduced-fat, low-fat, or light mayonnaise and salad dressings (reduced-sodium). Canola, safflower, olive, avocado, soybean, and sunflower oils. Avocado.  Seasonings and condiments  Herbs. Spices. Seasoning mixes without salt.  Other foods  Unsalted popcorn and pretzels. Fat-free sweets.  The items listed above may not be all the foods and drinks you can have. Talk to a dietitian to learn more.  What foods should I avoid?  Fruits  Canned fruit in a light or heavy syrup. Fried fruit. Fruit in cream or butter sauce.  Vegetables  Creamed or fried vegetables. Vegetables in a cheese sauce. Regular canned vegetables that are not marked as low-sodium or reduced-sodium. Regular canned tomato sauce and paste that are not marked as low-sodium or reduced-sodium. Regular tomato and vegetable juices that are not marked as low-sodium or reduced-sodium. Pickles. Olives.  Grains  Baked goods made with fat, such as croissants, muffins, or some breads. Dry pasta or rice meal packs.  Meats and other proteins  Fatty cuts of meat. Ribs. Fried meat. Perez. Bologna, salami, and other precooked or cured meats, such as sausages or meat loaves, that are not lean and low in sodium. Fat from the back of a pig (fatback). Bratwurst. Salted nuts and seeds. Canned beans with added salt. Canned or smoked fish. Whole eggs or egg yolks. Chicken or turkey with skin.  Dairy  Whole or 2% milk, cream, and half-and-half. Whole or full-fat cream cheese. Whole-fat or sweetened yogurt. Full-fat cheese. Nondairy creamers. Whipped toppings. Processed cheese and cheese spreads.  Fats and oils  Butter.  Stick margarine. Lard. Shortening. Ghee. Perez fat. Tropical oils, such as coconut, palm kernel, or palm oil.  Seasonings and condiments  Onion salt, garlic salt, seasoned salt, table salt, and sea salt. Worcestershire sauce. Tartar sauce. Barbecue sauce. Teriyaki sauce. Soy sauce, including reduced-sodium soy sauce. Steak sauce. Canned and packaged gravies. Fish sauce. Oyster sauce. Cocktail sauce. Store-bought horseradish. Ketchup. Mustard. Meat flavorings and tenderizers. Bouillon cubes. Hot sauces. Pre-made or packaged marinades. Pre-made or packaged taco seasonings. Relishes. Regular salad dressings.  Other foods  Salted popcorn and pretzels.  The items listed above may not be all the foods and drinks you should avoid. Talk to a dietitian to learn more.  Where to find more information  National Heart, Lung, and Blood [place] (NHLBI): nhlbi.nih.gov  American Heart Association (AHA): heart.org  Academy of Nutrition and Dietetics: eatright.org  National Kidney Foundation (NKF): kidney.org  This information is not intended to replace advice given to you by your health care provider. Make sure you discuss any questions you have with your health care provider.  Document Revised: 01/04/2024 Document Reviewed: 01/04/2024  [employer] Patient Education © 2024 [employer]

## 2025-01-28 NOTE — PROGRESS NOTES
SUBJECTIVE:  Patient ID: Milagros Parson is a 64 y.o. female is here today for follow-up.    Chief Complaint: Back pain  Chief Complaint   Patient presents with    Results     MRI [facility] 10/16/2023  Patient states she is waiting on a knee replacement but isn't sure when she can have it b/c she had open heart surgery on 5/1/24.  She states she is in a lot of pain.  She has never had pain mgmt injections.  She did home health therapy after her back surgery in 9/2022.       HPI  64-year-old female that had an ALIF and right hemilaminectomy facetectomy on September 23, 2022.  She never really got any benefit from this procedure and continued to have chronic back pain ever since.  She has a variety of chronic pain issues.  Right now she is complaining of right paraspinal back pain and isolated right knee pain.  She has been told by Dr. Poon that she needs a right total knee replacement but that was put on hold due to her cardiac issues.  She has done no recent physical therapy.  She has had no injection treatments.  She is managing this with oxycodone from her primary care doctor and gabapentin.  She is currently on and gabapentin.  She is also on Plavix due to history of stroke and a MI and coronary artery bypass in April May 2024..    The following portions of the patient's history were reviewed and updated as appropriate: allergies, current medications, past family history, past medical history, past social history, past surgical history and problem list.    OBJECTIVE:    Review of Systems   Musculoskeletal:  Positive for arthralgias and back pain.   All other systems reviewed and are negative.         Physical Exam  Constitutional:       General: She is awake.   Eyes:      Extraocular Movements: Extraocular movements intact.      Pupils: Pupils are equal, round, and reactive to light.   Neurological:      Motor: Motor strength is normal.     Deep Tendon Reflexes: Reflexes are normal and symmetric.   Psychiatric:          Speech: Speech normal.         Neurological Exam  Mental Status  Awake. Oriented to person, place and time. Speech is normal. Language is fluent with no aphasia. Attention and concentration are normal.    Cranial Nerves  CN I: Sense of smell is normal.  CN II: Right normal visual field. Left normal visual field.  CN III, IV, VI: Extraocular movements intact bilaterally. Pupils equal round and reactive to light bilaterally.  CN V: Facial sensation is normal.  CN VII:  Right: There is no facial weakness.  Left: There is no facial weakness.  CN XI: Shoulder shrug strength is normal.  CN XII: Tongue midline without atrophy or fasciculations.    Motor  Normal muscle bulk throughout. Normal muscle tone. Strength is 5/5 throughout all four extremities.    Sensory  Sensation is intact to light touch, pinprick, vibration and proprioception in all four extremities.    Reflexes  Deep tendon reflexes are 2+ and symmetric in all four extremities.    Gait  Normal casual, toe, heel and tandem gait.      Independent Review of Radiographic Studies:       ASSESSMENT/PLAN:  MRI of the lumbar spine from 2023 shows a well-healed anterior lumbar fusion at L5-S1.  Adjacent levels show no significant degeneration.  There is no significant neuroforaminal compromise or compression.  She is not requiring surgical intervention.  We discussed this at length.  I would recommend she follow-up with a pain management specialist to optimize control of her symptoms.      1. Chronic right-sided low back pain with right-sided sciatica    2. Former smoker    3. Class 1 obesity due to excess calories with serious comorbidity and body mass index (BMI) of 30.0 to 30.9 in adult        The patient's Body mass index is 30.18 kg/m².. BMI is above normal parameters. Recommendations include: educational material    Return for PRN - REFERRING TO PAIN MGMT.      Guanako Fischer MD

## 2025-02-06 ENCOUNTER — OFFICE VISIT (OUTPATIENT)
Dept: PRIMARY CARE CLINIC | Age: 65
End: 2025-02-06

## 2025-02-06 ENCOUNTER — OFFICE VISIT (OUTPATIENT)
Dept: CARDIOLOGY | Facility: CLINIC | Age: 65
End: 2025-02-06
Payer: MEDICARE

## 2025-02-06 VITALS
HEIGHT: 66 IN | BODY MASS INDEX: 29.41 KG/M2 | OXYGEN SATURATION: 96 % | HEART RATE: 60 BPM | WEIGHT: 183 LBS | SYSTOLIC BLOOD PRESSURE: 132 MMHG | DIASTOLIC BLOOD PRESSURE: 70 MMHG

## 2025-02-06 VITALS
OXYGEN SATURATION: 100 % | DIASTOLIC BLOOD PRESSURE: 76 MMHG | WEIGHT: 183.4 LBS | HEART RATE: 62 BPM | SYSTOLIC BLOOD PRESSURE: 128 MMHG | BODY MASS INDEX: 30.56 KG/M2 | HEIGHT: 65 IN

## 2025-02-06 DIAGNOSIS — I25.10 CORONARY ARTERY DISEASE INVOLVING NATIVE CORONARY ARTERY OF NATIVE HEART WITHOUT ANGINA PECTORIS: Primary | ICD-10-CM

## 2025-02-06 DIAGNOSIS — M54.16 LUMBAR RADICULOPATHY: ICD-10-CM

## 2025-02-06 DIAGNOSIS — E78.5 HYPERLIPIDEMIA LDL GOAL <70: ICD-10-CM

## 2025-02-06 DIAGNOSIS — K59.04 CHRONIC IDIOPATHIC CONSTIPATION: ICD-10-CM

## 2025-02-06 DIAGNOSIS — G43.E09 CHRONIC MIGRAINE WITH AURA WITHOUT STATUS MIGRAINOSUS, NOT INTRACTABLE: ICD-10-CM

## 2025-02-06 DIAGNOSIS — F41.1 GENERALIZED ANXIETY DISORDER WITH PANIC ATTACKS: ICD-10-CM

## 2025-02-06 DIAGNOSIS — I10 PRIMARY HYPERTENSION: ICD-10-CM

## 2025-02-06 DIAGNOSIS — F41.0 GENERALIZED ANXIETY DISORDER WITH PANIC ATTACKS: ICD-10-CM

## 2025-02-06 DIAGNOSIS — E11.59 TYPE 2 DIABETES MELLITUS WITH OTHER CIRCULATORY COMPLICATION, WITH LONG-TERM CURRENT USE OF INSULIN: ICD-10-CM

## 2025-02-06 DIAGNOSIS — N30.90 CYSTITIS: ICD-10-CM

## 2025-02-06 DIAGNOSIS — Z72.0 TOBACCO USE: ICD-10-CM

## 2025-02-06 DIAGNOSIS — Z79.4 TYPE 2 DIABETES MELLITUS WITH OTHER CIRCULATORY COMPLICATION, WITH LONG-TERM CURRENT USE OF INSULIN: ICD-10-CM

## 2025-02-06 DIAGNOSIS — F32.1 CURRENT MODERATE EPISODE OF MAJOR DEPRESSIVE DISORDER WITHOUT PRIOR EPISODE (HCC): Primary | ICD-10-CM

## 2025-02-06 LAB
APPEARANCE FLUID: CLEAR
BILIRUBIN, POC: NORMAL
BLOOD URINE, POC: NORMAL
CLARITY, POC: CLEAR
COLOR, POC: YELLOW
GLUCOSE URINE, POC: NORMAL MG/DL
KETONES, POC: NORMAL MG/DL
LEUKOCYTE EST, POC: NORMAL
NITRITE, POC: NORMAL
PH, POC: 5
PROTEIN, POC: NORMAL MG/DL
SPECIFIC GRAVITY, POC: 1.03
UROBILINOGEN, POC: 0.2 MG/DL

## 2025-02-06 RX ORDER — OXYCODONE AND ACETAMINOPHEN 10; 325 MG/1; MG/1
1 TABLET ORAL EVERY 6 HOURS PRN
Qty: 120 TABLET | Refills: 0 | Status: SHIPPED | OUTPATIENT
Start: 2025-02-06 | End: 2025-03-08

## 2025-02-06 RX ORDER — KETOROLAC TROMETHAMINE 30 MG/ML
30 INJECTION, SOLUTION INTRAMUSCULAR; INTRAVENOUS ONCE
Status: COMPLETED | OUTPATIENT
Start: 2025-02-06 | End: 2025-02-06

## 2025-02-06 RX ORDER — UBROGEPANT 50 MG/1
50 TABLET ORAL DAILY
Qty: 6 TABLET | Refills: 0 | Status: SHIPPED | COMMUNITY
Start: 2025-02-06

## 2025-02-06 RX ORDER — ROSUVASTATIN CALCIUM 20 MG/1
20 TABLET, COATED ORAL DAILY
Qty: 30 TABLET | Refills: 11 | Status: SHIPPED | OUTPATIENT
Start: 2025-02-06

## 2025-02-06 RX ORDER — PHENAZOPYRIDINE HYDROCHLORIDE 200 MG/1
200 TABLET, FILM COATED ORAL 3 TIMES DAILY PRN
Qty: 9 TABLET | Refills: 0 | Status: SHIPPED | OUTPATIENT
Start: 2025-02-06 | End: 2025-02-09

## 2025-02-06 RX ORDER — ALPRAZOLAM 1 MG/1
TABLET ORAL
Qty: 120 TABLET | Refills: 2 | Status: SHIPPED | OUTPATIENT
Start: 2025-02-06 | End: 2025-04-01

## 2025-02-06 RX ORDER — DEXAMETHASONE SODIUM PHOSPHATE 10 MG/ML
10 INJECTION INTRAMUSCULAR; INTRAVENOUS ONCE
Status: COMPLETED | OUTPATIENT
Start: 2025-02-06 | End: 2025-02-06

## 2025-02-06 RX ORDER — ARIPIPRAZOLE 5 MG/1
5 TABLET ORAL DAILY
Qty: 30 TABLET | Refills: 3 | Status: SHIPPED | OUTPATIENT
Start: 2025-02-06

## 2025-02-06 RX ORDER — NICOTINE 21 MG/24HR
1 PATCH, TRANSDERMAL 24 HOURS TRANSDERMAL DAILY
Qty: 30 PATCH | Refills: 1 | Status: SHIPPED | OUTPATIENT
Start: 2025-02-06 | End: 2025-04-07

## 2025-02-06 RX ORDER — PLECANATIDE 3 MG/1
1 TABLET ORAL DAILY
Qty: 30 TABLET | Refills: 2 | Status: SHIPPED | OUTPATIENT
Start: 2025-02-06

## 2025-02-06 RX ORDER — KETOROLAC TROMETHAMINE 30 MG/ML
30 INJECTION, SOLUTION INTRAMUSCULAR; INTRAVENOUS ONCE
Qty: 1 ML | Refills: 0 | Status: CANCELLED
Start: 2025-02-06 | End: 2025-02-06

## 2025-02-06 RX ORDER — CIPROFLOXACIN 500 MG/1
500 TABLET, FILM COATED ORAL 2 TIMES DAILY
Qty: 14 TABLET | Refills: 0 | Status: SHIPPED | OUTPATIENT
Start: 2025-02-06 | End: 2025-02-13

## 2025-02-06 RX ADMIN — DEXAMETHASONE SODIUM PHOSPHATE 10 MG: 10 INJECTION INTRAMUSCULAR; INTRAVENOUS at 17:54

## 2025-02-06 RX ADMIN — KETOROLAC TROMETHAMINE 30 MG: 30 INJECTION, SOLUTION INTRAMUSCULAR; INTRAVENOUS at 17:56

## 2025-02-06 SDOH — ECONOMIC STABILITY: FOOD INSECURITY: WITHIN THE PAST 12 MONTHS, YOU WORRIED THAT YOUR FOOD WOULD RUN OUT BEFORE YOU GOT MONEY TO BUY MORE.: NEVER TRUE

## 2025-02-06 SDOH — ECONOMIC STABILITY: FOOD INSECURITY: WITHIN THE PAST 12 MONTHS, THE FOOD YOU BOUGHT JUST DIDN'T LAST AND YOU DIDN'T HAVE MONEY TO GET MORE.: NEVER TRUE

## 2025-02-06 ASSESSMENT — PATIENT HEALTH QUESTIONNAIRE - PHQ9
3. TROUBLE FALLING OR STAYING ASLEEP: NEARLY EVERY DAY
9. THOUGHTS THAT YOU WOULD BE BETTER OFF DEAD, OR OF HURTING YOURSELF: NOT AT ALL
8. MOVING OR SPEAKING SO SLOWLY THAT OTHER PEOPLE COULD HAVE NOTICED. OR THE OPPOSITE, BEING SO FIGETY OR RESTLESS THAT YOU HAVE BEEN MOVING AROUND A LOT MORE THAN USUAL: SEVERAL DAYS
SUM OF ALL RESPONSES TO PHQ QUESTIONS 1-9: 15
5. POOR APPETITE OR OVEREATING: MORE THAN HALF THE DAYS
SUM OF ALL RESPONSES TO PHQ9 QUESTIONS 1 & 2: 6
7. TROUBLE CONCENTRATING ON THINGS, SUCH AS READING THE NEWSPAPER OR WATCHING TELEVISION: NOT AT ALL
4. FEELING TIRED OR HAVING LITTLE ENERGY: NEARLY EVERY DAY
SUM OF ALL RESPONSES TO PHQ QUESTIONS 1-9: 15
2. FEELING DOWN, DEPRESSED OR HOPELESS: NEARLY EVERY DAY
1. LITTLE INTEREST OR PLEASURE IN DOING THINGS: NEARLY EVERY DAY
10. IF YOU CHECKED OFF ANY PROBLEMS, HOW DIFFICULT HAVE THESE PROBLEMS MADE IT FOR YOU TO DO YOUR WORK, TAKE CARE OF THINGS AT HOME, OR GET ALONG WITH OTHER PEOPLE: VERY DIFFICULT
6. FEELING BAD ABOUT YOURSELF - OR THAT YOU ARE A FAILURE OR HAVE LET YOURSELF OR YOUR FAMILY DOWN: NOT AT ALL
SUM OF ALL RESPONSES TO PHQ QUESTIONS 1-9: 15
SUM OF ALL RESPONSES TO PHQ QUESTIONS 1-9: 15

## 2025-02-06 NOTE — PROGRESS NOTES
Reason For Visit:   Follow up for Back pain and anxiety.     Combined HPI and A/P:      Diagnosis Orders   1. Current moderate episode of major depressive disorder without prior episode (HCC)  ARIPiprazole (ABILIFY) 5 MG tablet      2. Lumbar radiculopathy  oxyCODONE-acetaminophen (PERCOCET)  MG per tablet    dexAMETHasone (DECADRON) injection 10 mg    ketorolac (TORADOL) injection 30 mg      3. Cystitis  phenazopyridine (PYRIDIUM) 200 MG tablet    ciprofloxacin (CIPRO) 500 MG tablet    POCT Urinalysis no Micro    Culture, Urine      4. Generalized anxiety disorder with panic attacks  ALPRAZolam (XANAX) 1 MG tablet      5. Chronic idiopathic constipation  Plecanatide (TRULANCE) 3 MG TABS      6. Chronic migraine with aura without status migrainosus, not intractable  Ubrogepant (UBRELVY) 50 MG TABS          1.) Depression:       - This is a chronic, worsening problem. She feels sad daily. She has decreased energy. She was taking Cymbalta in the past, but stopped taking this several months ago. I will prescribe Abilify 5 mg 1 tab daily.     2.) Lower Back Pain:       - This is a chronic, worsening problem. She has chronic pain, but recent fell 2 days ago. She is having increased pain of her lower back and right leg. She received a Toradol injection and Decadron injection in office today. I will continue the Percocet at the current dose.     3.) Cystitis:       - This is a new, acute problem. She is having dysuria. She denies hematuria, fever, or chills. Her UA shows signs of a cystitis. I will send this for a culture. I will prescribe Cipro, as she is allergic to PCN and Bactrim.     4.) Anxiety:       -This is a chronic stable problem.  The patient is currently taking Xanax 1 mg 1 tablet 3 times daily.  She feels that this works well to help with her anxiety and panic attacks.  She denies any side effects on the Xanax.  I will continue this at the current dose         Return in about 3 months (around

## 2025-02-06 NOTE — PROGRESS NOTES
"    Subjective:     Encounter Date:02/06/2025      Patient ID: Milagros Parson is a 64 y.o. female.    Chief Complaint:\"knee pain\"  Coronary Artery Disease  Presents for follow-up visit. Pertinent negatives include no chest pain, dizziness, leg swelling, palpitations, shortness of breath or weight gain. Risk factors include hyperlipidemia. The symptoms have been stable.   Hypertension  This is a chronic problem. The current episode started more than 1 year ago. The problem has been stable since onset. The problem is controlled. Pertinent negatives include no chest pain, malaise/fatigue, orthopnea, palpitations, PND or shortness of breath.   Hyperlipidemia  Pertinent negatives include no chest pain or shortness of breath.     Patient presents today as a follow up. She was initially seen in 4/2024 after she was [place] [facility] [place] [facility] by Dr. Quezada after presenting with complaints of substernal chest discomfort with radiation to her neck with an elevated troponin. She underwent a LHC, per interventional cardiologist, with angiography revealing multivessel disease. CT surgery was consulted and she ultimately underwent 4v CABG (LIMA-LAD, JONATHON-RCA, SVG-diagonal and SVG-OM) per Dr. Lutz on 5/1/24. She was noted to have torsades during surgery therefore was placed on Amiodarone for 3 weeks post-op. She has also had issues with sternal wound healing.    She presents today with her daughter. She reports her only pain is in her right knee which needed to be replaced prior to her bypass surgery. She has stopped her lopressor due to low BP with associated dizziness and her BP has improved and dizziness has subsided. She has also stopped lipitor due to muscle cramps. She complains of bruising however reports she had a fall a few days ago which caused the bruising. She continues to smoke. She denies chest pain, palpitations and dyspnea.       The following portions of the patient's history were reviewed and updated as " appropriate: allergies, current medications, past family history, past medical history, past social history, past surgical history and problem list.    Allergies   Allergen Reactions    Bactrim [Sulfamethoxazole-Trimethoprim] Anaphylaxis and Rash    Penicillins Anaphylaxis and Rash     Reaction: rash  Reaction: rash      Morphine Itching    Sulfa Antibiotics GI Intolerance     Reaction: GI upset  Reaction: GI upset       Current Outpatient Medications:     ALPRAZolam (XANAX) 1 MG tablet, Take 1 tablet by mouth 4 (Four) Times a Day As Needed for Anxiety., Disp: , Rfl:     aspirin 81 MG EC tablet, Take 1 tablet by mouth Daily., Disp: 30 tablet, Rfl: 2    Blood Glucose Monitoring Suppl (ONE TOUCH ULTRA 2) w/Device kit, , Disp: , Rfl:     clopidogrel (PLAVIX) 75 MG tablet, Take 1 tablet by mouth Daily., Disp: , Rfl:     cyclobenzaprine (FLEXERIL) 10 MG tablet, Take 1 tablet by mouth 3 (Three) Times a Day As Needed for Muscle Spasms., Disp: , Rfl:     doxycycline (VIBRAMYCIN) 100 MG capsule, Take 1 capsule by mouth Daily., Disp: , Rfl:     furosemide (LASIX) 40 MG tablet, Take 1 tablet by mouth Daily As Needed (weight gain or swelling)., Disp: , Rfl:     gabapentin (NEURONTIN) 300 MG capsule, Take 1 capsule by mouth 4 (Four) Times a Day. Take one tablet in the morning and at noon, then take 2 tablets at night. Max 4 tablets per day., Disp: , Rfl:     Insulin Aspart (novoLOG) 100 UNIT/ML injection, Inject 1-10 Units under the skin into the appropriate area as directed 3 (Three) Times a Day Before Meals. Pt uses sliding scale, Disp: 6 mL, Rfl: 11    Lancets (OneTouch Delica Plus [id number]) misc, , Disp: , Rfl:     levothyroxine (SYNTHROID, LEVOTHROID) 150 MCG tablet, Take 1 tablet by mouth Daily., Disp: , Rfl:     lisinopril (PRINIVIL,ZESTRIL) 5 MG tablet, Take 0.5 tablets by mouth Daily. (Patient taking differently: Take 1 tablet by mouth Daily.), Disp: 45 tablet, Rfl: 2    meloxicam (MOBIC) 15 MG tablet, , Disp: , Rfl:      OneTouch Ultra Test test strip, , Disp: , Rfl:     pantoprazole (PROTONIX) 40 MG EC tablet, Take 1 tablet by mouth Daily., Disp: , Rfl:     pioglitazone (ACTOS) 15 MG tablet, Take 1 tablet by mouth Daily., Disp: , Rfl:     polyethylene glycol (MIRALAX) 17 GM/SCOOP powder, Take 17 g by mouth Daily As Needed (constipation)., Disp: , Rfl:     potassium chloride 10 MEQ CR tablet, Takes when taking Lasix, Disp: , Rfl:     promethazine (PHENERGAN) 25 MG tablet, Take 1 tablet by mouth., Disp: , Rfl:     Semaglutide,0.25 or 0.5MG/DOS, (Ozempic, 0.25 or 0.5 MG/DOSE,) 2 MG/1.5ML solution pen-injector, Inject 0.5 mg under the skin into the appropriate area as directed 1 (One) Time Per Week., Disp: , Rfl:     Trulance 3 MG tablet, Take 1 tablet by mouth., Disp: , Rfl:     betamethasone, augmented, (DIPROLENE) 0.05 % cream, Apply 1 Application topically to the appropriate area as directed. (Patient not taking: Reported on 2/6/2025), Disp: , Rfl:     DULoxetine (CYMBALTA) 30 MG capsule, Take 1 capsule by mouth Daily. (Patient not taking: Reported on 2/6/2025), Disp: , Rfl:     Insulin Glargine (BASAGLAR KWIKPEN) 100 UNIT/ML injection pen, Inject 35 Units under the skin into the appropriate area as directed Every Night. (Patient not taking: Reported on 2/6/2025), Disp: 6 mL, Rfl: 11    naloxone (NARCAN) 4 MG/0.1ML nasal spray, Administer 1 spray into the nostril(s) as directed by provider Daily As Needed for Opioid Reversal. (Patient not taking: Reported on 2/6/2025), Disp: , Rfl:     NON FORMULARY, Topical Gabapentin Compound from hospitals [facility] (Patient not taking: Reported on 2/6/2025), Disp: , Rfl:     rosuvastatin (CRESTOR) 20 MG tablet, Take 1 tablet by mouth Daily., Disp: 30 tablet, Rfl: 11  Past Medical History:   Diagnosis Date    Arthritis     Atrial septal defect, secundum     Chest pain 11/17/2016    CKD (chronic kidney disease), stage II 01/25/2018    Diabetes mellitus     DIABETES , UNCOMPLICATED, TYPE  II     Diabetic polyneuropathy associated with type 2 diabetes mellitus 2018    Disease of thyroid gland     GERD (gastroesophageal reflux disease)     HLD (hyperlipidemia) 2016    HTN (hypertension) 2016    Hyperlipidemia, mixed     Hypertension associated with diabetes 2018    Hypertension, benign     Low back pain     Melanoma of lower leg, right 2018    Mixed diabetic hyperlipidemia associated with type 2 diabetes mellitus 2018    Nonspecific chest pain     Proliferative diabetic retinopathy of both eyes associated with type 2 diabetes mellitus 2018    Stroke 2015    SHORT-TERM MEMORY LOSS, L FACIAL WEAKNESS    Type 2 diabetes mellitus with hyperglycemia, with long-term current use of insulin 2018       Social History     Socioeconomic History    Marital status:    Tobacco Use    Smoking status: Every Day     Current packs/day: 0.00     Average packs/day: 1 pack/day for 36.3 years (36.3 ttl pk-yrs)     Types: Cigarettes     Start date:      Last attempt to quit: 2024     Years since quittin.7     Passive exposure: Current    Smokeless tobacco: Never   Vaping Use    Vaping status: Former    Substances: Nicotine, Flavoring    Devices: Disposable    Passive vaping exposure: Yes   Substance and Sexual Activity    Alcohol use: Not Currently     Comment: Occ    Drug use: No    Sexual activity: Defer       Review of Systems   Constitutional: Negative for malaise/fatigue, weight gain and weight loss.   Cardiovascular:  Negative for chest pain, dyspnea on exertion, irregular heartbeat, leg swelling, near-syncope, orthopnea, palpitations, paroxysmal nocturnal dyspnea and syncope.   Respiratory:  Negative for cough, shortness of breath, sleep disturbances due to breathing, sputum production and wheezing.    Skin:  Negative for dry skin, flushing, itching and rash.   Musculoskeletal:  Positive for joint pain.   Gastrointestinal:  Negative for hematemesis  "and hematochezia.   Neurological:  Negative for dizziness, light-headedness, loss of balance and weakness.   All other systems reviewed and are negative.         Objective:     Vitals reviewed.   Constitutional:       General: Not in acute distress.     Appearance: Healthy appearance. Well-developed. Not diaphoretic.   Eyes:      General: No scleral icterus.     Conjunctiva/sclera: Conjunctivae normal.      Pupils: Pupils are equal, round, and reactive to light.   HENT:      Head: Normocephalic.    Mouth/Throat:      Pharynx: No oropharyngeal exudate.   Neck:      Vascular: No JVR.   Pulmonary:      Effort: Pulmonary effort is normal. No respiratory distress.      Breath sounds: Normal breath sounds. No wheezing. No rhonchi. No rales.   Chest:      Chest wall: Not tender to palpatation.   Cardiovascular:      Normal rate. Regular rhythm.   Pulses:     Intact distal pulses.   Edema:     Peripheral edema absent.   Abdominal:      General: Bowel sounds are normal. There is no distension.      Palpations: Abdomen is soft.      Tenderness: There is no abdominal tenderness.   Musculoskeletal: Normal range of motion.      Cervical back: Normal range of motion and neck supple. Skin:     General: Skin is warm and dry.      Coloration: Skin is not pale.      Findings: No erythema or rash.   Neurological:      Mental Status: Alert, oriented to person, place, and time and oriented to person, place and time.      Deep Tendon Reflexes: Reflexes are normal and symmetric.   Psychiatric:         Behavior: Behavior normal.           Procedures  /70 (BP Location: Right arm, Patient Position: Sitting, Cuff Size: Adult)   Pulse 60   Ht 167.6 cm (66\")   Wt 83 kg (183 lb)   SpO2 96%   BMI 29.54 kg/m²     Lab Review:   I have reviewed previous office notes, recent labs and recent cardiac testing.     Lab Results   Component Value Date    CHOL 162 04/30/2024    TRIG 125 04/30/2024    HDL 32 (L) 04/30/2024     (H) " 04/30/2024     Results for orders placed during the hospital encounter of 04/24/24    Intra-Op TAVR Transesophageal Echo    Interpretation Summary    Left ventricular systolic function is normal.    Previous ASD closure device is visualized and appears in good position.    Conclusion 4/24/2024     Distal left main has approximately 40 to 50% stenosis  Ostial left main coronary artery has approximate 40% stenosis  Proximal left anterior descending coronary artery has sequential 60% stenosis  Mid left anterior descending coronary artery has a 95% stenosis  First diagonal branch has ostial 90% stenosis  Ostial and proximal left circumflex coronary artery has 70% stenosis  Proximal right coronary artery has 95% stenosis  No obstructive disease noted of the distal right coronary artery  No obvious obstructive disease of right posterior lateral or right posterior descending coronary artery  Patient is overall very intolerant to cath repeatedly complaining of discomfort and anxiety     Bilateral subclavian arteries are patent  Patent right internal mammary artery  Left internal mammary artery is not well-visualized         Assessment:          Diagnosis Plan   1. Coronary artery disease involving native coronary artery of native heart without angina pectoris        2. Primary hypertension        3. Hyperlipidemia LDL goal <70        4. Type 2 diabetes mellitus with other circulatory complication, with long-term current use of insulin        5. Tobacco use                 Plan:       1 CAD- stable. S/p 4v CABG in 5/2024. Continue ASA 81mg daily, Plavix 75mg daily, Lisinopril 5mg daily. Will change lipitor to crestor 20mg daily. Should she have issues with myalgias will consider PCSK9i or Leqvio. Ok to remain off lopressor due to low BP.    2, HTN- controlled. Had issues with low BP while taking lopressor. Has stabilized with cessation of lopressor  3. HLD- last LDL was in 4/2024 and elevated. Was started on lipitor but has  since stopped lipitor. Starting Crestor 20mg daily. Will need repeat lipid panel in 3 months if she is able to tolerate crestor.   4. DM2 with CAD- being managed per endocrinology.   5.Tobacco use- Milagros Parson  reports that she has been smoking cigarettes. She started smoking about 37 years ago. She has a 36.3 pack-year smoking history. She has been exposed to tobacco smoke. She has never used smokeless tobacco. I have educated her on the risk of diseases from using tobacco products such as cancer, COPD, and heart disease.     I advised her to quit and she is not willing to quit.    I spent 3  minutes counseling the patient.             Follow up in 6 months or sooner if symptoms worsen.     I spent 30 minutes caring for Milagros on this date of service. This time includes time spent by me in the following activities:preparing for the visit, reviewing tests, obtaining and/or reviewing a separately obtained history, performing a medically appropriate examination and/or evaluation , counseling and educating the patient/family/caregiver, ordering medications, tests, or procedures, and documenting information in the medical record

## 2025-02-06 NOTE — PROGRESS NOTES
Medication was administered by Caroline Emmanuel MA at 5:54 PM.    Medication: dexamethasone  Amount: 10mg  Route: intramuscular  Site: Dorsogluteal left    Patient tolerated well.    Medication was administered by Caroline Emmanuel MA at 5:55 PM.      Patient tolerated well.

## 2025-02-07 NOTE — PROGRESS NOTES
Spoke with patient and [facility] in [place] the lift will cost patient $1,100 out of pocket. I informed patient if she wanted to check with insurance that might not be as much somewhere else

## 2025-02-08 LAB — BACTERIA UR CULT: NORMAL

## 2025-02-13 ASSESSMENT — ENCOUNTER SYMPTOMS
COUGH: 0
TROUBLE SWALLOWING: 0
BACK PAIN: 1
NAUSEA: 0
CONSTIPATION: 1
VOMITING: 0
SHORTNESS OF BREATH: 0
DIARRHEA: 0
ABDOMINAL PAIN: 0
WHEEZING: 0

## 2025-02-25 ENCOUNTER — TELEPHONE (OUTPATIENT)
Age: 65
End: 2025-02-25

## 2025-02-25 DIAGNOSIS — G43.E09 CHRONIC MIGRAINE WITH AURA WITHOUT STATUS MIGRAINOSUS, NOT INTRACTABLE: ICD-10-CM

## 2025-02-25 NOTE — TELEPHONE ENCOUNTER
Patient is requesting to speak with dr [facility] in regards to her right knee. She states she was going to have surgery last year and had a heart attack and had to have open heart surgery so she wasn't able to have the surgery.    She wants to speak with someone to see what she can do for the pain and to see if she can speak to someone to get an appointment for an injection.    She also stated back then there was a knee made for her last year

## 2025-02-25 NOTE — TELEPHONE ENCOUNTER
Patient states ubrelvy 50 is owrking well for her and would like a prescription sent to her pharmacy. Patient also states she is still having uti symptoms after finishing her antibiotic a few days ago. Still has burning and  frequency, back pain now also?    Pharmacy is [facility]

## 2025-02-26 RX ORDER — UBROGEPANT 50 MG/1
50 TABLET ORAL DAILY
Qty: 6 TABLET | Refills: 0 | Status: SHIPPED | OUTPATIENT
Start: 2025-02-26

## 2025-03-04 ENCOUNTER — TELEPHONE (OUTPATIENT)
Dept: PRIMARY CARE CLINIC | Age: 65
End: 2025-03-04

## 2025-03-04 DIAGNOSIS — F41.1 GENERALIZED ANXIETY DISORDER WITH PANIC ATTACKS: ICD-10-CM

## 2025-03-04 DIAGNOSIS — M54.16 LUMBAR RADICULOPATHY: ICD-10-CM

## 2025-03-04 DIAGNOSIS — F41.0 GENERALIZED ANXIETY DISORDER WITH PANIC ATTACKS: ICD-10-CM

## 2025-03-04 NOTE — TELEPHONE ENCOUNTER
Sierra AGUIRRE Shan called to request a refill on her medication.      Last office visit : 2/6/2025   Next office visit : Visit date not found     Last UDS:   Benzodiazepine Screen, Urine   Date Value Ref Range Status   10/20/2023 positive  Final     Comment:     Pt takes Xanax 1 mg     Buprenorphine Urine   Date Value Ref Range Status   10/20/2023 neg  Final     Cocaine Metabolite Screen, Urine   Date Value Ref Range Status   10/20/2023 neg  Final     Gabapentin Screen, Urine   Date Value Ref Range Status   10/20/2023 neg  Final     Oxycodone Screen, Ur   Date Value Ref Range Status   10/20/2023 neg  Final     Propoxyphene Screen, Urine   Date Value Ref Range Status   10/20/2023 neg  Final     THC Screen, Urine   Date Value Ref Range Status   10/20/2023 neg  Final     Tricyclic Antidepressants, Urine   Date Value Ref Range Status   10/20/2023 neg  Final       Last Yemi: 01/03/25  Medication Contract:  10/15/24   Last Fill: 02/06/25    Requested Prescriptions     Pending Prescriptions Disp Refills    ALPRAZolam (XANAX) 1 MG tablet 120 tablet 2     Sig: TAKE ONE TABLET 4 TIMES DAILY AS NEEDED FOR ANXIETY . . .MAY CAUSE DROWSINESS!!    oxyCODONE-acetaminophen (PERCOCET)  MG per tablet 120 tablet 0     Sig: Take 1 tablet by mouth every 6 hours as needed for Pain for up to 30 days. Intended supply: 30 days Max Daily Amount: 4 tablets         Please approve or refuse this medication.   Day Rae MA

## 2025-03-06 DIAGNOSIS — M54.16 LUMBAR RADICULOPATHY: ICD-10-CM

## 2025-03-06 RX ORDER — OXYCODONE AND ACETAMINOPHEN 10; 325 MG/1; MG/1
1 TABLET ORAL EVERY 6 HOURS PRN
Qty: 120 TABLET | Refills: 0 | Status: SHIPPED | OUTPATIENT
Start: 2025-03-06 | End: 2025-04-05

## 2025-03-06 NOTE — TELEPHONE ENCOUNTER
Sierra AGUIRRE Shan called to request a refill on her medication.      Last office visit : 2/6/2025   Next office visit : Visit date not found     Last UDS:   Benzodiazepine Screen, Urine   Date Value Ref Range Status   10/20/2023 positive  Final     Comment:     Pt takes Xanax 1 mg     Buprenorphine Urine   Date Value Ref Range Status   10/20/2023 neg  Final     Cocaine Metabolite Screen, Urine   Date Value Ref Range Status   10/20/2023 neg  Final     Gabapentin Screen, Urine   Date Value Ref Range Status   10/20/2023 neg  Final     Oxycodone Screen, Ur   Date Value Ref Range Status   10/20/2023 neg  Final     Propoxyphene Screen, Urine   Date Value Ref Range Status   10/20/2023 neg  Final     THC Screen, Urine   Date Value Ref Range Status   10/20/2023 neg  Final     Tricyclic Antidepressants, Urine   Date Value Ref Range Status   10/20/2023 neg  Final       Last Yemi:  01/03/25  Medication Contract:10/15/24     Last Fill: 02/06/25    Requested Prescriptions     Pending Prescriptions Disp Refills    oxyCODONE-acetaminophen (PERCOCET)  MG per tablet [Pharmacy Med Name: OXYCODONE-ACET 10-325MG  Tablet] 120 tablet 0     Sig: Take 1 tablet by mouth every 6 hours as needed for Pain for up to 30 days. Intended supply: 30 days Max Daily Amount: 4 tablets         Please approve or refuse this medication.   Day Rae MA

## 2025-03-16 RX ORDER — ALPRAZOLAM 1 MG/1
TABLET ORAL
Qty: 120 TABLET | Refills: 2 | Status: SHIPPED | OUTPATIENT
Start: 2025-03-16 | End: 2025-04-27

## 2025-03-16 RX ORDER — OXYCODONE AND ACETAMINOPHEN 10; 325 MG/1; MG/1
1 TABLET ORAL EVERY 6 HOURS PRN
Qty: 120 TABLET | Refills: 0 | Status: SHIPPED | OUTPATIENT
Start: 2025-03-16 | End: 2025-04-15

## 2025-03-24 DIAGNOSIS — M54.16 LUMBAR RADICULOPATHY: ICD-10-CM

## 2025-03-26 NOTE — TELEPHONE ENCOUNTER
Sierra AGUIRRE Shan called to request a refill on her medication.      Last office visit : 2/6/2025   Next office visit : Visit date not found     Last UDS:   Benzodiazepine Screen, Urine   Date Value Ref Range Status   10/20/2023 positive  Final     Comment:     Pt takes Xanax 1 mg     Buprenorphine Urine   Date Value Ref Range Status   10/20/2023 neg  Final     Cocaine Metabolite Screen, Urine   Date Value Ref Range Status   10/20/2023 neg  Final     Gabapentin Screen, Urine   Date Value Ref Range Status   10/20/2023 neg  Final     Oxycodone Screen, Ur   Date Value Ref Range Status   10/20/2023 neg  Final     Propoxyphene Screen, Urine   Date Value Ref Range Status   10/20/2023 neg  Final     THC Screen, Urine   Date Value Ref Range Status   10/20/2023 neg  Final     Tricyclic Antidepressants, Urine   Date Value Ref Range Status   10/20/2023 neg  Final       Last Yemi: 03/16/25  Medication Contract:   10/15/24  Last Fill: 11/22/24    Requested Prescriptions     Pending Prescriptions Disp Refills    gabapentin (NEURONTIN) 300 MG capsule [Pharmacy Med Name: GABAPENTIN 300 MG CAPS 300 Capsule] 120 capsule 1     Sig: TAKE ONE TABLET IN THE MORNING AND AT NOON, THEN TAKE 2 TABLETS AT NIGHT.         Please approve or refuse this medication.   Day Rae MA

## 2025-03-27 DIAGNOSIS — G43.E09 CHRONIC MIGRAINE WITH AURA WITHOUT STATUS MIGRAINOSUS, NOT INTRACTABLE: ICD-10-CM

## 2025-03-28 RX ORDER — GABAPENTIN 300 MG/1
CAPSULE ORAL
Qty: 120 CAPSULE | Refills: 2 | Status: SHIPPED | OUTPATIENT
Start: 2025-03-28 | End: 2025-04-26

## 2025-03-28 RX ORDER — UBROGEPANT 50 MG/1
1 TABLET ORAL DAILY
Qty: 10 TABLET | Refills: 0 | Status: SHIPPED | OUTPATIENT
Start: 2025-03-28

## 2025-03-28 NOTE — TELEPHONE ENCOUNTER
Sierra called requesting a refill of the below medication which has been pended for you:     Requested Prescriptions     Pending Prescriptions Disp Refills    UBRELVY 50 MG TABS [Pharmacy Med Name: UBRELVY 50 MG TABS 50 Tablet] 10 tablet 0     Sig: TAKE ONE TABLET DAILY       Last Appointment Date: 2/6/2025  Next Appointment Date: Visit date not found    Allergies   Allergen Reactions    Penicillins Anaphylaxis and Rash     Reaction: rash    Other Reaction(s): ENCOUNTERDATE:2/16/2018    Sulfamethoxazole-Trimethoprim Anaphylaxis and Rash    Ibuprofen      Other Reaction(s): ENCOUNTERDATE:2/16/2018    Sulfa Antibiotics      Reaction: GI upset    Codeine Nausea And Vomiting    Morphine Itching

## 2025-04-01 DIAGNOSIS — M54.16 LUMBAR RADICULOPATHY: ICD-10-CM

## 2025-04-02 ENCOUNTER — TELEPHONE (OUTPATIENT)
Dept: PRIMARY CARE CLINIC | Age: 65
End: 2025-04-02

## 2025-04-02 RX ORDER — OXYCODONE AND ACETAMINOPHEN 10; 325 MG/1; MG/1
1 TABLET ORAL EVERY 6 HOURS PRN
Qty: 120 TABLET | Refills: 0 | Status: SHIPPED | OUTPATIENT
Start: 2025-04-02 | End: 2025-05-02

## 2025-04-02 NOTE — TELEPHONE ENCOUNTER
Pt called and stated they need the oxyCODONE-acetaminophen (PERCOCET)  MG per tablet refilled and sent to [facility]

## 2025-04-02 NOTE — TELEPHONE ENCOUNTER
Sierra AGUIRRE Shan called to request a refill on her medication.      Last office visit : 2/6/2025   Next office visit : 4/2/2025     Last UDS:   Benzodiazepine Screen, Urine   Date Value Ref Range Status   10/20/2023 positive  Final     Comment:     Pt takes Xanax 1 mg     Buprenorphine Urine   Date Value Ref Range Status   10/20/2023 neg  Final     Cocaine Metabolite Screen, Urine   Date Value Ref Range Status   10/20/2023 neg  Final     Gabapentin Screen, Urine   Date Value Ref Range Status   10/20/2023 neg  Final     Oxycodone Screen, Ur   Date Value Ref Range Status   10/20/2023 neg  Final     Propoxyphene Screen, Urine   Date Value Ref Range Status   10/20/2023 neg  Final     THC Screen, Urine   Date Value Ref Range Status   10/20/2023 neg  Final     Tricyclic Antidepressants, Urine   Date Value Ref Range Status   10/20/2023 neg  Final       Last Yemi: 10/4/24  Medication Contract: 10/15/24   Last Fill: 3/16/25    Requested Prescriptions     Pending Prescriptions Disp Refills    oxyCODONE-acetaminophen (PERCOCET)  MG per tablet [Pharmacy Med Name: OXYCODONE-ACET 10-325MG  Tablet] 120 tablet 0     Sig: Take 1 tablet by mouth every 6 hours as needed for Pain for up to 30 days. Intended supply: 30 days Max Daily Amount: 4 tablets         Please approve or refuse this medication.   Chelsi Amaya MA

## 2025-04-03 DIAGNOSIS — M54.16 LUMBAR RADICULOPATHY: ICD-10-CM

## 2025-04-03 RX ORDER — OXYCODONE AND ACETAMINOPHEN 10; 325 MG/1; MG/1
1 TABLET ORAL EVERY 6 HOURS PRN
Qty: 120 TABLET | Refills: 0 | Status: CANCELLED | OUTPATIENT
Start: 2025-04-03 | End: 2025-05-03

## 2025-04-23 ENCOUNTER — OFFICE VISIT (OUTPATIENT)
Age: 65
End: 2025-04-23
Payer: MEDICARE

## 2025-04-23 VITALS — BODY MASS INDEX: 29.59 KG/M2 | HEIGHT: 65 IN | WEIGHT: 177.6 LBS

## 2025-04-23 DIAGNOSIS — M17.11 PRIMARY OSTEOARTHRITIS OF RIGHT KNEE: ICD-10-CM

## 2025-04-23 DIAGNOSIS — M25.561 RIGHT KNEE PAIN, UNSPECIFIED CHRONICITY: Primary | ICD-10-CM

## 2025-04-23 PROCEDURE — 99214 OFFICE O/P EST MOD 30 MIN: CPT | Performed by: ORTHOPAEDIC SURGERY

## 2025-04-23 PROCEDURE — G8400 PT W/DXA NO RESULTS DOC: HCPCS | Performed by: ORTHOPAEDIC SURGERY

## 2025-04-23 PROCEDURE — 20610 DRAIN/INJ JOINT/BURSA W/O US: CPT | Performed by: ORTHOPAEDIC SURGERY

## 2025-04-23 PROCEDURE — 1123F ACP DISCUSS/DSCN MKR DOCD: CPT | Performed by: ORTHOPAEDIC SURGERY

## 2025-04-23 PROCEDURE — 3017F COLORECTAL CA SCREEN DOC REV: CPT | Performed by: ORTHOPAEDIC SURGERY

## 2025-04-23 PROCEDURE — G8417 CALC BMI ABV UP PARAM F/U: HCPCS | Performed by: ORTHOPAEDIC SURGERY

## 2025-04-23 PROCEDURE — 4004F PT TOBACCO SCREEN RCVD TLK: CPT | Performed by: ORTHOPAEDIC SURGERY

## 2025-04-23 PROCEDURE — 1159F MED LIST DOCD IN RCRD: CPT | Performed by: ORTHOPAEDIC SURGERY

## 2025-04-23 PROCEDURE — G8427 DOCREV CUR MEDS BY ELIG CLIN: HCPCS | Performed by: ORTHOPAEDIC SURGERY

## 2025-04-23 PROCEDURE — 1090F PRES/ABSN URINE INCON ASSESS: CPT | Performed by: ORTHOPAEDIC SURGERY

## 2025-04-23 RX ORDER — TRIAMCINOLONE ACETONIDE 40 MG/ML
40 INJECTION, SUSPENSION INTRA-ARTICULAR; INTRAMUSCULAR ONCE
Status: COMPLETED | OUTPATIENT
Start: 2025-04-23 | End: 2025-04-23

## 2025-04-23 RX ORDER — SEMAGLUTIDE 0.68 MG/ML
INJECTION, SOLUTION SUBCUTANEOUS
COMMUNITY
Start: 2025-04-10

## 2025-04-23 RX ORDER — BUPIVACAINE HYDROCHLORIDE 5 MG/ML
4 INJECTION, SOLUTION PERINEURAL ONCE
Status: COMPLETED | OUTPATIENT
Start: 2025-04-23 | End: 2025-04-23

## 2025-04-23 RX ADMIN — BUPIVACAINE HYDROCHLORIDE 20 MG: 5 INJECTION, SOLUTION PERINEURAL at 15:55

## 2025-04-23 RX ADMIN — TRIAMCINOLONE ACETONIDE 40 MG: 40 INJECTION, SUSPENSION INTRA-ARTICULAR; INTRAMUSCULAR at 15:55

## 2025-04-23 ASSESSMENT — ENCOUNTER SYMPTOMS
EYES NEGATIVE: 1
GASTROINTESTINAL NEGATIVE: 1
ALLERGIC/IMMUNOLOGIC NEGATIVE: 1
RESPIRATORY NEGATIVE: 1

## 2025-04-23 NOTE — PROGRESS NOTES
[facility]  [facility]  [address]  [place] [id number] [phone]       Sierra Torrez (:  1960) is a 65 y.o. female,Established patient, here for evaluation of the following chief complaint(s):  Follow-up (Rt knee pain)        Assessment & Plan  1. Knee pain due to documented bone-on-bone right knee:  Severe knee pain radiating from the knee to the ankle. Last imaging was in 2023. Steroid injection administered today. The risks and benefits of the procedure were explained to the patient including the risk for infection, nerve and artery damage, continued pain, continued decreased range of motion, paresthesias, paralysis, loss of limb, loss of life and the need for surgery, and the patient conveyed their understanding and willingness to proceed.  Under aseptic technique, the right knee was injected using a 21-gauge needle with a solution of 40 mg / 1 cc of Kenalog corticosteroid and 4 cc of half percent bupivacaine local anesthetic.  The patient tolerated the procedure very well.  It is explained to the patient that this injection may be repeated as frequently as every 3 months or less frequently if their symptoms will allow.  They convey their understanding.    Steroid injections and gel shots are recommended for short-term relief while awaiting cardiac clearance. If cardiac clearance is obtained, elective knee replacement surgery may be considered. Monitor blood sugar levels over the next 36 hours due to potential increase from the steroid injection. Follow-up in 3 months to reassess pain and determine further treatment based on cardiac clearance status.    2. Coronary artery disease:  Two residual blockages at 35%. Currently on medication for blockages.    Cardiac clearance is required to determine candidacy for elective arthroplasty. If deemed high risk, elective procedures will not be pursued. Awaiting further

## 2025-04-30 DIAGNOSIS — F41.1 GENERALIZED ANXIETY DISORDER WITH PANIC ATTACKS: ICD-10-CM

## 2025-04-30 DIAGNOSIS — M54.16 LUMBAR RADICULOPATHY: ICD-10-CM

## 2025-04-30 DIAGNOSIS — F41.0 GENERALIZED ANXIETY DISORDER WITH PANIC ATTACKS: ICD-10-CM

## 2025-04-30 RX ORDER — ALPRAZOLAM 1 MG/1
TABLET ORAL
Qty: 120 TABLET | Refills: 3 | OUTPATIENT
Start: 2025-04-30

## 2025-04-30 NOTE — TELEPHONE ENCOUNTER
Sierra AGUIRRE Shan called to request a refill on her medication.      Last office visit : 2/6/2025   Next office visit : 5/5/2025     Last UDS:   Benzodiazepine Screen, Urine   Date Value Ref Range Status   10/20/2023 positive  Final     Comment:     Pt takes Xanax 1 mg     Buprenorphine Urine   Date Value Ref Range Status   10/20/2023 neg  Final     Cocaine Metabolite Screen, Urine   Date Value Ref Range Status   10/20/2023 neg  Final     Gabapentin Screen, Urine   Date Value Ref Range Status   10/20/2023 neg  Final     Oxycodone Screen, Ur   Date Value Ref Range Status   10/20/2023 neg  Final     Propoxyphene Screen, Urine   Date Value Ref Range Status   10/20/2023 neg  Final     THC Screen, Urine   Date Value Ref Range Status   10/20/2023 neg  Final     Tricyclic Antidepressants, Urine   Date Value Ref Range Status   10/20/2023 neg  Final       Last Yemi: 10/4/2024  Medication Contract: 10/15/2024   Last Fill: Xanax 3/16 with 2 refills  Oxycodone 4/2/2025    Requested Prescriptions     Pending Prescriptions Disp Refills    oxyCODONE-acetaminophen (PERCOCET)  MG per tablet [Pharmacy Med Name: OXYCODONE-ACET 10-325MG  Tablet] 120 tablet 1     Sig: TAKE ONE TABLET BY MOUTH EVERY 6 HOURS AS NEEDED . . .MAY CAUSE DROWSINESS!!    ALPRAZolam (XANAX) 1 MG tablet [Pharmacy Med Name: ALPRAZOLAM 1 MG TABS 1 Tablet] 120 tablet 3     Sig: TAKE ONE TABLET 4 TIMES DAILY AS NEEDED FOR ANXIETY . . .MAY CAUSE DROWSINESS!!                           Please approve or refuse this medication.   Gemini Driver LPN

## 2025-05-01 DIAGNOSIS — F41.0 GENERALIZED ANXIETY DISORDER WITH PANIC ATTACKS: ICD-10-CM

## 2025-05-01 DIAGNOSIS — F41.1 GENERALIZED ANXIETY DISORDER WITH PANIC ATTACKS: ICD-10-CM

## 2025-05-01 NOTE — TELEPHONE ENCOUNTER
Sierra AGUIRRE Shan called to request a refill on her medication.      Last office visit : 2/6/2025   Next office visit : 5/5/2025     Last UDS:   Benzodiazepine Screen, Urine   Date Value Ref Range Status   10/20/2023 positive  Final     Comment:     Pt takes Xanax 1 mg     Buprenorphine Urine   Date Value Ref Range Status   10/20/2023 neg  Final     Cocaine Metabolite Screen, Urine   Date Value Ref Range Status   10/20/2023 neg  Final     Gabapentin Screen, Urine   Date Value Ref Range Status   10/20/2023 neg  Final     Oxycodone Screen, Ur   Date Value Ref Range Status   10/20/2023 neg  Final     Propoxyphene Screen, Urine   Date Value Ref Range Status   10/20/2023 neg  Final     THC Screen, Urine   Date Value Ref Range Status   10/20/2023 neg  Final     Tricyclic Antidepressants, Urine   Date Value Ref Range Status   10/20/2023 neg  Final       Last Yemi: 5/1/25  Medication Contract: 10/15/24   Last Fill: 4/8/25    Requested Prescriptions     Pending Prescriptions Disp Refills    ALPRAZolam (XANAX) 1 MG tablet 120 tablet 2     Sig: TAKE ONE TABLET 4 TIMES DAILY AS NEEDED FOR ANXIETY . . .MAY CAUSE DROWSINESS!!                           Please approve or refuse this medication.   Loly Huntley LPN

## 2025-05-03 RX ORDER — OXYCODONE AND ACETAMINOPHEN 10; 325 MG/1; MG/1
1 TABLET ORAL EVERY 6 HOURS PRN
Qty: 120 TABLET | Refills: 0 | Status: SHIPPED | OUTPATIENT
Start: 2025-05-03 | End: 2025-06-02

## 2025-05-03 RX ORDER — ALPRAZOLAM 1 MG/1
TABLET ORAL
Qty: 120 TABLET | Refills: 2 | Status: SHIPPED | OUTPATIENT
Start: 2025-05-08 | End: 2025-06-12

## 2025-05-08 ENCOUNTER — TELEPHONE (OUTPATIENT)
Dept: CARDIOLOGY | Facility: CLINIC | Age: 65
End: 2025-05-08
Payer: MEDICARE

## 2025-05-08 NOTE — TELEPHONE ENCOUNTER
----- Message from Deedee Carreon sent at 5/8/2025  1:36 PM CDT -----  Regarding: RE:  Ok. Let’s start creator 10mg daily for 1 week then increase to 20 mg thereafter  ----- Message -----  From: Martha Smith MA  Sent: 5/7/2025   3:14 PM CDT  To: OWEN Robb    She states she has not been taking it due to leg pain but before we change anything, she wants to try it one more time.  She will call if she has to stop it again.  Martha Smith MA  ----- Message -----  From: Deedee Carreon APRN  Sent: 5/7/2025  12:20 PM CDT  To: Martha Smith MA    Can you see if she is tolerating Crestor and if so I will repeat a lipid panel

## 2025-05-19 ENCOUNTER — APPOINTMENT (OUTPATIENT)
Dept: GENERAL RADIOLOGY | Facility: HOSPITAL | Age: 65
End: 2025-05-19
Payer: MEDICARE

## 2025-05-19 ENCOUNTER — HOSPITAL ENCOUNTER (EMERGENCY)
Facility: HOSPITAL | Age: 65
Discharge: HOME | End: 2025-05-19
Attending: FAMILY MEDICINE | Admitting: FAMILY MEDICINE
Payer: MEDICARE

## 2025-05-19 ENCOUNTER — APPOINTMENT (OUTPATIENT)
Dept: CT IMAGING | Facility: HOSPITAL | Age: 65
End: 2025-05-19
Payer: MEDICARE

## 2025-05-19 ENCOUNTER — APPOINTMENT (OUTPATIENT)
Dept: ULTRASOUND IMAGING | Facility: HOSPITAL | Age: 65
End: 2025-05-19
Payer: MEDICARE

## 2025-05-19 VITALS
HEART RATE: 59 BPM | HEIGHT: 66 IN | BODY MASS INDEX: 29.41 KG/M2 | OXYGEN SATURATION: 99 % | RESPIRATION RATE: 19 BRPM | TEMPERATURE: 98.1 F | DIASTOLIC BLOOD PRESSURE: 76 MMHG | WEIGHT: 182.98 LBS | SYSTOLIC BLOOD PRESSURE: 122 MMHG

## 2025-05-19 DIAGNOSIS — M25.561 CHRONIC PAIN OF RIGHT KNEE: ICD-10-CM

## 2025-05-19 DIAGNOSIS — R07.89 ATYPICAL CHEST PAIN: ICD-10-CM

## 2025-05-19 DIAGNOSIS — G89.29 CHRONIC PAIN OF RIGHT KNEE: ICD-10-CM

## 2025-05-19 DIAGNOSIS — G44.89 OTHER HEADACHE SYNDROME: ICD-10-CM

## 2025-05-19 DIAGNOSIS — R00.2 PALPITATIONS: Primary | ICD-10-CM

## 2025-05-19 LAB
A-A DO2: ABNORMAL
ACETONE BLD QL: NEGATIVE
ALBUMIN SERPL-MCNC: 3.9 G/DL (ref 3.5–5.2)
ALBUMIN/GLOB SERPL: 1.4 G/DL
ALP SERPL-CCNC: 67 U/L (ref 39–117)
ALT SERPL W P-5'-P-CCNC: 8 U/L (ref 1–33)
ANION GAP SERPL CALCULATED.3IONS-SCNC: 9 MMOL/L (ref 5–15)
APTT PPP: 25.3 SECONDS (ref 24.5–36)
AST SERPL-CCNC: 12 U/L (ref 1–32)
ATMOSPHERIC PRESS: 750 MMHG
BASE EXCESS BLDV CALC-SCNC: 3.1 MMOL/L (ref 0–2)
BASOPHILS # BLD AUTO: 0.03 10*3/MM3 (ref 0–0.2)
BASOPHILS NFR BLD AUTO: 0.6 % (ref 0–1.5)
BDY SITE: ABNORMAL
BILIRUB SERPL-MCNC: 0.3 MG/DL (ref 0–1.2)
BODY TEMPERATURE: 37
BUN SERPL-MCNC: 13 MG/DL (ref 8–23)
BUN/CREAT SERPL: 9.4 (ref 7–25)
CA-I BLD-MCNC: ABNORMAL MG/DL
CALCIUM SPEC-SCNC: 9.4 MG/DL (ref 8.6–10.5)
CHLORIDE SERPL-SCNC: 105 MMOL/L (ref 98–107)
CK SERPL-CCNC: 57 U/L (ref 20–180)
CO2 SERPL-SCNC: 26 MMOL/L (ref 22–29)
COHGB MFR BLD: 2.8 % (ref 0–5)
CPAP: ABNORMAL
CREAT SERPL-MCNC: 1.38 MG/DL (ref 0.57–1)
D-LACTATE SERPL-SCNC: 1.3 MMOL/L (ref 0.5–2)
DEPRECATED RDW RBC AUTO: 45.4 FL (ref 37–54)
EGFRCR SERPLBLD CKD-EPI 2021: 42 ML/MIN/1.73
EOSINOPHIL # BLD AUTO: 0.08 10*3/MM3 (ref 0–0.4)
EOSINOPHIL NFR BLD AUTO: 1.6 % (ref 0.3–6.2)
EPAP: ABNORMAL
ERYTHROCYTE [DISTWIDTH] IN BLOOD BY AUTOMATED COUNT: 13.2 % (ref 12.3–15.4)
GAS FLOW AIRWAY: ABNORMAL L/MIN
GEN 5 1HR TROPONIN T REFLEX: <6 NG/L
GLOBULIN UR ELPH-MCNC: 2.8 GM/DL
GLUCOSE BLDA-MCNC: 119 MG/DL (ref 65–99)
GLUCOSE BLDC GLUCOMTR-MCNC: 123 MG/DL (ref 70–130)
GLUCOSE SERPL-MCNC: 119 MG/DL (ref 65–99)
HCO3 BLDV-SCNC: 29.3 MMOL/L (ref 22–28)
HCT VFR BLD AUTO: 39.8 % (ref 34–46.6)
HCT VFR BLD CALC: 40.2 % (ref 38–51)
HGB BLD-MCNC: 12.8 G/DL (ref 12–15.9)
HGB BLDA-MCNC: 13.1 G/DL (ref 12–16)
IMM GRANULOCYTES # BLD AUTO: 0.01 10*3/MM3 (ref 0–0.05)
IMM GRANULOCYTES NFR BLD AUTO: 0.2 % (ref 0–0.5)
INHALED O2 CONCENTRATION: ABNORMAL %
INR PPP: 0.96 (ref 0.91–1.09)
IPAP: ABNORMAL
LACTATE BLDA-SCNC: 0.6 MMOL/L (ref 0.5–2)
LIPASE SERPL-CCNC: 25 U/L (ref 13–60)
LYMPHOCYTES # BLD AUTO: 1.27 10*3/MM3 (ref 0.7–3.1)
LYMPHOCYTES NFR BLD AUTO: 24.9 % (ref 19.6–45.3)
Lab: ABNORMAL
Lab: ABNORMAL
MAGNESIUM SERPL-MCNC: 2 MG/DL (ref 1.6–2.4)
MCH RBC QN AUTO: 30.4 PG (ref 26.6–33)
MCHC RBC AUTO-ENTMCNC: 32.2 G/DL (ref 31.5–35.7)
MCV RBC AUTO: 94.5 FL (ref 79–97)
METHGB BLD QL: 0.5 % (ref 0–3)
MODALITY: ABNORMAL
MONOCYTES # BLD AUTO: 0.38 10*3/MM3 (ref 0.1–0.9)
MONOCYTES NFR BLD AUTO: 7.4 % (ref 5–12)
NEUTROPHILS NFR BLD AUTO: 3.34 10*3/MM3 (ref 1.7–7)
NEUTROPHILS NFR BLD AUTO: 65.3 % (ref 42.7–76)
NITRIC OXIDE: ABNORMAL
NOTE: ABNORMAL
NOTIFIED BY: ABNORMAL
NOTIFIED WHO: ABNORMAL
NRBC BLD AUTO-RTO: 0 /100 WBC (ref 0–0.2)
OXYHGB MFR BLDV: 35.1 % (ref 60–85)
PAW @ PEAK INSP FLOW SETTING VENT: ABNORMAL CM[H2O]
PCO2 BLDV: 50.8 MM HG (ref 41–51)
PEEP RESPIRATORY: ABNORMAL CM[H2O]
PH BLDV: 7.37 PH UNITS (ref 7.32–7.42)
PHOSPHATE SERPL-MCNC: 3.6 MG/DL (ref 2.5–4.5)
PLATELET # BLD AUTO: 174 10*3/MM3 (ref 140–450)
PMV BLD AUTO: 12.2 FL (ref 6–12)
PO2 BLDV: 21.8 MM HG (ref 27–53)
POTASSIUM BLDV-SCNC: 4.1 MMOL/L (ref 3.5–5.2)
POTASSIUM SERPL-SCNC: 4.7 MMOL/L (ref 3.5–5.2)
PROT SERPL-MCNC: 6.7 G/DL (ref 6–8.5)
PROTHROMBIN TIME: 13.3 SECONDS (ref 11.8–14.8)
PSV: ABNORMAL
PULSE OX: ABNORMAL
RBC # BLD AUTO: 4.21 10*6/MM3 (ref 3.77–5.28)
SAO2 % BLDCOV: 36.3 % (ref 45–75)
SET MECH RESP RATE: ABNORMAL
SODIUM BLDV-SCNC: 142 MMOL/L (ref 136–145)
SODIUM SERPL-SCNC: 140 MMOL/L (ref 136–145)
TOTAL RATE: ABNORMAL
TROPONIN T NUMERIC DELTA: NORMAL
TROPONIN T SERPL HS-MCNC: 6 NG/L
VENTILATOR MODE: ABNORMAL
VOLUME % O2: ABNORMAL
VT ON VENT VENT: ABNORMAL ML
WBC NRBC COR # BLD AUTO: 5.11 10*3/MM3 (ref 3.4–10.8)

## 2025-05-19 PROCEDURE — 82550 ASSAY OF CK (CPK): CPT | Performed by: FAMILY MEDICINE

## 2025-05-19 PROCEDURE — 25810000003 LACTATED RINGERS SOLUTION: Performed by: FAMILY MEDICINE

## 2025-05-19 PROCEDURE — 82947 ASSAY GLUCOSE BLOOD QUANT: CPT

## 2025-05-19 PROCEDURE — 93971 EXTREMITY STUDY: CPT

## 2025-05-19 PROCEDURE — 71045 X-RAY EXAM CHEST 1 VIEW: CPT

## 2025-05-19 PROCEDURE — 93005 ELECTROCARDIOGRAM TRACING: CPT | Performed by: FAMILY MEDICINE

## 2025-05-19 PROCEDURE — 85730 THROMBOPLASTIN TIME PARTIAL: CPT | Performed by: FAMILY MEDICINE

## 2025-05-19 PROCEDURE — 82330 ASSAY OF CALCIUM: CPT

## 2025-05-19 PROCEDURE — 85025 COMPLETE CBC W/AUTO DIFF WBC: CPT | Performed by: FAMILY MEDICINE

## 2025-05-19 PROCEDURE — 82009 KETONE BODYS QUAL: CPT | Performed by: FAMILY MEDICINE

## 2025-05-19 PROCEDURE — 85018 HEMOGLOBIN: CPT

## 2025-05-19 PROCEDURE — 83605 ASSAY OF LACTIC ACID: CPT

## 2025-05-19 PROCEDURE — 84132 ASSAY OF SERUM POTASSIUM: CPT

## 2025-05-19 PROCEDURE — 70450 CT HEAD/BRAIN W/O DYE: CPT

## 2025-05-19 PROCEDURE — 83605 ASSAY OF LACTIC ACID: CPT | Performed by: FAMILY MEDICINE

## 2025-05-19 PROCEDURE — 83735 ASSAY OF MAGNESIUM: CPT | Performed by: FAMILY MEDICINE

## 2025-05-19 PROCEDURE — 25010000002 ONDANSETRON PER 1 MG: Performed by: FAMILY MEDICINE

## 2025-05-19 PROCEDURE — 80053 COMPREHEN METABOLIC PANEL: CPT | Performed by: FAMILY MEDICINE

## 2025-05-19 PROCEDURE — 99284 EMERGENCY DEPT VISIT MOD MDM: CPT | Performed by: FAMILY MEDICINE

## 2025-05-19 PROCEDURE — 82948 REAGENT STRIP/BLOOD GLUCOSE: CPT

## 2025-05-19 PROCEDURE — 83690 ASSAY OF LIPASE: CPT | Performed by: FAMILY MEDICINE

## 2025-05-19 PROCEDURE — 84100 ASSAY OF PHOSPHORUS: CPT | Performed by: FAMILY MEDICINE

## 2025-05-19 PROCEDURE — 85610 PROTHROMBIN TIME: CPT | Performed by: FAMILY MEDICINE

## 2025-05-19 PROCEDURE — 84484 ASSAY OF TROPONIN QUANT: CPT | Performed by: FAMILY MEDICINE

## 2025-05-19 PROCEDURE — 82805 BLOOD GASES W/O2 SATURATION: CPT

## 2025-05-19 PROCEDURE — 84295 ASSAY OF SERUM SODIUM: CPT

## 2025-05-19 PROCEDURE — 96374 THER/PROPH/DIAG INJ IV PUSH: CPT

## 2025-05-19 RX ORDER — ONDANSETRON 2 MG/ML
4 INJECTION INTRAMUSCULAR; INTRAVENOUS ONCE
Status: COMPLETED | OUTPATIENT
Start: 2025-05-19 | End: 2025-05-19

## 2025-05-19 RX ORDER — SODIUM CHLORIDE 0.9 % (FLUSH) 0.9 %
10 SYRINGE (ML) INJECTION AS NEEDED
Status: DISCONTINUED | OUTPATIENT
Start: 2025-05-19 | End: 2025-05-19 | Stop reason: HOSPADM

## 2025-05-19 RX ADMIN — ONDANSETRON 4 MG: 2 INJECTION INTRAMUSCULAR; INTRAVENOUS at 08:29

## 2025-05-19 RX ADMIN — SODIUM CHLORIDE, POTASSIUM CHLORIDE, SODIUM LACTATE AND CALCIUM CHLORIDE 1000 ML: 600; 310; 30; 20 INJECTION, SOLUTION INTRAVENOUS at 09:05

## 2025-05-19 NOTE — DISCHARGE INSTRUCTIONS
A referral above has been placed for the electro cardiologist Dr. Oconnell please call to schedule an appointment.

## 2025-05-19 NOTE — ED PROVIDER NOTES
"HPI:    Patient is a 65-year-old white female presents to the emergency room with multiple complaints.  Most of her symptoms started yesterday.  Patient states that she has a past medical history of open heart surgery 1 year ago done by Dr. Lutz and also a history of stroke.  She states that since the open heart when she was finally discharged she still had to occlusions that were present in her heart.  She has had a lot of problems with her right knee and was really needing knee replacement however, her orthopedist Dr. Tesfaye said that she is not a candidate due to her heart.  She did receive a cortisone injection 1 week ago in her knee which she states did not seem to help.  Patient states however yesterday she started having some issues of confusion nausea vomiting and diarrhea with more than 10-15 episodes of diarrhea watery in nature.  Patient states she also was having abdominal cramping.  She states that she felt very weak and nearly passed out.  Patient states she started drinking some juice this seemed to help with her hydration but she nearly passed out yesterday while her symptoms finally resolved and she thought she was \"getting better\" thus did not come into the emergency room yesterday.  However this morning she woke up notes her blood pressure systolically was greater than 190 and due to this and a past history of having a stroke and the nausea, abdominal pain, and diarrhea that she decided to come into the emergency room.  No blood in her stool.  She states she does not feel confused.  No fevers chills or night sweats.  Patient also states that the pain is worse in the back of her knee and is concerned she may have a blood clot and is requesting an ultrasound of her right leg.        REVIEW OF SYSTEMS    CONSTITUTIONAL: Positive for generalized fatigue   EYES:  No complaints of discharge   ENT: No complaints of sore throat or ear pain  CARDIOVASCULAR:  No complaints of chest pain, palpitations, or " swelling  RESPIRATORY:  No complaints of cough or shortness of breath  GI: Positive for nausea, abdominal pain and diarrhea   MUSCULOSKELETAL: Positive for severe right knee pain and pain behind the right knee with swelling   SKIN:  No complaints of rash  NEUROLOGIC: Positive for dizziness and mild headache   ENDOCRINE:  No complaints of polyuria or polydipsia  LYMPHATIC:  No complaints of swollen glands  GENITOURINARY: No complaints of urinary frequency or hematuria        PAST MEDICAL HISTORY  Past Medical History:   Diagnosis Date    Arthritis     Atrial septal defect, secundum     Chest pain 2016    CKD (chronic kidney disease), stage II 2018    Diabetes mellitus     DIABETES , UNCOMPLICATED, TYPE II     Diabetic polyneuropathy associated with type 2 diabetes mellitus 2018    Disease of thyroid gland     GERD (gastroesophageal reflux disease)     HLD (hyperlipidemia) 2016    HTN (hypertension) 2016    Hyperlipidemia, mixed     Hypertension associated with diabetes 2018    Hypertension, benign     Low back pain     Melanoma of lower leg, right 2018    Mixed diabetic hyperlipidemia associated with type 2 diabetes mellitus 2018    Nonspecific chest pain     Proliferative diabetic retinopathy of both eyes associated with type 2 diabetes mellitus 2018    Stroke 2015    SHORT-TERM MEMORY LOSS, L FACIAL WEAKNESS    Type 2 diabetes mellitus with hyperglycemia, with long-term current use of insulin 2018       FAMILY HISTORY  Family History   Problem Relation Age of Onset    Heart disease Father        SOCIAL HISTORY  Social History     Socioeconomic History    Marital status:    Tobacco Use    Smoking status: Every Day     Current packs/day: 0.00     Average packs/day: 1 pack/day for 36.3 years (36.3 ttl pk-yrs)     Types: Cigarettes     Start date:      Last attempt to quit: 2024     Years since quittin.0     Passive exposure: Current     Smokeless tobacco: Never   Vaping Use    Vaping status: Former    Substances: Nicotine, Flavoring    Devices: Disposable    Passive vaping exposure: Yes   Substance and Sexual Activity    Alcohol use: Not Currently     Comment: Occ    Drug use: No    Sexual activity: Defer       IMMUNIZATION HISTORY  Deferred to primary care physician.    SURGICAL HISTORY  Past Surgical History:   Procedure Laterality Date    ANTERIOR LUMBAR EXPOSURE N/A 9/23/2022    Procedure: ANTERIOR LUMBAR EXPOSURE;  Surgeon: Aubrey Flor DO;  Location:  [facility] OR;  Service: Vascular;  Laterality: N/A;    ATRIAL SEPTAL DEFECT REPAIR      CARDIAC CATHETERIZATION N/A 4/24/2024    Procedure: Left Heart Cath;  Surgeon: Marcello Valencia MD;  Location:  [facility] CATH [facility]ASIVE [facility];  Service: Cardiology;  Laterality: N/A;    CARPAL TUNNEL RELEASE      CORONARY ARTERY BYPASS GRAFT N/A 5/1/2024    Procedure: CORONARY ARTERY BYPASS GRAFTING X4, BILATERAL INTERNAL MAMMARY ARTERY GRAFT, LEFT ENDOSCOPIC VEIN HARVEST, TRANSESOPHAGEAL ECHOCARDIOGRAM;  Surgeon: Himanshu Lutz MD;  Location:  [facility] OR;  Service: Cardiothoracic;  Laterality: N/A;    FEMORAL LYMPH NODE BIOPSY/EXCISON      FINGER SURGERY      HYSTERECTOMY      LUMBAR LAMINECTOMY ANTERIOR LUMBAR INTERBODY FUSION Right 9/23/2022    Procedure: LUMBAR DISCECTOMY,ANTERIOR LUMBAR INTERBODY FUSION RIGHT LEISA- LAMINECTOMY, FACETECTOMY, FORAMENOTOMY, POSTEIOR PEDICLE SCREW INSTRUMENTATION L5-S1 RIGHT ROBOT.;  Surgeon: Guanako Fischer MD;  Location:  [facility] OR;  Service: Neurosurgery;  Laterality: Right;    LUMBAR LAMINECTOMY WITH FUSION Right 9/23/2022    Procedure: POSTEIOR PEDICLE SCREW INSTRUMENTATION L5-S1;  Surgeon: Guanako Fischer MD;  Location:  [facility] OR;  Service: Neurosurgery;  Laterality: Right;    OTHER SURGICAL HISTORY      melanoma removed right buttock       CURRENT MEDICATIONS    Current Facility-Administered Medications:     [COMPLETED] Insert Peripheral IV, , , Once **AND** sodium  chloride 0.9 % flush 10 mL, 10 mL, Intravenous, PRN, Guanako Hendrix Jr., MD    Current Outpatient Medications:     ALPRAZolam (XANAX) 1 MG tablet, Take 1 tablet by mouth 4 (Four) Times a Day As Needed for Anxiety., Disp: , Rfl:     aspirin 81 MG EC tablet, Take 1 tablet by mouth Daily., Disp: 30 tablet, Rfl: 2    betamethasone, augmented, (DIPROLENE) 0.05 % cream, Apply 1 Application topically to the appropriate area as directed. (Patient not taking: Reported on 2/6/2025), Disp: , Rfl:     Blood Glucose Monitoring Suppl (ONE TOUCH ULTRA 2) w/Device kit, , Disp: , Rfl:     clopidogrel (PLAVIX) 75 MG tablet, Take 1 tablet by mouth Daily., Disp: , Rfl:     cyclobenzaprine (FLEXERIL) 10 MG tablet, Take 1 tablet by mouth 3 (Three) Times a Day As Needed for Muscle Spasms., Disp: , Rfl:     doxycycline (VIBRAMYCIN) 100 MG capsule, Take 1 capsule by mouth Daily., Disp: , Rfl:     DULoxetine (CYMBALTA) 30 MG capsule, Take 1 capsule by mouth Daily. (Patient not taking: Reported on 2/6/2025), Disp: , Rfl:     furosemide (LASIX) 40 MG tablet, Take 1 tablet by mouth Daily As Needed (weight gain or swelling)., Disp: , Rfl:     gabapentin (NEURONTIN) 300 MG capsule, Take 1 capsule by mouth 4 (Four) Times a Day. Take one tablet in the morning and at noon, then take 2 tablets at night. Max 4 tablets per day., Disp: , Rfl:     Insulin Aspart (novoLOG) 100 UNIT/ML injection, Inject 1-10 Units under the skin into the appropriate area as directed 3 (Three) Times a Day Before Meals. Pt uses sliding scale, Disp: 6 mL, Rfl: 11    Insulin Glargine (BASAGLAR KWIKPEN) 100 UNIT/ML injection pen, Inject 35 Units under the skin into the appropriate area as directed Every Night. (Patient not taking: Reported on 2/6/2025), Disp: 6 mL, Rfl: 11    Lancets (OneTouch Delica Plus [id number]) misc, , Disp: , Rfl:     levothyroxine (SYNTHROID, LEVOTHROID) 150 MCG tablet, Take 1 tablet by mouth Daily., Disp: , Rfl:     lisinopril (PRINIVIL,ZESTRIL) 5  "MG tablet, Take 0.5 tablets by mouth Daily. (Patient taking differently: Take 1 tablet by mouth Daily.), Disp: 45 tablet, Rfl: 2    meloxicam (MOBIC) 15 MG tablet, , Disp: , Rfl:     naloxone (NARCAN) 4 MG/0.1ML nasal spray, Administer 1 spray into the nostril(s) as directed by provider Daily As Needed for Opioid Reversal. (Patient not taking: Reported on 2/6/2025), Disp: , Rfl:     NON FORMULARY, Topical Gabapentin Compound from [facility] (Patient not taking: Reported on 2/6/2025), Disp: , Rfl:     OneTouch Ultra Test test strip, , Disp: , Rfl:     pantoprazole (PROTONIX) 40 MG EC tablet, Take 1 tablet by mouth Daily., Disp: , Rfl:     pioglitazone (ACTOS) 15 MG tablet, Take 1 tablet by mouth Daily., Disp: , Rfl:     polyethylene glycol (MIRALAX) 17 GM/SCOOP powder, Take 17 g by mouth Daily As Needed (constipation)., Disp: , Rfl:     potassium chloride 10 MEQ CR tablet, Takes when taking Lasix, Disp: , Rfl:     promethazine (PHENERGAN) 25 MG tablet, Take 1 tablet by mouth., Disp: , Rfl:     rosuvastatin (CRESTOR) 20 MG tablet, Take 1 tablet by mouth Daily., Disp: 30 tablet, Rfl: 11    Semaglutide,0.25 or 0.5MG/DOS, (Ozempic, 0.25 or 0.5 MG/DOSE,) 2 MG/1.5ML solution pen-injector, Inject 0.5 mg under the skin into the appropriate area as directed 1 (One) Time Per Week., Disp: , Rfl:     Trulance 3 MG tablet, Take 1 tablet by mouth., Disp: , Rfl:     ALLERGIES  Allergies   Allergen Reactions    Bactrim [Sulfamethoxazole-Trimethoprim] Anaphylaxis and Rash    Penicillins Anaphylaxis and Rash     Reaction: rash  Reaction: rash      Morphine Itching    Sulfa Antibiotics GI Intolerance     Reaction: GI upset  Reaction: GI upset       Neuro exam    VITAL SIGNS:   /62   Pulse 51   Temp 98.1 °F (36.7 °C) (Oral)   Resp 18   Ht 167.6 cm (66\")   Wt 83 kg (182 lb 15.7 oz)   SpO2 95%   BMI 29.53 kg/m²     Constitutional: Patient is alert and in no distress.  Patient with mild abdominal " discomfort.    ENT: There is a normal pharynx with no acute erythema or exudate and oral mucosa is moist.  Nose is clear with no drainage.  Tympanic membranes intact and non-erythemic.    Cardiovascular: S1-S2 regular rate and rhythm.  No murmurs, rubs or gallops are noted.    Respiratory: Patient is clear to auscultation bilaterally with no wheezing or rhonchi.  Chest wall is nontender.  There are no external lesions on the chest.  There is no crepitance.    Abdomen: Soft, nontender.  Bowel sounds are normal in all 4 quadrants. There is no rebound or guarding noted.  There is no abdominal distention or hepatosplenomegaly.    Genitourinary: Patient is voiding appropriately.    Integument: No acute lesions noted.  Color appears to be normal.    Musculoskeletal:     Right knee: There is tender palpation of the right knee along the medial joint line posterior horn greater than right anterior horn of the medial meniscus.  There is tenderness in the posterior popliteal fossa with some fullness in this area.  Negative knee instability with negative Lachman.  Varus and valgus maneuver    Neurological: CN's 2 through 12 grossly intact no focal deficits.  Strength is 5+ in bilateral upper and lower extremity face and trunk    Allons Coma Scale: 15      RADIOLOGY/PROCEDURES      CT Head Without Contrast   Final Result   1. No acute intracranial abnormality to account for patient's symptoms.   2. Chronic ischemic and atrophic changes. An area of encephalomalacia in   the right appears stable since 2013.                                                   This report was signed and finalized on 5/19/2025 8:55 AM by Dr. Parvin Caputo MD.          XR Chest 1 View   Final Result       No acute findings.       This report was signed and finalized on 5/19/2025 8:37 AM by Dr. Santy Bueno MD.          US Venous Doppler Lower Extremity Right (duplex)    (Results Pending)           FUTURE APPOINTMENTS     Future Appointments    Date Time Provider Department Center   8/6/2025  2:00 PM Deedee Carreon APRN [facility]          EKG (reviewed and interpreted by me):  Sinus bradycardia with a ventricular rate of 48.  No acute ST segment elevation or depression noted        COURSE & MEDICAL DECISION MAKING     Patient's partial differential diagnosis can include:    TIA, Electrolyte abnormality,ischemic stroke, hemorrhagic stroke, atypical migraine, hemiplegic migraine, other headache, brain tumor, electrolyte abnormality,  UTI, and others    VBG, POC glucose, acetone, PT, PTT, CBC, CMP, phosphorus, CK, lactic acid, and troponin will be ordered.  As well as an EKG, CT scan of the head, chest x-ray and a venous Doppler of the right lower extremity.    Patient acetone is negative PT and PTT are normal CBC is normal venous blood gas shows no signs of acidosis, phosphorus, CK and troponin are still pending as well as magnesium, lab lipase and lactic acid.,  Lipase, CMP and lactic acid.      Chest x-ray complete and shows no acute findings.    Troponin negative x 2.  Patient's CBC is normal.  There is a normal CK lipase and phosphorus acetone and PT and PTT.  Patient's creatinine is 1.38 which is based for her chronic renal insufficiency.  That along with her CT scan of the head which shows no acute findings as well as cervical ultrasound Doppler which shows no DVT and chest x-ray being normal patient can be discharged home he is to follow outpatient with electro cardiology which I will refer.    Patient is aware of this and this plan and feels comfortable with this plan of discharge and referral    Patient's level of risk: Moderate        CRITICAL CARE    CRITICAL CARE: No    CRITICAL CARE TIME: None      Also Old charts were reviewed per [employer] EMR.  Pertinent details are summarized above.  All laboratory, radiologic, and EKG studies that were performed in the Emergency Department were a necessary part of the evaluation needed to exclude  unstable or  emergent medical conditions:     Patient was hemodynamically and neurologically stable in the ED.   Pertinent studies were reviewed as above.     Recent Results (from the past 24 hours)   Comprehensive Metabolic Panel    Collection Time: 05/19/25  8:15 AM    Specimen: Blood   Result Value Ref Range    Glucose 119 (H) 65 - 99 mg/dL    BUN 13 8 - 23 mg/dL    Creatinine 1.38 (H) 0.57 - 1.00 mg/dL    Sodium 140 136 - 145 mmol/L    Potassium 4.7 3.5 - 5.2 mmol/L    Chloride 105 98 - 107 mmol/L    CO2 26.0 22.0 - 29.0 mmol/L    Calcium 9.4 8.6 - 10.5 mg/dL    Total Protein 6.7 6.0 - 8.5 g/dL    Albumin 3.9 3.5 - 5.2 g/dL    ALT (SGPT) 8 1 - 33 U/L    AST (SGOT) 12 1 - 32 U/L    Alkaline Phosphatase 67 39 - 117 U/L    Total Bilirubin 0.3 0.0 - 1.2 mg/dL    Globulin 2.8 gm/dL    A/G Ratio 1.4 g/dL    BUN/Creatinine Ratio 9.4 7.0 - 25.0    Anion Gap 9.0 5.0 - 15.0 mmol/L    eGFR 42.0 (L) >60.0 mL/min/1.73   Protime-INR    Collection Time: 05/19/25  8:15 AM    Specimen: Blood   Result Value Ref Range    Protime 13.3 11.8 - 14.8 Seconds    INR 0.96 0.91 - 1.09   aPTT    Collection Time: 05/19/25  8:15 AM    Specimen: Blood   Result Value Ref Range    PTT 25.3 24.5 - 36.0 seconds   High Sensitivity Troponin T    Collection Time: 05/19/25  8:15 AM    Specimen: Blood   Result Value Ref Range    HS Troponin T 6 <14 ng/L   Lactic Acid, Plasma    Collection Time: 05/19/25  8:15 AM    Specimen: Blood   Result Value Ref Range    Lactate 1.3 0.5 - 2.0 mmol/L   Acetone    Collection Time: 05/19/25  8:15 AM    Specimen: Blood   Result Value Ref Range    Acetone Negative Negative   CK    Collection Time: 05/19/25  8:15 AM    Specimen: Blood   Result Value Ref Range    Creatine Kinase 57 20 - 180 U/L   Magnesium    Collection Time: 05/19/25  8:15 AM    Specimen: Blood   Result Value Ref Range    Magnesium 2.0 1.6 - 2.4 mg/dL   Phosphorus    Collection Time: 05/19/25  8:15 AM    Specimen: Blood   Result Value Ref Range     Phosphorus 3.6 2.5 - 4.5 mg/dL   CBC Auto Differential    Collection Time: 05/19/25  8:15 AM    Specimen: Blood   Result Value Ref Range    WBC 5.11 3.40 - 10.80 10*3/mm3    RBC 4.21 3.77 - 5.28 10*6/mm3    Hemoglobin 12.8 12.0 - 15.9 g/dL    Hematocrit 39.8 34.0 - 46.6 %    MCV 94.5 79.0 - 97.0 fL    MCH 30.4 26.6 - 33.0 pg    MCHC 32.2 31.5 - 35.7 g/dL    RDW 13.2 12.3 - 15.4 %    RDW-SD 45.4 37.0 - 54.0 fl    MPV 12.2 (H) 6.0 - 12.0 fL    Platelets 174 140 - 450 10*3/mm3    Neutrophil % 65.3 42.7 - 76.0 %    Lymphocyte % 24.9 19.6 - 45.3 %    Monocyte % 7.4 5.0 - 12.0 %    Eosinophil % 1.6 0.3 - 6.2 %    Basophil % 0.6 0.0 - 1.5 %    Immature Grans % 0.2 0.0 - 0.5 %    Neutrophils, Absolute 3.34 1.70 - 7.00 10*3/mm3    Lymphocytes, Absolute 1.27 0.70 - 3.10 10*3/mm3    Monocytes, Absolute 0.38 0.10 - 0.90 10*3/mm3    Eosinophils, Absolute 0.08 0.00 - 0.40 10*3/mm3    Basophils, Absolute 0.03 0.00 - 0.20 10*3/mm3    Immature Grans, Absolute 0.01 0.00 - 0.05 10*3/mm3    nRBC 0.0 0.0 - 0.2 /100 WBC   Lipase    Collection Time: 05/19/25  8:15 AM    Specimen: Blood   Result Value Ref Range    Lipase 25 13 - 60 U/L   POC Glucose Once    Collection Time: 05/19/25  8:34 AM    Specimen: Blood   Result Value Ref Range    Glucose 123 70 - 130 mg/dL   Venous Blood Gas with Coox and Lactate    Collection Time: 05/19/25  8:42 AM    Specimen: Venous Blood   Result Value Ref Range    pH, Venous 7.370 7.320 - 7.420 pH Units    pCO2, Venous 50.8 41.0 - 51.0 mm Hg    pO2, Venous 21.8 (L) 27.0 - 53.0 mm Hg    HCO3, Venous 29.3 (H) 22.0 - 28.0 mmol/L    Base Excess, Venous 3.1 (H) 0.0 - 2.0 mmol/L    Hemoglobin, Blood Gas 13.1 12 - 16 g/dL    Hematocrit, Blood Gas 40.2 38.0 - 51.0 %    Oxyhemoglobin Venous 35.1 (L) 60.0 - 85.0 %    Methemoglobin Venous 0.5 0.0 - 3.0 %    Carboxyhemoglobin Venous 2.8 0.0 - 5.0 %    O2 Saturation, Venous 36.3 (L) 45.0 - 75.0 %    Ionized Calcium      Sodium, Venous 142 136 - 145 mmol/L    Potassium,  Venous 4.1 3.5 - 5.2 mmol/L    Glucose, Arterial 119 (H) 65 - 99 mg/dL    A-a DO2      Volume % O2      Temperature 37.0     Barometric Pressure for Blood Gas 750 mmHg    Site Nurse/Dr Draw     Modality Room Air     FIO2      Flow Rate      Nitric Oxide      Ventilator Mode NA     Set Tidal Volume      Set Mech Resp Rate      Rate      PEEP      PSV      PIP      IPAP      EPAP      CPAP      Pulse Ox      Notified Who      Notified By      Notified Time      Collected by [id number]     Note      Lactate, whole blood 0.6 0.5 - 2 mmol/L   High Sensitivity Troponin T 1Hr    Collection Time: 05/19/25  9:30 AM    Specimen: Blood   Result Value Ref Range    HS Troponin T <6 <14 ng/L    Troponin T Numeric Delta     ECG 12 Lead Stroke Evaluation    Collection Time: 05/19/25  9:34 AM   Result Value Ref Range    QT Interval 430 ms    QTC Interval 384 ms         The patient received:  Medications   sodium chloride 0.9 % flush 10 mL (has no administration in time range)   ondansetron (ZOFRAN) injection 4 mg (4 mg Intravenous Given 5/19/25 6291)   lactated ringers bolus 1,000 mL (1,000 mL Intravenous New Bag 5/19/25 0905)              ED Disposition       ED Disposition   Discharge    Condition   Stable    Comment   --                   Dragon disclaimer:  Part of this note may be an electronic transcription/translation of spoken language to printed text using the Dragon Dictation System.     This information is consistent with my knowledge of the patient’s controlled substance use history.      FINAL IMPRESSION   Diagnosis Plan   1. Palpitations        2. Chronic pain of right knee        3. Other headache syndrome        4. Atypical chest pain              MD Simeon Dobson Jr, Thomas Mark Jr., MD  05/19/25 8772

## 2025-05-20 LAB
QT INTERVAL: 430 MS
QTC INTERVAL: 384 MS

## 2025-05-28 ENCOUNTER — TELEPHONE (OUTPATIENT)
Dept: PRIMARY CARE CLINIC | Age: 65
End: 2025-05-28

## 2025-05-28 NOTE — TELEPHONE ENCOUNTER
Patient called the office today 05- to report that she is vomiting very sick to her stomach. She has no diarrhea at the moment,she feels it may start soon.    Patient has apt today 05- at 2 pm she is wanting to reschedule it. The patient is due on the 06- refill for her Percocet 10 mg. I informed the patient that Jennifer Wilkins can not write for the percocet. The patient needs to come into the office and submit a up to date UDS. I did tell the patient that we can submit the Percocet but it's up to the doctor discretion on whether or not they will approve or deny the medication. The patient voiced understanding of the medication refill.

## 2025-05-30 ENCOUNTER — CLINICAL SUPPORT (OUTPATIENT)
Dept: PRIMARY CARE CLINIC | Age: 65
End: 2025-05-30

## 2025-05-30 DIAGNOSIS — Z79.899 MEDICATION MANAGEMENT: Primary | ICD-10-CM

## 2025-05-30 LAB
ALCOHOL URINE: NORMAL
AMPHETAMINE SCREEN URINE: NORMAL
BARBITURATE SCREEN URINE: NORMAL
BENZODIAZEPINE SCREEN, URINE: POSITIVE
BUPRENORPHINE URINE: NORMAL
COCAINE METABOLITE SCREEN URINE: NORMAL
FENTANYL SCREEN, URINE: NORMAL
GABAPENTIN SCREEN, URINE: NORMAL
MDMA, URINE: NORMAL
METHADONE SCREEN, URINE: NORMAL
METHAMPHETAMINE, URINE: NORMAL
OPIATE SCREEN URINE: NORMAL
OXYCODONE SCREEN URINE: POSITIVE
PHENCYCLIDINE SCREEN URINE: NORMAL
PROPOXYPHENE SCREEN, URINE: NORMAL
SYNTHETIC CANNABINOIDS(K2) SCREEN, URINE: NORMAL
THC SCREEN, URINE: NORMAL
TRAMADOL SCREEN URINE: NORMAL
TRICYCLIC ANTIDEPRESSANTS, UR: NORMAL

## 2025-06-02 ENCOUNTER — TELEPHONE (OUTPATIENT)
Dept: PRIMARY CARE CLINIC | Age: 65
End: 2025-06-02

## 2025-06-02 NOTE — TELEPHONE ENCOUNTER
Patient called in stating that she has not had a bowel movement in two weeks. States she has taken the trulance that dr harmon had prescribed for her with no relief. Asking for advice      Please call patient

## 2025-06-03 NOTE — TELEPHONE ENCOUNTER
Patient returned the call. She reports she missed a call. I informed her of the recommendations from Jennifer Wilkins about not having a Bowel Movement in one - two weeks. Patient understood and voiced understanding. Patient reports she is going to [facility].

## 2025-06-06 ENCOUNTER — LAB (OUTPATIENT)
Dept: LAB | Facility: HOSPITAL | Age: 65
End: 2025-06-06
Payer: MEDICARE

## 2025-06-06 ENCOUNTER — OFFICE VISIT (OUTPATIENT)
Dept: INTERNAL MEDICINE | Facility: CLINIC | Age: 65
End: 2025-06-06
Payer: MEDICARE

## 2025-06-06 VITALS
DIASTOLIC BLOOD PRESSURE: 68 MMHG | SYSTOLIC BLOOD PRESSURE: 146 MMHG | OXYGEN SATURATION: 100 % | RESPIRATION RATE: 16 BRPM | WEIGHT: 171 LBS | BODY MASS INDEX: 27.48 KG/M2 | HEIGHT: 66 IN | TEMPERATURE: 97.5 F | HEART RATE: 62 BPM

## 2025-06-06 DIAGNOSIS — F41.1 GENERALIZED ANXIETY DISORDER: Primary | ICD-10-CM

## 2025-06-06 DIAGNOSIS — I10 PRIMARY HYPERTENSION: ICD-10-CM

## 2025-06-06 DIAGNOSIS — Z79.899 LONG-TERM USE OF HIGH-RISK MEDICATION: ICD-10-CM

## 2025-06-06 DIAGNOSIS — G89.4 CHRONIC PAIN SYNDROME: ICD-10-CM

## 2025-06-06 DIAGNOSIS — G43.E09 CHRONIC MIGRAINE WITH AURA WITHOUT STATUS MIGRAINOSUS, NOT INTRACTABLE: ICD-10-CM

## 2025-06-06 PROCEDURE — 99204 OFFICE O/P NEW MOD 45 MIN: CPT | Performed by: INTERNAL MEDICINE

## 2025-06-06 PROCEDURE — 80307 DRUG TEST PRSMV CHEM ANLYZR: CPT | Performed by: INTERNAL MEDICINE

## 2025-06-06 PROCEDURE — 3078F DIAST BP <80 MM HG: CPT | Performed by: INTERNAL MEDICINE

## 2025-06-06 PROCEDURE — 1125F AMNT PAIN NOTED PAIN PRSNT: CPT | Performed by: INTERNAL MEDICINE

## 2025-06-06 PROCEDURE — 3077F SYST BP >= 140 MM HG: CPT | Performed by: INTERNAL MEDICINE

## 2025-06-06 RX ORDER — UBROGEPANT 50 MG/1
50 TABLET ORAL DAILY
COMMUNITY
Start: 2025-03-28 | End: 2025-06-06 | Stop reason: SDUPTHER

## 2025-06-06 RX ORDER — ATORVASTATIN CALCIUM 40 MG/1
40 TABLET, FILM COATED ORAL NIGHTLY
COMMUNITY
Start: 2025-05-29 | End: 2025-06-06 | Stop reason: SINTOL

## 2025-06-06 RX ORDER — OXYCODONE AND ACETAMINOPHEN 10; 325 MG/1; MG/1
1 TABLET ORAL EVERY 6 HOURS PRN
Qty: 120 TABLET | Refills: 0 | Status: SHIPPED | OUTPATIENT
Start: 2025-06-06

## 2025-06-06 RX ORDER — LISINOPRIL 5 MG/1
5 TABLET ORAL DAILY
Qty: 90 TABLET | Refills: 1 | Status: SHIPPED | OUTPATIENT
Start: 2025-06-06

## 2025-06-06 RX ORDER — ALPRAZOLAM 1 MG/1
1 TABLET ORAL 4 TIMES DAILY PRN
Qty: 120 TABLET | Refills: 0 | Status: SHIPPED | OUTPATIENT
Start: 2025-06-06

## 2025-06-06 RX ORDER — OXYCODONE AND ACETAMINOPHEN 10; 325 MG/1; MG/1
1 TABLET ORAL EVERY 6 HOURS PRN
COMMUNITY
End: 2025-06-06 | Stop reason: SDUPTHER

## 2025-06-06 RX ORDER — METOPROLOL TARTRATE 25 MG/1
12.5 TABLET, FILM COATED ORAL 2 TIMES DAILY
COMMUNITY
Start: 2025-05-12 | End: 2025-06-06

## 2025-06-06 RX ORDER — UBROGEPANT 50 MG/1
50 TABLET ORAL DAILY
Qty: 10 TABLET | Refills: 5 | Status: SHIPPED | OUTPATIENT
Start: 2025-06-06
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
## 2025-06-11 LAB
AMPHETAMINES UR QL SCN: NEGATIVE NG/ML
BARBITURATES UR QL SCN: NEGATIVE NG/ML
BENZODIAZ UR QL: POSITIVE NG/ML
BZE UR QL SCN: NEGATIVE NG/ML
CANNABINOIDS UR QL SCN: NEGATIVE NG/ML
CREAT UR-MCNC: 78.8 MG/DL (ref 20–300)
FENTANYL UR-MCNC: NEGATIVE PG/ML
LABORATORY COMMENT REPORT: ABNORMAL
MEPERIDINE UR QL: NEGATIVE NG/ML
METHADONE UR QL SCN: NEGATIVE NG/ML
OPIATES UR QL SCN: NEGATIVE NG/ML
OXYCODONE+OXYMORPHONE UR QL SCN: POSITIVE
PCP UR QL: NEGATIVE NG/ML
PH UR: 5 [PH] (ref 4.5–8.9)
PROPOXYPH UR QL SCN: NEGATIVE NG/ML
SP GR UR: 1.01
TRAMADOL UR QL SCN: NEGATIVE NG/ML

## 2025-06-18 ENCOUNTER — TELEPHONE (OUTPATIENT)
Dept: CARDIOLOGY | Facility: CLINIC | Age: 65
End: 2025-06-18
Payer: MEDICARE

## 2025-06-18 NOTE — TELEPHONE ENCOUNTER
Returned call to patient.  She had stated she was having trouble with a medication she was taking.  I have left a VM asking her to call back with more information.  Martha Smith MA

## 2025-06-20 ENCOUNTER — TELEPHONE (OUTPATIENT)
Dept: CARDIOLOGY | Facility: CLINIC | Age: 65
End: 2025-06-20
Payer: MEDICARE

## 2025-06-20 NOTE — TELEPHONE ENCOUNTER
PATIENT CALLED IN TO STATE SHE IS HAVING A BAD REACTION TO THE REPATHA SHOT.  MUSCLE ACHE, HEADACHE, HAVING TO USE A WALKER DUE TO PAIN. PLEASE ADVISE. Martha Smith MA

## 2025-07-06 ENCOUNTER — NURSE TRIAGE (OUTPATIENT)
Dept: CALL CENTER | Facility: HOSPITAL | Age: 65
End: 2025-07-06
Payer: MEDICARE

## 2025-07-06 NOTE — TELEPHONE ENCOUNTER
"Reason for Disposition   General information question, no triage required and triager able to answer question    Additional Information   Negative: [1] Caller is not with the adult (patient) AND [2] reporting urgent symptoms   Negative: Lab result questions   Negative: Medication questions   Negative: Caller can't be reached by phone   Negative: Caller has already spoken to PCP or another triager   Negative: RN needs further essential information from caller in order to complete triage   Negative: Requesting regular office appointment   Negative: [1] Caller requesting NON-URGENT health information AND [2] PCP's office is the best resource   Negative: Health Information question, no triage required and triager able to answer question   Negative: Question about upcoming scheduled test, no triage required and triager able to answer question   Negative: [1] Caller is not with the adult (patient) AND [2] probable NON-URGENT symptoms    Answer Assessment - Initial Assessment Questions  1. REASON FOR CALL or QUESTION: \"What is your reason for calling today?\" or \"How can I best help you?\" or \"What question do you have that I can help answer?\"      Appt reminder    Protocols used: Information Only Call-ADULT-    "

## 2025-07-07 ENCOUNTER — OFFICE VISIT (OUTPATIENT)
Dept: INTERNAL MEDICINE | Facility: CLINIC | Age: 65
End: 2025-07-07
Payer: MEDICARE

## 2025-07-07 ENCOUNTER — LAB (OUTPATIENT)
Dept: LAB | Facility: HOSPITAL | Age: 65
End: 2025-07-07
Payer: MEDICARE

## 2025-07-07 VITALS
RESPIRATION RATE: 16 BRPM | HEART RATE: 60 BPM | BODY MASS INDEX: 28.45 KG/M2 | OXYGEN SATURATION: 96 % | WEIGHT: 177 LBS | TEMPERATURE: 98.1 F | DIASTOLIC BLOOD PRESSURE: 77 MMHG | SYSTOLIC BLOOD PRESSURE: 143 MMHG | HEIGHT: 66 IN

## 2025-07-07 DIAGNOSIS — I25.10 CORONARY ARTERY DISEASE INVOLVING NATIVE CORONARY ARTERY OF NATIVE HEART WITHOUT ANGINA PECTORIS: ICD-10-CM

## 2025-07-07 DIAGNOSIS — G89.29 CHRONIC PAIN OF RIGHT KNEE: ICD-10-CM

## 2025-07-07 DIAGNOSIS — G43.E09 CHRONIC MIGRAINE WITH AURA WITHOUT STATUS MIGRAINOSUS, NOT INTRACTABLE: ICD-10-CM

## 2025-07-07 DIAGNOSIS — I10 PRIMARY HYPERTENSION: ICD-10-CM

## 2025-07-07 DIAGNOSIS — F41.1 GAD (GENERALIZED ANXIETY DISORDER): ICD-10-CM

## 2025-07-07 DIAGNOSIS — K21.9 GASTROESOPHAGEAL REFLUX DISEASE, UNSPECIFIED WHETHER ESOPHAGITIS PRESENT: ICD-10-CM

## 2025-07-07 DIAGNOSIS — E03.9 ACQUIRED HYPOTHYROIDISM: ICD-10-CM

## 2025-07-07 DIAGNOSIS — E78.5 HYPERLIPIDEMIA LDL GOAL <70: ICD-10-CM

## 2025-07-07 DIAGNOSIS — M25.561 CHRONIC PAIN OF RIGHT KNEE: ICD-10-CM

## 2025-07-07 DIAGNOSIS — E11.69 MIXED DIABETIC HYPERLIPIDEMIA ASSOCIATED WITH TYPE 2 DIABETES MELLITUS: ICD-10-CM

## 2025-07-07 DIAGNOSIS — E78.2 MIXED DIABETIC HYPERLIPIDEMIA ASSOCIATED WITH TYPE 2 DIABETES MELLITUS: ICD-10-CM

## 2025-07-07 DIAGNOSIS — K58.9 IRRITABLE BOWEL SYNDROME, UNSPECIFIED TYPE: Primary | ICD-10-CM

## 2025-07-07 LAB
ALBUMIN SERPL-MCNC: 4.4 G/DL (ref 3.5–5.2)
ALBUMIN/GLOB SERPL: 1.5 G/DL
ALP SERPL-CCNC: 65 U/L (ref 39–117)
ALT SERPL W P-5'-P-CCNC: 8 U/L (ref 1–33)
ANION GAP SERPL CALCULATED.3IONS-SCNC: 9 MMOL/L (ref 5–15)
AST SERPL-CCNC: 13 U/L (ref 1–32)
BILIRUB SERPL-MCNC: 0.2 MG/DL (ref 0–1.2)
BUN SERPL-MCNC: 19.1 MG/DL (ref 8–23)
BUN/CREAT SERPL: 14.6 (ref 7–25)
CALCIUM SPEC-SCNC: 9.8 MG/DL (ref 8.6–10.5)
CHLORIDE SERPL-SCNC: 106 MMOL/L (ref 98–107)
CO2 SERPL-SCNC: 27 MMOL/L (ref 22–29)
CREAT SERPL-MCNC: 1.31 MG/DL (ref 0.57–1)
EGFRCR SERPLBLD CKD-EPI 2021: 44.7 ML/MIN/1.73
GLOBULIN UR ELPH-MCNC: 2.9 GM/DL
GLUCOSE SERPL-MCNC: 101 MG/DL (ref 65–99)
HBA1C MFR BLD: 5.4 % (ref 4.8–5.6)
POTASSIUM SERPL-SCNC: 5 MMOL/L (ref 3.5–5.2)
PROT SERPL-MCNC: 7.3 G/DL (ref 6–8.5)
SODIUM SERPL-SCNC: 142 MMOL/L (ref 136–145)

## 2025-07-07 PROCEDURE — 1125F AMNT PAIN NOTED PAIN PRSNT: CPT | Performed by: INTERNAL MEDICINE

## 2025-07-07 PROCEDURE — 3077F SYST BP >= 140 MM HG: CPT | Performed by: INTERNAL MEDICINE

## 2025-07-07 PROCEDURE — 80053 COMPREHEN METABOLIC PANEL: CPT

## 2025-07-07 PROCEDURE — 1159F MED LIST DOCD IN RCRD: CPT | Performed by: INTERNAL MEDICINE

## 2025-07-07 PROCEDURE — 3044F HG A1C LEVEL LT 7.0%: CPT | Performed by: INTERNAL MEDICINE

## 2025-07-07 PROCEDURE — 1160F RVW MEDS BY RX/DR IN RCRD: CPT | Performed by: INTERNAL MEDICINE

## 2025-07-07 PROCEDURE — 83036 HEMOGLOBIN GLYCOSYLATED A1C: CPT

## 2025-07-07 PROCEDURE — 99214 OFFICE O/P EST MOD 30 MIN: CPT | Performed by: INTERNAL MEDICINE

## 2025-07-07 PROCEDURE — 3078F DIAST BP <80 MM HG: CPT | Performed by: INTERNAL MEDICINE

## 2025-07-07 PROCEDURE — 36415 COLL VENOUS BLD VENIPUNCTURE: CPT

## 2025-07-07 RX ORDER — SEMAGLUTIDE 1.34 MG/ML
1 INJECTION, SOLUTION SUBCUTANEOUS WEEKLY
Qty: 3 ML | Refills: 0 | Status: SHIPPED | OUTPATIENT
Start: 2025-07-07

## 2025-07-07 RX ORDER — PANTOPRAZOLE SODIUM 40 MG/1
40 TABLET, DELAYED RELEASE ORAL DAILY
Qty: 90 TABLET | Refills: 1 | Status: SHIPPED | OUTPATIENT
Start: 2025-07-07

## 2025-07-07 RX ORDER — UBROGEPANT 50 MG/1
50 TABLET ORAL DAILY
Qty: 10 TABLET | Refills: 5 | Status: SHIPPED | OUTPATIENT
Start: 2025-07-07

## 2025-07-07 RX ORDER — PLECANATIDE 3 MG/1
1 TABLET ORAL DAILY
Qty: 90 TABLET | Refills: 1 | Status: SHIPPED | OUTPATIENT
Start: 2025-07-07

## 2025-07-07 RX ORDER — OXYCODONE AND ACETAMINOPHEN 10; 325 MG/1; MG/1
1 TABLET ORAL EVERY 6 HOURS PRN
Qty: 120 TABLET | Refills: 0 | Status: SHIPPED | OUTPATIENT
Start: 2025-07-07

## 2025-07-07 RX ORDER — POLYETHYLENE GLYCOL 3350 17 G/17G
17 POWDER, FOR SOLUTION ORAL DAILY PRN
Qty: 578 G | Refills: 1 | Status: SHIPPED | OUTPATIENT
Start: 2025-07-07

## 2025-07-07 RX ORDER — ALPRAZOLAM 1 MG/1
1 TABLET ORAL 3 TIMES DAILY PRN
Qty: 90 TABLET | Refills: 0 | Status: SHIPPED | OUTPATIENT
Start: 2025-07-07

## 2025-07-07 RX ORDER — LISINOPRIL 5 MG/1
5 TABLET ORAL DAILY
Qty: 90 TABLET | Refills: 1 | Status: SHIPPED | OUTPATIENT
Start: 2025-07-07

## 2025-07-07 RX ORDER — FUROSEMIDE 40 MG/1
20 TABLET ORAL DAILY PRN
COMMUNITY
Start: 2025-06-10
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
## 2025-08-12 ENCOUNTER — OFFICE VISIT (OUTPATIENT)
Dept: CARDIOLOGY | Facility: CLINIC | Age: 65
End: 2025-08-12
Payer: MEDICARE

## 2025-08-12 VITALS
HEART RATE: 58 BPM | BODY MASS INDEX: 27.97 KG/M2 | HEIGHT: 66 IN | OXYGEN SATURATION: 99 % | SYSTOLIC BLOOD PRESSURE: 132 MMHG | WEIGHT: 174 LBS | DIASTOLIC BLOOD PRESSURE: 66 MMHG

## 2025-08-12 DIAGNOSIS — Z79.4 TYPE 2 DIABETES MELLITUS WITH OTHER CIRCULATORY COMPLICATION, WITH LONG-TERM CURRENT USE OF INSULIN: ICD-10-CM

## 2025-08-12 DIAGNOSIS — Z72.0 TOBACCO USE: ICD-10-CM

## 2025-08-12 DIAGNOSIS — E78.5 HYPERLIPIDEMIA LDL GOAL <70: ICD-10-CM

## 2025-08-12 DIAGNOSIS — I10 PRIMARY HYPERTENSION: ICD-10-CM

## 2025-08-12 DIAGNOSIS — E11.59 TYPE 2 DIABETES MELLITUS WITH OTHER CIRCULATORY COMPLICATION, WITH LONG-TERM CURRENT USE OF INSULIN: ICD-10-CM

## 2025-08-12 DIAGNOSIS — Z01.810 PRE-OPERATIVE CARDIOVASCULAR EXAMINATION: ICD-10-CM

## 2025-08-12 DIAGNOSIS — I25.10 CORONARY ARTERY DISEASE INVOLVING NATIVE CORONARY ARTERY OF NATIVE HEART WITHOUT ANGINA PECTORIS: Primary | ICD-10-CM

## 2025-08-12 PROCEDURE — 1159F MED LIST DOCD IN RCRD: CPT | Performed by: NURSE PRACTITIONER

## 2025-08-12 PROCEDURE — 3078F DIAST BP <80 MM HG: CPT | Performed by: NURSE PRACTITIONER

## 2025-08-12 PROCEDURE — 99214 OFFICE O/P EST MOD 30 MIN: CPT | Performed by: NURSE PRACTITIONER

## 2025-08-12 PROCEDURE — 1160F RVW MEDS BY RX/DR IN RCRD: CPT | Performed by: NURSE PRACTITIONER

## 2025-08-12 PROCEDURE — G2211 COMPLEX E/M VISIT ADD ON: HCPCS | Performed by: NURSE PRACTITIONER

## 2025-08-12 PROCEDURE — 99406 BEHAV CHNG SMOKING 3-10 MIN: CPT | Performed by: NURSE PRACTITIONER

## 2025-08-12 PROCEDURE — 3075F SYST BP GE 130 - 139MM HG: CPT | Performed by: NURSE PRACTITIONER

## 2025-08-12 RX ORDER — FUROSEMIDE 20 MG/1
20 TABLET ORAL DAILY
COMMUNITY

## 2025-08-12 RX ORDER — SEMAGLUTIDE 0.68 MG/ML
INJECTION, SOLUTION SUBCUTANEOUS
COMMUNITY

## 2025-08-22 DIAGNOSIS — E78.5 HYPERLIPIDEMIA, UNSPECIFIED HYPERLIPIDEMIA TYPE: Primary | ICD-10-CM

## 2025-08-22 RX ORDER — DIPHENHYDRAMINE HYDROCHLORIDE 50 MG/ML
50 INJECTION, SOLUTION INTRAMUSCULAR; INTRAVENOUS AS NEEDED
OUTPATIENT
Start: 2025-09-03

## 2025-08-22 RX ORDER — FAMOTIDINE 10 MG/ML
20 INJECTION, SOLUTION INTRAVENOUS AS NEEDED
OUTPATIENT
Start: 2025-09-03

## 2025-08-22 RX ORDER — HYDROCORTISONE SODIUM SUCCINATE 100 MG/2ML
100 INJECTION INTRAMUSCULAR; INTRAVENOUS AS NEEDED
OUTPATIENT
Start: 2025-09-03

## 2025-08-28 DIAGNOSIS — E78.5 HYPERLIPIDEMIA LDL GOAL <70: Primary | ICD-10-CM

## (undated) DEVICE — KNIF BAYO METRX DISECT

## (undated) DEVICE — SCANLAN® SURG-I-PAW® INSTRUMENT COVERS - RED, 1/10" X 5"/ 3 MMX13 CM (2 - 5" PCS /PKG): Brand: SCANLAN® SURG-I-PAW® INSTRUMENT COVERS

## (undated) DEVICE — CLTH CLENS READYCLEANSE PERI CARE PK/5

## (undated) DEVICE — DRP SLUSH MACH OM-ORS-320

## (undated) DEVICE — PK CATH CARD 30 CA/4

## (undated) DEVICE — SUT SILK 3/0 SUTUPAK TIES 24IN SA74H

## (undated) DEVICE — BLD SCLPL BEAVR MINI STR 2BVL 180D LF

## (undated) DEVICE — OPTIFOAM GENTLE SA, POSTOP, 4X12: Brand: MEDLINE

## (undated) DEVICE — GLV SURG BIOGEL LTX PF 8

## (undated) DEVICE — DRSNG SURESITE WNDW 4X4.5

## (undated) DEVICE — DRSNG TELFA PAD NONADH STR 1S 3X8IN

## (undated) DEVICE — SUT SILK 2/0 SUTUPAK TIES 24IN SA75H

## (undated) DEVICE — SHEET, T, LAPAROTOMY, STERILE: Brand: MEDLINE

## (undated) DEVICE — KT SPINE MAZORX DISP

## (undated) DEVICE — ROTATING SURGICAL PUNCHES, 1 PER POUCH: Brand: A&E MEDICAL / ROTATING SURGICAL PUNCHES

## (undated) DEVICE — PAD MINOR UNIVERSAL: Brand: MEDLINE INDUSTRIES, INC.

## (undated) DEVICE — SPONGE,LAP,12"X12",XR,ST,5/PK,40PK/CS: Brand: MEDLINE

## (undated) DEVICE — ANTIBACTERIAL UNDYED BRAIDED (POLYGLACTIN 910), SYNTHETIC ABSORBABLE SUTURE: Brand: COATED VICRYL

## (undated) DEVICE — BG OR ZSUT SADDLE 20IN CLR STRL

## (undated) DEVICE — GLV SURG BIOGEL LTX PF 6 1/2

## (undated) DEVICE — TOOL MR8-14MH30 MR8 14CM MATCH 3MM: Brand: MIDAS REX MR8

## (undated) DEVICE — SUT SILK 3/0 SH CR8 30IN C017D

## (undated) DEVICE — CONN FLX BREATHE CIRCT

## (undated) DEVICE — 1/2 FORCE SURGICAL SPRING CLIP: Brand: STEALTH® SPRING CLIP

## (undated) DEVICE — GLV SURG DERMASSURE GRN LF PF 8.0

## (undated) DEVICE — DISPOSABLE IRRIGATION CASSETTE: Brand: CORE

## (undated) DEVICE — SUT SILK 2/0 SH 75CM 30IN BLK C016D

## (undated) DEVICE — SUT MNCRYL 4/0 PS2 27IN UD MCP426H

## (undated) DEVICE — CANN NASL ETCO2 LO/FLO A/

## (undated) DEVICE — DISPOSABLE DRAPE, STERILE, FOR A CDS-3072 6 FOOT TABLE: Brand: PEDIGO PRODUCTS, INC.

## (undated) DEVICE — PROXIMATE RH ROTATING HEAD SKIN STAPLERS (35 WIDE) CONTAINS 35 STAINLESS STEEL STAPLES: Brand: PROXIMATE

## (undated) DEVICE — PAD, DEFIB, ADULT, RADIOTRANS, PHYSIO: Brand: MEDLINE

## (undated) DEVICE — SPHR MARKR STEALTH STATION

## (undated) DEVICE — STERNUM BLADE, OFFSET (32.0 X 0.8 X 6.3MM)

## (undated) DEVICE — 4-PORT MANIFOLD: Brand: NEPTUNE 2

## (undated) DEVICE — SUT VIC 0 MO4 CR8 18IN VCP701D

## (undated) DEVICE — SUT PROLN 4/0 RB1 36IN 4PK M8557

## (undated) DEVICE — GLV SURG SENSICARE W/ALOE PF LF 7.5 STRL

## (undated) DEVICE — MODEL AT P65, P/N 701554-001KIT CONTENTS: HAND CONTROLLER, 3-WAY HIGH-PRESSURE STOPCOCK WITH ROTATING END AND PREMIUM HIGH-PRESSURE TUBING: Brand: ANGIOTOUCH® KIT

## (undated) DEVICE — GW STARTER FXD CORE J .035 3X260CM 3MM

## (undated) DEVICE — TOOL MR8-14BA50 MR8 14CM BALL 5MM: Brand: MIDAS REX MR8

## (undated) DEVICE — VAGINAL PREP TRAY: Brand: MEDLINE INDUSTRIES, INC.

## (undated) DEVICE — BAPTIST TURNOVER KIT: Brand: MEDLINE INDUSTRIES, INC.

## (undated) DEVICE — GLV SURG DERMASSURE GRN LF PF 7.0

## (undated) DEVICE — E-PACK PROCEDURE KIT: Brand: E-PACK

## (undated) DEVICE — ANTIBACTERIAL VIOLET BRAIDED (POLYGLACTIN 910), SYNTHETIC ABSORBABLE SUTURE: Brand: COATED VICRYL

## (undated) DEVICE — PK SET UP ANES OPN HEART 30

## (undated) DEVICE — TB SXN SURG DLP MALL/CARD SFT/TP 6F 6IN

## (undated) DEVICE — BIPOLAR SEALER 23-113-1 AQM 2.3: Brand: AQUAMANTYS™

## (undated) DEVICE — MODEL BT2000 P/N 700287-012KIT CONTENTS: MANIFOLD WITH SALINE AND CONTRAST PORTS, SALINE TUBING WITH SPIKE AND HAND SYRINGE, TRANSDUCER: Brand: BT2000 AUTOMATED MANIFOLD KIT

## (undated) DEVICE — BLAKE SILICONE DRAINS CARDIO CONNECTOR 2:1: Brand: BLAKE

## (undated) DEVICE — SUT SILK 4/0 SUTUPAK TIES 24IN SA73H

## (undated) DEVICE — DRAPE,ANGIO,BRACH,STERILE,38X44: Brand: MEDLINE

## (undated) DEVICE — ELECTRD BLD EZ CLN MOD 4IN

## (undated) DEVICE — OASIS DRAIN, SINGLE, INLINE & ATS COMPATIBLE: Brand: OASIS

## (undated) DEVICE — PK OPN HEART 30

## (undated) DEVICE — TRAP FLD MINIVAC MEGADYNE 100ML

## (undated) DEVICE — SYS DISTNTION VEIN BONCHEK 300MMHG

## (undated) DEVICE — TOTAL TRAY, 16FR 10ML SIL FOLEY, URN: Brand: MEDLINE

## (undated) DEVICE — BLAKE CARDIO CONNECTOR 1:1: Brand: J-VAC

## (undated) DEVICE — DRSNG SURESITE123 4X10IN

## (undated) DEVICE — CATH IV ANGIO FEP 12G 3IN LTBLU 10PK

## (undated) DEVICE — KIT,ANTI FOG,W/SPONGE & FLUID,SOFT PACK: Brand: MEDLINE

## (undated) DEVICE — DRP TABL BK STERILEZ ZFLD

## (undated) DEVICE — ELECTRD BLD EZ CLN MOD XLNG 2.75IN

## (undated) DEVICE — SUT ETHIB 2/0 SH SH 36IN X523H

## (undated) DEVICE — BLD STERNALOCK XP ZDRIVE

## (undated) DEVICE — SUREFIT, DUAL DISPERSIVE ELECTRODE, CONTACT QUALITY MONITOR: Brand: SUREFIT

## (undated) DEVICE — TUBING, SUCTION, 1/4" X 12', STRAIGHT: Brand: MEDLINE

## (undated) DEVICE — DRAIN,WOUND,ROUND,24FR,5/16",FULL-FLUTED: Brand: MEDLINE

## (undated) DEVICE — INSTRUMENT 9560658 QUADRANT ILLUMIN SYS: Brand: MAST QUADRANT™ RETRACTOR SYSTEM

## (undated) DEVICE — DRP C/ARMOR

## (undated) DEVICE — MARKER,SKIN,WI/RULER AND LABELS: Brand: MEDLINE

## (undated) DEVICE — KT NDL GUIDE STRL 18GA

## (undated) DEVICE — SPK10277 JACKSON/PRO-AXIS KIT: Brand: SPK10277 JACKSON/PRO-AXIS KIT

## (undated) DEVICE — SPNG DISSCT SECTO KTTNER PK/5

## (undated) DEVICE — SYS VASOVIEW HEMOPRO ENDOSCOPIC HARVST VESL

## (undated) DEVICE — TR BAND RADIAL ARTERY COMPRESSION DEVICE: Brand: TR BAND

## (undated) DEVICE — 3M™ STERI-DRAPE™ INSTRUMENT POUCH 1018: Brand: STERI-DRAPE™

## (undated) DEVICE — 4.0MM PRECISION ROUND

## (undated) DEVICE — LP VESL MAXI 2.5X1MM RED 2PK

## (undated) DEVICE — SUT SILK 2/0 SH CR8 18IN CR8 C012D

## (undated) DEVICE — Device: Brand: ZDRIVE™

## (undated) DEVICE — SUT PROLN 5/0 C1 DA 24IN 8725H

## (undated) DEVICE — SHEET,DRAPE,53X77,STERILE: Brand: MEDLINE

## (undated) DEVICE — SUT PDS 0 CTX 36IN VIO PDP370T

## (undated) DEVICE — GLV SURG BIOGEL M LTX PF 7 1/2

## (undated) DEVICE — GLIDESHEATH SLENDER STAINLESS STEEL KIT: Brand: GLIDESHEATH SLENDER

## (undated) DEVICE — ELECTRD BLD MEGADYNE EZCLEAN STD 2.75IN XLNG

## (undated) DEVICE — ST TBG PNEUMOCLEAR EVAC SMOKE HIFLO

## (undated) DEVICE — TOOL MR8-31TD3030 MR8 TWST DRIL 3MMX30MM: Brand: MIDAS REX

## (undated) DEVICE — SUP ARMBRD ART/LINE BLU

## (undated) DEVICE — PK SPINE POST 30

## (undated) DEVICE — CVR HNDL LIGHT RIGID

## (undated) DEVICE — DISPOSABLE IRRIGATION BIPOLAR CORD, M1000 TYPE: Brand: KIRWAN

## (undated) DEVICE — 3M™ IOBAN™ 2 ANTIMICROBIAL INCISE DRAPE 6650EZ: Brand: IOBAN™ 2

## (undated) DEVICE — ELECTRD BLD EZ CLN STD 6.5IN

## (undated) DEVICE — PROBE 8225101 5PK STD PRASS FL TIP ROHS

## (undated) DEVICE — RADIFOCUS OPTITORQUE ANGIOGRAPHIC CATHETER: Brand: OPTITORQUE

## (undated) DEVICE — SUT SILK 0 SUTUPAK TIES 24IN SA76G

## (undated) DEVICE — SUT SILK 2/0 FS BLK 18IN 685G

## (undated) DEVICE — DRSNG WND SIL OPTIFOAM GNTL BRDR ADHS 1.6X2IN

## (undated) DEVICE — STRIP,CLOSURE,WOUND,MEDI-STRIP,1/2X4: Brand: MEDLINE

## (undated) DEVICE — SOLIDIFIER LIQUI LOC PLUS 2000CC

## (undated) DEVICE — SOL IRR NACL 0.9PCT BT 1000ML

## (undated) DEVICE — SYR LUERLOK 20CC BX/50

## (undated) DEVICE — ADHS LIQ MASTISOL 2/3ML